# Patient Record
Sex: MALE | Race: WHITE | NOT HISPANIC OR LATINO | Employment: OTHER | URBAN - METROPOLITAN AREA
[De-identification: names, ages, dates, MRNs, and addresses within clinical notes are randomized per-mention and may not be internally consistent; named-entity substitution may affect disease eponyms.]

---

## 2017-01-17 ENCOUNTER — OFFICE VISIT (OUTPATIENT)
Dept: PAIN MEDICINE | Facility: CLINIC | Age: 72
End: 2017-01-17

## 2017-01-17 ENCOUNTER — APPOINTMENT (OUTPATIENT)
Dept: LAB | Facility: HOSPITAL | Age: 72
End: 2017-01-17
Attending: ANESTHESIOLOGY

## 2017-01-17 VITALS
HEART RATE: 68 BPM | DIASTOLIC BLOOD PRESSURE: 73 MMHG | HEIGHT: 69 IN | BODY MASS INDEX: 29.33 KG/M2 | SYSTOLIC BLOOD PRESSURE: 137 MMHG | TEMPERATURE: 98 F | OXYGEN SATURATION: 95 % | RESPIRATION RATE: 18 BRPM | WEIGHT: 198 LBS

## 2017-01-17 DIAGNOSIS — R53.81 PHYSICAL DECONDITIONING: ICD-10-CM

## 2017-01-17 DIAGNOSIS — Z01.818 PRE-PROCEDURAL EXAMINATION: Primary | ICD-10-CM

## 2017-01-17 DIAGNOSIS — M48.062 SPINAL STENOSIS, LUMBAR REGION, WITH NEUROGENIC CLAUDICATION: ICD-10-CM

## 2017-01-17 DIAGNOSIS — Z51.81 ENCOUNTER FOR THERAPEUTIC DRUG LEVEL MONITORING: ICD-10-CM

## 2017-01-17 DIAGNOSIS — M96.1 POSTLAMINECTOMY SYNDROME OF LUMBAR REGION: ICD-10-CM

## 2017-01-17 DIAGNOSIS — M54.16 CHRONIC LUMBAR RADICULOPATHY: ICD-10-CM

## 2017-01-17 DIAGNOSIS — D51.3 OTHER DIETARY VITAMIN B12 DEFICIENCY ANEMIA: ICD-10-CM

## 2017-01-17 DIAGNOSIS — Z98.1 STATUS POST LUMBAR SPINAL FUSION: ICD-10-CM

## 2017-01-17 DIAGNOSIS — G96.198 EPIDURAL FIBROSIS: ICD-10-CM

## 2017-01-17 LAB
APTT PPP: 29.8 SECONDS (ref 24–31)
BASOPHILS # BLD AUTO: 0.1 10*3/MM3 (ref 0–0.2)
BASOPHILS NFR BLD AUTO: 1.3 % (ref 0–1)
DEPRECATED RDW RBC AUTO: 42.6 FL (ref 37–54)
EOSINOPHIL # BLD AUTO: 0.34 10*3/MM3 (ref 0.1–0.3)
EOSINOPHIL NFR BLD AUTO: 4.6 % (ref 0–3)
ERYTHROCYTE [DISTWIDTH] IN BLOOD BY AUTOMATED COUNT: 12.5 % (ref 11.3–14.5)
HCT VFR BLD AUTO: 43.9 % (ref 38.9–50.9)
HGB BLD-MCNC: 14.6 G/DL (ref 13.1–17.5)
IMM GRANULOCYTES # BLD: 0.02 10*3/MM3 (ref 0–0.03)
IMM GRANULOCYTES NFR BLD: 0.3 % (ref 0–0.6)
INR PPP: 0.95
LYMPHOCYTES # BLD AUTO: 1.6 10*3/MM3 (ref 0.6–4.8)
LYMPHOCYTES NFR BLD AUTO: 21.4 % (ref 24–44)
MCH RBC QN AUTO: 31.1 PG (ref 27–31)
MCHC RBC AUTO-ENTMCNC: 33.3 G/DL (ref 32–36)
MCV RBC AUTO: 93.4 FL (ref 80–99)
MONOCYTES # BLD AUTO: 0.76 10*3/MM3 (ref 0–1)
MONOCYTES NFR BLD AUTO: 10.2 % (ref 0–12)
NEUTROPHILS # BLD AUTO: 4.64 10*3/MM3 (ref 1.5–8.3)
NEUTROPHILS NFR BLD AUTO: 62.2 % (ref 41–71)
PLATELET # BLD AUTO: 313 10*3/MM3 (ref 150–450)
PMV BLD AUTO: 10.5 FL (ref 6–12)
PROTHROMBIN TIME: 10.3 SECONDS (ref 9.6–11.5)
RBC # BLD AUTO: 4.7 10*6/MM3 (ref 4.2–5.76)
WBC NRBC COR # BLD: 7.46 10*3/MM3 (ref 3.5–10.8)

## 2017-01-17 PROCEDURE — 85025 COMPLETE CBC W/AUTO DIFF WBC: CPT | Performed by: ANESTHESIOLOGY

## 2017-01-17 PROCEDURE — 85610 PROTHROMBIN TIME: CPT | Performed by: ANESTHESIOLOGY

## 2017-01-17 PROCEDURE — 85730 THROMBOPLASTIN TIME PARTIAL: CPT | Performed by: ANESTHESIOLOGY

## 2017-01-17 PROCEDURE — 99214 OFFICE O/P EST MOD 30 MIN: CPT | Performed by: ANESTHESIOLOGY

## 2017-01-17 PROCEDURE — 36415 COLL VENOUS BLD VENIPUNCTURE: CPT

## 2017-01-17 NOTE — PROGRESS NOTES
"Chief Complaint: \"I'm having the same pain: Pain in my left hip and all the way down my left leg into my foot and toes.  I also have some cramps in my leg. I'm here to discuss a stimulator trial with the high-frequency device.\"    Brief History: Mr. Avila returns to the clinic to discuss prospects of a spinal cord stimulator trial with Maggie.   Mr. Avila presents with a challenging chronic pain history, and has already experienced failure to obtain pain relief with conservative and interventional pain management measures. Patient has also been found not to be a surgical candidate.  Patient presents with a five year history of pain, which started after moving furniture. Patient is status post lumbar decompression and fusion that took place soon after his injury. Unfortunately, patient continued experiencing severe left lower extremity pain.   Patient underwent a spinal cord stimulator trial with a Saint Jude on 11/30/2015, which provided more than 50% relief and functional improvement. However, patient wanted to delay implantation of a permanent spinal cord stimulator device until the arrival of new technology. Patient did not like the feeling of tonic stimulation, and at times, even though the stimulation was overlapping the areas of his pain, it did not feel pleasant. At the beginning of the trial, patient experienced complete pain relief with stimulation, but soon thereafter, he did not experience enough coverage of his left foot pain. At times, he felt the pain and the stimulation at the same time.   Patient previously failed a trial of lumbar epidural steroid injections with steroids and hyaluronidase via caudal catheter on 04/25/2016, for treatment of chronic lumbosacral radiculopathy/lumbar postlaminectomy syndrome with epidural fibrosis. Patient experienced 100% pain relief along with full functional recovery that lasted for only 48 hours. Previously, he underwent diagnostic and therapeutic left S1 " "transforaminal epidural steroid injection with hyaluronidase in 12/2015, receiving complete pain relief after the procedure, but unfortunately recurring thereafter.   Patient returns to the clinic with his chronic complaints of left hip and left lower extremity pain and bilateral lower extremity muscle spasms.   There are no changes in his medical history.   Pain level: 5/10.   Pain level ranges from 0-10/10.   Patient reports that he experiences brief episodes of no pain, but that pain rapidly escalates to 10/10, and sometimes, pain wakes him up interferying with his sleep. Patient experienced improvement of his neurogenic claudication during his spinal cord stimulator trial.   Patient complains of intermittent left buttock pain, radiating into the left lower extremity to the foot and toes, described as \"electrical nerve spasms that shoot through my leg.\" The left buttock/leg pain increases immediately upon sitting or lying down. Nothing helps the pain. He reports numbness in his left leg and tingling, but denies weakness in the lower extremities.   Patient denies new symptoms, or any new bowel and/or bladder problems.   No new diagnostic studies.  He  denies difficulties with his balance or any recent falls  Current analgesics: Gabapentin 800 mg 4 times a day, Keppra 250 mg twice a day, tizanidine 4 mg daily at bedtime when necessary.  Patient does not take any supplemental opiates or other analgesics.    Review of previous therapies and consultations:  Mr. Avila failed extensive courses of conservative and interventional pain management measures by Dr. Raciel Ozuna at [facility], followed by unsuccessful L4-L5 hemilaminotomy and foraminotomy on 03/03/2014, with Dr. Mackay. He reports that Dr. Mackay recommended lumbar fusion, and therefore, sought second opinion from Dr. Renee on 05/13/2014. He underwent lumbar decompression and fusion from L4 to S1 on 07/24/2014, with Dr. Renee. Patient reports that he experienced " improvement after his last surgery, particularly complete resolution of his lower back pain. The left lower extremity pain/chronic radiculopathy preceded his lumbar fusion and did not improve subsequently. He underwent surgical consultation with Dr. Cox of the [facility], who recommended further decompression of the nerve roots at L3 through S1. Patient was last seen in follow-up consultation with Dr. Renee on 09/29/2015, and more recently on November 18, 2016, at which time Dr. Renee concurred with a spinal cord stimulator trial.   In terms of oral analgesics, patient takes gabapentin, nortriptyline and Keppra 125 mg a day, without side effects.   JOCELIN Report #[id number], appropriate.  Patient does not take any controlled substances     Review of new diagnostic studies:  MRI of the lumbar spine with and without contrast October 28, 2016.  Revealed postoperative changes of L4 L5 S1 posterior lumbar and interbody fusion.  Facet hypertrophy.  Mild bilateral foraminal stenosis at these levels.  New L3-L4 spinal stenosis secondary to severe posterior ligament hypertrophy.  AP diameter 6.5 mm in transverse diameter 9 mm with crowding of the nerve roots.  Preserved anterior CSF space.  Moderate bilateral neuroforaminal stenosis at L3-L4  MRI of the thoracic spine October 20, 2016: No evidence of significant thoracic spine disease    Previous Diagnostic Studies:   Laboratory Studies, 11/06/2015: CBC: WBC-8.62, RBC 4.42, HGB 13.8, HCT 40.1, . PT 10.7. INR 1.02. PTT 30.  X-ray of the lumbar spine, 03/24/2015: PLIF has been performed from L4 through S1. Interbody fusion devices have been placed in good position. There is no subluxation, fracture, fatigue fracture of the hardware or hardware displacement. Overall alignment in the AP and lateral projection is excellent.  X-ray of the lumbar spine flexion and extension on 09/30/2014, revealed lateral flexion and extension views of the lumbar spine demonstrate  the lumbar vertebrae to be stable in the reflex and extended positions.  Myelogram of the lumbar spine 09/30/2014, revealed changes related to posterior lumbar fusion at L4, L5, and S1. Dural sac is well distended. Normal lumbar myelogram.  CT scan of the lumbar spine post myelogram on 09/30/2014, revealed minimally bulging annulus at L3-L4. There is a bulging annulus at L4-L5. Small left paracentral disc protrusion at L5-S1.  CT of the lumbar spine without contrast on 09/22/2014, revealed pedicle screws are seen posteriorly fusing L4, L5, and S1. Minimal degenerative changes are identified within the sacroiliac joints bilaterally. Anterior spurring identified of the L4-L5 levels. Minimal degenerative changes are seen within the posterior facets. Small broad-based disc bulge identified at L3-L4 creating moderate to severe central spinal canal narrowing. Nerve root compromise cannot be excluded.  X-ray of the lumbar spine on 08/19/2014, revealed posterior fusion rods and pedicle screws are present at L3, L4, and L5. Bone graft material is also present at these levels.  X-ray of the lumbar spine 08/08/2014, revealed AP and lateral views of the lumbar spine demonstrate posterior pedicle screws and posterior lumbar fusion of L4, L5, and S1. Alignment appears satisfactory in both projections and the alignment unchanged since the immediate postoperative images of 07/14/2014.  MRI of the lumbar spine with and without contrast on 04/29/2014 revealed lumbar facet arthropathy from L2-L3 through L5-S1. L4-L5 extensive facet hypertrophy. Operative changes from left laminectomy. Enhancing tissue which is greatest on the laminectomy defect on the left side of the spinal canal and within the left subarticular recess and medial aspect of the foramen, compatible with scar. There is nonenhancing tissue extending into the inferior aspect of the left subarticular recess , most likely related to bony hypertrophy. This contacts the left  L5 nerve root. The dural sac is mildly narrowed. The right subarticular recess is moderately narrowed. Bilateral L4-L5 neuroforaminal stenosis, greater on the right. There is enhancing scar extending into the medial aspect of the left foramen and a mildly bulging annulus. L5-S1, moderate facet hypertrophy greater on the right. A diffusely bulging annulus greatest centrally. Moderate narrowing of the subarticular recesses and mild narrowing of the spinal canal. the neuroforaminal are moderately to severely narrowed, greatest medially.  MRI of the lumbar spine on 01/24/2014 without contrast revealed: L4-L5, high-grade disc protrusion, compounded by marked facet arthropathy and hypertrophy of the ligamentum flavum, producing near-complete central spinal stenosis with critical bilateral recess impingement stenosis. Not significantly changed when compared to previous 08/22/2013 exam. L5-S1, small soft disc protrusion with marked facet arthropathic hypertrophy, producing moderately severe lateral recess symmetrical bilateral stenosis in the lateral recess.  X-ray of the lumbar spine on 08/27/2013 revealed: Grade 1 anterolisthesis of L4 on L5. Moderate to severe facet arthropathy from L3 through S1. Mild L2-L3 discogenic disease. Mild to moderate L4-L5 discogenic disease. Mild L5-S1 discogenic disease.  MRI of the lumbar spine without contrast on 08/22/2013, revealed: L3-L4, fairly extensive facet hypertrophy. Minimal AP canal diameter is approximately 9 mm, no high grade stenosis is seen. L4-L5, marked posterior ligament and facet hypertrophy. Large broad-based disc protrusion with paracentral disc herniation causing marked narrowing of the canal, to approximately 2.5 or 3 mm AP diameter. Foramina appear mildly to moderately narrowed bilateral due to the bony facet changes. L5-S1 mild broad-based disc bulge. Mild lateral recess encroachment and mild bony foraminal narrowing.      Review of Systems   Musculoskeletal:  "Positive for arthralgias (left lower extremity pain).   All other systems reviewed and are negative.     The following portions of the patient's history were reviewed and updated as appropriate: problem list, past medical history, past surgery history, social history, family history, medications, and allergies     Visit Vitals   • /73 (BP Location: Left arm, Patient Position: Sitting)   • Pulse 68   • Temp 98 °F (36.7 °C) (Temporal Artery )   • Resp 18   • Ht 69\" (175.3 cm)   • Wt 198 lb (89.8 kg)   • SpO2 95%   • BMI 29.24 kg/m2      Physical Exam   Neurologic Exam  Constitutional General appearance: No acute distress, well appearing and well nourished. Appears healthy, within normal limits of ideal weight, well hydrated and appearance reflects stated age.   Head and face: Normal.   Palpation of the face and sinuses: No sinus tenderness.   Eyes Conjunctiva and lids: No erythema, swelling or discharge. Pupils: Equal, round, reactive to light.   Neck: Supple, symmetric, trachea midline, no masses.   Pulmonary Respiratory effort: No increased work of breathing or signs of respiratory distress. Respiratory rate: normal. Assessment of respiratory effort revealed normal rhythm and effort. Auscultation of lungs: Clear to auscultation. Auscultation of the lungs revealed no expiratory wheezing, normal expiratory time and no inspiratory wheezing. No rales or crackles were heard bilaterally. No rhonchi. No friction rub. No wheezing. No diminished breath sounds. No bronchial breath sounds.   Cardiovascular Auscultation of heart: Normal rate and rhythm, normal S1 and S2, no murmurs. Peripheral vascular exam: Normal. Examination of extremities for edema and/or varicosities: Normal.   Abdomen: Non-tender, no masses. Bowel sounds were normal. The abdomen was soft and nontender. No masses palpated.   Musculoskeletal Gait and station: Normal. Gait evaluation demonstrated a normal gait. The range of motion of the lumbar spine " is essentially full and without significant pain. Lumbar facet joint loading maneuvers are negative. Kodak and Gaenslen's tests are negative. The range of motion of the hip joints is full and without pain. Palpation of the gluteal bursa, ischial tuberosities, and greater trochanters, unrevealing. Piriformis maneuvers are negative. There is some discomfort upon palpation of the left piriformis muscle. Active resisted external rotation and passive internal rotation did not increase pain.   Muscle strength/tone: Normal. Motor Strength Findings: normal strength. Motor Tone: normal tone. Involuntary movements: none.  Significant shortening of the hamstrings, worse on the left side.  Skin and subcutaneous tissue: Normal without rashes or lesions.   Neurologic   Cranial nerves: Cranial nerves 2-12 intact.   Cortical function: Normal mental status.   Reflexes: 2+ and symmetric. Deep tendon reflexes: 2+ right biceps, 2+ left biceps, 1+ right patella, 1+ left patella, 1+ right ankle jerk and 1+ left ankle jerk. Superficial/Primitive Reflexes: primitive reflexes were absent. Straight leg raising test is negative. Femoral stretch sign is negative.   Sensation: No sensory loss. Sensory exam: intact to light touch, intact pain and temperature sensation, intact vibration sensation and normal proprioception.   Coordination: Normal finger to nose and heel to shin. Coordination: normal balance and negative Romberg's sign.   Psychiatric   Judgment and insight: Normal.   Orientation to person, place and time: Normal.   Recent and remote memory: Intact.   Mood and affect: Normal.     ASSESSMENT:   1. Chronic lumbar radiculopathy    2. Lumbar postlaminectomy syndrome     3. Spinal stenosis, lumbar region, with neurogenic claudication    4. Status post L4-S1 lumbar spinal fusion    5. Physical deconditioning      PLAN: Patient continues struggling with his chronic pain condition and residual symptoms.  Therefore, I have proposed the  following plan, as referenced in the history of present illness, as follows:   1. Patient will be scheduled for a spinal cord stimulator trial with a high-frequency device (Nevro).  Patient has already reviewed educational materials regarding spinal cord stimulation with Nevro. Patient has undergone follow-up surgical consultation with Dr. Renee, who has also concurred on the possibility of spinal cord stimulation. We have discussed regenerative therapies at length, including platelet rich plasma therapy, amniotic fluid therapy, neural prolotherapy, to name a few. Patient would like to move forward with a spinal cord stimulator trial, and if successful, implantation of a spinal cord stimulator device.   2. Diagnostics: We will obtain CBC, PT, PTT prior to this trial.  3. Long-term rehabilitation efforts:  a. Patient will start a comprehensive physical therapy program once his pain is under control  b. Patient has declined Pilates  4. Pharmacological measures, as follows;  a. Continue gabapentin 800 mg four times daily  b. Continue tizanidie 4 mg at bedtime as needed  c. Continue Keppra 125 mg twice daily  d. Patient has declined additional pharmacological measures at this time  5. The patient has been instructed to contact my office with any questions or difficulties. The patient understands the plan and agrees to proceed accordingly.        Patient Care Team:  Brian Obrien MD as PCP - General (Internal Medicine)  Efrain Batista MD as Consulting Physician (Pain Medicine)  Fausto Renee MD as Surgeon (Neurosurgery)     I spent 35 minutes face-to-face with the patient, of which 25 minutes were spent counseling regarding evaluation, diagnosis, prognosis, diagnostic testing, potential referrals, treatment options for chronic pain condition and overall rehabilitation, risk and benefits of different interventions, alternative therapies, a comprehensive plan of care to address physical deconditioning, risks  and benefits as it relates to spinal cord stimulator devices for trial and implantation and long-term management and functional goals of spinal cord stimulation     No orders of the defined types were placed in this encounter.        Future Appointments  Date Time Provider Department Center   1/30/2017 7:00 AM Efrain Batista MD [facility] None         Efrain Batista MD

## 2017-01-17 NOTE — LETTER
"January 17, 2017     Brian Obrien MD  [address]  [address]  [facility] [address]    Patient: Prashant Avila   YOB: 1945   Date of Visit: 1/17/2017       Dear Dr. Micah MD:    Thank you for referring Prashant Avila to me for evaluation. Below are the relevant portions of my assessment and plan of care.    If you have questions, please do not hesitate to call me. I look forward to following Prashant along with you.         Sincerely,        Efrain Batista MD        CC: MD Efrain Richmond MD  1/17/2017  7:47 AM  Signed  Chief Complaint: \"I'm having the same pain: Pain in my left hip and all the way down my left leg into my foot and toes.  I also have some cramps in my leg. I'm here to discuss a stimulator trial with the high-frequency device.\"    Brief History: Mr. Avila returns to the clinic to discuss prospects of a spinal cord stimulator trial with Maggie.   Mr. Avila presents with a challenging chronic pain history, and has already experienced failure to obtain pain relief with conservative and interventional pain management measures. Patient has also been found not to be a surgical candidate.  Patient presents with a five year history of pain, which started after moving furniture. Patient is status post lumbar decompression and fusion that took place soon after his injury. Unfortunately, patient continued experiencing severe left lower extremity pain.   Patient underwent a spinal cord stimulator trial with a [place] on 11/30/2015, which provided more than 50% relief and functional improvement. However, patient wanted to delay implantation of a permanent spinal cord stimulator device until the arrival of new technology. Patient did not like the feeling of tonic stimulation, and at times, even though the stimulation was overlapping the areas of his pain, it did not feel pleasant. At the beginning of the trial, patient experienced complete pain relief with " "stimulation, but soon thereafter, he did not experience enough coverage of his left foot pain. At times, he felt the pain and the stimulation at the same time.   Patient previously failed a trial of lumbar epidural steroid injections with steroids and hyaluronidase via caudal catheter on 04/25/2016, for treatment of chronic lumbosacral radiculopathy/lumbar postlaminectomy syndrome with epidural fibrosis. Patient experienced 100% pain relief along with full functional recovery that lasted for only 48 hours. Previously, he underwent diagnostic and therapeutic left S1 transforaminal epidural steroid injection with hyaluronidase in 12/2015, receiving complete pain relief after the procedure, but unfortunately recurring thereafter.   Patient returns to the clinic with his chronic complaints of left hip and left lower extremity pain and bilateral lower extremity muscle spasms.   There are no changes in his medical history.   Pain level: 5/10.   Pain level ranges from 0-10/10.   Patient reports that he experiences brief episodes of no pain, but that pain rapidly escalates to 10/10, and sometimes, pain wakes him up interferying with his sleep. Patient experienced improvement of his neurogenic claudication during his spinal cord stimulator trial.   Patient complains of intermittent left buttock pain, radiating into the left lower extremity to the foot and toes, described as \"electrical nerve spasms that shoot through my leg.\" The left buttock/leg pain increases immediately upon sitting or lying down. Nothing helps the pain. He reports numbness in his left leg and tingling, but denies weakness in the lower extremities.   Patient denies new symptoms, or any new bowel and/or bladder problems.   No new diagnostic studies.  He  denies difficulties with his balance or any recent falls  Current analgesics: Gabapentin 800 mg 4 times a day, Keppra 250 mg twice a day, tizanidine 4 mg daily at bedtime when necessary.  Patient does not " take any supplemental opiates or other analgesics.    Review of previous therapies and consultations:  Mr. Avila failed extensive courses of conservative and interventional pain management measures by Dr. Raciel Ozuna at [facility], followed by unsuccessful L4-L5 hemilaminotomy and foraminotomy on 03/03/2014, with Dr. Mackay. He reports that Dr. Mackay recommended lumbar fusion, and therefore, sought second opinion from Dr. Renee on 05/13/2014. He underwent lumbar decompression and fusion from L4 to S1 on 07/24/2014, with Dr. Renee. Patient reports that he experienced improvement after his last surgery, particularly complete resolution of his lower back pain. The left lower extremity pain/chronic radiculopathy preceded his lumbar fusion and did not improve subsequently. He underwent surgical consultation with Dr. Cox of the [facility], who recommended further decompression of the nerve roots at L3 through S1. Patient was last seen in follow-up consultation with Dr. Renee on 09/29/2015, and more recently on November 18, 2016, at which time Dr. Renee concurred with a spinal cord stimulator trial.   In terms of oral analgesics, patient takes gabapentin, nortriptyline and Keppra 125 mg a day, without side effects.   [facility] Report #[id number], appropriate.  Patient does not take any controlled substances     Review of new diagnostic studies:  MRI of the lumbar spine with and without contrast October 28, 2016.  Revealed postoperative changes of L4 L5 S1 posterior lumbar and interbody fusion.  Facet hypertrophy.  Mild bilateral foraminal stenosis at these levels.  New L3-L4 spinal stenosis secondary to severe posterior ligament hypertrophy.  AP diameter 6.5 mm in transverse diameter 9 mm with crowding of the nerve roots.  Preserved anterior CSF space.  Moderate bilateral neuroforaminal stenosis at L3-L4  MRI of the thoracic spine October 20, 2016: No evidence of significant thoracic spine disease    Previous Diagnostic  Studies:   Laboratory Studies, 11/06/2015: CBC: WBC-8.62, RBC 4.42, HGB 13.8, HCT 40.1, . PT 10.7. INR 1.02. PTT 30.  X-ray of the lumbar spine, 03/24/2015: PLIF has been performed from L4 through S1. Interbody fusion devices have been placed in good position. There is no subluxation, fracture, fatigue fracture of the hardware or hardware displacement. Overall alignment in the AP and lateral projection is excellent.  X-ray of the lumbar spine flexion and extension on 09/30/2014, revealed lateral flexion and extension views of the lumbar spine demonstrate the lumbar vertebrae to be stable in the reflex and extended positions.  Myelogram of the lumbar spine 09/30/2014, revealed changes related to posterior lumbar fusion at L4, L5, and S1. Dural sac is well distended. Normal lumbar myelogram.  CT scan of the lumbar spine post myelogram on 09/30/2014, revealed minimally bulging annulus at L3-L4. There is a bulging annulus at L4-L5. Small left paracentral disc protrusion at L5-S1.  CT of the lumbar spine without contrast on 09/22/2014, revealed pedicle screws are seen posteriorly fusing L4, L5, and S1. Minimal degenerative changes are identified within the sacroiliac joints bilaterally. Anterior spurring identified of the L4-L5 levels. Minimal degenerative changes are seen within the posterior facets. Small broad-based disc bulge identified at L3-L4 creating moderate to severe central spinal canal narrowing. Nerve root compromise cannot be excluded.  X-ray of the lumbar spine on 08/19/2014, revealed posterior fusion rods and pedicle screws are present at L3, L4, and L5. Bone graft material is also present at these levels.  X-ray of the lumbar spine 08/08/2014, revealed AP and lateral views of the lumbar spine demonstrate posterior pedicle screws and posterior lumbar fusion of L4, L5, and S1. Alignment appears satisfactory in both projections and the alignment unchanged since the immediate postoperative images of  07/14/2014.  MRI of the lumbar spine with and without contrast on 04/29/2014 revealed lumbar facet arthropathy from L2-L3 through L5-S1. L4-L5 extensive facet hypertrophy. Operative changes from left laminectomy. Enhancing tissue which is greatest on the laminectomy defect on the left side of the spinal canal and within the left subarticular recess and medial aspect of the foramen, compatible with scar. There is nonenhancing tissue extending into the inferior aspect of the left subarticular recess , most likely related to bony hypertrophy. This contacts the left L5 nerve root. The dural sac is mildly narrowed. The right subarticular recess is moderately narrowed. Bilateral L4-L5 neuroforaminal stenosis, greater on the right. There is enhancing scar extending into the medial aspect of the left foramen and a mildly bulging annulus. L5-S1, moderate facet hypertrophy greater on the right. A diffusely bulging annulus greatest centrally. Moderate narrowing of the subarticular recesses and mild narrowing of the spinal canal. the neuroforaminal are moderately to severely narrowed, greatest medially.  MRI of the lumbar spine on 01/24/2014 without contrast revealed: L4-L5, high-grade disc protrusion, compounded by marked facet arthropathy and hypertrophy of the ligamentum flavum, producing near-complete central spinal stenosis with critical bilateral recess impingement stenosis. Not significantly changed when compared to previous 08/22/2013 exam. L5-S1, small soft disc protrusion with marked facet arthropathic hypertrophy, producing moderately severe lateral recess symmetrical bilateral stenosis in the lateral recess.  X-ray of the lumbar spine on 08/27/2013 revealed: Grade 1 anterolisthesis of L4 on L5. Moderate to severe facet arthropathy from L3 through S1. Mild L2-L3 discogenic disease. Mild to moderate L4-L5 discogenic disease. Mild L5-S1 discogenic disease.  MRI of the lumbar spine without contrast on 08/22/2013,  "revealed: L3-L4, fairly extensive facet hypertrophy. Minimal AP canal diameter is approximately 9 mm, no high grade stenosis is seen. L4-L5, marked posterior ligament and facet hypertrophy. Large broad-based disc protrusion with paracentral disc herniation causing marked narrowing of the canal, to approximately 2.5 or 3 mm AP diameter. Foramina appear mildly to moderately narrowed bilateral due to the bony facet changes. L5-S1 mild broad-based disc bulge. Mild lateral recess encroachment and mild bony foraminal narrowing.      Review of Systems   Musculoskeletal: Positive for arthralgias (left lower extremity pain).   All other systems reviewed and are negative.     The following portions of the patient's history were reviewed and updated as appropriate: problem list, past medical history, past surgery history, social history, family history, medications, and allergies     Visit Vitals   • /73 (BP Location: Left arm, Patient Position: Sitting)   • Pulse 68   • Temp 98 °F (36.7 °C) (Temporal Artery )   • Resp 18   • Ht 69\" (175.3 cm)   • Wt 198 lb (89.8 kg)   • SpO2 95%   • BMI 29.24 kg/m2      Physical Exam   Neurologic Exam  Constitutional General appearance: No acute distress, well appearing and well nourished. Appears healthy, within normal limits of ideal weight, well hydrated and appearance reflects stated age.   Head and face: Normal.   Palpation of the face and sinuses: No sinus tenderness.   Eyes Conjunctiva and lids: No erythema, swelling or discharge. Pupils: Equal, round, reactive to light.   Neck: Supple, symmetric, trachea midline, no masses.   Pulmonary Respiratory effort: No increased work of breathing or signs of respiratory distress. Respiratory rate: normal. Assessment of respiratory effort revealed normal rhythm and effort. Auscultation of lungs: Clear to auscultation. Auscultation of the lungs revealed no expiratory wheezing, normal expiratory time and no inspiratory wheezing. No rales or " crackles were heard bilaterally. No rhonchi. No friction rub. No wheezing. No diminished breath sounds. No bronchial breath sounds.   Cardiovascular Auscultation of heart: Normal rate and rhythm, normal S1 and S2, no murmurs. Peripheral vascular exam: Normal. Examination of extremities for edema and/or varicosities: Normal.   Abdomen: Non-tender, no masses. Bowel sounds were normal. The abdomen was soft and nontender. No masses palpated.   Musculoskeletal Gait and station: Normal. Gait evaluation demonstrated a normal gait. The range of motion of the lumbar spine is essentially full and without significant pain. Lumbar facet joint loading maneuvers are negative. Kodak and Gaenslen's tests are negative. The range of motion of the hip joints is full and without pain. Palpation of the gluteal bursa, ischial tuberosities, and greater trochanters, unrevealing. Piriformis maneuvers are negative. There is some discomfort upon palpation of the left piriformis muscle. Active resisted external rotation and passive internal rotation did not increase pain.   Muscle strength/tone: Normal. Motor Strength Findings: normal strength. Motor Tone: normal tone. Involuntary movements: none.  Significant shortening of the hamstrings, worse on the left side.  Skin and subcutaneous tissue: Normal without rashes or lesions.   Neurologic   Cranial nerves: Cranial nerves 2-12 intact.   Cortical function: Normal mental status.   Reflexes: 2+ and symmetric. Deep tendon reflexes: 2+ right biceps, 2+ left biceps, 1+ right patella, 1+ left patella, 1+ right ankle jerk and 1+ left ankle jerk. Superficial/Primitive Reflexes: primitive reflexes were absent. Straight leg raising test is negative. Femoral stretch sign is negative.   Sensation: No sensory loss. Sensory exam: intact to light touch, intact pain and temperature sensation, intact vibration sensation and normal proprioception.   Coordination: Normal finger to nose and heel to shin.  Coordination: normal balance and negative Romberg's sign.   Psychiatric   Judgment and insight: Normal.   Orientation to person, place and time: Normal.   Recent and remote memory: Intact.   Mood and affect: Normal.     ASSESSMENT:   1. Chronic lumbar radiculopathy    2. Lumbar postlaminectomy syndrome     3. Spinal stenosis, lumbar region, with neurogenic claudication    4. Status post L4-S1 lumbar spinal fusion    5. Physical deconditioning      PLAN: Patient continues struggling with his chronic pain condition and residual symptoms.  Therefore, I have proposed the following plan, as referenced in the history of present illness, as follows:   1. Patient will be scheduled for a spinal cord stimulator trial with a high-frequency device ([employer]).  Patient has already reviewed educational materials regarding spinal cord stimulation with Nevro. Patient has undergone follow-up surgical consultation with Dr. Renee, who has also concurred on the possibility of spinal cord stimulation. We have discussed regenerative therapies at length, including platelet rich plasma therapy, amniotic fluid therapy, neural prolotherapy, to name a few. Patient would like to move forward with a spinal cord stimulator trial, and if successful, implantation of a spinal cord stimulator device.   2. Diagnostics: We will obtain CBC, PT, PTT prior to this trial.  3. Long-term rehabilitation efforts:  a. Patient will start a comprehensive physical therapy program once his pain is under control  b. Patient has declined Pilates  4. Pharmacological measures, as follows;  a. Continue gabapentin 800 mg four times daily  b. Continue tizanidie 4 mg at bedtime as needed  c. Continue Keppra 125 mg twice daily  d. Patient has declined additional pharmacological measures at this time  5. The patient has been instructed to contact my office with any questions or difficulties. The patient understands the plan and agrees to proceed accordingly.        Patient  Care Team:  Brian Obrien MD as PCP - General (Internal Medicine)  Efrain Batista MD as Consulting Physician (Pain Medicine)  Fausto Renee MD as Surgeon (Neurosurgery)     I spent 35 minutes face-to-face with the patient, of which 25 minutes were spent counseling regarding evaluation, diagnosis, prognosis, diagnostic testing, potential referrals, treatment options for chronic pain condition and overall rehabilitation, risk and benefits of different interventions, alternative therapies, a comprehensive plan of care to address physical deconditioning, risks and benefits as it relates to spinal cord stimulator devices for trial and implantation and long-term management and functional goals of spinal cord stimulation     No orders of the defined types were placed in this encounter.        Future Appointments  Date Time Provider Department Center   1/30/2017 7:00 AM Efrain Batista MD [facility] None         Efrain Batista MD

## 2017-01-17 NOTE — MR AVS SNAPSHOT
Prashant Avila   1/17/2017 7:00 AM   Office Visit    Dept Phone:  [phone]   Encounter #:  [id number]    Provider:  Efrain Batista MD   Department:  [facility]                Your Full Care Plan              Today's Medication Changes          These changes are accurate as of: 1/17/17  7:52 AM.  If you have any questions, ask your nurse or doctor.               Stop taking medication(s)listed here:     nortriptyline 10 MG capsule   Commonly known as:  PAMELOR   Stopped by:  Efrain Batista MD                      Your Updated Medication List          This list is accurate as of: 1/17/17  7:52 AM.  Always use your most recent med list.                gabapentin 800 MG tablet   Commonly known as:  NEURONTIN   Take 1 tablet by mouth 4 (four) times a day.       levETIRAcetam 250 MG tablet   Commonly known as:  KEPPRA       LIPITOR 10 MG tablet   Generic drug:  atorvastatin       tiZANidine 4 MG tablet   Commonly known as:  ZANAFLEX   Take 1 tablet by mouth At Night As Needed for muscle spasms.       triamterene-hydrochlorothiazide 37.5-25 MG per tablet   Commonly known as:  MAXZIDE-25               You Were Diagnosed With        Codes Comments    Chronic lumbar radiculopathy     ICD-10-CM: M54.16  ICD-9-CM: 724.4     Postlaminectomy syndrome of lumbar region     ICD-10-CM: M96.1  ICD-9-CM: 722.83     Spinal stenosis, lumbar region, with neurogenic claudication     ICD-10-CM: M48.06  ICD-9-CM: 724.03     Status post lumbar spinal fusion     ICD-10-CM: Z98.1  ICD-9-CM: V45.4     Physical deconditioning     ICD-10-CM: R53.81  ICD-9-CM: 799.3       Instructions     None    Patient Instructions History      Upcoming Appointments     Visit Type Date Time Department    OFFICE VISIT 1/17/2017  7:00 AM [facility]    OUTSIDE FACILITY 1/30/2017  7:00 AM [facility]      MyChart Signup     Our records indicate that your [facility] [employer] account  "has been deactivated. If you would like to reactivate your account, please email [email or web address] or call [phone] to talk to our [employer] staff.             Other Info from Your Visit           Your Appointments     Jan 30, 2017  7:00 AM EST   Outside Facility with Efrain Batista MD   [facility] (--)    [address] ,  [address] [address] [phone]              Allergies     Doxycycline        Reason for Visit     Follow-up SCS Trial      Vital Signs     Blood Pressure Pulse Temperature Respirations Height Weight    137/73 (BP Location: Left arm, Patient Position: Sitting) 68 98 °F (36.7 °C) (Temporal Artery ) 18 69\" (175.3 cm) 198 lb (89.8 kg)    Oxygen Saturation Body Mass Index Smoking Status             95% 29.24 kg/m2 Never Smoker         Problems and Diagnoses Noted     Chronic lumbar radiculopathy    Postlaminectomy syndrome of lumbar region    Physical deconditioning    Narrowing of spinal canal    Status post lumbar spinal fusion        "

## 2017-01-30 ENCOUNTER — OUTSIDE FACILITY SERVICE (OUTPATIENT)
Dept: PAIN MEDICINE | Facility: CLINIC | Age: 72
End: 2017-01-30

## 2017-01-30 PROCEDURE — 99153 MOD SED SAME PHYS/QHP EA: CPT | Performed by: ANESTHESIOLOGY

## 2017-01-30 PROCEDURE — 95972 ALYS CPLX SP/PN NPGT W/PRGRM: CPT | Performed by: ANESTHESIOLOGY

## 2017-01-30 PROCEDURE — 63650 IMPLANT NEUROELECTRODES: CPT | Performed by: ANESTHESIOLOGY

## 2017-01-30 PROCEDURE — 99152 MOD SED SAME PHYS/QHP 5/>YRS: CPT | Performed by: ANESTHESIOLOGY

## 2017-02-02 ENCOUNTER — OFFICE VISIT (OUTPATIENT)
Dept: PAIN MEDICINE | Facility: CLINIC | Age: 72
End: 2017-02-02

## 2017-02-02 VITALS
TEMPERATURE: 97.8 F | SYSTOLIC BLOOD PRESSURE: 157 MMHG | WEIGHT: 199 LBS | RESPIRATION RATE: 18 BRPM | HEIGHT: 69 IN | BODY MASS INDEX: 29.47 KG/M2 | OXYGEN SATURATION: 96 % | HEART RATE: 66 BPM | DIASTOLIC BLOOD PRESSURE: 74 MMHG

## 2017-02-02 DIAGNOSIS — M96.1 POSTLAMINECTOMY SYNDROME OF LUMBAR REGION: ICD-10-CM

## 2017-02-02 DIAGNOSIS — R53.81 PHYSICAL DECONDITIONING: ICD-10-CM

## 2017-02-02 DIAGNOSIS — M48.062 SPINAL STENOSIS, LUMBAR REGION, WITH NEUROGENIC CLAUDICATION: ICD-10-CM

## 2017-02-02 DIAGNOSIS — M54.16 CHRONIC LUMBAR RADICULOPATHY: ICD-10-CM

## 2017-02-02 DIAGNOSIS — Z98.1 STATUS POST LUMBAR SPINAL FUSION: ICD-10-CM

## 2017-02-02 DIAGNOSIS — G96.198 EPIDURAL FIBROSIS: ICD-10-CM

## 2017-02-02 PROCEDURE — 99024 POSTOP FOLLOW-UP VISIT: CPT | Performed by: ANESTHESIOLOGY

## 2017-02-02 PROCEDURE — 95972 ALYS CPLX SP/PN NPGT W/PRGRM: CPT | Performed by: ANESTHESIOLOGY

## 2017-02-02 RX ORDER — NAPROXEN SODIUM 220 MG
220 TABLET ORAL 2 TIMES DAILY PRN
COMMUNITY
End: 2017-11-03

## 2017-02-02 NOTE — PROGRESS NOTES
"Chief Complaint: \"I am not getting relief from the stimulator. I'm having the same pain: Pain in my left hip and all the way down my left leg into my foot and toes. I also have some cramps in my leg.\"    Brief History: Mr. Prashant Avila is a 72 y.o. male, who returns to the clinic for possible spinal cord stimulator reprogramming and possible removal of spinal cord stimulator trial leads placed on 01/30/2017. Mr. Prashant Avila reports no significant pain relief of his chronic left lower extremity pain with the use of his Nevro stimulator device. He has remained afebrile throughout the trial. Dressings are dry and intact. Patient denies any complications related to the procedure, any new symptoms or any new bladder or bowel problems. Patient denies   numbness and weakness in the lower extremities, or any new bladder or bowel problems.   Mr. Avila presents with a challenging chronic pain history, and has already experienced failure to obtain pain relief with conservative and interventional pain management measures. Patient has also been found not to be a surgical candidate. Patient presents with a five year history of pain, which started after moving furniture. Patient is status post lumbar decompression and fusion that took place soon after his injury. Unfortunately, patient continued experiencing severe left lower extremity pain.   Patient underwent a spinal cord stimulator trial with a [place] on 11/30/2015, which provided more than 50% relief and functional improvement. However, patient wanted to delay implantation of a permanent spinal cord stimulator device until the arrival of new technology. Patient did not like the feeling of tonic stimulation, and at times, even though the stimulation was overlapping the areas of his pain, it did not feel pleasant. At the beginning of the trial, patient experienced complete pain relief with stimulation, but soon thereafter, he did not experience enough coverage of " "his left foot pain. At times, he felt the pain and the stimulation at the same time. Patient previously failed a trial of lumbar epidural steroid injections with steroids and hyaluronidase via caudal catheter on 04/25/2016, for treatment of chronic lumbosacral radiculopathy/lumbar postlaminectomy syndrome with epidural fibrosis. Patient experienced 100% pain relief along with full functional recovery that lasted for only 48 hours. Previously, he underwent diagnostic and therapeutic left S1 transforaminal epidural steroid injection with hyaluronidase in 12/2015, receiving complete pain relief after the procedure, but unfortunately recurring thereafter.   Pain level: 5/10.   Pain level ranges from 0-10/10.   Patient reports that he experiences brief episodes of no pain, but that pain rapidly escalates to 10/10, and sometimes, pain wakes him up interferying with his sleep. Patient complains of intermittent left buttock pain, radiating into the left lower extremity to the foot and toes, described as \"electrical nerve spasms that shoot through my leg.\" The left buttock/leg pain increases immediately upon sitting or lying down. Nothing helps the pain. He reports numbness in his left leg and tingling, but denies weakness in the lower extremities.   Patient denies new symptoms, or any new bowel and/or bladder problems.   No new diagnostic studies. He denies difficulties with his balance or any recent falls  Current analgesics: Gabapentin 800 mg 4 times a day, Keppra 250 mg twice a day, tizanidine 4 mg daily at bedtime when necessary. Patient does not take any supplemental opiates or other analgesics.     Review of previous therapies and consultations:  Mr. Avila failed extensive courses of conservative and interventional pain management measures by Dr. Raciel Ozuna at [facility], followed by unsuccessful L4-L5 hemilaminotomy and foraminotomy on 03/03/2014, with Dr. Mackay. He reports that Dr. Mackay recommended lumbar fusion, and " therefore, sought second opinion from Dr. Renee on 05/13/2014. He underwent lumbar decompression and fusion from L4 to S1 on 07/24/2014, with Dr. Renee. Patient reports that he experienced improvement after his last surgery, particularly complete resolution of his lower back pain. The left lower extremity pain/chronic radiculopathy preceded his lumbar fusion and did not improve subsequently. He underwent surgical consultation with Dr. Cox of the [facility], who recommended further decompression of the nerve roots at L3 through S1. Patient was last seen in follow-up consultation with Dr. Renee on 09/29/2015, and more recently on November 18, 2016, at which time Dr. Renee concurred with a spinal cord stimulator trial.   In terms of oral analgesics, patient takes gabapentin, nortriptyline and Keppra 125 mg a day, without side effects.   [facility] Report #[id number], appropriate.  Patient does not take any controlled substances      Diagnostic studies:  MRI of the lumbar spine with and without contrast October 28, 2016. Revealed postoperative changes of L4 L5 S1 posterior lumbar and interbody fusion. Facet hypertrophy. Mild bilateral foraminal stenosis at these levels. New L3-L4 spinal stenosis secondary to severe posterior ligament hypertrophy. AP diameter 6.5 mm in transverse diameter 9 mm with crowding of the nerve roots. Preserved anterior CSF space. Moderate bilateral neuroforaminal stenosis at L3-L4  MRI of the thoracic spine October 20, 2016: No evidence of significant thoracic spine disease  Laboratory Studies, 11/06/2015: CBC: WBC-8.62, RBC 4.42, HGB 13.8, HCT 40.1, . PT 10.7. INR 1.02. PTT 30.  X-ray of the lumbar spine, 03/24/2015: PLIF has been performed from L4 through S1. Interbody fusion devices have been placed in good position. There is no subluxation, fracture, fatigue fracture of the hardware or hardware displacement. Overall alignment in the AP and lateral projection is  excellent.  X-ray of the lumbar spine flexion and extension on 09/30/2014, revealed lateral flexion and extension views of the lumbar spine demonstrate the lumbar vertebrae to be stable in the reflex and extended positions.  Myelogram of the lumbar spine 09/30/2014, revealed changes related to posterior lumbar fusion at L4, L5, and S1. Dural sac is well distended. Normal lumbar myelogram.  CT scan of the lumbar spine post myelogram on 09/30/2014, revealed minimally bulging annulus at L3-L4. There is a bulging annulus at L4-L5. Small left paracentral disc protrusion at L5-S1.  CT of the lumbar spine without contrast on 09/22/2014, revealed pedicle screws are seen posteriorly fusing L4, L5, and S1. Minimal degenerative changes are identified within the sacroiliac joints bilaterally. Anterior spurring identified of the L4-L5 levels. Minimal degenerative changes are seen within the posterior facets. Small broad-based disc bulge identified at L3-L4 creating moderate to severe central spinal canal narrowing. Nerve root compromise cannot be excluded.  X-ray of the lumbar spine on 08/19/2014, revealed posterior fusion rods and pedicle screws are present at L3, L4, and L5. Bone graft material is also present at these levels.  X-ray of the lumbar spine 08/08/2014, revealed AP and lateral views of the lumbar spine demonstrate posterior pedicle screws and posterior lumbar fusion of L4, L5, and S1. Alignment appears satisfactory in both projections and the alignment unchanged since the immediate postoperative images of 07/14/2014.  MRI of the lumbar spine with and without contrast on 04/29/2014 revealed lumbar facet arthropathy from L2-L3 through L5-S1. L4-L5 extensive facet hypertrophy. Operative changes from left laminectomy. Enhancing tissue which is greatest on the laminectomy defect on the left side of the spinal canal and within the left subarticular recess and medial aspect of the foramen, compatible with scar. There is  nonenhancing tissue extending into the inferior aspect of the left subarticular recess , most likely related to bony hypertrophy. This contacts the left L5 nerve root. The dural sac is mildly narrowed. The right subarticular recess is moderately narrowed. Bilateral L4-L5 neuroforaminal stenosis, greater on the right. There is enhancing scar extending into the medial aspect of the left foramen and a mildly bulging annulus. L5-S1, moderate facet hypertrophy greater on the right. A diffusely bulging annulus greatest centrally. Moderate narrowing of the subarticular recesses and mild narrowing of the spinal canal. the neuroforaminal are moderately to severely narrowed, greatest medially.  MRI of the lumbar spine on 01/24/2014 without contrast revealed: L4-L5, high-grade disc protrusion, compounded by marked facet arthropathy and hypertrophy of the ligamentum flavum, producing near-complete central spinal stenosis with critical bilateral recess impingement stenosis. Not significantly changed when compared to previous 08/22/2013 exam. L5-S1, small soft disc protrusion with marked facet arthropathic hypertrophy, producing moderately severe lateral recess symmetrical bilateral stenosis in the lateral recess.  X-ray of the lumbar spine on 08/27/2013 revealed: Grade 1 anterolisthesis of L4 on L5. Moderate to severe facet arthropathy from L3 through S1. Mild L2-L3 discogenic disease. Mild to moderate L4-L5 discogenic disease. Mild L5-S1 discogenic disease.  MRI of the lumbar spine without contrast on 08/22/2013, revealed: L3-L4, fairly extensive facet hypertrophy. Minimal AP canal diameter is approximately 9 mm, no high grade stenosis is seen. L4-L5, marked posterior ligament and facet hypertrophy. Large broad-based disc protrusion with paracentral disc herniation causing marked narrowing of the canal, to approximately 2.5 or 3 mm AP diameter. Foramina appear mildly to moderately narrowed bilateral due to the bony facet  "changes. L5-S1 mild broad-based disc bulge. Mild lateral recess encroachment and mild bony foraminal narrowing.       The following portions of the patient's history were reviewed and updated as appropriate: problem list, past medical history, past surgery history, social history, family history, medications, and allergies    Review of Systems   Musculoskeletal: Positive for arthralgias (leg pain).   All other systems reviewed and are negative.    Visit Vitals   • /74 (BP Location: Right arm, Patient Position: Sitting)   • Pulse 66   • Temp 97.8 °F (36.6 °C) (Temporal Artery )   • Resp 18   • Ht 69\" (175.3 cm)   • Wt 199 lb (90.3 kg)   • SpO2 96%   • BMI 29.39 kg/m2      Physical Exam   Neurologic Exam  Constitutional General appearance: No acute distress, well appearing and well nourished. Appears healthy, within normal limits of ideal weight, well hydrated and appearance reflects stated age.   Head and face: Normal.   Palpation of the face and sinuses: No sinus tenderness.   Eyes Conjunctiva and lids: No erythema, swelling or discharge. Pupils: Equal, round, reactive to light.   Neck: Supple, symmetric, trachea midline, no masses.   Pulmonary Respiratory effort: No increased work of breathing or signs of respiratory distress. Respiratory rate: normal. Assessment of respiratory effort revealed normal rhythm and effort. Auscultation of lungs: Clear to auscultation. Auscultation of the lungs revealed no expiratory wheezing, normal expiratory time and no inspiratory wheezing. No rales or crackles were heard bilaterally. No rhonchi. No friction rub. No wheezing. No diminished breath sounds. No bronchial breath sounds.   Cardiovascular Auscultation of heart: Normal rate and rhythm, normal S1 and S2, no murmurs. Peripheral vascular exam: Normal. Examination of extremities for edema and/or varicosities: Normal.   Abdomen: Non-tender, no masses. Bowel sounds were normal. The abdomen was soft and nontender. No masses " palpated.   Musculoskeletal Gait and station: Normal. Gait evaluation demonstrated a normal gait. The range of motion of the lumbar spine is essentially full and without significant pain. Lumbar facet joint loading maneuvers are negative. Kodak and Gaenslen's tests are negative. The range of motion of the hip joints is full and without pain. Palpation of the gluteal bursa, ischial tuberosities, and greater trochanters, unrevealing. Piriformis maneuvers are negative. There is some discomfort upon palpation of the left piriformis muscle. Active resisted external rotation and passive internal rotation did not increase pain.   Muscle strength/tone: Normal. Motor Strength Findings: normal strength. Motor Tone: normal tone. Involuntary movements: none. Significant shortening of the hamstrings, worse on the left side.  Skin and subcutaneous tissue: Normal without rashes or lesions.   Neurologic   Cranial nerves: Cranial nerves 2-12 intact.   Cortical function: Normal mental status.   Reflexes: 2+ and symmetric. Deep tendon reflexes: 2+ right biceps, 2+ left biceps, 1+ right patella, 1+ left patella, 1+ right ankle jerk and 1+ left ankle jerk. Superficial/Primitive Reflexes: primitive reflexes were absent. Straight leg raising test is negative. Femoral stretch sign is negative.   Sensation: No sensory loss. Sensory exam: intact to light touch, intact pain and temperature sensation, intact vibration sensation and normal proprioception.   Coordination: Normal finger to nose and heel to shin. Coordination: normal balance and negative Romberg's sign.   Psychiatric   Judgment and insight: Normal.   Orientation to person, place and time: Normal.   Recent and remote memory: Intact.   Mood and affect: Normal.     PROCEDURE: Analysis of the spinal cord stimulator device with complex spinal cord stimulator reprogramming   Patient used the Nevro spinal cord stimulator device 100% of the time since initiation of the trial phase on  2017. The spinal cord stimulator device was reprogrammed under my direct supervision and two programs were created, and the previous programs (P1, P2, P3) deleted, by adjusting electrode polarities, amplitude, pulse width, and pulse rate, in the following fashion;  Program P4   Electrode polarities: 7-, 8+  Amplitude: 2.5 mA     Pulse width: 30 mcs  Rate: 10,000 Hz    Program P5   Electrode polarities: 9-, 10+, 11-, 12+, 13-, 14+, 15-, 16+  Amplitude: 2.5 mA     Pulse width 30 µs  Pulse rate: 10,000 Hz  Cycling: On    Time spent reprogrammin minutes  A copy of the telemetry report will be scanned in the patient's chart.    ASSESSMENT:   1. Chronic lumbar radiculopathy    2. Epidural fibrosis    3. Postlaminectomy syndrome of lumbar region    4. Spinal stenosis, lumbar region, with neurogenic claudication    5. Status post L4-S1 lumbar spinal fusion    6. Physical deconditioning      PLAN: Patient did not appreciate significant pain relief with his current spinal cord stimulator programs.  Therefore, we will extend his trial;  1.  Follow-up tomorrow at 1 PM for possible spinal cord stimulator reprogramming and removal of the spinal cord stimulator trial leads. Patient has undergone follow-up surgical consultation with Dr. Renee, who has also concurred on the possibility of spinal cord stimulation. We have discussed regenerative therapies at length, including platelet rich plasma therapy, amniotic fluid therapy, neural prolotherapy, to name a few.   2. Long-term rehabilitation efforts:  a. Patient will start a comprehensive physical therapy program once his pain is under control  b. Patient has declined Pilates  3. Pharmacological measures, as follows;  a. Continue gabapentin 800 mg four times daily  b. Continue tizanidine 4 mg at bedtime as needed  c. Continue Keppra 125 mg twice daily  d. Patient has declined additional pharmacological measures at this time  4. The patient and his wife have been  instructed to contact my office with any questions or difficulties. The patient understands the plan and agrees to proceed accordingly.        Patient Care Team:  Brian Obrien MD as PCP - General (Internal Medicine)  Efrain Batista MD as Consulting Physician (Pain Medicine)  Fausto Renee MD as Referring Physician (Neurosurgery)     New Medications Ordered This Visit   Medications   • naproxen sodium (ALEVE) 220 MG tablet     Sig: Take 220 mg by mouth 2 (Two) Times a Day As Needed for mild pain (1-3).         No future appointments.      Efrain Batista MD      EMR Dragon/Transcription disclaimer:  Much of this encounter note is an electronic transcription of spoken language to printed text. Electronic transcription of spoken language may permit erroneous, or at times, nonsensical words or phrases to be inadvertently transcribed. Although I have reviewed the note for such errors, some may still exist.

## 2017-02-03 ENCOUNTER — OFFICE VISIT (OUTPATIENT)
Dept: PAIN MEDICINE | Facility: CLINIC | Age: 72
End: 2017-02-03

## 2017-02-03 VITALS
SYSTOLIC BLOOD PRESSURE: 140 MMHG | WEIGHT: 199 LBS | BODY MASS INDEX: 29.47 KG/M2 | RESPIRATION RATE: 18 BRPM | HEIGHT: 69 IN | OXYGEN SATURATION: 97 % | TEMPERATURE: 97.8 F | HEART RATE: 72 BPM | DIASTOLIC BLOOD PRESSURE: 72 MMHG

## 2017-02-03 DIAGNOSIS — M48.062 SPINAL STENOSIS, LUMBAR REGION, WITH NEUROGENIC CLAUDICATION: ICD-10-CM

## 2017-02-03 DIAGNOSIS — Z98.1 STATUS POST LUMBAR SPINAL FUSION: ICD-10-CM

## 2017-02-03 DIAGNOSIS — M54.16 CHRONIC LUMBAR RADICULOPATHY: ICD-10-CM

## 2017-02-03 DIAGNOSIS — M96.1 POSTLAMINECTOMY SYNDROME OF LUMBAR REGION: ICD-10-CM

## 2017-02-03 DIAGNOSIS — R53.81 PHYSICAL DECONDITIONING: ICD-10-CM

## 2017-02-03 DIAGNOSIS — G96.198 EPIDURAL FIBROSIS: ICD-10-CM

## 2017-02-03 PROCEDURE — 99024 POSTOP FOLLOW-UP VISIT: CPT | Performed by: ANESTHESIOLOGY

## 2017-02-03 PROCEDURE — 95972 ALYS CPLX SP/PN NPGT W/PRGRM: CPT | Performed by: ANESTHESIOLOGY

## 2017-02-03 RX ORDER — TIZANIDINE 4 MG/1
4 TABLET ORAL 3 TIMES DAILY PRN
Qty: 90 TABLET | Refills: 6 | Status: SHIPPED | OUTPATIENT
Start: 2017-02-03 | End: 2017-11-03

## 2017-02-03 RX ORDER — DESIPRAMINE HYDROCHLORIDE 10 MG/1
10 TABLET ORAL NIGHTLY
Qty: 60 TABLET | Refills: 6 | Status: SHIPPED | OUTPATIENT
Start: 2017-02-03 | End: 2017-02-20 | Stop reason: ALTCHOICE

## 2017-02-03 RX ORDER — OXCARBAZEPINE 150 MG/1
150 TABLET, FILM COATED ORAL 2 TIMES DAILY
Qty: 60 TABLET | Refills: 3 | Status: SHIPPED | OUTPATIENT
Start: 2017-02-03 | End: 2017-02-20 | Stop reason: ALTCHOICE

## 2017-02-03 NOTE — PROGRESS NOTES
"Chief Complaint: \"I did not get any relief from this stimulator. I have changed the programs as I was instructed. I have pain in my left hip all the way down to my left foot and toes. I have cramps in my leg.\"    Brief History: Mr. Prashant Avila is a 72 y.o. male, who returns to the clinic for additional spinal cord stimulator reprogramming with a different spinal cord device , possible removal of spinal cord stimulator trial leads placed on 01/30/2017, and to discuss alternative options for management of his intractable pain. Mr. Prashant Avila reports no pain relief of his chronic left lower extremity pain with the use of his Nevro stimulator device despite the use of the seven programs tried as per Maggie's protocol. Patient has remained afebrile throughout the trial. Dressings are dry and intact. Patient denies any complications related to the procedure, any new symptoms or any new bladder or bowel problems. Patient denies numbness or weakness in the lower extremities, or any new bladder or bowel problems.   Mr. Avila presents with a challenging chronic pain history and has already failed to obtain pain relief with surgical measures, conservative measures, as well as interventional pain management measures. In addition, patient has been found not to be a candidate for additional surgery.   Patient presents with a five year history of pain, which started after moving furniture. Patient underwent lumbar decompression and fusion soon after his injury. Unfortunately, patient continued experiencing severe left lower extremity pain.   On 11/30/2015, patient underwent a spinal cord stimulator trial with a Saint Jude Medical Device, which provided him with more than 50% pain relief and functional improvement. However, patient decided to delay implantation of a permanent spinal cord stimulator device until the arrival of new technology. Patient did not like the feeling of tonic stimulation, and at times, " "even though stimulation was overlapping the areas of his chronic pain, he did not find it pleasant.   Patient previously failed a trial of lumbar epidural steroid injections with steroids and hyaluronidase via caudal catheter on 04/25/2016, for treatment of chronic lumbosacral radiculopathy/lumbar postlaminectomy syndrome with epidural fibrosis. Patient experienced 100% pain relief along with full functional recovery that lasted for only 48 hours. Previously, he underwent diagnostic and therapeutic left S1 transforaminal epidural steroid injection with hyaluronidase in 12/2015, receiving complete pain relief after the procedure, but unfortunately recurring thereafter.   Pain level: 5/10.   Pain level ranges from 0/10-to-10/10.   Patient is pain-free for brief episodes, but at times his pain rapidly escalates to 10/10. In addition, his pain wakes him up and interferes with his sleep.   Pain description, pattern, and radiation: Intermittent left buttock pain that radiates into the left lower extremity all the way down to the foot and toes, described as \"electrical nerve spasms that shoot through my leg, pins and needles in my foot.\"   Pain increases with sitting or lying down.   Pain decreases: Nothing relieves his pain.   Patient experiences intermittent numbness and tingling in his left lower extremity but denies weakness.   Patient denies new new bowel and/or bladder problems.   He denies difficulties with his balance or any recent falls  Current analgesics: Gabapentin 800 mg 4 times a day, without side effects.  Patient does not take any supplemental opiates or other analgesics.     Review of previous therapies and consultations:  Mr. Avila has already failed extensive courses of conservative and interventional pain management measures by Dr. Raciel Ozuna at [facility], followed by unsuccessful L4-L5 hemilaminotomy and foraminotomy on 03/03/2014, with Dr. Mackay. He reports that Dr. Mackay recommended lumbar fusion, and " therefore, sought second opinion from Dr. Renee on 05/13/2014. He underwent lumbar decompression and fusion from L4 to S1 on 07/24/2014, with Dr. Renee. Patient reports that he experienced improvement after his last surgery, particularly complete resolution of his lower back pain. The left lower extremity pain/chronic radiculopathy preceded his lumbar fusion and did not improve after his surgery. He underwent surgical consultation with Dr. Cox of the [facility], who recommended further decompression of the nerve roots at L3 through S1. Patient was last seen in follow-up consultation with Dr. Renee on 09/29/2015, and more recently on November 18, 2016, at which time Dr. Renee also concurred with a spinal cord stimulator trial.   JOCELIN Report #[id number], appropriate.  Patient does not take any controlled substances      Diagnostic studies:  MRI of the lumbar spine with and without contrast October 28, 2016, revealed postoperative changes of L4-L5-S1 posterior lumbar and interbody fusion. Lumbar facet hypertrophy. Mild bilateral foraminal stenosis. L3-L4 spinal stenosis secondary to severe posterior ligament hypertrophy. AP diameter 6.5 mm in transverse diameter 9 mm with crowding of the nerve roots. Preserved anterior CSF space. Moderate bilateral neuroforaminal stenosis at L3-L4.  MRI of the thoracic spine October 20, 2016: No evidence of significant thoracic spine disease  Laboratory Studies, 11/06/2015: CBC: WBC-8.62, RBC 4.42, HGB 13.8, HCT 40.1, . PT 10.7. INR 1.02. PTT 30.  X-ray of the lumbar spine, 03/24/2015: PLIF has been performed from L4 through S1. Interbody fusion devices have been placed in good position. There is no subluxation, fracture, fatigue fracture of the hardware or hardware displacement. Overall alignment in the AP and lateral projection is excellent.  X-ray of the lumbar spine flexion and extension on 09/30/2014, revealed lateral flexion and extension views of the lumbar  spine demonstrate the lumbar vertebrae to be stable in the reflex and extended positions.  Myelogram of the lumbar spine 09/30/2014, revealed changes related to posterior lumbar fusion at L4, L5, and S1. Dural sac is well distended. Normal lumbar myelogram.  CT scan of the lumbar spine post myelogram on 09/30/2014, revealed minimally bulging annulus at L3-L4. There is a bulging annulus at L4-L5. Small left paracentral disc protrusion at L5-S1.  CT of the lumbar spine without contrast on 09/22/2014, revealed pedicle screws are seen posteriorly fusing L4, L5, and S1. Minimal degenerative changes are identified within the sacroiliac joints bilaterally. Anterior spurring identified of the L4-L5 levels. Minimal degenerative changes are seen within the posterior facets. Small broad-based disc bulge identified at L3-L4 creating moderate to severe central spinal canal narrowing. Nerve root compromise cannot be excluded.  X-ray of the lumbar spine on 08/19/2014, revealed posterior fusion rods and pedicle screws are present at L3, L4, and L5. Bone graft material is also present at these levels.  X-ray of the lumbar spine 08/08/2014, revealed AP and lateral views of the lumbar spine demonstrate posterior pedicle screws and posterior lumbar fusion of L4, L5, and S1. Alignment appears satisfactory in both projections and the alignment unchanged since the immediate postoperative images of 07/14/2014.  MRI of the lumbar spine with and without contrast on 04/29/2014 revealed lumbar facet arthropathy from L2-L3 through L5-S1. L4-L5 extensive facet hypertrophy. Operative changes from left laminectomy. Enhancing tissue which is greatest on the laminectomy defect on the left side of the spinal canal and within the left subarticular recess and medial aspect of the foramen, compatible with scar. There is nonenhancing tissue extending into the inferior aspect of the left subarticular recess , most likely related to bony hypertrophy. This  contacts the left L5 nerve root. The dural sac is mildly narrowed. The right subarticular recess is moderately narrowed. Bilateral L4-L5 neuroforaminal stenosis, greater on the right. There is enhancing scar extending into the medial aspect of the left foramen and a mildly bulging annulus. L5-S1, moderate facet hypertrophy greater on the right. A diffusely bulging annulus greatest centrally. Moderate narrowing of the subarticular recesses and mild narrowing of the spinal canal. the neuroforaminal are moderately to severely narrowed, greatest medially.  MRI of the lumbar spine on 01/24/2014 without contrast revealed: L4-L5, high-grade disc protrusion, compounded by marked facet arthropathy and hypertrophy of the ligamentum flavum, producing near-complete central spinal stenosis with critical bilateral recess impingement stenosis. Not significantly changed when compared to previous 08/22/2013 exam. L5-S1, small soft disc protrusion with marked facet arthropathic hypertrophy, producing moderately severe lateral recess symmetrical bilateral stenosis in the lateral recess.  X-ray of the lumbar spine on 08/27/2013 revealed: Grade 1 anterolisthesis of L4 on L5. Moderate to severe facet arthropathy from L3 through S1. Mild L2-L3 discogenic disease. Mild to moderate L4-L5 discogenic disease. Mild L5-S1 discogenic disease.  MRI of the lumbar spine without contrast on 08/22/2013, revealed: L3-L4, fairly extensive facet hypertrophy. Minimal AP canal diameter is approximately 9 mm, no high grade stenosis is seen. L4-L5, marked posterior ligament and facet hypertrophy. Large broad-based disc protrusion with paracentral disc herniation causing marked narrowing of the canal, to approximately 2.5 or 3 mm AP diameter. Foramina appear mildly to moderately narrowed bilateral due to the bony facet changes. L5-S1 mild broad-based disc bulge. Mild lateral recess encroachment and mild bony foraminal narrowing.       The following portions  "of the patient's history were reviewed and updated as appropriate: problem list, past medical history, past surgery history, social history, family history, medications, and allergies    Review of Systems   Musculoskeletal: Positive for arthralgias (leg pain).   All other systems reviewed and are negative.    Visit Vitals   • /72 (BP Location: Left arm, Patient Position: Sitting)   • Pulse 72   • Temp 97.8 °F (36.6 °C) (Temporal Artery )   • Resp 18   • Ht 69\" (175.3 cm)   • Wt 199 lb (90.3 kg)   • SpO2 97%   • BMI 29.39 kg/m2      Physical Exam   Neurologic Exam  Constitutional General appearance: No acute distress, well appearing and well nourished. Appears healthy, within normal limits of ideal weight, well hydrated and appearance reflects stated age.   Head and face: Normal.   Palpation of the face and sinuses: No sinus tenderness.   Eyes Conjunctiva and lids: No erythema, swelling or discharge. Pupils: Equal, round, reactive to light.   Neck: Supple, symmetric, trachea midline, no masses.   Pulmonary Respiratory effort: No increased work of breathing or signs of respiratory distress. Respiratory rate: normal. Assessment of respiratory effort revealed normal rhythm and effort. Auscultation of lungs: Clear to auscultation. Auscultation of the lungs revealed no expiratory wheezing, normal expiratory time and no inspiratory wheezing. No rales or crackles were heard bilaterally. No rhonchi. No friction rub. No wheezing. No diminished breath sounds. No bronchial breath sounds.   Cardiovascular Auscultation of heart: Normal rate and rhythm, normal S1 and S2, no murmurs. Peripheral vascular exam: Normal. Examination of extremities for edema and/or varicosities: Normal.   Abdomen: Non-tender, no masses. Bowel sounds were normal. The abdomen was soft and nontender. No masses palpated.   Musculoskeletal Gait and station: Normal. Gait evaluation demonstrated a normal gait. The range of motion of the lumbar spine is " essentially full and without significant pain. Lumbar facet joint loading maneuvers are negative. Kodak and Gaenslen's tests are negative. The range of motion of the hip joints is full and without pain. Palpation of the gluteal bursa, ischial tuberosities, and greater trochanters, unrevealing. Piriformis maneuvers are negative. There is some discomfort upon palpation of the left piriformis muscle. Active resisted external rotation and passive internal rotation did not increase pain.   Muscle strength/tone: Normal. Motor Strength Findings: normal strength. Motor Tone: normal tone. Involuntary movements: none. Significant shortening of the hamstrings, worse on the left side.  Skin and subcutaneous tissue: Normal without rashes or lesions.   Neurologic   Cranial nerves: Cranial nerves 2-12 intact.   Cortical function: Normal mental status.   Reflexes: 2+ and symmetric. Deep tendon reflexes: 2+ right biceps, 2+ left biceps, 1+ right patella, 1+ left patella, 1+ right ankle jerk and 1+ left ankle jerk. Superficial/Primitive Reflexes: primitive reflexes were absent. Straight leg raising test is negative. Femoral stretch sign is negative.   Sensation: No sensory loss. Sensory exam: intact to light touch, intact pain and temperature sensation, intact vibration sensation and normal proprioception.   Coordination: Normal finger to nose and heel to shin. Coordination: normal balance and negative Romberg's sign.   Psychiatric   Judgment and insight: Normal.   Orientation to person, place and time: Normal.   Recent and remote memory: Intact.   Mood and affect: Normal.     PROCEDURE: Analysis of the spinal cord stimulator device with complex spinal cord stimulator reprogramming   Patient used the Nevro spinal cord stimulator device 100% of the time since initiation of the trial phase on 01/30/2017. Patient did not experience any significant pain relief with the Nevro SCS system. Therefore, patient was connected to a Saint Jude  Medical Stimulator device, and the device was reprogrammed by adjusting electrode polarities, pulse width, pulse rate, BurstDR, micro-dosing, as follows;  Program ONE  Electrode polarities: 3+, 4-, 5-, 6+  Amplitude: 1.35 mA     Pulse width: 1000 mcs  Rate: 40 Hz  IntraBurst rate: 500 Hz  Micro-dosin:3   Patient experienced stimulation that overlapped the pain. However, patient did not like the stimulation. He tolerated sitting position for more than one hour.  Time spent reprogrammin minutes  A copy of the telemetry report will be scanned in the patient's chart.    Procedure #2: Removal of spinal cord stimulator trial leads.   Two leads were removed with tips intact. There is no erythema, drainage, or fluid accumulation at the site. The area was cleansed with chlorhexidine.  A small Covaderm was applied.     ASSESSMENT:   1. Chronic lumbar radiculopathy    2. Epidural fibrosis    3. Spinal stenosis, lumbar region, with neurogenic claudication    4. Lumbar postlaminectomy syndrome     5. Status post L4-S1 lumbar spinal fusion    6. Physical deconditioning       PLAN: Patient did not appreciate pain relief during his spinal cord stimulator trial despite adjusting programs and trying another device before removing the leads. Mr. Avila presents with a challenging chronic pain history and has already failed to obtain pain relief with surgical measures, conservative measures, as well as interventional pain management measures. In addition, patient has been found not to be a candidate for additional surgery. Patient presents with intolerance to all analgesics tried. Patient declined additional pharmacological measures in the past, but has agreed to another trial due to the severity of his chronic pain. Therefore, I have proposed the following plan:  1. Patient will be scheduled for diagnostic left S1 selective nerve root injection to identify the origin of his chronic pain. If patient experiences significant  pain relief, then, I will schedule him for a series of left S1 transforaminal epidural steroid injections with the addition of hyaluronidase while he undergoes intensive physical therapy. We have also discussed the possibility of regenerative therapies including platelet rich plasma therapy, amniotic fluid therapy, neural prolotherapy, to name a few, left S1 DRG pulsed radiofrenquency, or eventually DRG stimulation trial.  Patient has declined the possibility of an intrathecal pump trial if failure to obtain relief with all available therapies.  2. Long-term rehabilitation efforts:  a. Patient will start a comprehensive physical therapy program once his pain is under control  b. Patient has declined Pilates  3. Pharmacological measures, as follows;  a. Continue gabapentin 800 mg four times daily  b. Trial with desipramine 10 mg 1-2 tablets at bedtime  c. Continue tizanidine 4 mg at bedtime as needed  d. Trial with Oxcarbazepine 150 mg mg twice daily with further titration  e. Start the following multivitamins and supplements, as follows;  · Start folic acid/cyanocobalamine/Pyridoxine 2.5 mg/1000 mcg/25 mg one tablet by mouth three times daily  · Start vitamin C 500 mg 3 times a day  · Start CoQ10 100 mg 3 times a day    · Start alpha lipoid acid 2681-1444 mg per day divided into 3 doses  · Start magnesium oxide 400 mg one tablet three times daily    · Start omega-3 fatty acids 1 g capsule 1-2 tablets 3 times a day with meals  · Start melatonin 3 mg at bedtime  4. The patient and his wife have been instructed to contact my office with any questions or difficulties. The patient understands the plan and agrees to proceed accordingly.        Patient Care Team:  Brian Obrien MD as PCP - General (Internal Medicine)  Efrain Batista MD as Consulting Physician (Pain Medicine)  Fausto Renee MD as Referring Physician (Neurosurgery)     New Medications Ordered This Visit   Medications   • tiZANidine (ZANAFLEX) 4  MG tablet     Sig: Take 1 tablet by mouth 3 (Three) Times a Day As Needed for muscle spasms.     Dispense:  90 tablet     Refill:  6   • desipramine (NOPRAMIN) 10 MG tablet     Sig: Take 1 tablet by mouth Every Night. 1-2 TABLETS AT BEDTIME     Dispense:  60 tablet     Refill:  6     Patient failed trials with amitriptyline, nortriptyline, Paxil, Keppra, opioids, NSAIDS, etc   • OXcarbazepine (TRILEPTAL) 150 MG tablet     Sig: Take 1 tablet by mouth 2 (Two) Times a Day.     Dispense:  60 tablet     Refill:  3         No future appointments.      Efrain Batista MD      EMR Dragon/Transcription disclaimer:  Much of this encounter note is an electronic transcription of spoken language to printed text. Electronic transcription of spoken language may permit erroneous, or at times, nonsensical words or phrases to be inadvertently transcribed. Although I have reviewed the note for such errors, some may still exist.

## 2017-02-06 ENCOUNTER — OUTSIDE FACILITY SERVICE (OUTPATIENT)
Dept: PAIN MEDICINE | Facility: CLINIC | Age: 72
End: 2017-02-06

## 2017-02-06 PROCEDURE — 64483 NJX AA&/STRD TFRM EPI L/S 1: CPT | Performed by: ANESTHESIOLOGY

## 2017-02-20 ENCOUNTER — OUTSIDE FACILITY SERVICE (OUTPATIENT)
Dept: PAIN MEDICINE | Facility: CLINIC | Age: 72
End: 2017-02-20

## 2017-02-20 PROCEDURE — 99152 MOD SED SAME PHYS/QHP 5/>YRS: CPT | Performed by: ANESTHESIOLOGY

## 2017-02-20 PROCEDURE — 64445 NJX AA&/STRD SCIATIC NRV IMG: CPT | Performed by: ANESTHESIOLOGY

## 2017-02-20 PROCEDURE — 76942 ECHO GUIDE FOR BIOPSY: CPT | Performed by: ANESTHESIOLOGY

## 2017-02-20 RX ORDER — NORTRIPTYLINE HYDROCHLORIDE 10 MG/1
CAPSULE ORAL
Qty: 60 CAPSULE | Refills: 6 | Status: SHIPPED | OUTPATIENT
Start: 2017-02-20 | End: 2017-11-03

## 2017-02-20 RX ORDER — LEVETIRACETAM 250 MG/1
250 TABLET ORAL 2 TIMES DAILY
Qty: 60 TABLET | Refills: 6 | Status: SHIPPED | OUTPATIENT
Start: 2017-02-20 | End: 2017-11-03

## 2017-03-25 ENCOUNTER — HOSPITAL ENCOUNTER (EMERGENCY)
Facility: HOSPITAL | Age: 72
Discharge: HOME | End: 2017-03-25
Attending: EMERGENCY MEDICINE | Admitting: EMERGENCY MEDICINE

## 2017-03-25 ENCOUNTER — APPOINTMENT (OUTPATIENT)
Dept: CARDIOLOGY | Facility: HOSPITAL | Age: 72
End: 2017-03-25

## 2017-03-25 VITALS
TEMPERATURE: 98.1 F | HEART RATE: 53 BPM | RESPIRATION RATE: 16 BRPM | DIASTOLIC BLOOD PRESSURE: 74 MMHG | WEIGHT: 190 LBS | HEIGHT: 69 IN | BODY MASS INDEX: 28.14 KG/M2 | SYSTOLIC BLOOD PRESSURE: 158 MMHG | OXYGEN SATURATION: 95 %

## 2017-03-25 DIAGNOSIS — S76.311A HAMSTRING MUSCLE STRAIN, RIGHT, INITIAL ENCOUNTER: Primary | ICD-10-CM

## 2017-03-25 LAB
BH CV ECHO MEAS - BSA(HAYCOCK): 2.1 M^2
BH CV ECHO MEAS - BSA: 2 M^2
BH CV ECHO MEAS - BZI_BMI: 28.1 KILOGRAMS/M^2
BH CV ECHO MEAS - BZI_METRIC_HEIGHT: 175.3 CM
BH CV ECHO MEAS - BZI_METRIC_WEIGHT: 86.2 KG
BH CV LOWER VASCULAR LEFT COMMON FEMORAL AUGMENT: NORMAL
BH CV LOWER VASCULAR LEFT COMMON FEMORAL COMPRESS: NORMAL
BH CV LOWER VASCULAR LEFT COMMON FEMORAL PHASIC: NORMAL
BH CV LOWER VASCULAR LEFT COMMON FEMORAL SPONT: NORMAL
BH CV LOWER VASCULAR RIGHT COMMON FEMORAL AUGMENT: NORMAL
BH CV LOWER VASCULAR RIGHT COMMON FEMORAL COMPRESS: NORMAL
BH CV LOWER VASCULAR RIGHT COMMON FEMORAL PHASIC: NORMAL
BH CV LOWER VASCULAR RIGHT COMMON FEMORAL SPONT: NORMAL
BH CV LOWER VASCULAR RIGHT DISTAL FEMORAL COMPRESS: NORMAL
BH CV LOWER VASCULAR RIGHT GASTRONEMIUS COMPRESS: NORMAL
BH CV LOWER VASCULAR RIGHT GREATER SAPH AK COMPRESS: NORMAL
BH CV LOWER VASCULAR RIGHT MID FEMORAL AUGMENT: NORMAL
BH CV LOWER VASCULAR RIGHT MID FEMORAL COMPRESS: NORMAL
BH CV LOWER VASCULAR RIGHT MID FEMORAL PHASIC: NORMAL
BH CV LOWER VASCULAR RIGHT MID FEMORAL SPONT: NORMAL
BH CV LOWER VASCULAR RIGHT PERONEAL COMPRESS: NORMAL
BH CV LOWER VASCULAR RIGHT POPLITEAL AUGMENT: NORMAL
BH CV LOWER VASCULAR RIGHT POPLITEAL COMPRESS: NORMAL
BH CV LOWER VASCULAR RIGHT POPLITEAL PHASIC: NORMAL
BH CV LOWER VASCULAR RIGHT POPLITEAL SPONT: NORMAL
BH CV LOWER VASCULAR RIGHT POSTERIOR TIBIAL COMPRESS: NORMAL
BH CV LOWER VASCULAR RIGHT PROXIMAL FEMORAL COMPRESS: NORMAL
BH CV LOWER VASCULAR RIGHT SAPHENOFEMORAL JUNCTION AUGMENT: NORMAL
BH CV LOWER VASCULAR RIGHT SAPHENOFEMORAL JUNCTION COMPRESS: NORMAL
BH CV LOWER VASCULAR RIGHT SAPHENOFEMORAL JUNCTION PHASIC: NORMAL
BH CV LOWER VASCULAR RIGHT SAPHENOFEMORAL JUNCTION SPONT: NORMAL

## 2017-03-25 PROCEDURE — 93971 EXTREMITY STUDY: CPT | Performed by: INTERNAL MEDICINE

## 2017-03-25 PROCEDURE — 99283 EMERGENCY DEPT VISIT LOW MDM: CPT

## 2017-03-25 PROCEDURE — 93971 EXTREMITY STUDY: CPT

## 2017-03-25 NOTE — ED PROVIDER NOTES
Subjective   HPI Comments: Prashant Avila is a 72 y.o. male presenting to the ED with c/o right leg pain. Mr. Avila reports that he has been having pain and swelling in his right upper leg over the past three days. This morning, he noted bruising behind his right knee . He presented to urgent care, who advised he present to the ED for further evaluation of a possible DVT. Denies any injury or trauma to cause the pain. No other complaints at this time.    Patient is a 72 y.o. male presenting with lower extremity pain.   History provided by:  Patient  Lower Extremity Issue   Location:  Leg  Time since incident:  3 days  Injury: no    Leg location:  R upper leg  Pain details:     Quality:  Aching    Radiates to:  Does not radiate    Severity:  Moderate    Onset quality:  Sudden    Timing:  Constant    Progression:  Worsening  Chronicity:  New  Dislocation: no    Foreign body present:  No foreign bodies  Tetanus status:  Unknown  Prior injury to area:  Unable to specify  Relieved by:  Nothing  Worsened by:  Nothing  Ineffective treatments:  None tried  Associated symptoms: swelling    Associated symptoms: no back pain, no fatigue and no fever        Review of Systems   Constitutional: Negative for chills, fatigue and fever.   HENT: Negative for rhinorrhea.    Respiratory: Negative for cough and shortness of breath.    Cardiovascular: Positive for leg swelling (right leg). Negative for chest pain.   Gastrointestinal: Negative for abdominal pain, diarrhea, nausea and vomiting.   Genitourinary: Negative for dysuria.   Musculoskeletal: Negative for back pain and joint swelling.   Skin: Negative for color change.   Neurological: Negative for dizziness, syncope, weakness, light-headedness, numbness and headaches.   All other systems reviewed and are negative.      Past Medical History:   Diagnosis Date   • Arthritis    • Carcinoma of prostate    • Chronic pain disorder    • Extremity pain    • Hearing loss of both ears     • History of chronic kidney disease    • Hyperlipidemia    • Hypertension    • Low back pain    • Neck pain    • Osteoarthritis    • Rheumatoid arthritis        Allergies   Allergen Reactions   • Doxycycline        Past Surgical History:   Procedure Laterality Date   • EPIDURAL BLOCK  2016   • LUMBAR FUSION  07/14/2014    TLIF L4-S1 (Dr. Renee)   • LUMBAR LAMINECTOMY  03/03/2014    LT-L4/5 BONG & VICTOR HUGO (Dr. Mackay)   • ORTHOPEDIC SURGERY     • OTHER SURGICAL HISTORY      Enteroscopic Polypectomy   • OTHER SURGICAL HISTORY      Vocal Cordectomy   • PROSTATE SURGERY     • VOCAL CORD BIOPSY         Family History   Problem Relation Age of Onset   • Congenital heart disease Mother    • Multiple myeloma Father        Social History     Social History   • Marital status:      Spouse name: N/A   • Number of children: N/A   • Years of education: N/A     Social History Main Topics   • Smoking status: Never Smoker   • Smokeless tobacco: Never Used   • Alcohol use No   • Drug use: No   • Sexual activity: Not Asked     Other Topics Concern   • None     Social History Narrative         Objective   Physical Exam   Constitutional: He is oriented to person, place, and time. He appears well-developed and well-nourished. No distress.   HENT:   Head: Normocephalic and atraumatic.   Eyes: Conjunctivae and EOM are normal.   Neck: Normal range of motion. Neck supple.   Pulmonary/Chest: Effort normal. No respiratory distress.   Musculoskeletal: Normal range of motion. He exhibits no edema.        Right upper leg: He exhibits tenderness.        Right lower leg: Normal.        Right foot: Normal.   Neurological: He is alert and oriented to person, place, and time.   Skin: Skin is warm and dry. Bruising (Brownish bruising on the right politeal fossa) noted. No erythema.   Psychiatric: He has a normal mood and affect. His behavior is normal.   Nursing note and vitals reviewed.      Procedures         ED Course  ED Course   Comment By  Time   The preliminary ultrasound report is that all veins compress and there is no evidence of DVT.  Given that information I think it's most likely that he had a muscle tear on his hamstring and we are now seeing the bleeding from that. Mitch Holder MD 03/25 1521                     MDM  Number of Diagnoses or Management Options  Hamstring muscle strain, right, initial encounter: new and requires workup     Amount and/or Complexity of Data Reviewed  Tests in the radiology section of CPT®: ordered and reviewed        Final diagnoses:   Hamstring muscle strain, right, initial encounter       Documentation assistance provided by martir Willingham.  Information recorded by the scribe was done at my direction and has been verified and validated by me.     Scott Willingham  03/25/17 1522       Mitch Holder MD  03/26/17 0639

## 2017-04-12 RX ORDER — GABAPENTIN 800 MG/1
TABLET ORAL
Qty: 120 TABLET | Refills: 0 | Status: SHIPPED | OUTPATIENT
Start: 2017-04-12 | End: 2017-05-10 | Stop reason: SDUPTHER

## 2017-05-10 RX ORDER — GABAPENTIN 800 MG/1
TABLET ORAL
Qty: 120 TABLET | Refills: 0 | Status: SHIPPED | OUTPATIENT
Start: 2017-05-10 | End: 2017-06-12 | Stop reason: SDUPTHER

## 2017-06-12 RX ORDER — GABAPENTIN 800 MG/1
TABLET ORAL
Qty: 120 TABLET | Refills: 0 | Status: SHIPPED | OUTPATIENT
Start: 2017-06-12 | End: 2017-07-14 | Stop reason: SDUPTHER

## 2017-07-14 RX ORDER — GABAPENTIN 800 MG/1
TABLET ORAL
Qty: 120 TABLET | Refills: 2 | OUTPATIENT
Start: 2017-07-14 | End: 2017-10-13 | Stop reason: SDUPTHER

## 2017-08-04 ENCOUNTER — TRANSCRIBE ORDERS (OUTPATIENT)
Dept: ADMINISTRATIVE | Facility: HOSPITAL | Age: 72
End: 2017-08-04

## 2017-08-04 ENCOUNTER — HOSPITAL ENCOUNTER (OUTPATIENT)
Dept: GENERAL RADIOLOGY | Facility: HOSPITAL | Age: 72
Discharge: HOME | End: 2017-08-04
Admitting: ANESTHESIOLOGY

## 2017-08-04 DIAGNOSIS — M54.50 LOW BACK PAIN RADIATING TO LEFT LEG: ICD-10-CM

## 2017-08-04 DIAGNOSIS — M79.605 LOW BACK PAIN RADIATING TO LEFT LEG: Primary | ICD-10-CM

## 2017-08-04 DIAGNOSIS — M79.605 LOW BACK PAIN RADIATING TO LEFT LEG: ICD-10-CM

## 2017-08-04 DIAGNOSIS — M54.50 LOW BACK PAIN RADIATING TO LEFT LEG: Primary | ICD-10-CM

## 2017-08-04 PROCEDURE — 72120 X-RAY BEND ONLY L-S SPINE: CPT

## 2017-09-08 ENCOUNTER — TRANSCRIBE ORDERS (OUTPATIENT)
Dept: ADMINISTRATIVE | Facility: HOSPITAL | Age: 72
End: 2017-09-08

## 2017-09-08 DIAGNOSIS — M54.5 LOW BACK PAIN, UNSPECIFIED BACK PAIN LATERALITY, UNSPECIFIED CHRONICITY, WITH SCIATICA PRESENCE UNSPECIFIED: Primary | ICD-10-CM

## 2017-09-13 ENCOUNTER — HOSPITAL ENCOUNTER (OUTPATIENT)
Dept: CT IMAGING | Facility: HOSPITAL | Age: 72
Discharge: HOME | End: 2017-09-13
Attending: NEUROLOGICAL SURGERY | Admitting: NEUROLOGICAL SURGERY

## 2017-09-13 DIAGNOSIS — M54.5 LOW BACK PAIN, UNSPECIFIED BACK PAIN LATERALITY, UNSPECIFIED CHRONICITY, WITH SCIATICA PRESENCE UNSPECIFIED: ICD-10-CM

## 2017-09-13 PROCEDURE — 72131 CT LUMBAR SPINE W/O DYE: CPT

## 2017-10-13 RX ORDER — GABAPENTIN 800 MG/1
TABLET ORAL
Qty: 120 TABLET | Refills: 2 | Status: CANCELLED | OUTPATIENT
Start: 2017-10-13

## 2017-10-13 RX ORDER — GABAPENTIN 800 MG/1
TABLET ORAL
Qty: 120 TABLET | Refills: 5 | OUTPATIENT
Start: 2017-10-13 | End: 2018-04-19 | Stop reason: SDUPTHER

## 2017-11-03 ENCOUNTER — OFFICE VISIT (OUTPATIENT)
Dept: NEUROSURGERY | Facility: CLINIC | Age: 72
End: 2017-11-03

## 2017-11-03 VITALS — HEIGHT: 69 IN | TEMPERATURE: 97.5 F | WEIGHT: 189 LBS | BODY MASS INDEX: 27.99 KG/M2

## 2017-11-03 DIAGNOSIS — M51.34 DDD (DEGENERATIVE DISC DISEASE), THORACIC: ICD-10-CM

## 2017-11-03 DIAGNOSIS — M54.16 LUMBAR RADICULOPATHY: Primary | ICD-10-CM

## 2017-11-03 PROCEDURE — 99213 OFFICE O/P EST LOW 20 MIN: CPT | Performed by: NEUROLOGICAL SURGERY

## 2017-11-03 NOTE — PROGRESS NOTES
Patient: Prashant Avila  : 1945    Primary Care Provider: Brian Obrien MD    Requesting Provider: Dr. Jean Warren      History    Chief Complaint: Left hip and leg pain.    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 Warren who has provided an evaluation.  The patient is well-known to my service.  In the past the patient had undergone a left L4-5 foraminotomy at the [facility] in this was not helpful.  Subsequently on 14 I performed an L4-S1 decompression and fusion.  He's continued to have pain in his back and left hip that extends down his leg.  The curious issue about his symptoms is that they are better with standing and walking and much worse with sitting or lying down.  He's had extensive pain management interventions including trials of spinal cord stimulators which have not been helpful.  Most recently Dr. Francis did a medial branch block which was not helpful.  At one point a number of years ago he was seen at the [facility] and Dr. Cox who recommended additional decompression at multiple levels including L3-4.  Prior to that I performed a CT myelogram which did not reveal evidence of nerve root compromise at L3-4 and I was not particularly enthusiastic about that.  He indicates that his symptoms are progressing.  A lot of the pain extends into the back of his left leg but at times he has more global pain.  He has no right-sided symptoms.  He denies bowel or bladder difficulties.    Review of Systems   Constitutional: Negative for activity change, appetite change, chills, diaphoresis, fatigue, fever and unexpected weight change.   HENT: Positive for hearing loss and tinnitus. Negative for congestion, dental problem, drooling, ear discharge, ear pain, facial swelling, mouth sores, nosebleeds, postnasal drip, rhinorrhea, sinus pressure, sneezing, sore throat, trouble swallowing and voice change.    Eyes: Negative for photophobia, pain,  "discharge, redness, itching and visual disturbance.   Respiratory: Negative for apnea, cough, choking, chest tightness, shortness of breath, wheezing and stridor.    Cardiovascular: Negative for chest pain, palpitations and leg swelling.   Gastrointestinal: Negative for abdominal distention, abdominal pain, anal bleeding, blood in stool, constipation, diarrhea, nausea, rectal pain and vomiting.   Musculoskeletal: Positive for back pain, myalgias and neck pain. Negative for arthralgias, gait problem, joint swelling and neck stiffness.   Skin: Negative for color change, pallor, rash and wound.   Allergic/Immunologic: Negative for environmental allergies, food allergies and immunocompromised state.   Neurological: Negative for dizziness, tremors, seizures, syncope, facial asymmetry, speech difficulty, weakness, light-headedness, numbness and headaches.   Hematological: Negative for adenopathy. Does not bruise/bleed easily.   Psychiatric/Behavioral: Positive for sleep disturbance. Negative for agitation, behavioral problems, confusion, decreased concentration, dysphoric mood, self-injury and suicidal ideas. The patient is not nervous/anxious and is not hyperactive.        The patient's past medical history, past surgical history, family history, and social history have been reviewed at length in the electronic medical record.    Physical Exam:   Temp 97.5 °F (36.4 °C) (Temporal Artery )   Ht 69\" (175.3 cm)  Wt 189 lb (85.7 kg)  BMI 27.91 kg/m2  CONSTITUTIONAL: Patient is well-nourished, pleasant and appears stated age.  CV: Heart regular rate and rhythm without murmur, rub, or gallop.  PULMONARY: Lungs are clear to ascultation.  MUSCULOSKELETAL:  Straight leg raising is negative.  Kodak's Sign is negative.  ROM in back normal.  Tenderness in the back to palpation is not observed.  NEUROLOGICAL:  Orientation, memory, attention span, language function, and cognition have been examined and are intact.  Strength is " intact in the lower extremities to direct testing.  Muscle tone is normal throughout.  Station and gait are normal.  Sensation is intact to light touch testing throughout.  Deep tendon reflexes are 1+ and symmetrical.  Coordination is intact.      Medical Decision Making    Data Review:   Plain films of his back reveal his fusion construct from L4-S1 to be intact.  I don't see any significant improvement on the flexion extension views.  There is modest fusion mass interbody at both levels.  CT of the lumbar spine demonstrates changes consistent with his known decompression and fusion.  There is narrowing of the recesses bilaterally at L3-4.    Diagnosis:   I remain uncertain as to the source for his symptoms.  Other providers of raised the specter of decompression on the left at L3-4.    Treatment Options:   I've discussed at length whether to proceed with decompression at L3-4 with the Rev.  If he feels that his symptoms are unmanageable then I think it is reasonable to pursue a laminotomy and foraminotomy on the left at L3-4.  In addition to the usual risks I think there is some risk of him developing instability at this level that would necessitate extending his fusion to L3.  The greatest risk however in my mind would be him not improving.  After a very lengthy discussion he has elected to hold off on surgery for now.  He will follow-up with me or contact me if he changes his mind and would like to proceed with surgery.       Diagnosis Plan   1. Lumbar radiculopathy     2. DDD (degenerative disc disease), thoracic         Scribed for Fausto Renee MD by Tawnya Silver CMA on 11/03/2017 at 10:14 AM    I, Dr. Renee, personally performed the services described in the documentation, as scribed in my presence, and it is both accurate and complete.

## 2018-04-16 RX ORDER — GABAPENTIN 800 MG/1
TABLET ORAL
Qty: 120 TABLET | Refills: 5 | OUTPATIENT
Start: 2018-04-16

## 2018-04-17 ENCOUNTER — TELEPHONE (OUTPATIENT)
Dept: PAIN MEDICINE | Facility: CLINIC | Age: 73
End: 2018-04-17

## 2018-04-19 ENCOUNTER — TELEPHONE (OUTPATIENT)
Dept: PAIN MEDICINE | Facility: CLINIC | Age: 73
End: 2018-04-19

## 2018-04-19 ENCOUNTER — HOSPITAL ENCOUNTER (OUTPATIENT)
Dept: CARDIOLOGY | Facility: HOSPITAL | Age: 73
Discharge: HOME | End: 2018-04-19
Attending: ANESTHESIOLOGY | Admitting: ANESTHESIOLOGY

## 2018-04-19 ENCOUNTER — OFFICE VISIT (OUTPATIENT)
Dept: PAIN MEDICINE | Facility: CLINIC | Age: 73
End: 2018-04-19

## 2018-04-19 VITALS
HEART RATE: 58 BPM | SYSTOLIC BLOOD PRESSURE: 141 MMHG | OXYGEN SATURATION: 97 % | HEIGHT: 69 IN | RESPIRATION RATE: 16 BRPM | TEMPERATURE: 97.6 F | BODY MASS INDEX: 27.85 KG/M2 | WEIGHT: 188 LBS | DIASTOLIC BLOOD PRESSURE: 80 MMHG

## 2018-04-19 DIAGNOSIS — M96.1 POSTLAMINECTOMY SYNDROME OF LUMBAR REGION: ICD-10-CM

## 2018-04-19 DIAGNOSIS — M48.061 BILATERAL STENOSIS OF LATERAL RECESS OF LUMBAR SPINE: ICD-10-CM

## 2018-04-19 DIAGNOSIS — Z51.81 THERAPEUTIC DRUG MONITORING: Primary | ICD-10-CM

## 2018-04-19 DIAGNOSIS — M48.061 BILATERAL STENOSIS OF LATERAL RECESS OF LUMBAR SPINE: Primary | ICD-10-CM

## 2018-04-19 DIAGNOSIS — Z51.81 THERAPEUTIC DRUG MONITORING: ICD-10-CM

## 2018-04-19 DIAGNOSIS — G03.9 ADHESIVE ARACHNOIDITIS: ICD-10-CM

## 2018-04-19 DIAGNOSIS — Z98.1 STATUS POST LUMBAR SPINAL FUSION: ICD-10-CM

## 2018-04-19 DIAGNOSIS — M54.16 CHRONIC LUMBAR RADICULOPATHY: ICD-10-CM

## 2018-04-19 PROCEDURE — 93010 ELECTROCARDIOGRAM REPORT: CPT | Performed by: INTERNAL MEDICINE

## 2018-04-19 PROCEDURE — 93005 ELECTROCARDIOGRAM TRACING: CPT | Performed by: ANESTHESIOLOGY

## 2018-04-19 PROCEDURE — 99215 OFFICE O/P EST HI 40 MIN: CPT | Performed by: ANESTHESIOLOGY

## 2018-04-19 RX ORDER — AZELASTINE 1 MG/ML
SPRAY, METERED NASAL
COMMUNITY
Start: 2018-02-19 | End: 2018-10-24

## 2018-04-19 RX ORDER — GABAPENTIN 800 MG/1
TABLET ORAL
Qty: 120 TABLET | Refills: 5 | OUTPATIENT
Start: 2018-04-19 | End: 2018-10-29 | Stop reason: SDUPTHER

## 2018-04-19 NOTE — PROGRESS NOTES
"Chief Complaint: \"I have pain in my left buttock all the way down to my left foot and toes.\"     History of Present Illness:   Patient: Mr. Prashant Avila, 73 y.o. male   Referring physician: Dr. Fausto Renee   Reason for referral: Consultation for intractable chronic left lower extremity pain.   Patient is a established patient not seen in over one year. Patient called the office on 04/18/2018, requesting an appointment for assessment of his unrelenting chronic left lower extremity pain.   Pain history: Patient reports a 3-year history of pain, which began without incident. Pain has progressed in intensity over the past 1 year. Patient is status post unsuccessful L4-L5 hemilaminectomy and foraminotomy at the [facility] with Dr. Mackay  on 03/03/2014, followed by L4-S1 lumbar  decompression and fusion by Dr. Renee on 07/24/2014. Patient reports that he experienced significant improvement after his last surgery, particularly complete resolution of his lower back pain. The left lower extremity pain/chronic radiculopathy preceded his lumbar fusion and did not improve after surgery. He underwent surgical consultation with Dr. Cox of the [facility] [place], who recommended further decompression of the nerve roots at L3-S1. He underwent follow-up neurosurgical consultation with Dr. Fausto Renee on 11/03/2017. Dr. Renee discussed the possibility of lumbar decompression at L3-L4: Left L3-L4 laminotomy and foraminotomy. Due to potential risk of developing instability that would necessitate extending his lumbar fusion to the L3 level. Patient elected to hold off on surgery. Patient is here to discuss strategies at improving his chronic pain.  Pain description: constant pain with intermittent exacerbation, described as burning and tingling sensation.   Radiation of pain: The pain radiates into the posterior aspect of the thigh, left leg and plantar aspect of the foot. The leg pain is more severe than the lower back pain. When " the pain increases, it affects the entire left lower extremity.  Pain pattern: dermatomic   Pain intensity today: 4/10 (standing) 9-10 (sitting or lying down)  Average pain intensity last week: 7/10  Pain intensity ranges from: 4/10 to 10/10  Aggravating factors: Pain increases with sitting longer than 5 minutes and laying down for more than 5 minutes.  Alleviating factors: Pain decreases with standing.   Associated symptoms:   Patient denies numbness or weakness in the lower extremities.   Patient denies any new bladder or bowel problems.   Patient denies difficulties with his balance or recent falls.      Review of previous therapies and additional medical records:  Prashant Avila has already failed the following measures, including:   Conservative measures: oral analgesics, physical therapy, ice and heat   Interventional measures:   02/20/2017- Diagnostic and Therapeutic Left sciatic nerve block with local anesthetics, steroids, hyaluronidase under ultrasound, PNS and fluoroscopic guidance- Patient did not receive sustained relief from this procedure.   02/06/2017: Diagnostic left S1 selective nerve root injection  01/30/2017: Nevro SCS Trial; no relief  04/25/2016: Trial of lumbar epidural steroid injections with steroids and hyaluronidase via caudal catheter with 100% pain relief lasting 48 hours  12/16/2015 diagnostic and therapeutic left S1 transforaminal epidural steroid injection with hyaluronidase with complete resolution of pain that recurred thereafter  11/30/2015: Spinal cord stimulator trial [employer] Jude, which provided him with more than 50% pain relief and functional improvement. However, patient decided to delay implantation of a permanent spinal cord stimulator device until the arrival of new technology. Patient did not like the feeling of tonic stimulation, and at times, even though stimulation was overlapping the areas of his chronic pain, he did not find it pleasant.   02/09/2015: Diagnostic  left piriformis muscle injection  Surgical measures:   03/03/2014: Left L4-L5 hemilaminectomy and foraminotomy at [facility] with Dr. Mackay   07/24/2014: L4-S1 decompression and fusion by Dr. Renee  Prashant Avila underwent follow-up neurosurgical consultation with Dr. Fausto Renee on 11/30/2017, and was found to be a potential surgical candidate.  Prashant Avila presents with significant comorbidities including hypertension, rheumatoid arthritis, osteoporosis, hyperlipidemia, chronic kidney disease, hearing loss, carcinoma of prostate, engaged in treatment.  In terms of current analgesics, Prashant Avila takes: Tylenol and gabapentin  I have reviewed Jno Report #[id number] consistent to medication reconciliation.     04-19-18           Global Pain Scale            Pain 19           Feelings 1           Clinical outcomes 11           Activities 17           GPS Total: 48              Review of Diagnostic Studies:    CT Lumbar Spine, without contrast, 09/13/2017: There are postoperative changes related to intervertebral body fusion and posterior fusion of L4, L5 and S1. In the lateral projection the alignment is excellent.   L1-L2: disc space is normal.   L2-L3: minimally circumferentially bulging annulus.   L3-L4: minimally circumferentially bulging annulus. Narrowing of the lateral recess.  L4-L5 and L5-S1: postoperative changes but there is no evidence of bony central spinal stenosis.  X-ray lumbar spine 08/04/2017: posterior lumbar fusion of L4, L5 and S1. There are degenerative changes at L1-L2 with anterior osteophyte formation. There is normal lumbar alignment in the lateral projection with a stable alignment in the flexed and extended positions.      Duplex Venous Lower, 03/25/2017: Normal right lower extremity venous duplex scan. No evidence of DVT.  MRI Lumbar Spine, 10/28/2016: Since the previous MRI study, there has been L4-5-S1 posterior lumbar and interbody fusion. No new abnormality of alignment is  "seen. There is, however, significant worsening of posterior element hypertrophy at L3-4, above the fusion level, due to persistent L3-4 disc bulge, and what appears to be new spinal stenosis at this level, generally as a result of posterior element hypertrophic change. Canal appears stable elsewhere. No lumbar compression deformity or new abnormality of alignment is seen. No vertebral marrow edema is appreciated. Sagittal images suggest a small (18 mm) seroma or synovial cyst on the right at L5-S1. Tip of conus medullaris is again noted at L1.  Axial images   L1-L2: no significant canal or foraminal stenosis   L2-L3: canal appears relatively narrowed, due to posterior element hypertrophy but still greater than 1 cm in diameter. Foramina appear  lower limits of normal diameter.   L3-L4: moderate canal stenosis, with additional lateral narrowing, AP diameter 6.5 mm, transverse diameter approximately 9 mm, with crowding of the nerve roots, but well-preserved anterior CSF space. Foramina appear mildly to moderately narrowed bilaterally at this level  due to facet DJD.   L4-L5: fused level, canal appears normal. Foramina appear mildly narrowed due to facet DJD.  L5-S1: canal appears normal. Left-sided neural foramen appears normal. Right-sided neural foramen appears mildly narrowed due to facet DJD. Postcontrast images show expected postoperative enhancement of the dorsal soft tissues and no pathologic postcontrast enhancement elsewhere.       The following portions of the patient's history were reviewed and updated as appropriate: problem list, past medical history, past surgery history, social history, family history, medications, and allergies    Review of Systems   Musculoskeletal: Positive for arthralgias (leg pain).   All other systems reviewed and are negative.    /80 (BP Location: Right arm, Patient Position: Sitting)   Pulse 58   Temp 97.6 °F (36.4 °C) (Temporal Artery )   Resp 16   Ht 175.3 cm (69\")  "  Wt 85.3 kg (188 lb)   SpO2 97%   BMI 27.76 kg/m²      Physical Exam   Neurologic Exam  Constitutional General appearance: No acute distress, well appearing and well nourished. Appears healthy, within normal limits of ideal weight, well hydrated and appearance reflects stated age.   Head and face: Normal.   Palpation of the face and sinuses: No sinus tenderness.   Eyes Conjunctiva and lids: No erythema, swelling or discharge. Pupils: Equal, round, reactive to light.   Neck: Supple, symmetric, trachea midline, no masses.   Pulmonary Respiratory effort: No increased work of breathing or signs of respiratory distress. Respiratory rate: normal. Assessment of respiratory effort revealed normal rhythm and effort. Auscultation of lungs: Clear to auscultation. Auscultation of the lungs revealed no expiratory wheezing, normal expiratory time and no inspiratory wheezing. No rales or crackles were heard bilaterally. No rhonchi. No friction rub. No wheezing. No diminished breath sounds. No bronchial breath sounds.   Cardiovascular Auscultation of heart: Normal rate and rhythm, normal S1 and S2, no murmurs. Peripheral vascular exam: Normal. Examination of extremities for edema and/or varicosities: Normal.   Abdomen: Non-tender, no masses. Bowel sounds were normal. The abdomen was soft and nontender. No masses palpated.   Musculoskeletal Gait and station: Normal. Gait evaluation demonstrated a normal gait. The range of motion of the lumbar spine is essentially full and without significant pain. Lumbar facet joint loading maneuvers are negative. Kodak and Gaenslen's tests are negative. The range of motion of the hip joints is full and without pain. Palpation of the gluteal bursa, ischial tuberosities, and greater trochanters, unrevealing. Piriformis maneuvers are negative. There is some discomfort upon palpation of the left piriformis muscle. Active resisted external rotation and passive internal rotation did not increase  pain.   Muscle strength/tone: Normal. Motor Strength Findings: normal strength. Motor Tone: normal tone. Involuntary movements: none. Significant shortening of the hamstrings, worse on the left side.  Skin and subcutaneous tissue: Normal without rashes or lesions.   Neurologic   Cranial nerves: Cranial nerves 2-12 intact.   Cortical function: Normal mental status.   Reflexes: 2+ and symmetric. Deep tendon reflexes: 2+ right biceps, 2+ left biceps, 1+ right patella, 1+ left patella, 1+ right ankle jerk and 1+ left ankle jerk. Superficial/Primitive Reflexes: primitive reflexes were absent. Straight leg raising test is negative. Femoral stretch sign is negative.   Sensation: No sensory loss. Sensory exam: intact to light touch, intact pain and temperature sensation, intact vibration sensation and normal proprioception.   Coordination: Normal finger to nose and heel to shin. Coordination: normal balance and negative Romberg's sign.   Psychiatric: Judgment and insight: Normal. Orientation to person, place and time: Normal. Recent and remote memory: Intact. Mood and affect: Normal.     ASSESSMENT:   1. Chronic lumbar radiculopathy    2. Adhesive arachnoiditis    3. Bilateral stenosis of lateral recess of lumbar spine    4. Lumbar postlaminectomy syndrome     5. Status post L4-S1 lumbar spinal fusion       PLAN: Patient did not appreciate pain relief during his spinal cord stimulator trial despite adjusting programs and trying another device before removing the leads. Mr. Avila presents with a challenging chronic pain history and has already failed to obtain pain relief with surgical measures, conservative measures, as well as interventional pain management measures. Patient has been found to be a potential candidate for additional surgical decompression at the superior adjacent level of his lumbar fusion. Patient underwent follow-up neurosurgical consultation with Dr. Fausto Renee on 11/03/2017. Dr. Renee discussed the  possibility of lumbar decompression at L3-L4: Left L3-L4 laminotomy and foraminotomy. Due to potential risk of developing instability that would necessitate extending his lumbar fusion to the L3 level.  Patient elected to hold off on surgery.  On MRI there is evidence of lateral recess stenosis at the adjacent level at L3-L4 with crowding of the nerve roots and possible lumbar arachnoiditis.  Patient's pain syndrome is compatible with lumbar polyradiculopathy with a predominantly L5-S1 dermatomal distribution, but with increased pain, his pain also spreads into the anterior aspect of his left thigh which could involve a left L3 radicular component.  Patient presents with intolerance to conventional analgesics and adjuvants. Therefore, I have proposed the following plan:  1. Diagnostics: EKG (baseline prior to starting mexiletine)  2. Interventional pain management measures: Patient will be scheduled for diagnostic and therapeutic left L3-L4 transforaminal epidural steroid injection with the addition of hyaluronidase.  We may repeat the procedure depending on patient's outcome. We have also discussed the possibility of regenerative therapies including platelet rich plasma therapy, amniotic fluid therapy, neural prolotherapy, left S1 (L3) DRG pulsed radiofrenquency, or eventually DRG stimulation trial Left L3 and S1.  Patient has declined the possibility of surgical decompression at this time  3. Long-term rehabilitation efforts:  a. Patient will start a comprehensive physical therapy program once his pain is under control  b. Patient has declined Pilates  4. Pharmacological measures, as follows;  A. Continue gabapentin 800 mg four times daily  B. Trial with mexiletine 150 mg BID (WILL START AFTER REVIEWING HIS EKG)  C. Trial with Rheumate once daily  5. The patient and his wife have been instructed to contact my office with any questions or difficulties. The patient understands the plan and agrees to proceed  accordingly.    I spent 70 minutes face-to-face with the patient and family, of which 45 minutes were spent counseling regarding evaluation, diagnosis, prognosis, diagnostic testing, potential referrals, treatment options for chronic pain condition and overall rehabilitation, risk and benefits of different interventions, alternative therapies, risks and benefits as it relates to spinal cord stimulator devices for trial and implantation and long-term management and functional goals of spinal cord stimulation       Patient Care Team:  Brian Obrien MD as PCP - General (Internal Medicine)  Efrain Batista MD as Consulting Physician (Pain Medicine)  Fausto Renee MD as Referring Physician (Neurosurgery)  Jean Warren MD as Referring Physician (Neurosurgery)     New Medications Ordered This Visit   Medications   • azelastine (ASTELIN) 0.1 % nasal spray         No future appointments.      Efrain Batista MD      EMR Dragon/Transcription disclaimer:  Much of this encounter note is an electronic transcription of spoken language to printed text. Electronic transcription of spoken language may permit erroneous, or at times, nonsensical words or phrases to be inadvertently transcribed. Although I have reviewed the note for such errors, some may still exist.

## 2018-04-23 ENCOUNTER — OUTSIDE FACILITY SERVICE (OUTPATIENT)
Dept: PAIN MEDICINE | Facility: CLINIC | Age: 73
End: 2018-04-23

## 2018-04-23 PROCEDURE — 64483 NJX AA&/STRD TFRM EPI L/S 1: CPT | Performed by: ANESTHESIOLOGY

## 2018-04-23 PROCEDURE — 99152 MOD SED SAME PHYS/QHP 5/>YRS: CPT | Performed by: ANESTHESIOLOGY

## 2018-10-22 DIAGNOSIS — M48.061 BILATERAL STENOSIS OF LATERAL RECESS OF LUMBAR SPINE: ICD-10-CM

## 2018-10-22 RX ORDER — GABAPENTIN 800 MG/1
TABLET ORAL
Qty: 120 TABLET | Refills: 5 | OUTPATIENT
Start: 2018-10-22

## 2018-10-24 ENCOUNTER — OFFICE VISIT (OUTPATIENT)
Dept: PAIN MEDICINE | Facility: CLINIC | Age: 73
End: 2018-10-24

## 2018-10-24 VITALS
DIASTOLIC BLOOD PRESSURE: 62 MMHG | WEIGHT: 184 LBS | SYSTOLIC BLOOD PRESSURE: 122 MMHG | HEIGHT: 69 IN | BODY MASS INDEX: 27.25 KG/M2 | TEMPERATURE: 96.7 F | OXYGEN SATURATION: 98 % | RESPIRATION RATE: 18 BRPM | HEART RATE: 75 BPM

## 2018-10-24 DIAGNOSIS — M96.1 POSTLAMINECTOMY SYNDROME OF LUMBAR REGION: ICD-10-CM

## 2018-10-24 DIAGNOSIS — Z98.1 STATUS POST LUMBAR SPINAL FUSION: ICD-10-CM

## 2018-10-24 DIAGNOSIS — G03.9 ADHESIVE ARACHNOIDITIS: ICD-10-CM

## 2018-10-24 DIAGNOSIS — M48.061 BILATERAL STENOSIS OF LATERAL RECESS OF LUMBAR SPINE: ICD-10-CM

## 2018-10-24 DIAGNOSIS — M54.16 CHRONIC LUMBAR RADICULOPATHY: Primary | ICD-10-CM

## 2018-10-24 PROCEDURE — 99213 OFFICE O/P EST LOW 20 MIN: CPT | Performed by: PHYSICIAN ASSISTANT

## 2018-10-24 NOTE — PROGRESS NOTES
"Chief Complaint: \"I need a refill on gabapentin.  I still have pain in my left buttock all the way down to my left foot and toes.\"     History of Present Illness:   Patient: Mr. Prashant Avila, 73 y.o. Male, who presents to the clinic for medication refill.  He takes 800 mg of gabapentin four times daily, without adverse effects.  Patient was last seen on 04/23/2018 for left L3-L4 transforaminal epidural steroid injections.  He continues to complain of left leg pain.  Patient has undergone numerous interventional pain measures for his chronic pain, without significant relief.  Also with history of lumbar surgery, see below.  Pain description: constant pain with intermittent exacerbation, described as burning and tingling sensation.   Radiation of pain: The pain radiates into the posterior aspect of the thigh, left leg and plantar aspect of the foot. The leg pain is more severe than the lower back pain.   Pain pattern: dermatomic   Pain intensity today: 7/10  Average pain intensity last week: 7/10  Pain intensity ranges from: 4/10 to 10/10  Aggravating factors: Pain increases with sitting longer than 5 minutes and laying down for more than 5 minutes.  Alleviating factors: Pain decreases with standing.   Associated symptoms:   Patient denies numbness or weakness in the lower extremities.   Patient denies any new bladder or bowel problems.   Patient denies difficulties with his balance or recent falls.      Review of previous therapies and additional medical records:  Prashant Avila has already failed the following measures, including:   Conservative measures: oral analgesics, physical therapy, ice and heat   Interventional measures:   02/20/2017- Diagnostic and Therapeutic Left sciatic nerve block with local anesthetics, steroids, hyaluronidase under ultrasound, PNS and fluoroscopic guidance- Patient did not receive sustained relief from this procedure.   02/06/2017: Diagnostic left S1 selective nerve root " injection  01/30/2017: Nevro SCS Trial; no relief  04/25/2016: Trial of lumbar epidural steroid injections with steroids and hyaluronidase via caudal catheter with 100% pain relief lasting 48 hours  12/16/2015 diagnostic and therapeutic left S1 transforaminal epidural steroid injection with hyaluronidase with complete resolution of pain that recurred thereafter  11/30/2015: Spinal cord stimulator trial [employer], which provided him with more than 50% pain relief and functional improvement. However, patient decided to delay implantation of a permanent spinal cord stimulator device until the arrival of new technology. Patient did not like the feeling of tonic stimulation, and at times, even though stimulation was overlapping the areas of his chronic pain, he did not find it pleasant.   02/09/2015: Diagnostic left piriformis muscle injection  Surgical measures:   03/03/2014: Left L4-L5 hemilaminectomy and foraminotomy at [facility] with Dr. Mackay   07/24/2014: L4-S1 decompression and fusion by Dr. Renee  Prashant Avila underwent follow-up neurosurgical consultation with Dr. Fausto Renee on 11/30/2017, and was found to be a potential surgical candidate.  Prashant Avila presents with significant comorbidities including hypertension, rheumatoid arthritis, osteoporosis, hyperlipidemia, chronic kidney disease, hearing loss, carcinoma of prostate, engaged in treatment.  In terms of current analgesics, Prashant Avila takes: Tylenol and gabapentin  I have reviewed Jon Report #[id number] consistent to medication reconciliation.     04-19-18 10-          Global Pain Scale            Pain 19 17          Feelings 1 0          Clinical outcomes 11 8          Activities 17 10          GPS Total: 48 35             Review of New Diagnostic Studies: There are no new diagnostic studies available for review.       The following portions of the patient's history were reviewed and updated as appropriate: problem list, past  "medical history, past surgery history, social history, family history, medications, and allergies    Review of Systems   Musculoskeletal: Positive for arthralgias (leg pain).   All other systems reviewed and are negative.    /62   Pulse 75   Temp 96.7 °F (35.9 °C) (Temporal Artery )   Resp 18   Ht 175.3 cm (69\")   Wt 83.5 kg (184 lb)   SpO2 98%   BMI 27.17 kg/m²      Physical Exam   Neurologic Exam  Constitutional General appearance: No acute distress, well appearing and well nourished.   Head and face: Normal.    Eyes Conjunctiva and lids: No erythema, swelling or discharge. Pupils: Equal, round, reactive to light.   Neck: Supple, symmetric, trachea midline, no masses.   Pulmonary: Lungs clear to auscultation bilaterally.  Cardiovascular Auscultation of heart: Normal rate and rhythm, normal S1 and S2, no murmurs.    Musculoskeletal Gait and station: Normal. Gait evaluation demonstrated a normal gait.   Lumbar spine: The range of motion of the lumbar spine is full and without pain. Lumbar facet joint loading maneuvers are negative.   Kodak and Gaenslen's tests are negative.   The range of motion of the hip joints is full and without pain.   Piriformis maneuvers are negative.   Muscle strength/tone: Normal. Motor Strength Findings: normal. Motor Tone: normal tone. Involuntary movements: none.   Skin and subcutaneous tissue: Normal without rashes or lesions.   Neurologic   Cranial nerves: Cranial nerves 2-12 intact.   Cortical function: Normal mental status.   Deep tendon reflexes: 2+ bilateral biceps, 1+ bilateral patellar, 1+ bilateral ankle.   Straight leg raising test is negative. Femoral stretch sign is negative.   Sensation: No sensory loss. Sensory exam: intact to light touch, intact pain and temperature sensation, intact vibration sensation and normal proprioception.   Coordination: Normal finger to nose and heel to shin. Coordination: normal balance and negative Romberg's sign.   Psychiatric: " Judgment and insight: Normal. Orientation to person, place and time: Normal. Recent and remote memory: Intact. Mood and affect: Normal.     ASSESSMENT:   1. Chronic lumbar radiculopathy    2. Adhesive arachnoiditis    3. Bilateral stenosis of lateral recess of lumbar spine    4. Lumbar postlaminectomy syndrome     5. Status post L4-S1 lumbar spinal fusion       PLAN:   I have reviewed the above medical records.  I'm going to refill the gabapentin today.  He states that he is doing okay with the gabapentin, and denies adverse effects.  I've recommended that he follow-up with Dr. Batista to discuss treatment options due to his complex history and failure with numerous interventional measures.  Patient is satisfied with the plan.  1. Continue gabapentin 800 mg qid.  Refilled.  Patient advised that he will need medication follow-up in 6 months.  2.  Follow-up with Dr. Batista to discuss treatment options for chronic back and leg pain.  3. The patient has been instructed to contact my office with any questions or difficulties. The patient understands the plan and agrees to proceed accordingly.      Patient Care Team:  Brian Obrien MD as PCP - General (General Practice)  Brian Obrien MD as PCP - Claims Attributed  Efrain Batista MD as Consulting Physician (Pain Medicine)  Fausto Renee MD as Referring Physician (Neurosurgery)  Jean Warren MD as Referring Physician (Neurosurgery)     No orders of the defined types were placed in this encounter.        Future Appointments  Date Time Provider Department Center   11/8/2018 2:30 PM Efrain Batista MD [facility] None         Abner Taylor PA-C      EMR Dragon/Transcription disclaimer:  Much of this encounter note is an electronic transcription of spoken language to printed text. Electronic transcription of spoken language may permit erroneous, or at times, nonsensical words or phrases to be inadvertently transcribed. Although I have  reviewed the note for such errors, some may still exist.

## 2018-10-29 DIAGNOSIS — M48.061 BILATERAL STENOSIS OF LATERAL RECESS OF LUMBAR SPINE: ICD-10-CM

## 2018-10-29 RX ORDER — GABAPENTIN 800 MG/1
TABLET ORAL
Qty: 120 TABLET | Refills: 5 | OUTPATIENT
Start: 2018-10-29 | End: 2019-05-23 | Stop reason: SDUPTHER

## 2018-11-01 NOTE — PROGRESS NOTES
"Chief Complaint: \"Pain in my left buttocks and all the way down to my left foot and toes.\"      History of Present Illness: Mr. Prashant Avila, 73 y.o. Male, originally referred by Dr. Fausto Renee in consultation for intractable chronic left lower extremity pain. Patient was last seen by my physician assistant, Abner Taylor PA-C, for medication refill. He is here today to discuss potential therapies for treatment of his unrelenting chronic and severe left gluteal and lower extremity pain.   Pain interferes with his sleep and daily activities as he is unable to sit down.    Pain history: Patient reports a 5-year history of pain, which began without incident. Patient is status post L4-L5 hemilaminectomy and foraminotomy at the [facility] with Dr. Mackay on 03/03/2014, followed by L4-S1 lumbar decompression and fusion by Dr. Renee on 07/24/2014. Patient reports that he experienced significant improvement after his last surgery with Dr. Renee, in particular, complete resolution of his previously severe lower back pain. Unfortunately, his left lower extremity pain/chronic radicular pain preceded his lumbar fusion, and did not improve after surgery. He underwent surgical consultation with Dr. Cox at the [facility], who recommended decompression of the nerve roots at L3-S1. He underwent follow-up neurosurgical consultation with Dr. Fausto Renee on 11/03/2017. Dr. Renee discussed the possibility of lumbar decompression at L3-L4: Left L3-L4 laminotomy and foraminotomy. Due to potential risk of developing instability that would necessitate extending his lumbar fusion to the L3 level. Patient underwent on April 3, 2018, left L3-L4 transforaminal epidural steroid injection without significant pain relief. Patient elected to hold off on surgery. Patient is here to discuss strategies at improving his unrelenting chronic pain.  Pain description: constant pain with intermittent exacerbation, described as burning, " electricity, pins and needles, and tingling sensation.   Radiation of pain: The pain radiates into the posterior aspect of the thigh, left leg and plantar aspect of the foot. The leg pain is more severe than the lower back pain.   Pain pattern: dermatomic, left S1   Pain intensity today: 7/10 (standing) 9-10 (sitting or lying down)  Average pain intensity last week: 7/10  Pain intensity ranges from: 4/10 to 10/10  Aggravating factors: Pain increases with sitting longer than 5 minutes and laying down for more than 5 minutes.  Alleviating factors: Pain decreases with standing.     Associated symptoms:   Patient denies numbness or weakness in the lower extremities.   Patient denies any new bladder or bowel problems.   Patient denies difficulties with his balance or recent falls.      Review of previous therapies and additional medical records:  Prashant Avila has already failed the following measures, including:   Conservative measures: oral analgesics, physical therapy, ice and heat   Interventional measures:   02/20/2017- Diagnostic and Therapeutic Left sciatic nerve block with local anesthetics, steroids, hyaluronidase under ultrasound, PNS and fluoroscopic guidance- Patient did not receive sustained relief from this procedure.   02/06/2017: Diagnostic left S1 selective nerve root injection  01/30/2017: Nevro SCS Trial; no relief  04/25/2016: Trial of lumbar epidural steroid injections with steroids and hyaluronidase via caudal catheter with 100% pain relief lasting 48 hours  12/16/2015 diagnostic and therapeutic left S1 transforaminal epidural steroid injection with hyaluronidase with complete resolution of pain that recurred thereafter  11/30/2015: Spinal cord stimulator trial [employer] Jude, which provided him with more than 50% pain relief and functional improvement. However, patient decided to delay implantation of a permanent spinal cord stimulator device until the arrival of new technology. Patient did not like  the perception of tonic stimulation, even though stimulation overlapped successfully the areas of his chronic pain, he did not find it pleasant.   02/09/2015: Diagnostic left piriformis muscle injection  Surgical measures:   03/03/2014: Left L4-L5 hemilaminectomy and foraminotomy at [facility] with Dr. Mackay   07/24/2014: L4-S1 decompression and fusion by Dr. Renee  Prashant Avila underwent follow-up neurosurgical consultation with Dr. Fausto Renee on 11/30/2017, and was found to be a potential surgical candidate.  Prashant Avila presents with significant comorbidities including hypertension, rheumatoid arthritis, osteoporosis, hyperlipidemia, chronic kidney disease, hearing loss, carcinoma of prostate, engaged in treatment.  In terms of current analgesics, Prashant Avila takes: Tylenol and gabapentin 800 mg 4 times a day without any side effects.  I have reviewed Jon Report #[id number] consistent to medication reconciliation.    Global Pain Scale 4-19-18 10-24-18 11-8-18        Pain 19 17 17        Feelings 1 0 0        Clinical outcomes 11 8 13        Activities 17 10 16        GPS Total: 48 35 46           Review of New Diagnostic Studies:    EKG April 19, 2018 revealed sinus bradycardia with first-degree AV block.  Left bundle branch block.      Review of Previous Diagnostic Studies:    CT Lumbar Spine, without contrast, 09/13/2017: There are postoperative changes related to intervertebral body fusion and posterior fusion of L4, L5 and S1. In the lateral projection the alignment is excellent.   L1-L2: disc space is normal.   L2-L3: minimally circumferentially bulging annulus.   L3-L4: minimally circumferentially bulging annulus. Narrowing of the lateral recess.  L4-L5 and L5-S1: postoperative changes but there is no evidence of bony central spinal stenosis.  X-ray lumbar spine 08/04/2017: posterior lumbar fusion of L4, L5 and S1. There are degenerative changes at L1-L2 with anterior osteophyte formation.  There is normal lumbar alignment in the lateral projection with a stable alignment in the flexed and extended positions.      Duplex Venous Lower, 03/25/2017: Normal right lower extremity venous duplex scan. No evidence of DVT.  MRI Lumbar Spine, 10/28/2016: Since the previous MRI study, there has been L4-5-S1 posterior lumbar and interbody fusion. No new abnormality of alignment is seen. There is, however, significant worsening of posterior element hypertrophy at L3-4, above the fusion level, due to persistent L3-4 disc bulge, and what appears to be new spinal stenosis at this level, generally as a result of posterior element hypertrophic change. Canal appears stable elsewhere. No lumbar compression deformity or new abnormality of alignment is seen. No vertebral marrow edema is appreciated. Sagittal images suggest a small (18 mm) seroma or synovial cyst on the right at L5-S1. Tip of conus medullaris is again noted at L1.  Axial images   L1-L2: no significant canal or foraminal stenosis   L2-L3: canal appears relatively narrowed, due to posterior element hypertrophy but still greater than 1 cm in diameter. Foramina appear  lower limits of normal diameter.   L3-L4: moderate canal stenosis, with additional lateral narrowing, AP diameter 6.5 mm, transverse diameter approximately 9 mm, with crowding of the nerve roots, but well-preserved anterior CSF space. Foramina appear mildly to moderately narrowed bilaterally at this level  due to facet DJD.   L4-L5: fused level, canal appears normal. Foramina appear mildly narrowed due to facet DJD.  L5-S1: canal appears normal. Left-sided neural foramen appears normal. Right-sided neural foramen appears mildly narrowed due to facet DJD. Postcontrast images show expected postoperative enhancement of the dorsal soft tissues and no pathologic postcontrast enhancement elsewhere.       The following portions of the patient's history were reviewed and updated as appropriate: problem  "list, past medical history, past surgery history, social history, family history, medications, and allergies    Review of Systems   Musculoskeletal: Positive for arthralgias (leg pain), back pain, joint swelling and neck stiffness.   All other systems reviewed and are negative.    /62   Pulse 82   Temp 97.6 °F (36.4 °C) (Temporal Artery )   Resp 18   Ht 175.3 cm (69\")   Wt 83.5 kg (184 lb)   SpO2 95%   BMI 27.17 kg/m²      Physical Exam   Neurologic Exam  Constitutional General appearance: No acute distress, well appearing and well nourished. Appears healthy, within normal limits of ideal weight, well hydrated and appearance reflects stated age.   Head and face: Normal.   Palpation of the face and sinuses: No sinus tenderness.   Eyes Conjunctiva and lids: No erythema, swelling or discharge. Pupils: Equal, round, reactive to light.   Neck: Supple, symmetric, trachea midline, no masses.   Pulmonary Respiratory effort: No increased work of breathing or signs of respiratory distress. Respiratory rate: normal. Assessment of respiratory effort revealed normal rhythm and effort. Auscultation of lungs: Clear to auscultation. Auscultation of the lungs revealed no expiratory wheezing, normal expiratory time and no inspiratory wheezing. No rales or crackles were heard bilaterally. No rhonchi. No friction rub. No wheezing. No diminished breath sounds. No bronchial breath sounds.   Cardiovascular Auscultation of heart: Normal rate and rhythm, normal S1 and S2, no murmurs. Peripheral vascular exam: Normal. Examination of extremities for edema and/or varicosities: Normal.   Abdomen: Non-tender, no masses. Bowel sounds were normal. The abdomen was soft and nontender. No masses palpated.   Musculoskeletal Gait and station: Normal. Gait evaluation demonstrated a normal gait. The range of motion of the lumbar spine is essentially full and without significant pain. Lumbar facet joint loading maneuvers are negative. Kodak " and Gaenslen's tests are negative. The range of motion of the hip joints is full and without pain. Palpation of the gluteal bursa, ischial tuberosities, and greater trochanters, unrevealing. Piriformis maneuvers are negative. There is some discomfort upon palpation of the left piriformis muscle. Active resisted external rotation and passive internal rotation did not increase pain.   Muscle strength/tone: Normal. Motor Strength Findings: normal strength. Motor Tone: normal tone. Involuntary movements: none. Significant shortening of the hamstrings, worse on the left side.  Skin and subcutaneous tissue: Normal without rashes or lesions.   Neurologic   Cranial nerves: Cranial nerves 2-12 intact.   Cortical function: Normal mental status.   Reflexes: 2+ and symmetric. Deep tendon reflexes: 2+ right biceps, 2+ left biceps, 1+ right patella, 1+ left patella, 1+ right ankle jerk and 1+ left ankle jerk. Superficial/Primitive Reflexes: primitive reflexes were absent. Straight leg raising test is negative. Femoral stretch sign is negative.   Sensation: No sensory loss. Sensory exam: intact to light touch, intact pain and temperature sensation, intact vibration sensation and normal proprioception.   Coordination: Normal finger to nose and heel to shin. Coordination: normal balance and negative Romberg's sign.   Psychiatric: Judgment and insight: Normal. Orientation to person, place and time: Normal. Recent and remote memory: Intact. Mood and affect: Normal.     ASSESSMENT:   1. Chronic lumbar radiculopathy    2. Adhesive arachnoiditis    3. Bilateral stenosis of lateral recess of lumbar spine    4. Lumbar postlaminectomy syndrome     5. Status post L4-S1 lumbar spinal fusion    6. Physical deconditioning         PLAN: Mr. Avila presents with a challenging chronic pain history and has already failed to obtain pain relief with surgical measures, conservative measures, and interventional pain management measures, as referenced  above. Patient underwent follow-up neurosurgical consultation with Dr. Fausto Renee on 11/03/2017. Dr. Renee discussed the possibility of lumbar decompression at L3-L4: Left L3-L4 laminotomy and foraminotomy. Due to potential risk of developing instability that would necessitate extending his lumbar fusion to the L3 level. Patient elected to hold off on surgery. On MRI of the lumbar spine, there is evidence of lateral recess stenosis at the adjacent level at L3-L4 with crowding of the nerve roots and possible lumbar arachnoiditis. 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 Patient denies pain in the L3 distribution. Patient presents with intolerance to conventional analgesics and adjuvants. Therefore, I have proposed the following plan:  1. Diagnostics: EMG/NCV bilateral lower extremities  2. Interventional pain management measures: I have discussed with the patient the possibility of regenerative therapies (as per patient's request) including platelet rich plasma therapy, amniotic fluid therapy, neural prolotherapy. We have also discussed left S1 DRG pulsed radiofrenquency, DRG stimulation trial left L3 and S1, conventional SCS trial with [employer] Jude for BurstDr, intrathecal pump trials (Prialt, clonidine/bupivacaine). We have also discussed the possibility of follow-up surgical consultation. Patient would like to consider all options and get back with me  3. Long-term rehabilitation efforts:  a. Patient will start a comprehensive physical therapy program once his pain is under control  b. Patient has declined Pilates  4. Pharmacological measures, as follows; Patient refused the idea of new pharmacological trials. Unfortunately, he is not a candidate for mexiletine based on his EKG  A. Continue gabapentin 800 mg four times daily  B. Trial with Rheumate twice daily (samples)  C. Start pyridoxine 25 mg three times a day  5. The patient has been  instructed to contact my office with any questions or difficulties. The patient understands the plan and agrees to proceed accordingly.    I spent more than 60 minutes face-to-face with the patient, of which more than 50% of the time was spent counseling regarding evaluation, diagnosis, prognosis, diagnostic testing, potential referrals, treatment options for chronic pain condition and overall rehabilitation, risk and benefits of different interventions, alternative therapies, risks and benefits as it relates to spinal cord stimulator devices for trial and implantation and long-term management and functional goals of spinal cord stimulation, intrathecal pump trials and implants, regenerative therapies, to name a few topics       Patient Care Team:  Brian Obrien MD as PCP - General (General Practice)  Brian Obrien MD as PCP - Claims Attributed  Efrain Batista MD as Consulting Physician (Pain Medicine)  Fausto Renee MD as Consulting Physician (Neurosurgery)  Jean Warren MD as Consulting Physician (Neurosurgery)  Hank Frankel MD as Consulting Physician (Cardiology)  Marcelino Condon MD as Consulting Physician (Urology)  Brian Tatum III, MD as Consulting Physician (Cardiology)  SHERON Chun MD as Consulting Physician (Family Medicine)     No orders of the defined types were placed in this encounter.        No future appointments.      Efrain Batista MD      EMR Dragon/Transcription disclaimer:  Much of this encounter note is an electronic transcription of spoken language to printed text. Electronic transcription of spoken language may permit erroneous, or at times, nonsensical words or phrases to be inadvertently transcribed. Although I have reviewed the note for such errors, some may still exist.

## 2018-11-08 ENCOUNTER — OFFICE VISIT (OUTPATIENT)
Dept: PAIN MEDICINE | Facility: CLINIC | Age: 73
End: 2018-11-08

## 2018-11-08 VITALS
OXYGEN SATURATION: 95 % | HEIGHT: 69 IN | SYSTOLIC BLOOD PRESSURE: 140 MMHG | DIASTOLIC BLOOD PRESSURE: 62 MMHG | WEIGHT: 184 LBS | RESPIRATION RATE: 18 BRPM | HEART RATE: 82 BPM | BODY MASS INDEX: 27.25 KG/M2 | TEMPERATURE: 97.6 F

## 2018-11-08 DIAGNOSIS — M48.061 BILATERAL STENOSIS OF LATERAL RECESS OF LUMBAR SPINE: ICD-10-CM

## 2018-11-08 DIAGNOSIS — R53.81 PHYSICAL DECONDITIONING: ICD-10-CM

## 2018-11-08 DIAGNOSIS — M96.1 POSTLAMINECTOMY SYNDROME OF LUMBAR REGION: ICD-10-CM

## 2018-11-08 DIAGNOSIS — M54.16 CHRONIC LUMBAR RADICULOPATHY: ICD-10-CM

## 2018-11-08 DIAGNOSIS — Z98.1 STATUS POST LUMBAR SPINAL FUSION: ICD-10-CM

## 2018-11-08 DIAGNOSIS — G03.9 ADHESIVE ARACHNOIDITIS: ICD-10-CM

## 2018-11-08 DIAGNOSIS — R53.81 PHYSICAL DECONDITIONING: Primary | ICD-10-CM

## 2018-11-08 PROCEDURE — 99215 OFFICE O/P EST HI 40 MIN: CPT | Performed by: ANESTHESIOLOGY

## 2018-11-08 RX ORDER — PYRIDOXINE HCL (VITAMIN B6) 25 MG
25 TABLET ORAL 3 TIMES DAILY
Qty: 90 TABLET | Refills: 5 | Status: SHIPPED | OUTPATIENT
Start: 2018-11-08 | End: 2019-10-03

## 2018-11-08 RX ORDER — ME-TETRAHYDROFOLATE/B12/HRB236 1-1-500 MG
CAPSULE ORAL
Qty: 180 CAPSULE | Refills: 1 | Status: ON HOLD | OUTPATIENT
Start: 2018-11-08 | End: 2019-01-21

## 2018-12-18 ENCOUNTER — HOSPITAL ENCOUNTER (OUTPATIENT)
Dept: NEUROLOGY | Facility: HOSPITAL | Age: 73
Discharge: HOME | End: 2018-12-18
Attending: ANESTHESIOLOGY | Admitting: ANESTHESIOLOGY

## 2018-12-18 PROCEDURE — 95910 NRV CNDJ TEST 7-8 STUDIES: CPT

## 2018-12-18 PROCEDURE — 95886 MUSC TEST DONE W/N TEST COMP: CPT

## 2019-01-10 ENCOUNTER — TELEPHONE (OUTPATIENT)
Dept: PAIN MEDICINE | Facility: CLINIC | Age: 74
End: 2019-01-10

## 2019-01-10 DIAGNOSIS — M48.061 BILATERAL STENOSIS OF LATERAL RECESS OF LUMBAR SPINE: Primary | ICD-10-CM

## 2019-01-10 DIAGNOSIS — Z98.1 STATUS POST LUMBAR SPINAL FUSION: ICD-10-CM

## 2019-01-10 DIAGNOSIS — M96.1 POSTLAMINECTOMY SYNDROME OF LUMBAR REGION: ICD-10-CM

## 2019-01-10 DIAGNOSIS — M54.16 CHRONIC LUMBAR RADICULOPATHY: ICD-10-CM

## 2019-01-10 NOTE — TELEPHONE ENCOUNTER
Results given to patient- he requests that a myelogram be done if possible to further address/assess the location of his pain and see if there was a surgical intervention. Dr. Batista notified.

## 2019-01-21 ENCOUNTER — HOSPITAL ENCOUNTER (OUTPATIENT)
Facility: HOSPITAL | Age: 74
Discharge: HOME | End: 2019-01-21
Attending: RADIOLOGY | Admitting: ANESTHESIOLOGY

## 2019-01-21 ENCOUNTER — APPOINTMENT (OUTPATIENT)
Dept: CT IMAGING | Facility: HOSPITAL | Age: 74
End: 2019-01-21

## 2019-01-21 VITALS
BODY MASS INDEX: 28.73 KG/M2 | SYSTOLIC BLOOD PRESSURE: 151 MMHG | DIASTOLIC BLOOD PRESSURE: 82 MMHG | TEMPERATURE: 97.5 F | OXYGEN SATURATION: 98 % | RESPIRATION RATE: 16 BRPM | HEART RATE: 61 BPM | HEIGHT: 69 IN | WEIGHT: 194 LBS

## 2019-01-21 DIAGNOSIS — M48.061 BILATERAL STENOSIS OF LATERAL RECESS OF LUMBAR SPINE: ICD-10-CM

## 2019-01-21 DIAGNOSIS — M54.16 CHRONIC LUMBAR RADICULOPATHY: ICD-10-CM

## 2019-01-21 DIAGNOSIS — Z98.1 STATUS POST LUMBAR SPINAL FUSION: ICD-10-CM

## 2019-01-21 DIAGNOSIS — M96.1 POSTLAMINECTOMY SYNDROME OF LUMBAR REGION: ICD-10-CM

## 2019-01-21 PROCEDURE — 72132 CT LUMBAR SPINE W/DYE: CPT

## 2019-01-21 PROCEDURE — 0 IOPAMIDOL 41 % SOLUTION: Performed by: RADIOLOGY

## 2019-01-21 PROCEDURE — 62304 MYELOGRAPHY LUMBAR INJECTION: CPT | Performed by: RADIOLOGY

## 2019-01-21 RX ORDER — LIDOCAINE HYDROCHLORIDE 10 MG/ML
INJECTION, SOLUTION EPIDURAL; INFILTRATION; INTRACAUDAL; PERINEURAL AS NEEDED
Status: DISCONTINUED | OUTPATIENT
Start: 2019-01-21 | End: 2019-01-21 | Stop reason: HOSPADM

## 2019-01-21 RX ORDER — CELECOXIB 200 MG/1
200 CAPSULE ORAL EVERY MORNING
COMMUNITY
End: 2020-01-14

## 2019-01-21 NOTE — H&P
NAME: REBECA STONE  : 1945  PCP: Brian Obrien MD  Attending MD: Francisco Javier Clay MD    Date of Admission:  2019  Date of Service: 2019    History of Present Illness:  74 y.o.  male with history of multiple prior lumbar spine surgeries, his most recent being L4-S1 fusion in .  He's had recurrent/persistent left lower extremity pain and underwent an epidural steroid injection in 2018 without significant relief.  He is being evaluated for possible further intervention.    Past Medical History:  Past Medical History:   Diagnosis Date   • Arthritis    • Carcinoma of prostate (CMS/HCC)    • Chronic pain disorder    • Extremity pain    • Hearing loss of both ears    • History of chronic kidney disease    • Hyperlipidemia    • Hypertension    • Low back pain    • Neck pain    • Osteoarthritis    • Rheumatoid arthritis (CMS/HCC)        Past Surgical History:  Past Surgical History:   Procedure Laterality Date   • BACK SURGERY     • EPIDURAL BLOCK     • LUMBAR FUSION  2014    TLIF L4-S1 (Dr. Renee)   • LUMBAR LAMINECTOMY  2014    LT-L4/5 LAMI & VICTOR HUGO (Dr. Mackay)   • ORTHOPEDIC SURGERY     • OTHER SURGICAL HISTORY      Enteroscopic Polypectomy   • OTHER SURGICAL HISTORY      Vocal Cordectomy   • PROSTATE SURGERY     • VOCAL CORD BIOPSY           Medications  Medications Prior to Admission   Medication Sig Dispense Refill Last Dose   • atorvastatin (LIPITOR) 10 MG tablet Take 1 tablet by mouth Every Morning.   2019 at 0700   • celecoxib (CeleBREX) 200 MG capsule Take 200 mg by mouth Every Morning.   2019 at 0700   • gabapentin (NEURONTIN) 800 MG tablet TAKE ONE TABLET BY MOUTH 4 TIMES DAILY (Patient taking differently: Take 800 mg by mouth 4 (Four) Times a Day. TAKE ONE TABLET BY MOUTH 4 TIMES DAILY) 120 tablet 5 2019 at 0700   • pyridoxine (VITAMIN B-6) 25 MG tablet Take 1 tablet by mouth 3 (Three) Times a Day. 90 tablet 5 2019 at 0700   •  triamterene-hydrochlorothiazide (MAXZIDE-25) 37.5-25 MG per tablet Take 1 tablet by mouth daily.   1/21/2019 at 0700       Allergies:  Allergies   Allergen Reactions   • Doxycycline Rash       Social Hx:  Social History     Socioeconomic History   • Marital status:      Spouse name: Not on file   • Number of children: Not on file   • Years of education: Not on file   • Highest education level: Not on file   Social Needs   • Financial resource strain: Not on file   • Food insecurity - worry: Not on file   • Food insecurity - inability: Not on file   • Transportation needs - medical: Not on file   • Transportation needs - non-medical: Not on file   Occupational History   • Not on file   Tobacco Use   • Smoking status: Never Smoker   • Smokeless tobacco: Never Used   Substance and Sexual Activity   • Alcohol use: No   • Drug use: No   • Sexual activity: Not on file   Other Topics Concern   • Not on file   Social History Narrative   • Not on file       Family Hx:  Family History   Problem Relation Age of Onset   • Congenital heart disease Mother    • Multiple myeloma Father        Review of Imaging:  No new imaging    Laboratory Result:  Lab Results   Component Value Date    WBC 7.46 01/17/2017    HGB 14.6 01/17/2017    HCT 43.9 01/17/2017    MCV 93.4 01/17/2017     01/17/2017     Lab Results   Component Value Date    GLUCOSE 140 (H) 07/09/2014    CALCIUM 9.2 07/09/2014     07/09/2014    K 4.13 07/14/2014    CO2 28 07/09/2014     07/09/2014    BUN 21 07/09/2014    CREATININE 1.1 07/09/2014    ANIONGAP 8 07/09/2014     Lab Results   Component Value Date    HGBA1C 5.3 07/09/2014     No results found for: CHOL, CHLPL, TRIG, HDL, LDL, LDLDIRECT    Physical Examination:  There were no vitals filed for this visit.     General Appearance:   Well developed, well nourished, well groomed, alert, and cooperative.    Neurological examination:  Cranial nerves: Cranial nerves 2-12 intact.   Cortical  function: Normal mental status.   Reflexes: 2+ and symmetric. Deep tendon reflexes: 2+ right biceps, 2+ left biceps, 1+ right patella, 1+ left patella, 1+ right ankle jerk and 1+ left ankle jerk. Superficial/Primitive Reflexes: primitive reflexes were absent. Straight leg raising test is negative. Femoral stretch sign is negative.   Sensation: No sensory loss. Sensory exam: intact to light touch, intact pain and temperature sensation, intact vibration sensation and normal proprioception.   Coordination: Normal finger to nose and heel to shin. Coordination: normal balance and negative Romberg's sign.         Diagnoses/Plan:    Mr. Avila is a 74 y.o. male with chronic back and lower extremity pain, status post multiple prior lumbar surgeries and epidural steroid injections.  He presents for lumbar myelography to evaluate his back pain and radicular complaints for possible intervention

## 2019-01-24 NOTE — PROGRESS NOTES
"Chief Complaint: \"Pain in my left buttocks and all the way down the bottom of my left foot and toes.\"      History of Present Illness: Mr. Prashant Avila, 74 y.o. male, originally referred by Dr. Fausto Renee in consultation for intractable chronic left lower extremity pain. Patient was last seen by my physician assistant, Abner Taylor PA-C, for medication refill.  In addition, we have order a lumbar myelogram followed by CT postmyelogram.  He is here today to discuss potential options for the treatment of his unrelenting left lower extremity pain. Pain interferes with his sleep and daily activities as he is unable to sit down for any length of time due to the severity of his pain.    Pain history: Patient reports a 6-year history of pain, which began without incident. Patient is status post L4-L5 hemilaminectomy and foraminotomy at the [facility] with Dr. Mackay on 03/03/2014, followed by L4-S1 lumbar decompression and fusion due to unresolved pain by Dr. Renee on 07/24/2014. Patient reports that he experienced significant improvement after his last surgery with Dr. Renee, in particular, complete resolution of his previously severe lower back pain. Unfortunately, his left lower extremity pain, chronic radicular pain preceded his last lumbar fusion, and did not improve after surgery. He underwent surgical consultation with Dr. Cox at the [facility], who recommended decompression of the nerve roots at L3-S1. He underwent follow-up neurosurgical consultation with Dr. Fausto Renee on 11/03/2017. Dr. Renee discussed the possibility of lumbar decompression at L3-L4: Left L3-L4 laminotomy and foraminotomy. Due to potential risk of developing instability that would necessitate extending his lumbar fusion to the L3 level. Patient underwent on April 3, 2018, left L3-L4 transforaminal epidural steroid injection without any  pain relief. Patient elected to hold off on surgery. He previously underwent EMG/NCV, which " confirmed chronic left S1 radiculopathy.  A CT myelogram was significant for stenosis above the level of his lumbar fusion, at L3-L4 without clinical correlation.  Patient presents with pain with the left S1 dermatomal distribution.  Pain description: constant pain with intermittent exacerbation, described as burning, electricity, pins and needles, and tingling sensation.   Radiation of pain: The pain radiates into the posterior aspect of the thigh, left leg and plantar aspect of the foot. The leg pain is more severe than the lower back pain.   Pain pattern: dermatomic, left S1   Pain intensity today: 7/10 (standing) 9-10 (sitting or lying down)  Average pain intensity last week: 7/10  Pain intensity ranges from: 4/10 to 10/10  Aggravating factors: Pain increases with sitting longer than 5 minutes and laying down for more than 5 minutes.  Alleviating factors: Pain decreases with standing.     Associated symptoms:   Patient denies numbness or weakness in the lower extremities.   Patient denies any new bladder or bowel problems.   Patient denies difficulties with his balance or recent falls.      Review of previous therapies and additional medical records:  Prashant Avila has already failed the following measures, including:   Conservative measures: oral analgesics, physical therapy, ice and heat   Interventional measures:   02/20/2017- Diagnostic and therapeutic left sciatic nerve block with local anesthetics, steroids, hyaluronidase under ultrasound, PNS and fluoroscopic guidance- Patient did not receive sustained relief from this procedure.   02/06/2017: Diagnostic left S1 selective nerve root injection  01/30/2017: Nevro SCS Trial; no relief  04/25/2016: Trial of lumbar epidural steroid injections with steroids and hyaluronidase via caudal catheter with 100% pain relief lasting 48 hours  12/16/2015: diagnostic and therapeutic left S1 transforaminal epidural steroid injection with hyaluronidase with complete  resolution of pain for several weeks  11/30/2015: Spinal cord stimulator trial [employer], which provided him with more than 50% pain relief and functional improvement. However, patient decided to delay implantation of a permanent spinal cord stimulator device until the arrival of new technology. Patient did not like the perception of tonic stimulation, even though stimulation overlapped successfully the areas of his chronic pain, he did not find it pleasant.   02/09/2015: Diagnostic left piriformis muscle injection  Surgical measures:   03/03/2014: Left L4-L5 hemilaminectomy and foraminotomy at [facility] with Dr. Mackay   07/24/2014: L4-S1 decompression and fusion by Dr. Renee  Prashant Avila underwent follow-up neurosurgical consultation with Dr. Fausto Renee on 11/30/2017, and was found to be a potential surgical candidate.  Prashant Avila presents with significant comorbidities including hypertension, rheumatoid arthritis, osteoporosis, hyperlipidemia, chronic kidney disease, hearing loss, carcinoma of prostate, engaged in treatment.  In terms of current analgesics, Prashant Avila takes: Tylenol and gabapentin 800 mg 4 times a day without any side effects.  I have reviewed Jon Report #[id number] consistent to medication reconciliation.    Global Pain Scale 04-19  2018 10-24  2018 11-08  2018 01-29  2019       Pain 19 17 17 18       Feelings 1 0 0 0       Clinical outcomes 11 8 13 11       Activities 17 10 16 18       GPS Total: 48 35 46 47          Review of New Diagnostic Studies:    EMG/NCV of the bilateral lower extremities December 18, 2018: Chronic left S1 radiculopathy.   CT LUMBAR SPINE WITH CONTRAST-01/21/2019: There is excellent opacification of the thecal sac. Imaging covers from T12 through the S1 level caudally. The conus terminates at the L1/L2 level.  The distal cord and conus are normal in caliber.  The nerve roots of the cauda equina are normal in caliber and distribution.  L4-S1 fusion  hardware is in place; the left S1 pedicle screw is  fractured and there is lucency around the right S1 pedicle screw. Left L4 laminotomy. Vertebral body heights and alignment are normal. Vertebral body heights are normal. Degenerative changes are as follows:  T12-L1: Facet arthropathy without significant foraminal or spinal canal stenosis.  L1-L2: Facet arthropathy without significant foraminal or spinal canal stenosis.  L2-L3: Right greater than left facet arthropathy. Mild narrowing of the right subarticular zone. No significant central spinal canal or  foraminal stenosis.  L3-L4: Disc bulge and facet arthropathy with ligamentum flavum thickening. There is severe spinal canal stenosis and at least moderate bilateral foraminal stenosis. This has progressed since the lumbar myelogram CT from 09/30/2014.  L4-L5: Improved right subarticular zone narrowing. No significant osseous spinal canal or foraminal stenosis.  L5-S1: Bilateral facet arthropathy. Shallow disc osteophyte. Unchanged mild osseous foraminal stenosis. No significant osseous spinal canal stenosis.     Review of Previous Diagnostic Studies:    EKG April 19, 2018 revealed sinus bradycardia with first-degree AV block.  Left bundle branch block.    CT Lumbar Spine, without contrast, 09/13/2017: There are postoperative changes related to intervertebral body fusion and posterior fusion of L4, L5 and S1. In the lateral projection the alignment is excellent.   L1-L2: disc space is normal.   L2-L3: minimally circumferentially bulging annulus.   L3-L4: minimally circumferentially bulging annulus. Narrowing of the lateral recess.  L4-L5 and L5-S1: postoperative changes but there is no evidence of bony central spinal stenosis.  X-ray lumbar spine 08/04/2017: posterior lumbar fusion of L4, L5 and S1. There are degenerative changes at L1-L2 with anterior osteophyte formation. There is normal lumbar alignment in the lateral projection with a stable alignment in the  flexed and extended positions.      Duplex Venous Lower, 03/25/2017: Normal right lower extremity venous duplex scan. No evidence of DVT.  MRI Lumbar Spine, 10/28/2016: Since the previous MRI study, there has been L4-5-S1 posterior lumbar and interbody fusion. No new abnormality of alignment is seen. There is, however, significant worsening of posterior element hypertrophy at L3-4, above the fusion level, due to persistent L3-4 disc bulge, and what appears to be new spinal stenosis at this level, generally as a result of posterior element hypertrophic change. Canal appears stable elsewhere. No lumbar compression deformity or new abnormality of alignment is seen. No vertebral marrow edema is appreciated. Sagittal images suggest a small (18 mm) seroma or synovial cyst on the right at L5-S1. Tip of conus medullaris is again noted at L1.  Axial images   L1-L2: no significant canal or foraminal stenosis   L2-L3: canal appears relatively narrowed, due to posterior element hypertrophy but still greater than 1 cm in diameter. Foramina appear  lower limits of normal diameter.   L3-L4: moderate canal stenosis, with additional lateral narrowing, AP diameter 6.5 mm, transverse diameter approximately 9 mm, with crowding of the nerve roots, but well-preserved anterior CSF space. Foramina appear mildly to moderately narrowed bilaterally at this level  due to facet DJD.   L4-L5: fused level, canal appears normal. Foramina appear mildly narrowed due to facet DJD.  L5-S1: canal appears normal. Left-sided neural foramen appears normal. Right-sided neural foramen appears mildly narrowed due to facet DJD. Postcontrast images show expected postoperative enhancement of the dorsal soft tissues and no pathologic postcontrast enhancement elsewhere.       The following portions of the patient's history were reviewed and updated as appropriate: problem list, past medical history, past surgery history, social history, family history,  "medications, and allergies    Review of Systems   Musculoskeletal: Positive for arthralgias (leg pain), back pain and joint swelling.   All other systems reviewed and are negative.    /72   Pulse 81   Temp 97.2 °F (36.2 °C) (Temporal)   Resp 18   Ht 175.3 cm (69\")   Wt 88.8 kg (195 lb 12.8 oz)   SpO2 98%   BMI 28.91 kg/m²      Physical Exam   Neurologic Exam  Constitutional General appearance: No acute distress, well appearing and well nourished. Appears healthy, within normal limits of ideal weight, well hydrated and appearance reflects stated age.   Head and face: Normal.   Palpation of the face and sinuses: No sinus tenderness.   Eyes Conjunctiva and lids: No erythema, swelling or discharge. Pupils: Equal, round, reactive to light.   Neck: Supple, symmetric, trachea midline, no masses.   Pulmonary Respiratory effort: No increased work of breathing or signs of respiratory distress. Respiratory rate: normal. Assessment of respiratory effort revealed normal rhythm and effort. Auscultation of lungs: Clear to auscultation. Auscultation of the lungs revealed no expiratory wheezing, normal expiratory time and no inspiratory wheezing. No rales or crackles were heard bilaterally. No rhonchi. No friction rub. No wheezing. No diminished breath sounds. No bronchial breath sounds.   Cardiovascular Auscultation of heart: Normal rate and rhythm, normal S1 and S2, no murmurs. Peripheral vascular exam: Normal. Examination of extremities for edema and/or varicosities: Normal.   Abdomen: Non-tender, no masses. Bowel sounds were normal. The abdomen was soft and nontender. No masses palpated.   Musculoskeletal Gait and station: Normal. Gait evaluation demonstrated a normal gait. The range of motion of the lumbar spine is essentially full and without significant pain. Lumbar facet joint loading maneuvers are negative. Kodak and Gaenslen's tests are negative. The range of motion of the hip joints is full and without " pain. Palpation of the gluteal bursa, ischial tuberosities, and greater trochanters, unrevealing. Piriformis maneuvers are negative. There is some discomfort upon palpation of the left piriformis muscle. Active resisted external rotation and passive internal rotation did not increase pain.   Muscle strength/tone: Normal. Motor Strength Findings: normal strength. Motor Tone: normal tone. Involuntary movements: none. Significant shortening of the hamstrings, worse on the left side.  Skin and subcutaneous tissue: Normal without rashes or lesions.   Neurologic   Cranial nerves: Cranial nerves 2-12 intact.   Cortical function: Normal mental status.   Reflexes: 2+ and symmetric. Deep tendon reflexes: 2+ right biceps, 2+ left biceps, 1+ right patella, 1+ left patella, 1+ right ankle jerk and 1+ left ankle jerk. Superficial/Primitive Reflexes: primitive reflexes were absent. Straight leg raising test is negative. Femoral stretch sign is negative.   Sensation: No sensory loss. Sensory exam: intact to light touch, intact pain and temperature sensation, intact vibration sensation and normal proprioception.   Coordination: Normal finger to nose and heel to shin. Coordination: normal balance and negative Romberg's sign.   Psychiatric: Judgment and insight: Normal. Orientation to person, place and time: Normal. Recent and remote memory: Intact. Mood and affect: Normal.     ASSESSMENT:   1. Chronic lumbar radiculopathy    2. Adhesive arachnoiditis    3. Lumbar postlaminectomy syndrome     4. Status post L4-S1 lumbar spinal fusion    5. Bilateral stenosis of lateral recess of lumbar spine    6. Physical deconditioning         PLAN: Mr. Avila presents with a challenging chronic pain history and has already failed to obtain pain relief with surgical measures, conservative measures, and interventional pain management measures, as referenced above. Patient underwent follow-up neurosurgical consultation with Dr. Fausto Renee on  11/03/2017. Dr. Renee discussed the possibility of lumbar decompression and extension of fusion at L3-L4. Patient elected to hold off on surgery. On MRI of the lumbar spine, there is evidence of lateral recess stenosis at the adjacent level at L3-L4 with crowding of the nerve roots and possible lumbar arachnoiditis. CT scan post-myelogram confirmed stenosis at L3-L4 without correlation. Recent EMG/NCV confirmed chronic left S1 radiculopathy. Patient's pain syndrome is compatible with left S1 lumbar radiculopathy. Patient denies pain in the L3 distribution. Patient presents with intolerance to conventional analgesics and adjuvants. Therefore, I have proposed the following plan:  1. Interventional pain management measures: Patient has elected to proceed with diagnostic and therapeutic left S1 transforaminal epidural steroid injection.  In the meantime, patient will return to Dr. Praveen Navarro to obtain psychological clearance for a potential spinal cord stimulator trial, DRG trial or an intrathecal trial. We have also discussed at length the possibility of regenerative therapies (as per patient's request) including platelet rich plasma therapy, amniotic fluid therapy, neural prolotherapy. However, due to cost and lack of insurance coverage, he has decided against these therapies. In addition, we have discussed left S1 DRG pulsed radiofrenquency (declined due to lack of insurance coverage), DRG stimulation trial left L3 and S1, conventional SCS trial with [facility] for BurstDr therapy, intrathecal pump trial (Prialt vs clonidine/bupivacaine). Last, I have encouraged him to pursue follow-up NS consultation with Dr. Renee, which he has declined at this time  2. Long-term rehabilitation efforts:  A. Start a comprehensive physical therapy program for water therapy, neurodynamics, gait and balance and core strengthening  B. Patient has declined Pilates  3. Pharmacological measures, as follows; Patient refused the idea of  new pharmacological trials. Unfortunately, he is not a candidate for mexiletine based on his EKG  A. Continue gabapentin 800 mg four times daily  B. Trial with Rheumate twice daily (samples)  C. Continue pyridoxine 25 mg three times a day  4. The patient has been instructed to contact my office with any questions or difficulties. The patient understands the plan and agrees to proceed accordingly.        Patient Care Team:  SHERON Chun MD as PCP - General (Family Medicine)  Efrain Batista MD as Consulting Physician (Pain Medicine)  Fausto Renee MD as Consulting Physician (Neurosurgery)  Jean Warren MD as Consulting Physician (Neurosurgery)  Hank Frankel MD as Consulting Physician (Cardiology)  Marcelino Condon MD as Consulting Physician (Urology)  Brian Tatum III, MD as Consulting Physician (Cardiology)  SHERON Chun MD as Consulting Physician (Family Medicine)  Francisco Javier Clay MD as Consulting Physician (Neurosurgery)  Royer Roldan MD as Consulting Physician (General Practice)     No orders of the defined types were placed in this encounter.        No future appointments.      Efrain Batista MD      EMR Dragon/Transcription disclaimer:  Much of this encounter note is an electronic transcription of spoken language to printed text. Electronic transcription of spoken language may permit erroneous, or at times, nonsensical words or phrases to be inadvertently transcribed. Although I have reviewed the note for such errors, some may still exist.

## 2019-01-29 ENCOUNTER — OFFICE VISIT (OUTPATIENT)
Dept: PAIN MEDICINE | Facility: CLINIC | Age: 74
End: 2019-01-29

## 2019-01-29 VITALS
RESPIRATION RATE: 18 BRPM | WEIGHT: 195.8 LBS | HEIGHT: 69 IN | BODY MASS INDEX: 29 KG/M2 | TEMPERATURE: 97.2 F | DIASTOLIC BLOOD PRESSURE: 72 MMHG | SYSTOLIC BLOOD PRESSURE: 140 MMHG | OXYGEN SATURATION: 98 % | HEART RATE: 81 BPM

## 2019-01-29 DIAGNOSIS — M54.16 CHRONIC LUMBAR RADICULOPATHY: ICD-10-CM

## 2019-01-29 DIAGNOSIS — M54.16 CHRONIC LUMBAR RADICULOPATHY: Primary | ICD-10-CM

## 2019-01-29 DIAGNOSIS — G03.9 ADHESIVE ARACHNOIDITIS: ICD-10-CM

## 2019-01-29 DIAGNOSIS — M48.061 BILATERAL STENOSIS OF LATERAL RECESS OF LUMBAR SPINE: ICD-10-CM

## 2019-01-29 DIAGNOSIS — Z98.1 STATUS POST LUMBAR SPINAL FUSION: ICD-10-CM

## 2019-01-29 DIAGNOSIS — R53.81 PHYSICAL DECONDITIONING: ICD-10-CM

## 2019-01-29 DIAGNOSIS — M96.1 POSTLAMINECTOMY SYNDROME OF LUMBAR REGION: ICD-10-CM

## 2019-01-29 PROCEDURE — 99214 OFFICE O/P EST MOD 30 MIN: CPT | Performed by: ANESTHESIOLOGY

## 2019-01-29 RX ORDER — ME-TETRAHYDROFOLATE/B12/HRB236 1-1-500 MG
CAPSULE ORAL
Qty: 180 CAPSULE | Refills: 1 | Status: SHIPPED | OUTPATIENT
Start: 2019-01-29 | End: 2019-03-06

## 2019-02-06 ENCOUNTER — OUTSIDE FACILITY SERVICE (OUTPATIENT)
Dept: PAIN MEDICINE | Facility: CLINIC | Age: 74
End: 2019-02-06

## 2019-02-06 PROCEDURE — 99152 MOD SED SAME PHYS/QHP 5/>YRS: CPT | Performed by: ANESTHESIOLOGY

## 2019-02-06 PROCEDURE — 64483 NJX AA&/STRD TFRM EPI L/S 1: CPT | Performed by: ANESTHESIOLOGY

## 2019-02-07 ENCOUNTER — TELEPHONE (OUTPATIENT)
Dept: PAIN MEDICINE | Facility: CLINIC | Age: 74
End: 2019-02-07

## 2019-02-07 NOTE — TELEPHONE ENCOUNTER
Patient had dx/tx left S1 TFESI on 02/06/19. Called patient to f/u post procedure. Reached voicemail. Left message for return call

## 2019-03-05 ENCOUNTER — DOCUMENTATION (OUTPATIENT)
Dept: PAIN MEDICINE | Facility: CLINIC | Age: 74
End: 2019-03-05

## 2019-03-05 NOTE — PROGRESS NOTES
1.  Patient does not meet the criteria for psychiatric diagnosis at this time.  2.  From a psychological perspective, patient is considered to be an appropriate candidate for a spinal cord stimulator or an intrathecal pump implant at this time.  3.  Patient does not require any psychological treatment at this time, but I will be available to see him again in the future if needed.

## 2019-03-06 ENCOUNTER — OFFICE VISIT (OUTPATIENT)
Dept: PAIN MEDICINE | Facility: CLINIC | Age: 74
End: 2019-03-06

## 2019-03-06 VITALS
OXYGEN SATURATION: 98 % | BODY MASS INDEX: 28.76 KG/M2 | RESPIRATION RATE: 18 BRPM | TEMPERATURE: 97.3 F | HEART RATE: 64 BPM | DIASTOLIC BLOOD PRESSURE: 62 MMHG | HEIGHT: 69 IN | WEIGHT: 194.2 LBS | SYSTOLIC BLOOD PRESSURE: 122 MMHG

## 2019-03-06 DIAGNOSIS — Z98.1 STATUS POST LUMBAR SPINAL FUSION: ICD-10-CM

## 2019-03-06 DIAGNOSIS — G03.9 ADHESIVE ARACHNOIDITIS: ICD-10-CM

## 2019-03-06 DIAGNOSIS — M54.16 CHRONIC LUMBAR RADICULOPATHY: Primary | ICD-10-CM

## 2019-03-06 DIAGNOSIS — R53.81 PHYSICAL DECONDITIONING: ICD-10-CM

## 2019-03-06 DIAGNOSIS — M96.1 POSTLAMINECTOMY SYNDROME OF LUMBAR REGION: ICD-10-CM

## 2019-03-06 DIAGNOSIS — M48.061 BILATERAL STENOSIS OF LATERAL RECESS OF LUMBAR SPINE: ICD-10-CM

## 2019-03-06 PROCEDURE — 99213 OFFICE O/P EST LOW 20 MIN: CPT | Performed by: PHYSICIAN ASSISTANT

## 2019-03-06 RX ORDER — PREDNISONE 1 MG/1
5 TABLET ORAL DAILY
COMMUNITY
End: 2019-10-03

## 2019-03-06 NOTE — PROGRESS NOTES
"Chief Complaint: \"Left buttock pain all the way down the bottom of my left foot and toes.\"      History of Present Illness: Mr. Prashant Avila, 74 y.o. male, with a 6-year history of pain, which began without incident.  He presents today for post-procedure follow-up.  Patient was last seen on 02/06/2019 for left S1 transforaminal epidural steroid injection and experienced pain relief in his shoulders, knees, and hips that was fairly short-lived.  Unfortunately, patient reports no relief of his lower back, left thigh, and leg pain.  He was recently prescribed prednisone by his rheumatologist, but hasn't taken it yet.  Pain description: constant pain with intermittent exacerbation, described as burning, electricity, pins and needles, and tingling sensation.   Radiation of pain: The pain radiates into the posterior aspect of the thigh, left leg, and plantar aspect of the foot.  The leg pain is more severe than the lower back pain.   Pain pattern: dermatomic, left S1   Pain intensity today: 7/10   Average pain intensity last week: 7/10  Pain intensity ranges from: 4/10 to 9/10  Aggravating factors: Pain increases with sitting longer than 5 minutes and laying down for more than 5 minutes.  Alleviating factors: Pain decreases with standing.     Associated symptoms:   Patient denies numbness or weakness in the lower extremities.   Patient denies any new bladder or bowel problems.   Patient denies difficulties with his balance or recent falls.      Review of previous therapies and additional medical records:  Prashant Avila has already failed the following measures, including:   Conservative measures: oral analgesics, physical therapy, ice and heat   Interventional measures: As referenced above.  02/20/2017- Diagnostic and therapeutic left sciatic nerve block with local anesthetics, steroids, hyaluronidase; Patient did not receive sustained relief from this procedure.   02/06/2017: Diagnostic left S1 selective nerve " "root injection  01/30/2017: Nevro SCS Trial- no relief  04/25/2016: Trial of lumbar CHINTAN's with steroids and hyaluronidase via caudal catheter with 100% pain relief lasting 48 hours  12/16/2015: Left S1 TESI with hyaluronidase with complete resolution of pain for several weeks  11/30/2015: Spinal cord stimulator trial [facility], which provided him with more than 50% pain relief and functional improvement. However, patient decided to delay implantation of a permanent spinal cord stimulator device until the arrival of new technology.    02/09/2015: Diagnostic left piriformis muscle injection  Surgical measures:   03/03/2014: Left L4-L5 hemilaminectomy and [facility]aminotomy at [facility] with Dr. Mackay   07/24/2014: L4-S1 decompression and fusion by Dr. Renee  Prashant Avila underwent follow-up neurosurgical consultation with Dr. Fausto Renee on 11/30/2017, and was found to be a potential surgical candidate.  Prashant Avila presents with significant comorbidities including hypertension, rheumatoid arthritis, osteoporosis, hyperlipidemia, chronic kidney disease, hearing loss, carcinoma of prostate, engaged in treatment.  In terms of current analgesics, rPashant Avila takes: gabapentin, Celebrex  I have reviewed Jon Report #[id number] consistent to medication reconciliation.    Pain Psychology Evaluation (02/25/2019): \"From a psychological perspective, patient is considered to be an appropriate candidate for a spinal cord stimulator or an intrathecal pump implant at this time.\"      Global Pain Scale 04-19  2018 10-24  2018 11-08  2018 01-29  2019 03-06  2019      Pain 19 17 17 18 17      Feelings 1 0 0 0 0      Clinical outcomes 11 8 13 11 6      Activities 17 10 16 18 6      GPS Total: 48 35 46 47 29         Review of Diagnostic Studies:    EMG/NCV of the bilateral lower extremities- 12/18/2018: Chronic left S1 radiculopathy.   CT LUMBAR SPINE WITH CONTRAST-01/21/2019:   T12-L1: Facet arthropathy without significant " "foraminal or spinal canal stenosis.  L1-L2: Facet arthropathy without significant foraminal or spinal canal stenosis.  L2-L3: Right greater than left facet arthropathy. Mild narrowing of the right subarticular zone. No significant central spinal canal or  foraminal stenosis.  L3-L4: Disc bulge and facet arthropathy with ligamentum flavum thickening. There is severe spinal canal stenosis and at least moderate bilateral foraminal stenosis. This has progressed since the lumbar myelogram CT from 09/30/2014.  L4-L5: Improved right subarticular zone narrowing. No significant osseous spinal canal or foraminal stenosis.  L5-S1: Bilateral facet arthropathy. Shallow disc osteophyte. Unchanged mild osseous foraminal stenosis. No significant osseous spinal canal stenosis.         The following portions of the patient's history were reviewed and updated as appropriate: problem list, past medical history, past surgery history, social history, family history, medications, and allergies    Review of Systems   Musculoskeletal: Positive for arthralgias (leg pain), back pain and joint swelling.   All other systems reviewed and are negative.    /62   Pulse 64   Temp 97.3 °F (36.3 °C) (Temporal)   Resp 18   Ht 175.3 cm (69\")   Wt 88.1 kg (194 lb 3.2 oz)   SpO2 98%   BMI 28.68 kg/m²      Physical Exam   Neurologic Exam  General appearance: No acute distress.  Well appearing and well nourished.    Head and face: Normal.   Conjunctiva and lids: No erythema, swelling or discharge. Pupils: Equal, round, and reactive to light.   Neck: Supple, symmetric, trachea midline, no masses.   Pulmonary: No increased work of breathing or signs of respiratory distress.  Clear to auscultation bilaterally.   Cardiovascular: Normal rate and rhythm, normal S1 and S2, no murmurs.  Peripheral vascular exam: Normal.  No edema.   Gait and station: Normal.   Lumbar spine: The range of motion of the lumbar spine is essentially full and without " significant pain. Lumbar facet joint loading maneuvers are negative.   Kodak and Gaenslen's tests are negative.   The range of motion of the hip joints is full and without pain.   Bilateral greater trochanters and iliotibial bands were non-TTP.   Muscle strength/tone: Normal.    Involuntary movements: none.   Skin and subcutaneous tissue: Normal without rashes or lesions.   Neurologic   Cranial nerves: Cranial nerves 2-12 intact.   Cortical function: Normal mental status.   Deep tendon reflexes: 2+ biceps bilaterally.  1+ patellar bilaterally.  1+ Achilles bilaterally.   Straight leg raising test is negative.    Sensation: No sensory loss. Sensory exam: intact to light touch, intact pain and temperature sensation, intact vibration sensation and normal proprioception.   Coordination: Normal finger to nose and heel to shin. Coordination: normal balance and negative Romberg's sign.   Psychiatric: Judgment and insight: Normal. Orientation to person, place and time: Normal. Recent and remote memory: Intact. Mood and affect: Normal.     ASSESSMENT:   1. Chronic lumbar radiculopathy    2. Adhesive arachnoiditis    3. Lumbar postlaminectomy syndrome     4. Status post L4-S1 lumbar spinal fusion    5. Bilateral stenosis of lateral recess of lumbar spine    6. Physical deconditioning         PLAN: Patient has failed to obtain pain relief with surgical measures, conservative measures, and interventional pain management measures.  He has received psychological clearance for an implantable device.  I have reviewed all available medical records, and discussed the patient with Dr. Batista.  1. Interventional pain management measures: None scheduled.  We discussed the possibility of an implantable device, which Dr. Batista has also previously discussed at length.  We also discussed neurosurgical consult, which patient declined.  At this time he wants to consider his options, and he will let us know if he wants to pursue an  implantable device for pain manangement.  Advised that he would need CBC, aPTT, and PT/INR prior to stimulator trial or an intrathecal pump trial.  Patient was given additional educational materials regarding spinal cord stimulation.  2. Long-term rehabilitation efforts:  A. Start a comprehensive physical therapy program for water therapy, neurodynamics, gait and balance and core strengthening  3. Pharmacological measures: Reviewed and discussed.  A. Continue gabapentin 800 mg four times daily  B. ContinueRheumate twice daily (samples)  C. Continue pyridoxine 25 mg three times a day  4. The patient has been instructed to contact my office with any questions or difficulties. The patient understands the plan and agrees to proceed accordingly.        Patient Care Team:  SHERON Chun MD as PCP - General (Family Medicine)  Brian Obrien MD as PCP - Claims Attributed  Efrain Batista MD as Consulting Physician (Pain Medicine)  Fausto Renee MD as Consulting Physician (Neurosurgery)  Jean Warren MD as Consulting Physician (Neurosurgery)  Hank Frankel MD as Consulting Physician (Cardiology)  Marcelino Condon MD as Consulting Physician (Urology)  Brian Tatum III, MD as Consulting Physician (Cardiology)  SHERON Chun MD as Consulting Physician (Family Medicine)  Francisco Javier Clay MD as Consulting Physician (Neurosurgery)  Royer Roldan MD as Consulting Physician (General Practice)  Abner Taylor PA-C as Physician Assistant (Physician Assistant)     No orders of the defined types were placed in this encounter.        No future appointments.      Abner Taylor PA-C      EMR Dragon/Transcription disclaimer:  Much of this encounter note is an electronic transcription of spoken language to printed text. Electronic transcription of spoken language may permit erroneous, or at times, nonsensical words or phrases to be inadvertently transcribed. Although I have reviewed the note for  such errors, some may still exist.

## 2019-05-23 DIAGNOSIS — M48.061 BILATERAL STENOSIS OF LATERAL RECESS OF LUMBAR SPINE: ICD-10-CM

## 2019-05-23 RX ORDER — GABAPENTIN 800 MG/1
TABLET ORAL
Qty: 120 TABLET | Refills: 5 | OUTPATIENT
Start: 2019-05-23 | End: 2019-12-11 | Stop reason: SDUPTHER

## 2019-05-23 NOTE — TELEPHONE ENCOUNTER
Received refill request for Gabapentin 800 mg qid. Patient last seen 03/06/19. Jon report # [id number] scanned to [employer]. Rx called in to [facility]

## 2019-10-02 NOTE — PROGRESS NOTES
"Chief Complaint: \"Pain in my left buttocks and all the way down the bottom of my left foot and toes.\"        History of Present Illness: Mr. Prashant Avila, 74 y.o. male, originally referred by Dr. Fausto Renee in consultation for intractable chronic left lower extremity pain. Patient was last seen by my physician assistant, Abner Taylor PA-C, for medication refill.  He is here today to discuss potential options for the treatment of his unrelenting left lower extremity pain. Pain interferes with his sleep and daily activities as he is unable to sit down for any length of time due to the severity of his pain.    Pain history: Patient reports a 7-year history of pain, which began without incident. Patient is status post L4-L5 hemilaminectomy and foraminotomy at the [facility] with Dr. Mackay on 03/03/2014, followed by L4-S1 lumbar decompression and fusion due to unresolved pain by Dr. Renee on 07/24/2014. Patient reports that he experienced significant improvement after his last surgery with Dr. Renee, in particular, complete resolution of his previously severe lower back pain. Unfortunately, his left lower extremity pain, chronic radicular pain preceded his last lumbar fusion, and did not improve after surgery. He underwent surgical consultation with Dr. Cox at the [facility], who recommended decompression of the nerve roots at L3-S1. He underwent follow-up neurosurgical consultation with Dr. Fausto Renee on 11/03/2017. Dr. Renee discussed the possibility of lumbar decompression at L3-L4: Left L3-L4 laminotomy and foraminotomy. Due to potential risk of developing instability that would necessitate extending his lumbar fusion to the L3 level. Patient underwent on April 3, 2018, left L3-L4 transforaminal epidural steroid injection without any pain relief. Patient elected to hold off on surgery. Patient underwent EMG/NCV, which confirmed chronic left S1 radiculopathy.  A CT myelogram was significant for " stenosis above the level of his lumbar fusion, at L3-L4 without clinical correlation.  Patient presents with pain with a left S1 dermatomal distribution pattern.  Pain description: Constant pain with intermittent exacerbation, described as burning, electricity, pins and needles, and tingling sensation.   Radiation of pain: The pain radiates into the posterior aspect of the thigh, left leg and plantar aspect of the foot. The leg pain is more severe than the lower back pain.   Pain pattern: dermatomic, left L5 and S1   Pain intensity today: 3/10 (standing) 9-10/10 (sitting or lying down)  Average pain intensity last week: 7/10  Pain intensity ranges from: 4/10 to 10/10  Aggravating factors: Pain increases with sitting longer than 5 minutes and laying down for more than 5 minutes.  Alleviating factors: Pain decreases with standing.     Associated symptoms:   Patient denies numbness or weakness in the lower extremities.   Patient denies any new bladder or bowel problems.   Patient denies difficulties with his balance or recent falls.      Review of previous therapies and additional medical records:  Prashant Avila has already failed the following measures, including:   Conservative measures: oral analgesics, ice and heat, physical therapy  Interventional measures:  02/06/2019: Left S1 transforaminal epidural steroid injection and experienced pain relief that was short-lived   02/20/2017- Diagnostic and therapeutic left sciatic nerve block with local anesthetics, steroids, hyaluronidase under ultrasound, PNS and fluoroscopic guidance- Patient did not receive sustained relief from this procedure.   02/06/2017: Diagnostic left S1 selective nerve root injection  01/30/2017: Nevro SCS Trial; no relief  04/25/2016: Trial of lumbar epidural steroid injections with steroids and hyaluronidase via caudal catheter with 100% pain relief lasting 48 hours  12/16/2015: diagnostic and therapeutic left S1 transforaminal epidural  "steroid injection with hyaluronidase with complete resolution of pain for several weeks  11/30/2015: Spinal cord stimulator trial [employer] [facility], which provided him with more than 50% pain relief and functional improvement. However, patient decided to delay implantation of a permanent spinal cord stimulator device until the arrival of new technology. Patient did not like the perception of tonic stimulation, even though stimulation overlapped successfully the areas of his chronic pain, he did not find it pleasant.   02/09/2015: Diagnostic left piriformis muscle injection  Surgical measures:   03/03/2014: Left L4-L5 hemilaminectomy and [facility]aminotomy at [facility] with Dr. Mackay   07/24/2014: L4-S1 decompression and fusion by Dr. Renee  Prashant Avila underwent follow-up neurosurgical consultation with Dr. Fausto Renee on 11/30/2017, and was found to be a potential surgical candidate.  Pain Psychology Evaluation (02/25/2019): \"From a psychological perspective, patient is considered to be an appropriate candidate for a spinal cord stimulator or an intrathecal pump implant at this time.\"  Prashant Avila presents with significant comorbidities including hypertension, rheumatoid arthritis, osteoporosis, hyperlipidemia, chronic kidney disease, hearing loss, carcinoma of prostate, engaged in treatment.  In terms of current analgesics, Prashant Avila takes: Tylenol and gabapentin 800 mg 4 times a day without any side effects.  I have reviewed Jon Report #[id number] consistent to medication reconciliation.    Global Pain Scale 04-19  2018 10-24  2018 11-08  2018 01-29  2019 03-06  2019 10-03  2019     Pain 19 17 17 18 17 20     Feelings 1 0 0 0 0   8     Clinical outcomes 11 8 13 11 6 11     Activities 17 10 16 18 6 15     GPS Total: 48 35 46 47 29 54        Review of New Diagnostic Studies:    EMG/NCV of the bilateral lower extremities December 18, 2018: Chronic left S1 radiculopathy.   CT LUMBAR SPINE WITH CONTRAST-01/21/2019: " There is excellent opacification of the thecal sac. Imaging covers from T12 through the S1 level caudally. The conus terminates at the L1/L2 level.  The distal cord and conus are normal in caliber.  The nerve roots of the cauda equina are normal in caliber and distribution.  L4-S1 fusion hardware is in place; the left S1 pedicle screw is  fractured and there is lucency around the right S1 pedicle screw. Left L4 laminotomy. Vertebral body heights and alignment are normal. Vertebral body heights are normal. Degenerative changes are as follows:  T12-L1: Facet arthropathy without significant foraminal or spinal canal stenosis.  L1-L2: Facet arthropathy without significant foraminal or spinal canal stenosis.  L2-L3: Right greater than left facet arthropathy. Mild narrowing of the right subarticular zone. No significant central spinal canal or  foraminal stenosis.  L3-L4: Disc bulge and facet arthropathy with ligamentum flavum thickening. There is severe spinal canal stenosis and at least moderate bilateral foraminal stenosis. This has progressed since the lumbar myelogram CT from 09/30/2014.  L4-L5: Improved right subarticular zone narrowing. No significant osseous spinal canal or foraminal stenosis.  L5-S1: Bilateral facet arthropathy. Shallow disc osteophyte. Unchanged mild osseous foraminal stenosis. No significant osseous spinal canal stenosis.  EKG April 19, 2018 revealed sinus bradycardia with first-degree AV block.  Left bundle branch block.    CT Lumbar Spine, without contrast, 09/13/2017: There are postoperative changes related to intervertebral body fusion and posterior fusion of L4, L5 and S1. In the lateral projection the alignment is excellent.   L1-L2: disc space is normal.   L2-L3: minimally circumferentially bulging annulus.   L3-L4: minimally circumferentially bulging annulus. Narrowing of the lateral recess.  L4-L5 and L5-S1: postoperative changes but there is no evidence of bony central spinal  stenosis.  X-ray lumbar spine 08/04/2017: posterior lumbar fusion of L4, L5 and S1. There are degenerative changes at L1-L2 with anterior osteophyte formation. There is normal lumbar alignment in the lateral projection with a stable alignment in the flexed and extended positions.      Duplex Venous Lower, 03/25/2017: Normal right lower extremity venous duplex scan. No evidence of DVT.  MRI Lumbar Spine, 10/28/2016: Since the previous MRI study, there has been L4-5-S1 posterior lumbar and interbody fusion. No new abnormality of alignment is seen. There is, however, significant worsening of posterior element hypertrophy at L3-4, above the fusion level, due to persistent L3-4 disc bulge, and what appears to be new spinal stenosis at this level, generally as a result of posterior element hypertrophic change. Canal appears stable elsewhere. No lumbar compression deformity or new abnormality of alignment is seen. No vertebral marrow edema is appreciated. Sagittal images suggest a small (18 mm) seroma or synovial cyst on the right at L5-S1. Tip of conus medullaris is again noted at L1.  Axial images   L1-L2: no significant canal or foraminal stenosis   L2-L3: canal appears relatively narrowed, due to posterior element hypertrophy but still greater than 1 cm in diameter. Foramina appear  lower limits of normal diameter.   L3-L4: moderate canal stenosis, with additional lateral narrowing, AP diameter 6.5 mm, transverse diameter approximately 9 mm, with crowding of the nerve roots, but well-preserved anterior CSF space. Foramina appear mildly to moderately narrowed bilaterally at this level  due to facet DJD.   L4-L5: fused level, canal appears normal. Foramina appear mildly narrowed due to facet DJD.  L5-S1: canal appears normal. Left-sided neural foramen appears normal. Right-sided neural foramen appears mildly narrowed due to facet DJD. Postcontrast images show expected postoperative enhancement of the dorsal soft tissues  "and no pathologic postcontrast enhancement elsewhere.       The following portions of the patient's history were reviewed and updated as appropriate: problem list, past medical history, past surgery history, social history, family history, medications, and allergies    Review of Systems   Musculoskeletal: Positive for arthralgias (leg pain), back pain and joint swelling.   All other systems reviewed and are negative.    /62 (BP Location: Left arm, Patient Position: Sitting)   Temp 98.3 °F (36.8 °C) (Temporal)   Ht 175.3 cm (69\")   Wt 83.9 kg (185 lb)   BMI 27.32 kg/m²      Physical Exam   Neurologic Exam  Constitutional General appearance: No acute distress, well appearing and well nourished. Appears healthy, within normal limits of ideal weight, well hydrated and appearance reflects stated age.   Head and face: Normal.   Palpation of the face and sinuses: No sinus tenderness.   Eyes Conjunctiva and lids: No erythema, swelling or discharge. Pupils: Equal, round, reactive to light.   Neck: Supple, symmetric, trachea midline, no masses.   Pulmonary Respiratory effort: No increased work of breathing or signs of respiratory distress. Respiratory rate: normal. Assessment of respiratory effort revealed normal rhythm and effort. Auscultation of lungs: Clear to auscultation. Auscultation of the lungs revealed no expiratory wheezing, normal expiratory time and no inspiratory wheezing. No rales or crackles were heard bilaterally. No rhonchi. No friction rub. No wheezing. No diminished breath sounds. No bronchial breath sounds.   Cardiovascular Auscultation of heart: Normal rate and rhythm, normal S1 and S2, no murmurs. Peripheral vascular exam: Normal. Examination of extremities for edema and/or varicosities: Normal.   Abdomen: Non-tender, no masses. Bowel sounds were normal. The abdomen was soft and nontender. No masses palpated.   Musculoskeletal Gait and station: Normal. Gait evaluation demonstrated a normal " gait. The range of motion of the lumbar spine is essentially full and without significant pain. Lumbar facet joint loading maneuvers are negative. Kodak and Gaenslen's tests are negative. The range of motion of the hip joints is full and without pain. Palpation of the gluteal bursa, ischial tuberosities, and greater trochanters, unrevealing. Piriformis maneuvers are negative. There is some discomfort upon palpation of the left piriformis muscle. Active resisted external rotation and passive internal rotation did not increase pain.   Muscle strength/tone: Normal. Motor Strength Findings: normal strength. Motor Tone: normal tone. Involuntary movements: none. Significant shortening of the hamstrings, worse on the left side.  Skin and subcutaneous tissue: Normal without rashes or lesions.   Neurologic   Cranial nerves: Cranial nerves 2-12 intact.   Cortical function: Normal mental status.   Reflexes: 2+ and symmetric. Deep tendon reflexes: 2+ right biceps, 2+ left biceps, 1+ right patella, 1+ left patella, 1+ right ankle jerk and 1+ left ankle jerk. Superficial/Primitive Reflexes: primitive reflexes were absent. Straight leg raising test is negative. Femoral stretch sign is negative.   Sensation: No sensory loss. Sensory exam: intact to light touch, intact pain and temperature sensation, intact vibration sensation and normal proprioception.   Coordination: Normal finger to nose and heel to shin. Coordination: normal balance and negative Romberg's sign.   Psychiatric: Judgment and insight: Normal. Orientation to person, place and time: Normal. Recent and remote memory: Intact. Mood and affect: Normal.     ASSESSMENT:   1. Chronic lumbar radiculopathy    2. Adhesive arachnoiditis    3. Lumbar postlaminectomy syndrome     4. Status post L4-S1 lumbar spinal fusion    5. Bilateral stenosis of lateral recess of lumbar spine    6. Physical deconditioning         PLAN: Mr. Avila presents with a challenging chronic pain  history and has already failed to obtain pain relief with surgical measures, conservative measures, and interventional pain management measures, as referenced above. Patient underwent follow-up neurosurgical consultation with Dr. Fausto Renee on 11/03/2017. Dr. Renee discussed the possibility of lumbar decompression and extension of fusion at L3-L4. Patient elected to hold off on surgery. On MRI of the lumbar spine, there is evidence of lateral recess stenosis at the adjacent level at L3-L4 with crowding of the nerve roots and possible lumbar arachnoiditis. CT scan post-myelogram confirmed stenosis at L3-L4 without correlation. Recent EMG/NCV confirmed chronic left S1 radiculopathy. Patient's pain syndrome is compatible with chronic left S1 lumbar radiculopathy and lumbar arachnoiditis. Patient denies pain in the L3 distribution. Patient presents with intolerance to conventional analgesics and adjuvants. Therefore, I have proposed the following plan:  1. Interventional pain management measures: Patient has elected to proceed with a spinal cord stimulator trial with [employer] with [facility]. We have also discussed at length the possibility of an intrathecal trial (Prialt vs clonidine/bupivacaine), regenerative therapies (as per patient's request) including platelet rich plasma therapy, amniotic fluid therapy, neural prolotherapy. However, due to cost and lack of insurance coverage, he has decided against these therapies. In addition, we have discussed left S1 DRG pulsed radiofrenquency (declined due to lack of insurance coverage), . Last, I have encouraged him to pursue follow-up NS consultation with Dr. Renee, which he has declined at this time  2. Long-term rehabilitation efforts:  A. Start a comprehensive physical therapy program for water therapy, neurodynamics, gait and balance and core strengthening  B. Patient has declined a referral to [facility]  3. Pharmacological measures, as follows; Patient refused the  idea of new pharmacological trials. Unfortunately, he is not a candidate for mexiletine based on his EKG. Continue gabapentin 800 mg four times daily  4. The patient has been instructed to contact my office with any questions or difficulties. The patient understands the plan and agrees to proceed accordingly.        Patient Care Team:  SHERON Chun MD as PCP - General (Family Medicine)  SHERON Chun MD as PCP - Claims Attributed  Efrain Batista MD as Consulting Physician (Pain Medicine)  Fausto Renee MD as Consulting Physician (Neurosurgery)  Jean Warren MD as Consulting Physician (Neurosurgery)  Hank Frankel MD as Consulting Physician (Cardiology)  Marcelino Condon MD as Consulting Physician (Urology)  Brian Tatum III, MD as Consulting Physician (Cardiology)  SHERON Chun MD as Consulting Physician (Family Medicine)  Francisco Javier Clya MD as Consulting Physician (Neurosurgery)  Royer Roldan MD as Consulting Physician (General Practice)  Emigdio Watt MD as Consulting Physician (Gastroenterology)  Shola Vila MD as Consulting Physician (Otolaryngology)     No orders of the defined types were placed in this encounter.        No future appointments.      Efrain Batista MD      EMR Dragon/Transcription disclaimer:  Much of this encounter note is an electronic transcription of spoken language to printed text. Electronic transcription of spoken language may permit erroneous, or at times, nonsensical words or phrases to be inadvertently transcribed. Although I have reviewed the note for such errors, some may still exist.

## 2019-10-03 ENCOUNTER — APPOINTMENT (OUTPATIENT)
Dept: LAB | Facility: HOSPITAL | Age: 74
End: 2019-10-03

## 2019-10-03 ENCOUNTER — OFFICE VISIT (OUTPATIENT)
Dept: PAIN MEDICINE | Facility: CLINIC | Age: 74
End: 2019-10-03

## 2019-10-03 VITALS
HEIGHT: 69 IN | DIASTOLIC BLOOD PRESSURE: 62 MMHG | WEIGHT: 185 LBS | TEMPERATURE: 98.3 F | BODY MASS INDEX: 27.4 KG/M2 | SYSTOLIC BLOOD PRESSURE: 104 MMHG

## 2019-10-03 DIAGNOSIS — G03.9 ADHESIVE ARACHNOIDITIS: ICD-10-CM

## 2019-10-03 DIAGNOSIS — R53.81 PHYSICAL DECONDITIONING: ICD-10-CM

## 2019-10-03 DIAGNOSIS — Z98.1 STATUS POST LUMBAR SPINAL FUSION: ICD-10-CM

## 2019-10-03 DIAGNOSIS — M54.16 CHRONIC LUMBAR RADICULOPATHY: Primary | ICD-10-CM

## 2019-10-03 DIAGNOSIS — M48.061 BILATERAL STENOSIS OF LATERAL RECESS OF LUMBAR SPINE: ICD-10-CM

## 2019-10-03 DIAGNOSIS — Z51.81 ENCOUNTER FOR THERAPEUTIC DRUG MONITORING: ICD-10-CM

## 2019-10-03 DIAGNOSIS — M54.16 CHRONIC LUMBAR RADICULOPATHY: ICD-10-CM

## 2019-10-03 DIAGNOSIS — M96.1 POSTLAMINECTOMY SYNDROME OF LUMBAR REGION: ICD-10-CM

## 2019-10-03 LAB
APTT PPP: 38.8 SECONDS (ref 24–37)
BASOPHILS # BLD AUTO: 0.09 10*3/MM3 (ref 0–0.2)
BASOPHILS NFR BLD AUTO: 1 % (ref 0–1.5)
DEPRECATED RDW RBC AUTO: 43.4 FL (ref 37–54)
EOSINOPHIL # BLD AUTO: 0.28 10*3/MM3 (ref 0–0.4)
EOSINOPHIL NFR BLD AUTO: 3.3 % (ref 0.3–6.2)
ERYTHROCYTE [DISTWIDTH] IN BLOOD BY AUTOMATED COUNT: 12.7 % (ref 12.3–15.4)
HCT VFR BLD AUTO: 40.5 % (ref 37.5–51)
HGB BLD-MCNC: 13.4 G/DL (ref 13–17.7)
IMM GRANULOCYTES # BLD AUTO: 0.03 10*3/MM3 (ref 0–0.05)
IMM GRANULOCYTES NFR BLD AUTO: 0.3 % (ref 0–0.5)
INR PPP: 1.12 (ref 0.85–1.16)
LYMPHOCYTES # BLD AUTO: 1.84 10*3/MM3 (ref 0.7–3.1)
LYMPHOCYTES NFR BLD AUTO: 21.4 % (ref 19.6–45.3)
MCH RBC QN AUTO: 30.7 PG (ref 26.6–33)
MCHC RBC AUTO-ENTMCNC: 33.1 G/DL (ref 31.5–35.7)
MCV RBC AUTO: 92.9 FL (ref 79–97)
MONOCYTES # BLD AUTO: 1.01 10*3/MM3 (ref 0.1–0.9)
MONOCYTES NFR BLD AUTO: 11.7 % (ref 5–12)
NEUTROPHILS # BLD AUTO: 5.36 10*3/MM3 (ref 1.7–7)
NEUTROPHILS NFR BLD AUTO: 62.3 % (ref 42.7–76)
NRBC BLD AUTO-RTO: 0 /100 WBC (ref 0–0.2)
PLATELET # BLD AUTO: 308 10*3/MM3 (ref 140–450)
PMV BLD AUTO: 10.9 FL (ref 6–12)
PROTHROMBIN TIME: 13.8 SECONDS (ref 11.2–14.3)
RBC # BLD AUTO: 4.36 10*6/MM3 (ref 4.14–5.8)
WBC NRBC COR # BLD: 8.61 10*3/MM3 (ref 3.4–10.8)

## 2019-10-03 PROCEDURE — 99214 OFFICE O/P EST MOD 30 MIN: CPT | Performed by: ANESTHESIOLOGY

## 2019-10-03 PROCEDURE — 36415 COLL VENOUS BLD VENIPUNCTURE: CPT | Performed by: ANESTHESIOLOGY

## 2019-10-03 PROCEDURE — 85730 THROMBOPLASTIN TIME PARTIAL: CPT | Performed by: ANESTHESIOLOGY

## 2019-10-03 PROCEDURE — 85025 COMPLETE CBC W/AUTO DIFF WBC: CPT | Performed by: ANESTHESIOLOGY

## 2019-10-03 PROCEDURE — 85610 PROTHROMBIN TIME: CPT | Performed by: ANESTHESIOLOGY

## 2019-10-03 RX ORDER — TRIAMTERENE AND HYDROCHLOROTHIAZIDE 37.5; 25 MG/1; MG/1
1 TABLET ORAL EVERY MORNING
COMMUNITY

## 2019-10-14 ENCOUNTER — OUTSIDE FACILITY SERVICE (OUTPATIENT)
Dept: PAIN MEDICINE | Facility: CLINIC | Age: 74
End: 2019-10-14

## 2019-10-14 PROBLEM — Z46.2 ENCOUNTER FOR FITTING AND ADJUSTMENT OF NEUROPACEMAKER OF SPINAL CORD: Status: ACTIVE | Noted: 2019-10-14

## 2019-10-14 PROCEDURE — 99152 MOD SED SAME PHYS/QHP 5/>YRS: CPT | Performed by: ANESTHESIOLOGY

## 2019-10-14 PROCEDURE — 63650 IMPLANT NEUROELECTRODES: CPT | Performed by: ANESTHESIOLOGY

## 2019-10-14 NOTE — PROGRESS NOTES
"Chief Complaint: \"I did very well during the stimulator trial. I slept better and was able to sit longer with less pain.\"    Brief History: Mr. Prashant Avila is a 74 y.o. male, who returns to the clinic for possible spinal cord stimulator reprogramming and removal of spinal cord stimulator trial leads placed on 10/14/2019. Mr. Prashant Avila reports 75-80% relief of his chronic left hip, left gluteal and left lower extremity pain along with remarkable functional improvement with the use of his stimulator device. In addition, patient reports improvement of his nocturnal pain with the use of the SCS device.  Pain level is rated as 3/10 with the stimulator \"turned on.”   Patient level ranges from 1/10 to 3/10 with the use of the spinal cord stimulator.    Patient did not take additional analgesics throughout his spinal cord stimulator trial. He has remained afebrile throughout the trial. Dressings are dry and intact. Patient denies any complications related to the procedure.   Patient denies pain, numbness and weakness in the lower extremities.  Patient denies any new bladder or bowel problems.   Patient is satisfied with the trial and would like to move forward with implantation of a spinal cord stimulator device.     Pain History:  Referring physician: Dr. Fausto Renee   Consultation for intractable chronic left lower extremity pain.   Patient reports a 7-year history of pain, which began without incident. Patient is status post L4-L5 hemilaminectomy and foraminotomy at the [facility] with Dr. Mackay on 03/03/2014, followed by L4-S1 lumbar decompression and fusion due to unresolved pain by Dr. Renee on 07/24/2014. Patient reports that he experienced significant improvement after his last surgery with Dr. Renee, in particular, complete resolution of his previously severe lower back pain. Unfortunately, his left lower extremity pain, chronic radicular pain preceded his last lumbar fusion, and did not improve after " surgery. He underwent surgical consultation with Dr. Cox at the [facility], who recommended decompression of the nerve roots at L3-S1. He underwent follow-up neurosurgical consultation with Dr. Fausto Renee on 11/03/2017. Dr. Renee discussed the possibility of lumbar decompression at L3-L4: Left L3-L4 laminotomy and foraminotomy. Due to potential risk of developing instability that would necessitate extending his lumbar fusion to the L3 level. Patient underwent on April 3, 2018, left L3-L4 transforaminal epidural steroid injection without any pain relief. Patient elected to hold off on surgery. Patient underwent EMG/NCV, which confirmed chronic left S1 radiculopathy.  A CT myelogram was significant for stenosis above the level of his lumbar fusion, at L3-L4 without clinical correlation.  Patient presents with pain with a left S1 dermatomal distribution pattern.  Pain description:   Constant pain with intermittent exacerbation, described as burning, electricity, pins and needles, and tingling sensation.   Radiation of pain: The pain radiates into the posterior aspect of the thigh, left leg and plantar aspect of the foot. The leg pain is more severe than the lower back pain.   Pain pattern: dermatomic, left L5 and S1   Pain intensity today: 3/10 (standing) 9-10/10 (sitting or lying down)  Average pain intensity last week: 7/10  Pain intensity ranges from: 4/10 to 10/10  Aggravating factors: Pain increases with sitting longer than 5 minutes and laying down for more than 5 minutes.  Alleviating factors: Pain decreases with standing.   Associated symptoms:   Patient denies numbness or weakness in the lower extremities.   Patient denies any new bladder or bowel problems.   Patient denies difficulties with his balance or recent falls.       Review of previous therapies and additional medical records:  Prashant Avila has already failed the following measures, including:   Conservative measures: oral analgesics,  "ice and heat, physical therapy  Interventional measures:  02/06/2019: Left S1 transforaminal epidural steroid injection and experienced pain relief that was short-lived   02/20/2017- Diagnostic and therapeutic left sciatic nerve block with local anesthetics, steroids, hyaluronidase under ultrasound, PNS and fluoroscopic guidance- Patient did not receive sustained relief from this procedure.   02/06/2017: Diagnostic left S1 selective nerve root injection  01/30/2017: Nevro SCS Trial; no relief  04/25/2016: Trial of lumbar epidural steroid injections with steroids and hyaluronidase via caudal catheter with 100% pain relief lasting 48 hours  12/16/2015: diagnostic and therapeutic left S1 transforaminal epidural steroid injection with hyaluronidase with complete resolution of pain for several weeks  11/30/2015: Spinal cord stimulator trial Saint Jude, which provided him with more than 50% pain relief and functional improvement. However, patient decided to delay implantation of a permanent spinal cord stimulator device until the arrival of new technology. Patient did not like the perception of tonic stimulation, even though stimulation overlapped successfully the areas of his chronic pain, he did not find it pleasant.   02/09/2015: Diagnostic left piriformis muscle injection  Surgical measures:   03/03/2014: Left L4-L5 hemilaminectomy and foraminotomy at [facility] with Dr. Mackay   07/24/2014: L4-S1 decompression and fusion by Dr. Renee  Prashant Avila underwent follow-up neurosurgical consultation with Dr. Fausto Renee on 11/30/2017, and was found to be a potential surgical candidate.  Pain Psychology Evaluation (02/25/2019): \"From a psychological perspective, patient is considered to be an appropriate candidate for a spinal cord stimulator or an intrathecal pump implant at this time.\"  Prashant Avila presents with significant comorbidities including hypertension, rheumatoid arthritis, osteoporosis, hyperlipidemia, " chronic kidney disease, hearing loss, carcinoma of prostate, engaged in treatment.  In terms of current analgesics, Prashant Avila takes: Tylenol and gabapentin 800 mg 4 times a day without any side effects.  I have reviewed Jon Report #[id number] consistent to medication reconciliation.     Global Pain Scale 04-19  2018 10-24  2018 11-08  2018 01-29  2019 03-06  2019 10-03  2019 10-17  2019         Pain 19 17 17 18 17 20  10         Feelings 1 0 0 0 0   8    4         Clinical outcomes 11 8 13 11 6 11    7         Activities 17 10 16 18 6 15    7         GPS Total: 48 35 46 47 29 54  28           Review of Diagnostic Studies:    EMG/NCV of the bilateral lower extremities December 18, 2018: Chronic left S1 radiculopathy.   CT LUMBAR SPINE WITH CONTRAST-01/21/2019: There is excellent opacification of the thecal sac. Imaging covers from T12 through the S1 level caudally. The conus terminates at the L1/L2 level.  The distal cord and conus are normal in caliber.  The nerve roots of the cauda equina are normal in caliber and distribution.  L4-S1 fusion hardware is in place; the left S1 pedicle screw is  fractured and there is lucency around the right S1 pedicle screw. Left L4 laminotomy. Vertebral body heights and alignment are normal. Vertebral body heights are normal. Degenerative changes are as follows:  T12-L1: Facet arthropathy without significant foraminal or spinal canal stenosis.  L1-L2: Facet arthropathy without significant foraminal or spinal canal stenosis.  L2-L3: Right greater than left facet arthropathy. Mild narrowing of the right subarticular zone. No significant central spinal canal or  foraminal stenosis.  L3-L4: Disc bulge and facet arthropathy with ligamentum flavum thickening. There is severe spinal canal stenosis and at least moderate bilateral foraminal stenosis. This has progressed since the lumbar myelogram CT from 09/30/2014.  L4-L5: Improved right subarticular zone narrowing. No significant  osseous spinal canal or foraminal stenosis.  L5-S1: Bilateral facet arthropathy. Shallow disc osteophyte. Unchanged mild osseous foraminal stenosis. No significant osseous spinal canal stenosis.  EKG April 19, 2018 revealed sinus bradycardia with first-degree AV block.  Left bundle branch block.    CT Lumbar Spine, without contrast, 09/13/2017: There are postoperative changes related to intervertebral body fusion and posterior fusion of L4, L5 and S1. In the lateral projection the alignment is excellent.   L1-L2: disc space is normal.   L2-L3: minimally circumferentially bulging annulus.   L3-L4: minimally circumferentially bulging annulus. Narrowing of the lateral recess.  L4-L5 and L5-S1: postoperative changes but there is no evidence of bony central spinal stenosis.  X-ray lumbar spine 08/04/2017: posterior lumbar fusion of L4, L5 and S1. There are degenerative changes at L1-L2 with anterior osteophyte formation. There is normal lumbar alignment in the lateral projection with a stable alignment in the flexed and extended positions.      Duplex Venous Lower, 03/25/2017: Normal right lower extremity venous duplex scan. No evidence of DVT.  MRI Lumbar Spine, 10/28/2016: Since the previous MRI study, there has been L4-5-S1 posterior lumbar and interbody fusion. No new abnormality of alignment is seen. There is, however, significant worsening of posterior element hypertrophy at L3-4, above the fusion level, due to persistent L3-4 disc bulge, and what appears to be new spinal stenosis at this level, generally as a result of posterior element hypertrophic change. Canal appears stable elsewhere. No lumbar compression deformity or new abnormality of alignment is seen. No vertebral marrow edema is appreciated. Sagittal images suggest a small (18 mm) seroma or synovial cyst on the right at L5-S1. Tip of conus medullaris is again noted at L1.  Axial images   L1-L2: no significant canal or foraminal stenosis   L2-L3: canal  "appears relatively narrowed, due to posterior element hypertrophy but still greater than 1 cm in diameter. Foramina appear  lower limits of normal diameter.   L3-L4: moderate canal stenosis, with additional lateral narrowing, AP diameter 6.5 mm, transverse diameter approximately 9 mm, with crowding of the nerve roots, but well-preserved anterior CSF space. Foramina appear mildly to moderately narrowed bilaterally at this level  due to facet DJD.   L4-L5: fused level, canal appears normal. Foramina appear mildly narrowed due to facet DJD.  L5-S1: canal appears normal. Left-sided neural foramen appears normal. Right-sided neural foramen appears mildly narrowed due to facet DJD. Postcontrast images show expected postoperative enhancement of the dorsal soft tissues and no pathologic postcontrast enhancement elsewhere.     The following portions of the patient's history were reviewed and updated as appropriate: problem list, past medical history, past surgery history, social history, family history, medications, and allergies    Review of Systems   All other systems reviewed and are negative.      /62   Pulse 53   Temp 97.7 °F (36.5 °C) (Temporal)   Resp 18   Ht 175.3 cm (69\")   Wt 82.4 kg (181 lb 9.6 oz)   SpO2 93%   BMI 26.82 kg/m²       Physical Exam   Neurologic Exam  Constitutional General Appearance: No acute distress, well appearing and well nourished. Appears healthy, within normal limits of ideal weight, well hydrated and appearance reflects stated age.   Head and face: Normal.   Palpation of the face and sinuses: No sinus tenderness.   Eyes Conjunctiva and lids: No erythema, swelling or discharge. Pupils: Equal, round, reactive to light.   Neck: Supple, symmetric, trachea midline, no masses.   Pulmonary Respiratory effort: No increased work of breathing or signs of respiratory distress. Respiratory rate: normal. Assessment of respiratory effort revealed normal rhythm and effort. Auscultation of " lungs: Clear to auscultation. Auscultation of the lungs revealed no expiratory wheezing, normal expiratory time and no inspiratory wheezing. No rales or crackles were heard bilaterally. No rhonchi. No friction rub. No wheezing. No diminished breath sounds. No bronchial breath sounds.   Cardiovascular Auscultation of heart: Normal rate and rhythm, normal S1 and S2, no murmurs. Peripheral vascular exam: Normal. Examination of extremities for edema and/or varicosities: Normal.   Abdomen: Non-tender, no masses. Bowel sounds were normal. The abdomen was soft and nontender. No masses palpated.   Musculoskeletal Gait and station: Normal. Gait evaluation demonstrated a normal gait. The range of motion of the lumbar spine is essentially full and without significant pain. Lumbar facet joint loading maneuvers are negative. Kodak and Gaenslen's tests are negative. The range of motion of the hip joints is full and without pain. Palpation of the gluteal bursa, ischial tuberosities, and greater trochanters, unrevealing. Piriformis maneuvers are negative. There is some discomfort upon palpation of the left piriformis muscle. Active resisted external rotation and passive internal rotation did not increase pain.   Muscle strength/tone: Normal. Motor Strength Findings: normal strength. Motor Tone: normal tone. Involuntary movements: none. Significant shortening of the hamstrings, worse on the left side.  Skin and subcutaneous tissue: Normal without rashes or lesions.   Neurologic   Cranial nerves: Cranial nerves 2-12 intact.   Cortical function: Normal mental status.   Reflexes: 2+ and symmetric. Deep tendon reflexes: 2+ right biceps, 2+ left biceps, 1+ right patella, 1+ left patella, 1+ right ankle jerk and 1+ left ankle jerk. Superficial/Primitive Reflexes: primitive reflexes were absent. Straight leg raising test is negative. Femoral stretch sign is negative.   Sensation: No sensory loss. Sensory exam: intact to light touch,  intact pain and temperature sensation, intact vibration sensation and normal proprioception.   Coordination: Normal finger to nose and heel to shin. Coordination: normal balance and negative Romberg's sign.   Psychiatric: Judgment and insight: Normal. Orientation to person, place and time: Normal. Recent and remote memory: Intact. Mood and affect: Normal.     Analysis of the spinal cord stimulator device with complex spinal cord stimulator reprogramming   Patient used the Saint Jude spinal cord stimulator device 100% of the time since initiation of the trial phase. The spinal cord stimulator device was reprogrammed under my direct supervision and four programs were created by adjusting electrode polarities, amplitude, pulse width, pulse rate, BurstDR, micro-dosing, in the following fashion;    Program FOUR (Best Program):    Electrode polarities: 4+, 7-  Amplitude: 0.6-1.2 mA     Pulse width: 1000 mcs  Rate: 40 Hz  IntraBurst rate: 500 Hz  Micro-dosing ratio: 30:90  Patient experienced significant pain relief with coverage with pleasant paresthesia in all areas of chronic pain.    Time spent reprogramming: 15 minutes  A copy of the telemetry report will be scanned in the patient's chart.    Procedure #2: Removal of spinal cord stimulator trial leads.   Two leads were removed with tips intact. There is no erythema, drainage, or fluid accumulation at the site. The area was cleansed with chlorhexidine.  A small Covaderm was applied.     ASSESSMENT:   1. Chronic lumbar radiculopathy    2. Adhesive arachnoiditis    3. Lumbar postlaminectomy syndrome     4. Status post L4-S1 lumbar spinal fusion    5. Bilateral stenosis of lateral recess of lumbar spine    6. Physical deconditioning    7. Encounter for fitting and adjustment of neuropacemaker of spinal cord      PLAN: Patient's chronic pain condition has been significantly improved during his SCS trial. Mr. Avila presents with a very challenging chronic pain history and  had previously failed to obtain pain relief with surgical measures, conservative measures, and interventional pain management measures, as referenced on previous encounters. Patient presents with intolerance to conventional analgesics and adjuvants. Patient underwent follow-up neurosurgical consultation with Dr. Fausto Renee on 11/03/2017. Dr. Renee discussed the possibility of lumbar decompression and extension of fusion at L3-L4. Patient elected to hold off on surgery. On MRI of the lumbar spine, there is evidence of lateral recess stenosis at the adjacent level at L3-L4 with crowding of the nerve roots and possible lumbar arachnoiditis. CT scan post-myelogram confirmed stenosis at L3-L4 without correlation. Recent EMG/NCV confirmed chronic left S1 radiculopathy. Patient's chronic pain syndrome is of chronic left S1 lumbar radiculopathy and lumbar arachnoiditis. Patient denies pain in the L3 distribution at this time. Patient is very satisfied with the trial and would like to move forward with implantation of a spinal cord stimulator device. Therefore, I have proposed the following plan:  1. Referral to Dr. Fausto Renee in consultation for implantation of a spinal cord stimulator device. I have recommended [employer] Harsha Penta paddle lead with the top electrodes projecting at the level of the superior endplate of the T8 vertebral level. I have recommended [employer] Harsha Proclaim 5 XR IPG Non-Rechargeable (Full Body MRI compatible). Patient will follow-up with gabe mcmahon.   2. Diagnostics: MRI of the thoracic spine without contrast to assess patency of thoracic epidural space for placement of surgical leads.  3. Long-term rehabilitation efforts:  A. Start a comprehensive physical therapy program for water therapy, neurodynamics, gait and balance and core strengthening 4-6 weeks after implantation of his SCS device  B. Patient has declined a referral to [facility]  4. Pharmacological measures, as follows;   A. Patient  refused the idea of new pharmacological trials. Unfortunately, he is not a candidate for mexiletine based on his EKG. Continue gabapentin 800 mg four times daily  B. Tramadol 50 mg 1/2 to 1 tablet combined with Tylenol 500 mg 1-2 tablets every six hours PRN pain #12, no refills  5. The patient has been instructed to contact my office with any questions or difficulties. The patient understands the plan and agrees to proceed accordingly.      Patient Care Team:  SHERON Chun MD as PCP - General (Family Medicine)  SHERON Chun MD as PCP - Claims Attributed  Efarin Batista MD as Consulting Physician (Pain Medicine)  Fausto Renee MD as Consulting Physician (Neurosurgery)  Jean Warren MD as Consulting Physician (Neurosurgery)  Hank Frankel MD as Consulting Physician (Cardiology)  Marcelino Condon MD as Consulting Physician (Urology)  Brian Tatum III, MD as Consulting Physician (Cardiology)  SHERON Chun MD as Consulting Physician (Family Medicine)  Francisco Javier Clay MD as Consulting Physician (Neurosurgery)  Royer Roldan MD as Consulting Physician (General Practice)  Emigdio Watt MD as Consulting Physician (Gastroenterology)  Shola Vila MD as Consulting Physician (Otolaryngology)     No orders of the defined types were placed in this encounter.        No future appointments.      Efrain Batista MD      EMR Dragon/Transcription disclaimer:  Much of this encounter note is an electronic transcription of spoken language to printed text. Electronic transcription of spoken language may permit erroneous, or at times, nonsensical words or phrases to be inadvertently transcribed. Although I have reviewed the note for such errors, some may still exist.

## 2019-10-15 ENCOUNTER — TELEPHONE (OUTPATIENT)
Dept: PAIN MEDICINE | Facility: CLINIC | Age: 74
End: 2019-10-15

## 2019-10-15 NOTE — TELEPHONE ENCOUNTER
Patient had SCS trial placed with St. Harsha stimulator on 10/14/19. Called patient to f/u post procedure. Reached voicemail. Left message for return call

## 2019-10-17 ENCOUNTER — OFFICE VISIT (OUTPATIENT)
Dept: PAIN MEDICINE | Facility: CLINIC | Age: 74
End: 2019-10-17

## 2019-10-17 VITALS
SYSTOLIC BLOOD PRESSURE: 140 MMHG | HEART RATE: 53 BPM | BODY MASS INDEX: 26.9 KG/M2 | HEIGHT: 69 IN | DIASTOLIC BLOOD PRESSURE: 62 MMHG | TEMPERATURE: 97.7 F | WEIGHT: 181.6 LBS | OXYGEN SATURATION: 93 % | RESPIRATION RATE: 18 BRPM

## 2019-10-17 DIAGNOSIS — Z98.1 STATUS POST LUMBAR SPINAL FUSION: ICD-10-CM

## 2019-10-17 DIAGNOSIS — M96.1 POSTLAMINECTOMY SYNDROME OF LUMBAR REGION: ICD-10-CM

## 2019-10-17 DIAGNOSIS — G03.9 ADHESIVE ARACHNOIDITIS: ICD-10-CM

## 2019-10-17 DIAGNOSIS — M48.061 BILATERAL STENOSIS OF LATERAL RECESS OF LUMBAR SPINE: ICD-10-CM

## 2019-10-17 DIAGNOSIS — R53.81 PHYSICAL DECONDITIONING: ICD-10-CM

## 2019-10-17 DIAGNOSIS — Z01.818 PRE-OP TESTING: Primary | ICD-10-CM

## 2019-10-17 DIAGNOSIS — M54.16 CHRONIC LUMBAR RADICULOPATHY: ICD-10-CM

## 2019-10-17 DIAGNOSIS — Z46.2 ENCOUNTER FOR FITTING AND ADJUSTMENT OF NEUROPACEMAKER OF SPINAL CORD: ICD-10-CM

## 2019-10-17 PROCEDURE — 95972 ALYS CPLX SP/PN NPGT W/PRGRM: CPT | Performed by: ANESTHESIOLOGY

## 2019-10-17 PROCEDURE — 99024 POSTOP FOLLOW-UP VISIT: CPT | Performed by: ANESTHESIOLOGY

## 2019-10-17 RX ORDER — TRAMADOL HYDROCHLORIDE 50 MG/1
TABLET ORAL
Qty: 12 TABLET | Refills: 0 | OUTPATIENT
Start: 2019-10-17 | End: 2019-11-06

## 2019-10-17 NOTE — TELEPHONE ENCOUNTER
Tramadol 50 mg 1/2 to 1 tablet combined with Tylenol 500 mg 1-2 tablets every six hours PRN pain #12, no refills

## 2019-10-31 ENCOUNTER — HOSPITAL ENCOUNTER (OUTPATIENT)
Dept: MRI IMAGING | Facility: HOSPITAL | Age: 74
Discharge: HOME | End: 2019-10-31
Admitting: ANESTHESIOLOGY

## 2019-10-31 PROCEDURE — 72146 MRI CHEST SPINE W/O DYE: CPT

## 2019-11-06 ENCOUNTER — PREP FOR SURGERY (OUTPATIENT)
Dept: OTHER | Facility: HOSPITAL | Age: 74
End: 2019-11-06

## 2019-11-06 ENCOUNTER — OFFICE VISIT (OUTPATIENT)
Dept: NEUROSURGERY | Facility: CLINIC | Age: 74
End: 2019-11-06

## 2019-11-06 VITALS — WEIGHT: 183.4 LBS | RESPIRATION RATE: 16 BRPM | HEIGHT: 69 IN | BODY MASS INDEX: 27.16 KG/M2

## 2019-11-06 DIAGNOSIS — M54.16 LUMBAR RADICULOPATHY: Primary | ICD-10-CM

## 2019-11-06 DIAGNOSIS — M54.16 CHRONIC LUMBAR RADICULOPATHY: Primary | ICD-10-CM

## 2019-11-06 DIAGNOSIS — Z98.1 HISTORY OF LUMBAR FUSION: ICD-10-CM

## 2019-11-06 DIAGNOSIS — M53.9 MULTILEVEL DEGENERATIVE DISC DISEASE: ICD-10-CM

## 2019-11-06 PROCEDURE — 99213 OFFICE O/P EST LOW 20 MIN: CPT | Performed by: NEUROLOGICAL SURGERY

## 2019-11-06 RX ORDER — PRAVASTATIN SODIUM 10 MG
10 TABLET ORAL NIGHTLY
COMMUNITY

## 2019-11-06 RX ORDER — FAMOTIDINE 20 MG/1
20 TABLET, FILM COATED ORAL
Status: CANCELLED | OUTPATIENT
Start: 2019-11-06

## 2019-11-06 RX ORDER — CEFAZOLIN SODIUM 2 G/100ML
2 INJECTION, SOLUTION INTRAVENOUS ONCE
Status: CANCELLED | OUTPATIENT
Start: 2019-11-06 | End: 2019-11-06

## 2019-11-06 NOTE — PROGRESS NOTES
Patient: Prashant Avila  : 1945    Primary Care Provider: SHERON Chun MD    Requesting Provider: As above        History    Chief Complaint: Left buttock and leg pain.    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  He is now a 74-year-old gentleman who is well-known to my service. In the past the patient had undergone a left L4-5 foraminotomy at the [facility] but this was not helpful.  Subsequently on 14 I performed an L4-S1 decompression and fusion.  He's continued to have pain in his back and left hip that extends down his leg.  The curious issue about his symptoms is that they are better with standing and walking and much worse with sitting or lying down.  He has no right lower extremity symptoms.  In the past he has had extensive pain management interventions.  At one point a number of years ago he was seen at the [facility] and Dr. Cox who recommended additional decompression at multiple levels including L3-4.  Prior to that I performed a CT myelogram which did not reveal evidence of nerve root compromise at L3-4 and I was not particularly enthusiastic about that.  Most recently the patient has undergone a trial of a Saint Jude epidural spinal cord stimulator and this provided rather substantial improvement in his symptoms.     Review of Systems   Constitutional: Negative for activity change, appetite change, chills, diaphoresis, fatigue, fever and unexpected weight change.   HENT: Negative for congestion, dental problem, drooling, ear discharge, ear pain, facial swelling, hearing loss, mouth sores, nosebleeds, postnasal drip, rhinorrhea, sinus pressure, sneezing, sore throat, tinnitus, trouble swallowing and voice change.    Eyes: Negative for photophobia, pain, discharge, redness, itching and visual disturbance.   Respiratory: Negative for apnea, cough, choking, chest tightness, shortness of breath, wheezing and stridor.    Cardiovascular: Negative for chest  "pain, palpitations and leg swelling.   Gastrointestinal: Negative for abdominal distention, abdominal pain, anal bleeding, blood in stool, constipation, diarrhea, nausea, rectal pain and vomiting.   Endocrine: Negative for cold intolerance, heat intolerance, polydipsia, polyphagia and polyuria.   Genitourinary: Negative for decreased urine volume, difficulty urinating, dysuria, enuresis, flank pain, frequency, genital sores, hematuria and urgency.   Musculoskeletal: Positive for back pain. Negative for arthralgias, gait problem, joint swelling, myalgias, neck pain and neck stiffness.   Skin: Negative for color change, pallor, rash and wound.   Allergic/Immunologic: Negative for environmental allergies, food allergies and immunocompromised state.   Neurological: Negative for dizziness, tremors, seizures, syncope, facial asymmetry, speech difficulty, weakness, light-headedness, numbness and headaches.   Hematological: Negative for adenopathy. Does not bruise/bleed easily.   Psychiatric/Behavioral: Negative for agitation, behavioral problems, confusion, decreased concentration, dysphoric mood, hallucinations, self-injury, sleep disturbance and suicidal ideas. The patient is not nervous/anxious and is not hyperactive.    All other systems reviewed and are negative.      The patient's past medical history, past surgical history, family history, and social history have been reviewed at length in the electronic medical record.    Physical Exam:   Resp 16   Ht 175.3 cm (69\")   Wt 83.2 kg (183 lb 6.4 oz)   BMI 27.08 kg/m²   CONSTITUTIONAL: Patient is well-nourished, pleasant and appears stated age.  CV: Heart regular rate and rhythm without murmur, rub, or gallop.  PULMONARY: Lungs are clear to ascultation.  MUSCULOSKELETAL:  Straight leg raising is negative.  Kodak's Sign is negative.  ROM in back normal.  Tenderness in the back to palpation is not observed.  NEUROLOGICAL:  Orientation, memory, attention span, language " function, and cognition have been examined and are intact.  Strength is intact in the lower extremities to direct testing.  Muscle tone is normal throughout.  Station and gait are normal.  Sensation is intact to light touch testing throughout.  Deep tendon reflexes are 1+ and symmetrical.  Coordination is intact.      Medical Decision Making    Data Review:   MRI of the thoracic spine demonstrates a capacious canal.    Diagnosis:   Lumbar radiculopathy.    Treatment Options:   As requested by Dr. Batista I have recommended Saint Harsha epidural spinal cord stimulator placement.  It is desired that the paddle lead project to the superior T8 vertebral level.  This will be a fully MRI compatible system.  The nature of the procedure as well as the potential risks, complications, limitations, and alternatives to the procedure were discussed at length with the patient and the patient has agreed to proceed with surgery.       Diagnosis Plan   1. Chronic lumbar radiculopathy     2. Multilevel degenerative disc disease     3. History of lumbar fusion         Scribed for Fausto Renee MD by Jess Camp CMA on 11/6/2019 3:43 PM       I, Dr. Renee, personally performed the services described in the documentation, as scribed in my presence, and it is both accurate and complete.

## 2019-11-06 NOTE — H&P (VIEW-ONLY)
Patient: Prashant Avila  : 1945     Primary Care Provider: SHERON Chun MD     Requesting Provider: As above           History     Chief Complaint: Left buttock and leg pain.     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  He is now a 74-year-old gentleman who is well-known to my service. In the past the patient had undergone a left L4-5 foraminotomy at the [facility] but this was not helpful.  Subsequently on 14 I performed an L4-S1 decompression and fusion.  He's continued to have pain in his back and left hip that extends down his leg.  The curious issue about his symptoms is that they are better with standing and walking and much worse with sitting or lying down.  He has no right lower extremity symptoms.  In the past he has had extensive pain management interventions.  At one point a number of years ago he was seen at the [facility] and Dr. Cox who recommended additional decompression at multiple levels including L3-4.  Prior to that I performed a CT myelogram which did not reveal evidence of nerve root compromise at L3-4 and I was not particularly enthusiastic about that.  Most recently the patient has undergone a trial of a Saint Jude epidural spinal cord stimulator and this provided rather substantial improvement in his symptoms.      Review of Systems   Constitutional: Negative for activity change, appetite change, chills, diaphoresis, fatigue, fever and unexpected weight change.   HENT: Negative for congestion, dental problem, drooling, ear discharge, ear pain, facial swelling, hearing loss, mouth sores, nosebleeds, postnasal drip, rhinorrhea, sinus pressure, sneezing, sore throat, tinnitus, trouble swallowing and voice change.    Eyes: Negative for photophobia, pain, discharge, redness, itching and visual disturbance.   Respiratory: Negative for apnea, cough, choking, chest tightness, shortness of breath, wheezing and stridor.    Cardiovascular: Negative for  chest pain, palpitations and leg swelling.   Gastrointestinal: Negative for abdominal distention, abdominal pain, anal bleeding, blood in stool, constipation, diarrhea, nausea, rectal pain and vomiting.   Endocrine: Negative for cold intolerance, heat intolerance, polydipsia, polyphagia and polyuria.   Genitourinary: Negative for decreased urine volume, difficulty urinating, dysuria, enuresis, flank pain, frequency, genital sores, hematuria and urgency.   Musculoskeletal: Positive for back pain. Negative for arthralgias, gait problem, joint swelling, myalgias, neck pain and neck stiffness.   Skin: Negative for color change, pallor, rash and wound.   Allergic/Immunologic: Negative for environmental allergies, food allergies and immunocompromised state.   Neurological: Negative for dizziness, tremors, seizures, syncope, facial asymmetry, speech difficulty, weakness, light-headedness, numbness and headaches.   Hematological: Negative for adenopathy. Does not bruise/bleed easily.   Psychiatric/Behavioral: Negative for agitation, behavioral problems, confusion, decreased concentration, dysphoric mood, hallucinations, self-injury, sleep disturbance and suicidal ideas. The patient is not nervous/anxious and is not hyperactive.    All other systems reviewed and are negative.        The patient's past medical history, past surgical history, family history, and social history have been reviewed at length in the electronic medical record.     Past Medical History:   Diagnosis Date   • Arthritis    • Carcinoma of prostate (CMS/HCC)    • Chronic pain disorder    • Extremity pain    • Hearing loss of both ears    • History of chronic kidney disease    • Hyperlipidemia    • Hypertension    • Low back pain    • Neck pain    • Osteoarthritis    • Rheumatoid arthritis (CMS/HCC)      Past Surgical History:   Procedure Laterality Date   • BACK SURGERY     • EPIDURAL BLOCK  2016   • LUMBAR FUSION  07/14/2014    TLIF L4-S1 (Dr. Renee)  "  • LUMBAR LAMINECTOMY  03/03/2014    LT-L4/5 BONG & VICTOR HUGO (Dr. Mackay)   • ORTHOPEDIC SURGERY     • OTHER SURGICAL HISTORY      Enteroscopic Polypectomy   • OTHER SURGICAL HISTORY      Vocal Cordectomy   • IA MYELOGRAPHY VIA LUMBAR INJECT RS&I LUMBOSACRAL N/A 1/21/2019    Procedure: IR myelogram, lumbar;  Surgeon: Francisco Javier Clay MD;  Location: [facility] INVASIVE LOCATION;  Service: Interventional Radiology   • PROSTATE SURGERY     • VOCAL CORD BIOPSY       Family History   Problem Relation Age of Onset   • Congenital heart disease Mother    • Multiple myeloma Father      Social History     Socioeconomic History   • Marital status:      Spouse name: Not on file   • Number of children: Not on file   • Years of education: Not on file   • Highest education level: Not on file   Tobacco Use   • Smoking status: Never Smoker   • Smokeless tobacco: Never Used   Substance and Sexual Activity   • Alcohol use: No   • Drug use: No   • Sexual activity: Defer     Allergies   Allergen Reactions   • Doxycycline Rash       Physical Exam:   Resp 16   Ht 175.3 cm (69\")   Wt 83.2 kg (183 lb 6.4 oz)   BMI 27.08 kg/m²   CONSTITUTIONAL: Patient is well-nourished, pleasant and appears stated age.  CV: Heart regular rate and rhythm without murmur, rub, or gallop.  PULMONARY: Lungs are clear to ascultation.  MUSCULOSKELETAL:  Straight leg raising is negative.  Kodak's Sign is negative.  ROM in back normal.  Tenderness in the back to palpation is not observed.  NEUROLOGICAL:  Orientation, memory, attention span, language function, and cognition have been examined and are intact.  Strength is intact in the lower extremities to direct testing.  Muscle tone is normal throughout.  Station and gait are normal.  Sensation is intact to light touch testing throughout.  Deep tendon reflexes are 1+ and symmetrical.  Coordination is intact.        Medical Decision Making     Data Review:   MRI of the thoracic spine demonstrates a " capacious canal.     Diagnosis:   Lumbar radiculopathy.     Treatment Options:   As requested by Dr. Batista I have recommended Saint Jude epidural spinal cord stimulator placement.  It is desired that the paddle lead project to the superior T8 vertebral level.  This will be a fully MRI compatible system.  The nature of the procedure as well as the potential risks, complications, limitations, and alternatives to the procedure were discussed at length with the patient and the patient has agreed to proceed with surgery.          Diagnosis Plan   1. Chronic lumbar radiculopathy      2. Multilevel degenerative disc disease      3. History of lumbar fusion

## 2019-11-06 NOTE — H&P
Patient: Prashant Avila  : 1945     Primary Care Provider: SHERON Chun MD     Requesting Provider: As above           History     Chief Complaint: Left buttock and leg pain.     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  He is now a 74-year-old gentleman who is well-known to my service. In the past the patient had undergone a left L4-5 foraminotomy at the [place] but this was not helpful.  Subsequently on 14 I performed an L4-S1 decompression and fusion.  He's continued to have pain in his back and left hip that extends down his leg.  The curious issue about his symptoms is that they are better with standing and walking and much worse with sitting or lying down.  He has no right lower extremity symptoms.  In the past he has had extensive pain management interventions.  At one point a number of years ago he was seen at the [facility] and Dr. Cox who recommended additional decompression at multiple levels including L3-4.  Prior to that I performed a CT myelogram which did not reveal evidence of nerve root compromise at L3-4 and I was not particularly enthusiastic about that.  Most recently the patient has undergone a trial of a Saint Jude epidural spinal cord stimulator and this provided rather substantial improvement in his symptoms.      Review of Systems   Constitutional: Negative for activity change, appetite change, chills, diaphoresis, fatigue, fever and unexpected weight change.   HENT: Negative for congestion, dental problem, drooling, ear discharge, ear pain, facial swelling, hearing loss, mouth sores, nosebleeds, postnasal drip, rhinorrhea, sinus pressure, sneezing, sore throat, tinnitus, trouble swallowing and voice change.    Eyes: Negative for photophobia, pain, discharge, redness, itching and visual disturbance.   Respiratory: Negative for apnea, cough, choking, chest tightness, shortness of breath, wheezing and stridor.    Cardiovascular: Negative for  chest pain, palpitations and leg swelling.   Gastrointestinal: Negative for abdominal distention, abdominal pain, anal bleeding, blood in stool, constipation, diarrhea, nausea, rectal pain and vomiting.   Endocrine: Negative for cold intolerance, heat intolerance, polydipsia, polyphagia and polyuria.   Genitourinary: Negative for decreased urine volume, difficulty urinating, dysuria, enuresis, flank pain, frequency, genital sores, hematuria and urgency.   Musculoskeletal: Positive for back pain. Negative for arthralgias, gait problem, joint swelling, myalgias, neck pain and neck stiffness.   Skin: Negative for color change, pallor, rash and wound.   Allergic/Immunologic: Negative for environmental allergies, food allergies and immunocompromised state.   Neurological: Negative for dizziness, tremors, seizures, syncope, facial asymmetry, speech difficulty, weakness, light-headedness, numbness and headaches.   Hematological: Negative for adenopathy. Does not bruise/bleed easily.   Psychiatric/Behavioral: Negative for agitation, behavioral problems, confusion, decreased concentration, dysphoric mood, hallucinations, self-injury, sleep disturbance and suicidal ideas. The patient is not nervous/anxious and is not hyperactive.    All other systems reviewed and are negative.        The patient's past medical history, past surgical history, family history, and social history have been reviewed at length in the electronic medical record.     Past Medical History:   Diagnosis Date   • Arthritis    • Carcinoma of prostate (CMS/HCC)    • Chronic pain disorder    • Extremity pain    • Hearing loss of both ears    • History of chronic kidney disease    • Hyperlipidemia    • Hypertension    • Low back pain    • Neck pain    • Osteoarthritis    • Rheumatoid arthritis (CMS/HCC)      Past Surgical History:   Procedure Laterality Date   • BACK SURGERY     • EPIDURAL BLOCK  2016   • LUMBAR FUSION  07/14/2014    TLIF L4-S1 (Dr. Renee)  "  • LUMBAR LAMINECTOMY  03/03/2014    LT-L4/5 BONG & VICTOR HUGO (Dr. Mackay)   • ORTHOPEDIC SURGERY     • OTHER SURGICAL HISTORY      Enteroscopic Polypectomy   • OTHER SURGICAL HISTORY      Vocal Cordectomy   • [place] MYELOGRAPHY VIA LUMBAR INJECT RS&I LUMBOSACRAL N/A 1/21/2019    Procedure: IR myelogram, lumbar;  Surgeon: Francisco Javier Clay MD;  Location: [facility] INVASIVE LOCATION;  Service: Interventional Radiology   • PROSTATE SURGERY     • VOCAL CORD BIOPSY       Family History   Problem Relation Age of Onset   • Congenital heart disease Mother    • Multiple myeloma Father      Social History     Socioeconomic History   • Marital status:      Spouse name: Not on file   • Number of children: Not on file   • Years of education: Not on file   • Highest education level: Not on file   Tobacco Use   • Smoking status: Never Smoker   • Smokeless tobacco: Never Used   Substance and Sexual Activity   • Alcohol use: No   • Drug use: No   • Sexual activity: Defer     Allergies   Allergen Reactions   • Doxycycline Rash       Physical Exam:   Resp 16   Ht 175.3 cm (69\")   Wt 83.2 kg (183 lb 6.4 oz)   BMI 27.08 kg/m²   CONSTITUTIONAL: Patient is well-nourished, pleasant and appears stated age.  CV: Heart regular rate and rhythm without murmur, rub, or gallop.  PULMONARY: Lungs are clear to ascultation.  MUSCULOSKELETAL:  Straight leg raising is negative.  Kodak's Sign is negative.  ROM in back normal.  Tenderness in the back to palpation is not observed.  NEUROLOGICAL:  Orientation, memory, attention span, language function, and cognition have been examined and are intact.  Strength is intact in the lower extremities to direct testing.  Muscle tone is normal throughout.  Station and gait are normal.  Sensation is intact to light touch testing throughout.  Deep tendon reflexes are 1+ and symmetrical.  Coordination is intact.        Medical Decision Making     Data Review:   MRI of the thoracic spine demonstrates a " capacious canal.     Diagnosis:   Lumbar radiculopathy.     Treatment Options:   As requested by Dr. Batista I have recommended Saint Jude epidural spinal cord stimulator placement.  It is desired that the paddle lead project to the superior T8 vertebral level.  This will be a fully MRI compatible system.  The nature of the procedure as well as the potential risks, complications, limitations, and alternatives to the procedure were discussed at length with the patient and the patient has agreed to proceed with surgery.          Diagnosis Plan   1. Chronic lumbar radiculopathy      2. Multilevel degenerative disc disease      3. History of lumbar fusion

## 2019-11-11 ENCOUNTER — APPOINTMENT (OUTPATIENT)
Dept: PREADMISSION TESTING | Facility: HOSPITAL | Age: 74
End: 2019-11-11

## 2019-11-11 VITALS — HEIGHT: 69 IN | BODY MASS INDEX: 26.96 KG/M2 | WEIGHT: 182 LBS

## 2019-11-11 DIAGNOSIS — M54.16 LUMBAR RADICULOPATHY: ICD-10-CM

## 2019-11-11 LAB
DEPRECATED RDW RBC AUTO: 41.5 FL (ref 37–54)
ERYTHROCYTE [DISTWIDTH] IN BLOOD BY AUTOMATED COUNT: 12.4 % (ref 12.3–15.4)
HCT VFR BLD AUTO: 39.2 % (ref 37.5–51)
HGB BLD-MCNC: 12.8 G/DL (ref 13–17.7)
MCH RBC QN AUTO: 30.3 PG (ref 26.6–33)
MCHC RBC AUTO-ENTMCNC: 32.7 G/DL (ref 31.5–35.7)
MCV RBC AUTO: 92.7 FL (ref 79–97)
PLATELET # BLD AUTO: 360 10*3/MM3 (ref 140–450)
PMV BLD AUTO: 10.1 FL (ref 6–12)
POTASSIUM BLD-SCNC: 4.6 MMOL/L (ref 3.5–5.2)
RBC # BLD AUTO: 4.23 10*6/MM3 (ref 4.14–5.8)
WBC NRBC COR # BLD: 7.45 10*3/MM3 (ref 3.4–10.8)

## 2019-11-11 PROCEDURE — 93010 ELECTROCARDIOGRAM REPORT: CPT | Performed by: INTERNAL MEDICINE

## 2019-11-11 PROCEDURE — 93005 ELECTROCARDIOGRAM TRACING: CPT

## 2019-11-11 PROCEDURE — 84132 ASSAY OF SERUM POTASSIUM: CPT | Performed by: ANESTHESIOLOGY

## 2019-11-11 PROCEDURE — 87081 CULTURE SCREEN ONLY: CPT | Performed by: NEUROLOGICAL SURGERY

## 2019-11-11 PROCEDURE — 85027 COMPLETE CBC AUTOMATED: CPT | Performed by: ANESTHESIOLOGY

## 2019-11-11 PROCEDURE — 36415 COLL VENOUS BLD VENIPUNCTURE: CPT

## 2019-11-12 LAB — MRSA SPEC QL CULT: NORMAL

## 2019-11-21 ENCOUNTER — ANESTHESIA (OUTPATIENT)
Dept: PERIOP | Facility: HOSPITAL | Age: 74
End: 2019-11-21

## 2019-11-21 ENCOUNTER — HOSPITAL ENCOUNTER (EMERGENCY)
Facility: HOSPITAL | Age: 74
Discharge: HOME | End: 2019-11-22
Attending: EMERGENCY MEDICINE | Admitting: EMERGENCY MEDICINE

## 2019-11-21 ENCOUNTER — ANESTHESIA EVENT (OUTPATIENT)
Dept: PERIOP | Facility: HOSPITAL | Age: 74
End: 2019-11-21

## 2019-11-21 ENCOUNTER — APPOINTMENT (OUTPATIENT)
Dept: CT IMAGING | Facility: HOSPITAL | Age: 74
End: 2019-11-21

## 2019-11-21 ENCOUNTER — HOSPITAL ENCOUNTER (OUTPATIENT)
Facility: HOSPITAL | Age: 74
Discharge: HOME | End: 2019-11-21
Attending: NEUROLOGICAL SURGERY | Admitting: NEUROLOGICAL SURGERY

## 2019-11-21 ENCOUNTER — APPOINTMENT (OUTPATIENT)
Dept: GENERAL RADIOLOGY | Facility: HOSPITAL | Age: 74
End: 2019-11-21

## 2019-11-21 VITALS
DIASTOLIC BLOOD PRESSURE: 76 MMHG | BODY MASS INDEX: 26.96 KG/M2 | HEIGHT: 69 IN | OXYGEN SATURATION: 99 % | SYSTOLIC BLOOD PRESSURE: 161 MMHG | TEMPERATURE: 97.2 F | WEIGHT: 182 LBS | RESPIRATION RATE: 18 BRPM | HEART RATE: 51 BPM

## 2019-11-21 DIAGNOSIS — Z96.89 SPINAL CORD STIMULATOR STATUS: ICD-10-CM

## 2019-11-21 DIAGNOSIS — M54.16 LUMBAR RADICULOPATHY: ICD-10-CM

## 2019-11-21 DIAGNOSIS — Z86.79 HISTORY OF HYPERTENSION: ICD-10-CM

## 2019-11-21 DIAGNOSIS — G89.4 CHRONIC PAIN SYNDROME: ICD-10-CM

## 2019-11-21 DIAGNOSIS — M54.16 CHRONIC LUMBAR RADICULOPATHY: ICD-10-CM

## 2019-11-21 DIAGNOSIS — R10.84 GENERALIZED ABDOMINAL PAIN: Primary | ICD-10-CM

## 2019-11-21 DIAGNOSIS — N28.1 BILATERAL RENAL CYSTS: ICD-10-CM

## 2019-11-21 DIAGNOSIS — E87.1 HYPONATREMIA: ICD-10-CM

## 2019-11-21 DIAGNOSIS — Z87.39 HISTORY OF RHEUMATOID ARTHRITIS: ICD-10-CM

## 2019-11-21 LAB
ALBUMIN SERPL-MCNC: 4.2 G/DL (ref 3.5–5.2)
ALBUMIN/GLOB SERPL: 1.1 G/DL
ALP SERPL-CCNC: 69 U/L (ref 39–117)
ALT SERPL W P-5'-P-CCNC: 13 U/L (ref 1–41)
ANION GAP SERPL CALCULATED.3IONS-SCNC: 17 MMOL/L (ref 5–15)
AST SERPL-CCNC: 26 U/L (ref 1–40)
BASOPHILS # BLD AUTO: 0.01 10*3/MM3 (ref 0–0.2)
BASOPHILS NFR BLD AUTO: 0.1 % (ref 0–1.5)
BILIRUB SERPL-MCNC: 0.5 MG/DL (ref 0.2–1.2)
BUN BLD-MCNC: 18 MG/DL (ref 8–23)
BUN/CREAT SERPL: 16.2 (ref 7–25)
CALCIUM SPEC-SCNC: 9 MG/DL (ref 8.6–10.5)
CHLORIDE SERPL-SCNC: 91 MMOL/L (ref 98–107)
CO2 SERPL-SCNC: 21 MMOL/L (ref 22–29)
CREAT BLD-MCNC: 1.11 MG/DL (ref 0.76–1.27)
DEPRECATED RDW RBC AUTO: 40.6 FL (ref 37–54)
EOSINOPHIL # BLD AUTO: 0 10*3/MM3 (ref 0–0.4)
EOSINOPHIL NFR BLD AUTO: 0 % (ref 0.3–6.2)
ERYTHROCYTE [DISTWIDTH] IN BLOOD BY AUTOMATED COUNT: 12 % (ref 12.3–15.4)
GFR SERPL CREATININE-BSD FRML MDRD: 64 ML/MIN/1.73
GLOBULIN UR ELPH-MCNC: 3.7 GM/DL
GLUCOSE BLD-MCNC: 174 MG/DL (ref 65–99)
HCT VFR BLD AUTO: 39.7 % (ref 37.5–51)
HGB BLD-MCNC: 13.2 G/DL (ref 13–17.7)
HOLD SPECIMEN: NORMAL
HOLD SPECIMEN: NORMAL
IMM GRANULOCYTES # BLD AUTO: 0.06 10*3/MM3 (ref 0–0.05)
IMM GRANULOCYTES NFR BLD AUTO: 0.5 % (ref 0–0.5)
LIPASE SERPL-CCNC: 16 U/L (ref 13–60)
LYMPHOCYTES # BLD AUTO: 0.37 10*3/MM3 (ref 0.7–3.1)
LYMPHOCYTES NFR BLD AUTO: 3.4 % (ref 19.6–45.3)
MCH RBC QN AUTO: 30.6 PG (ref 26.6–33)
MCHC RBC AUTO-ENTMCNC: 33.2 G/DL (ref 31.5–35.7)
MCV RBC AUTO: 91.9 FL (ref 79–97)
MONOCYTES # BLD AUTO: 0.29 10*3/MM3 (ref 0.1–0.9)
MONOCYTES NFR BLD AUTO: 2.6 % (ref 5–12)
NEUTROPHILS # BLD AUTO: 10.24 10*3/MM3 (ref 1.7–7)
NEUTROPHILS NFR BLD AUTO: 93.4 % (ref 42.7–76)
NRBC BLD AUTO-RTO: 0 /100 WBC (ref 0–0.2)
PLATELET # BLD AUTO: 252 10*3/MM3 (ref 140–450)
PMV BLD AUTO: 10.1 FL (ref 6–12)
POTASSIUM BLD-SCNC: 4 MMOL/L (ref 3.5–5.2)
POTASSIUM BLD-SCNC: 4.3 MMOL/L (ref 3.5–5.2)
PROT SERPL-MCNC: 7.9 G/DL (ref 6–8.5)
RBC # BLD AUTO: 4.32 10*6/MM3 (ref 4.14–5.8)
SODIUM BLD-SCNC: 129 MMOL/L (ref 136–145)
WBC NRBC COR # BLD: 10.97 10*3/MM3 (ref 3.4–10.8)
WHOLE BLOOD HOLD SPECIMEN: NORMAL
WHOLE BLOOD HOLD SPECIMEN: NORMAL

## 2019-11-21 PROCEDURE — 74177 CT ABD & PELVIS W/CONTRAST: CPT

## 2019-11-21 PROCEDURE — 25010000002 ONDANSETRON PER 1 MG: Performed by: EMERGENCY MEDICINE

## 2019-11-21 PROCEDURE — 96375 TX/PRO/DX INJ NEW DRUG ADDON: CPT

## 2019-11-21 PROCEDURE — 25010000002 DEXAMETHASONE PER 1 MG: Performed by: NURSE ANESTHETIST, CERTIFIED REGISTERED

## 2019-11-21 PROCEDURE — 85025 COMPLETE CBC W/AUTO DIFF WBC: CPT

## 2019-11-21 PROCEDURE — 25010000002 VANCOMYCIN 1 G RECONSTITUTED SOLUTION: Performed by: NEUROLOGICAL SURGERY

## 2019-11-21 PROCEDURE — 25010000002 NEOSTIGMINE 10 MG/10ML SOLUTION: Performed by: NURSE ANESTHETIST, CERTIFIED REGISTERED

## 2019-11-21 PROCEDURE — 25010000002 ONDANSETRON PER 1 MG: Performed by: NURSE ANESTHETIST, CERTIFIED REGISTERED

## 2019-11-21 PROCEDURE — C1767 GENERATOR, NEURO NON-RECHARG: HCPCS | Performed by: NEUROLOGICAL SURGERY

## 2019-11-21 PROCEDURE — 63710000001 FAMOTIDINE 20 MG TABLET: Performed by: ANESTHESIOLOGY

## 2019-11-21 PROCEDURE — 25010000002 HYDROMORPHONE PER 4 MG: Performed by: NURSE ANESTHETIST, CERTIFIED REGISTERED

## 2019-11-21 PROCEDURE — 76000 FLUOROSCOPY <1 HR PHYS/QHP: CPT

## 2019-11-21 PROCEDURE — 63685 INS/RPLC SPI NPG/RCVR POCKET: CPT | Performed by: NEUROLOGICAL SURGERY

## 2019-11-21 PROCEDURE — 25010000002 FENTANYL CITRATE (PF) 100 MCG/2ML SOLUTION: Performed by: NURSE ANESTHETIST, CERTIFIED REGISTERED

## 2019-11-21 PROCEDURE — 84132 ASSAY OF SERUM POTASSIUM: CPT | Performed by: NEUROLOGICAL SURGERY

## 2019-11-21 PROCEDURE — 63655 IMPLANT NEUROELECTRODES: CPT | Performed by: NEUROLOGICAL SURGERY

## 2019-11-21 PROCEDURE — 80053 COMPREHEN METABOLIC PANEL: CPT

## 2019-11-21 PROCEDURE — 25010000002 MORPHINE PER 10 MG: Performed by: EMERGENCY MEDICINE

## 2019-11-21 PROCEDURE — 99284 EMERGENCY DEPT VISIT MOD MDM: CPT

## 2019-11-21 PROCEDURE — 83690 ASSAY OF LIPASE: CPT

## 2019-11-21 PROCEDURE — 25010000002 PROPOFOL 10 MG/ML EMULSION: Performed by: NURSE ANESTHETIST, CERTIFIED REGISTERED

## 2019-11-21 PROCEDURE — 25010000002 IOPAMIDOL 61 % SOLUTION: Performed by: EMERGENCY MEDICINE

## 2019-11-21 PROCEDURE — 51798 US URINE CAPACITY MEASURE: CPT

## 2019-11-21 PROCEDURE — A9270 NON-COVERED ITEM OR SERVICE: HCPCS | Performed by: ANESTHESIOLOGY

## 2019-11-21 PROCEDURE — 96374 THER/PROPH/DIAG INJ IV PUSH: CPT

## 2019-11-21 PROCEDURE — 81003 URINALYSIS AUTO W/O SCOPE: CPT

## 2019-11-21 PROCEDURE — C1778 LEAD, NEUROSTIMULATOR: HCPCS | Performed by: NEUROLOGICAL SURGERY

## 2019-11-21 DEVICE — LD STIM PENTA PADL KT 16CH 3MM 60CM: Type: IMPLANTABLE DEVICE | Site: BACK | Status: FUNCTIONAL

## 2019-11-21 DEVICE — GEN IPG SCS PROCLAIM/ELITE/5 NONRECHG 10N: Type: IMPLANTABLE DEVICE | Site: BACK | Status: FUNCTIONAL

## 2019-11-21 RX ORDER — SODIUM CHLORIDE 0.9 % (FLUSH) 0.9 %
3-10 SYRINGE (ML) INJECTION AS NEEDED
Status: CANCELLED | OUTPATIENT
Start: 2019-11-21

## 2019-11-21 RX ORDER — SODIUM CHLORIDE 0.9 % (FLUSH) 0.9 %
10 SYRINGE (ML) INJECTION AS NEEDED
Status: DISCONTINUED | OUTPATIENT
Start: 2019-11-21 | End: 2019-11-22 | Stop reason: HOSPADM

## 2019-11-21 RX ORDER — ONDANSETRON 2 MG/ML
INJECTION INTRAMUSCULAR; INTRAVENOUS AS NEEDED
Status: DISCONTINUED | OUTPATIENT
Start: 2019-11-21 | End: 2019-11-21 | Stop reason: SURG

## 2019-11-21 RX ORDER — MORPHINE SULFATE 2 MG/ML
2 INJECTION, SOLUTION INTRAMUSCULAR; INTRAVENOUS ONCE
Status: COMPLETED | OUTPATIENT
Start: 2019-11-21 | End: 2019-11-21

## 2019-11-21 RX ORDER — GLYCOPYRROLATE 0.2 MG/ML
INJECTION INTRAMUSCULAR; INTRAVENOUS AS NEEDED
Status: DISCONTINUED | OUTPATIENT
Start: 2019-11-21 | End: 2019-11-21 | Stop reason: SURG

## 2019-11-21 RX ORDER — LIDOCAINE HYDROCHLORIDE 10 MG/ML
INJECTION, SOLUTION EPIDURAL; INFILTRATION; INTRACAUDAL; PERINEURAL AS NEEDED
Status: DISCONTINUED | OUTPATIENT
Start: 2019-11-21 | End: 2019-11-21 | Stop reason: SURG

## 2019-11-21 RX ORDER — CEFAZOLIN SODIUM 2 G/100ML
2 INJECTION, SOLUTION INTRAVENOUS ONCE
Status: DISCONTINUED | OUTPATIENT
Start: 2019-11-21 | End: 2019-11-21 | Stop reason: HOSPADM

## 2019-11-21 RX ORDER — FENTANYL CITRATE 50 UG/ML
50 INJECTION, SOLUTION INTRAMUSCULAR; INTRAVENOUS
Status: DISCONTINUED | OUTPATIENT
Start: 2019-11-21 | End: 2019-11-21 | Stop reason: HOSPADM

## 2019-11-21 RX ORDER — ONDANSETRON 2 MG/ML
4 INJECTION INTRAMUSCULAR; INTRAVENOUS ONCE
Status: COMPLETED | OUTPATIENT
Start: 2019-11-21 | End: 2019-11-21

## 2019-11-21 RX ORDER — SODIUM CHLORIDE, SODIUM LACTATE, POTASSIUM CHLORIDE, CALCIUM CHLORIDE 600; 310; 30; 20 MG/100ML; MG/100ML; MG/100ML; MG/100ML
9 INJECTION, SOLUTION INTRAVENOUS CONTINUOUS
Status: DISCONTINUED | OUTPATIENT
Start: 2019-11-21 | End: 2019-11-21 | Stop reason: HOSPADM

## 2019-11-21 RX ORDER — SODIUM CHLORIDE 0.9 % (FLUSH) 0.9 %
3 SYRINGE (ML) INJECTION EVERY 12 HOURS SCHEDULED
Status: CANCELLED | OUTPATIENT
Start: 2019-11-21

## 2019-11-21 RX ORDER — NEOSTIGMINE METHYLSULFATE 1 MG/ML
INJECTION, SOLUTION INTRAVENOUS AS NEEDED
Status: DISCONTINUED | OUTPATIENT
Start: 2019-11-21 | End: 2019-11-21 | Stop reason: SURG

## 2019-11-21 RX ORDER — LIDOCAINE HYDROCHLORIDE 10 MG/ML
0.5 INJECTION, SOLUTION EPIDURAL; INFILTRATION; INTRACAUDAL; PERINEURAL ONCE AS NEEDED
Status: COMPLETED | OUTPATIENT
Start: 2019-11-21 | End: 2019-11-21

## 2019-11-21 RX ORDER — DEXAMETHASONE SODIUM PHOSPHATE 4 MG/ML
INJECTION, SOLUTION INTRA-ARTICULAR; INTRALESIONAL; INTRAMUSCULAR; INTRAVENOUS; SOFT TISSUE AS NEEDED
Status: DISCONTINUED | OUTPATIENT
Start: 2019-11-21 | End: 2019-11-21 | Stop reason: SURG

## 2019-11-21 RX ORDER — HYDROCODONE BITARTRATE AND ACETAMINOPHEN 10; 325 MG/1; MG/1
1 TABLET ORAL 3 TIMES DAILY PRN
Qty: 15 TABLET | Refills: 0 | Status: SHIPPED | OUTPATIENT
Start: 2019-11-21 | End: 2019-12-11

## 2019-11-21 RX ORDER — SODIUM CHLORIDE 9 MG/ML
INJECTION, SOLUTION INTRAVENOUS AS NEEDED
Status: DISCONTINUED | OUTPATIENT
Start: 2019-11-21 | End: 2019-11-21 | Stop reason: HOSPADM

## 2019-11-21 RX ORDER — HYDROMORPHONE HYDROCHLORIDE 1 MG/ML
0.5 INJECTION, SOLUTION INTRAMUSCULAR; INTRAVENOUS; SUBCUTANEOUS
Status: DISCONTINUED | OUTPATIENT
Start: 2019-11-21 | End: 2019-11-21 | Stop reason: HOSPADM

## 2019-11-21 RX ORDER — MAGNESIUM HYDROXIDE 1200 MG/15ML
LIQUID ORAL AS NEEDED
Status: DISCONTINUED | OUTPATIENT
Start: 2019-11-21 | End: 2019-11-21 | Stop reason: HOSPADM

## 2019-11-21 RX ORDER — VANCOMYCIN HYDROCHLORIDE 1 G/20ML
INJECTION, POWDER, LYOPHILIZED, FOR SOLUTION INTRAVENOUS AS NEEDED
Status: DISCONTINUED | OUTPATIENT
Start: 2019-11-21 | End: 2019-11-21 | Stop reason: HOSPADM

## 2019-11-21 RX ORDER — PROPOFOL 10 MG/ML
VIAL (ML) INTRAVENOUS AS NEEDED
Status: DISCONTINUED | OUTPATIENT
Start: 2019-11-21 | End: 2019-11-21 | Stop reason: SURG

## 2019-11-21 RX ORDER — FENTANYL CITRATE 50 UG/ML
INJECTION, SOLUTION INTRAMUSCULAR; INTRAVENOUS AS NEEDED
Status: DISCONTINUED | OUTPATIENT
Start: 2019-11-21 | End: 2019-11-21 | Stop reason: SURG

## 2019-11-21 RX ORDER — ROCURONIUM BROMIDE 10 MG/ML
INJECTION, SOLUTION INTRAVENOUS AS NEEDED
Status: DISCONTINUED | OUTPATIENT
Start: 2019-11-21 | End: 2019-11-21 | Stop reason: SURG

## 2019-11-21 RX ORDER — FAMOTIDINE 20 MG/1
20 TABLET, FILM COATED ORAL ONCE
Status: COMPLETED | OUTPATIENT
Start: 2019-11-21 | End: 2019-11-21

## 2019-11-21 RX ORDER — FAMOTIDINE 10 MG/ML
20 INJECTION, SOLUTION INTRAVENOUS ONCE
Status: CANCELLED | OUTPATIENT
Start: 2019-11-21 | End: 2019-11-21

## 2019-11-21 RX ADMIN — FENTANYL CITRATE 100 MCG: 50 INJECTION, SOLUTION INTRAMUSCULAR; INTRAVENOUS at 09:00

## 2019-11-21 RX ADMIN — MORPHINE SULFATE 2 MG: 2 INJECTION, SOLUTION INTRAMUSCULAR; INTRAVENOUS at 20:59

## 2019-11-21 RX ADMIN — HYDROMORPHONE HYDROCHLORIDE 0.5 MG: 1 INJECTION, SOLUTION INTRAMUSCULAR; INTRAVENOUS; SUBCUTANEOUS at 10:34

## 2019-11-21 RX ADMIN — SODIUM CHLORIDE, POTASSIUM CHLORIDE, SODIUM LACTATE AND CALCIUM CHLORIDE 9 ML/HR: 600; 310; 30; 20 INJECTION, SOLUTION INTRAVENOUS at 07:40

## 2019-11-21 RX ADMIN — ROCURONIUM BROMIDE 35 MG: 10 INJECTION INTRAVENOUS at 09:00

## 2019-11-21 RX ADMIN — LIDOCAINE HYDROCHLORIDE 50 MG: 10 INJECTION, SOLUTION EPIDURAL; INFILTRATION; INTRACAUDAL; PERINEURAL at 09:00

## 2019-11-21 RX ADMIN — IOPAMIDOL 95 ML: 612 INJECTION, SOLUTION INTRAVENOUS at 21:59

## 2019-11-21 RX ADMIN — SODIUM CHLORIDE 500 ML: 9 INJECTION, SOLUTION INTRAVENOUS at 20:58

## 2019-11-21 RX ADMIN — PROPOFOL 200 MG: 10 INJECTION, EMULSION INTRAVENOUS at 09:00

## 2019-11-21 RX ADMIN — NEOSTIGMINE METHYLSULFATE 3 MG: 1 INJECTION, SOLUTION INTRAVENOUS at 10:06

## 2019-11-21 RX ADMIN — FAMOTIDINE 20 MG: 20 TABLET ORAL at 07:43

## 2019-11-21 RX ADMIN — GLYCOPYRROLATE 0.4 MG: 0.2 INJECTION, SOLUTION INTRAMUSCULAR; INTRAVENOUS at 10:06

## 2019-11-21 RX ADMIN — FENTANYL CITRATE 50 MCG: 0.05 INJECTION, SOLUTION INTRAMUSCULAR; INTRAVENOUS at 11:09

## 2019-11-21 RX ADMIN — DEXAMETHASONE SODIUM PHOSPHATE 8 MG: 4 INJECTION, SOLUTION INTRAMUSCULAR; INTRAVENOUS at 09:06

## 2019-11-21 RX ADMIN — ONDANSETRON 4 MG: 2 INJECTION INTRAMUSCULAR; INTRAVENOUS at 12:10

## 2019-11-21 RX ADMIN — FENTANYL CITRATE 50 MCG: 0.05 INJECTION, SOLUTION INTRAMUSCULAR; INTRAVENOUS at 10:43

## 2019-11-21 RX ADMIN — EPHEDRINE SULFATE 10 MG: 50 INJECTION INTRAMUSCULAR; INTRAVENOUS; SUBCUTANEOUS at 09:20

## 2019-11-21 RX ADMIN — HYDROMORPHONE HYDROCHLORIDE 0.5 MG: 1 INJECTION, SOLUTION INTRAMUSCULAR; INTRAVENOUS; SUBCUTANEOUS at 10:25

## 2019-11-21 RX ADMIN — ONDANSETRON 4 MG: 2 INJECTION INTRAMUSCULAR; INTRAVENOUS at 20:59

## 2019-11-21 RX ADMIN — LIDOCAINE HYDROCHLORIDE 0.5 ML: 10 INJECTION, SOLUTION EPIDURAL; INFILTRATION; INTRACAUDAL; PERINEURAL at 07:40

## 2019-11-21 RX ADMIN — ONDANSETRON 4 MG: 2 INJECTION INTRAMUSCULAR; INTRAVENOUS at 09:58

## 2019-11-21 NOTE — ANESTHESIA PROCEDURE NOTES
Airway  Urgency: elective    Date/Time: 11/21/2019 9:15 AM  Airway not difficult    General Information and Staff    Patient location during procedure: OR  CRNA: Ramiro June CRNA    Indications and Patient Condition  Indications for airway management: airway protection    Preoxygenated: yes  MILS not maintained throughout  Mask difficulty assessment: 1 - vent by mask    Final Airway Details  Final airway type: endotracheal airway      Successful airway: ETT  Cuffed: yes   Successful intubation technique: direct laryngoscopy  Facilitating devices/methods: intubating stylet  Endotracheal tube insertion site: oral  Blade: Lucian  Blade size: 3  ETT size (mm): 7.5  Cormack-Lehane Classification: grade I - full view of glottis  Placement verified by: chest auscultation and capnometry   Measured from: lips  ETT/EBT  to lips (cm): 20  Number of attempts at approach: 1    Additional Comments  Negative epigastric sounds, Breath sound equal bilaterally with symmetric chest rise and fall

## 2019-11-21 NOTE — ANESTHESIA PREPROCEDURE EVALUATION
Anesthesia Evaluation     Patient summary reviewed and Nursing notes reviewed   no history of anesthetic complications:  NPO Solid Status: > 8 hours  NPO Liquid Status: > 8 hours           Airway   Mallampati: II  TM distance: >3 FB  Neck ROM: full  No difficulty expected  Dental      Pulmonary - negative pulmonary ROS and normal exam   Cardiovascular - normal exam    (+) hypertension, dysrhythmias, hyperlipidemia,       Neuro/Psych  (+) numbness,     GI/Hepatic/Renal/Endo    (+)   renal disease,     Musculoskeletal     Abdominal    Substance History      OB/GYN          Other   arthritis,                      Anesthesia Plan    ASA 3     general     intravenous induction     Anesthetic plan, all risks, benefits, and alternatives have been provided, discussed and informed consent has been obtained with: patient.    Plan discussed with CRNA.

## 2019-11-21 NOTE — ANESTHESIA POSTPROCEDURE EVALUATION
Patient: Prashant Avila    Procedure Summary     Date:  11/21/19 Room / Location:   [facility]  /  [facility]    Anesthesia Start:  0857 Anesthesia Stop:  1017    Procedure:  SPINAL CORD STIMULATOR INSERTION (N/A Back) Diagnosis:       Lumbar radiculopathy      (Lumbar radiculopathy [M54.16])    Surgeon:  Fausto Renee MD Provider:  Arturo Metcalf MD    Anesthesia Type:  general ASA Status:  3          Anesthesia Type: general  Last vitals  BP   158/72 (11/21/19 1045)   Temp   97.2 °F (36.2 °C) (11/21/19 1045)   Pulse   55 (11/21/19 1055)   Resp   18 (11/21/19 1045)     SpO2   98 % (11/21/19 1055)     Post Anesthesia Care and Evaluation    Patient location during evaluation: PACU  Patient participation: complete - patient participated  Level of consciousness: awake and alert  Pain score: 0  Pain management: adequate  Airway patency: patent  Anesthetic complications: No anesthetic complications  PONV Status: none  Cardiovascular status: hemodynamically stable and acceptable  Respiratory status: nonlabored ventilation, acceptable and nasal cannula  Hydration status: acceptable

## 2019-11-21 NOTE — INTERVAL H&P NOTE
"Pre-Op H&P (See Recent Office Note Attached for Full H&P)    Chief complaint: Left buttock and leg pain    Review of Systems:  General ROS:  no fever, chills, rashes, No change since last office visit  Cardiovascular ROS: no chest pain or dyspnea on exertion.  History of LBBB, cardiac workup with negative findings with Dr. Frankel 2018  Respiratory ROS: no cough, shortness of breath, or wheezing    Meds:    No current facility-administered medications on file prior to encounter.      Current Outpatient Medications on File Prior to Encounter   Medication Sig Dispense Refill   • celecoxib (CeleBREX) 200 MG capsule Take 200 mg by mouth Every Morning.     • gabapentin (NEURONTIN) 800 MG tablet TAKE 1 TABLET BY MOUTH 4 TIMES DAILY (Patient taking differently: 800 mg 4 (Four) Times a Day. TAKE 1 TABLET BY MOUTH 4 TIMES DAILY) 120 tablet 5   • pravastatin (PRAVACHOL) 10 MG tablet Take 10 mg by mouth Daily.     • triamterene-hydrochlorothiazide (MAXZIDE-25) 37.5-25 MG per tablet Take 1 tablet by mouth Daily.         Vital Signs:  /75 (BP Location: Right arm, Patient Position: Lying)   Pulse 58   Temp 97.4 °F (36.3 °C) (Temporal)   Resp 16   Ht 175.3 cm (69\")   Wt 82.6 kg (182 lb)   SpO2 98%   BMI 26.88 kg/m²     Physical Exam:    CV:  S1S2 regular rate and rhythm, no murmur               Resp:  Clear to auscultation; respirations regular, even and unlabored    Results Review:     Lab Results   Component Value Date    WBC 7.45 11/11/2019    HGB 12.8 (L) 11/11/2019    HCT 39.2 11/11/2019    MCV 92.7 11/11/2019     11/11/2019    NEUTROABS 5.36 10/03/2019    GLUCOSE 140 (H) 07/09/2014    BUN 21 07/09/2014    CREATININE 1.1 07/09/2014     07/09/2014    K 4.6 11/11/2019     07/09/2014    CO2 28 07/09/2014    CALCIUM 9.2 07/09/2014        I reviewed the patient's new clinical results.    Cancer Staging (if applicable)  Cancer Patient: __ yes _x_no __unknown; If yes, clinical stage T:__ N:__M:__, " stage group or __N/A    Assessment/Plan:    Diagnosis:   Lumbar radiculopathy.     Treatment Options:   As requested by Dr. Batista I have recommended Saint Harsha epidural spinal cord stimulator placement.  It is desired that the paddle lead project to the superior T8 vertebral level.  This will be a fully MRI compatible system.  The nature of the procedure as well as the potential risks, complications, limitations, and alternatives to the procedure were discussed at length with the patient and the patient has agreed to proceed with surgery.    Henny Leggett, NABOR  11/21/2019   7:40 AM

## 2019-11-21 NOTE — OP NOTE
NEUROSURGICAL OPERATIVE NOTE        PREOPERATIVE DIAGNOSIS:    Lumbar postlaminectomy syndrome      POSTOPERATIVE DIAGNOSIS:  Same      PROCEDURE:  T10 laminotomy for placement of [facility] epidural spinal cord stimulator      SURGEON:  Fausto Renee M.D.      ASSISTANT: Chente White PA-C      ANESTHESIA:  General      ESTIMATED BLOOD LOSS: Minimal      SPECIMEN: None      DRAINS: None      COMPLICATIONS:  None      CLINICAL NOTE:  The patient is a 74-year-old gentleman who has undergone prior lumbar interventions.  Despite that he has ongoing severe left leg pain.  A recent trial of a spinal cord stimulator provided significant improvement in his symptoms and as such she presents at this time for [facility] epidural spinal cord stimulator placement.  The nature of the procedure as well as the potential risks, complications, limitations, and alternatives to the procedure were discussed at length with the patient and the patient has agreed to proceed with surgery.      TECHNICAL NOTE:  The patient was brought to the operating room and while on his cart general endotracheal anesthesia was achieved. He was then turned prone onto blanket rolls maintained longitudinally under her chest and abdomen. Special care was ensured to protect pressure points. His mid and low back as well as the upper buttocks were prepared and draped in the usual fashion. A localizing radiograph was obtained to identify the T10-T11 level. This was based on counting up from the 12th rib. A  midline incision was fashioned. Underlying tissues were divided with cautery to provide exposure to the posterior spinal elements. Another radiograph confirmed the operative level. A central laminotomy was fashioned at the inferior aspect of T10-T11 using Leksell rongeur, Midas Justin drill, and Kerrison punches. Bleeding points were controlled with bone wax and Floseal. The dorsal epidural space was defined in a cephalad fashion using a Penfield #3  dissector. The [facility] Penta lead was then advanced a couple of passes near the upper endplate of the T8 vertebra. This was just slightly left of midline as desired given his left leg pain. Lead tethers were utilized to secure the lead wires to surrounding tissues. A transverse incision was then made on the left upper buttock and a subcutaneous pocket was fashioned. A lead passer was utilized to pass the lead wires through the thoracic incision to the upper buttock incision. Lead wires were affixed to the [facility] IPG. Impedances were appropriate. Vancomycin powder was sprinkled in the buttock pocket and the IPG was placed and secured in place with 3-0 silk sutures. The subcutaneous tissues were closed in a single layer in interrupted fashion with 2-0 Vicryl suture. The thoracic incision was washed out with an antibiotic-containing solution. A small portion of Gelfoam was left at the laminotomy site. Vancomycin powder was sprinkled in the depths and then more superficially as the wound was closed. The paraspinous muscle and fascia were reapproximated in interrupted fashion with 0 Vicryl suture. Subcutaneous tissues were closed in layers with 3-0 Vicryl suture. Completion fluoroscopy confirmed good positioning of the lead as noted above. The skin at each site was closed in a running subcuticular fashion with 3-0 Vicryl suture. A dermal sealant and sterile dressings were applied. He did receive preoperative antibiotics.             Fausto Renee M.D.

## 2019-11-22 ENCOUNTER — TELEPHONE (OUTPATIENT)
Dept: NEUROSURGERY | Facility: CLINIC | Age: 74
End: 2019-11-22

## 2019-11-22 VITALS
BODY MASS INDEX: 26.96 KG/M2 | RESPIRATION RATE: 18 BRPM | HEIGHT: 69 IN | HEART RATE: 70 BPM | SYSTOLIC BLOOD PRESSURE: 129 MMHG | TEMPERATURE: 97.6 F | DIASTOLIC BLOOD PRESSURE: 55 MMHG | OXYGEN SATURATION: 94 % | WEIGHT: 182 LBS

## 2019-11-22 LAB
BILIRUB UR QL STRIP: NEGATIVE
CLARITY UR: CLEAR
COLOR UR: YELLOW
GLUCOSE UR STRIP-MCNC: NEGATIVE MG/DL
HGB UR QL STRIP.AUTO: NEGATIVE
KETONES UR QL STRIP: NEGATIVE
LEUKOCYTE ESTERASE UR QL STRIP.AUTO: NEGATIVE
NITRITE UR QL STRIP: NEGATIVE
PH UR STRIP.AUTO: <=5 [PH] (ref 5–8)
PROT UR QL STRIP: NEGATIVE
SP GR UR STRIP: 1.04 (ref 1–1.03)
UROBILINOGEN UR QL STRIP: ABNORMAL

## 2019-11-22 NOTE — TELEPHONE ENCOUNTER
Provider:  Víctor  Caller: patient  Time of call:  10:00   Phone #:  [phone]  Surgery:  Yesterday-SCS insertion  Surgery Date:    Last visit:   same  Next visit: 12/04/19    [facility]    Reason for call:     Patient called to let Dr. Renee know that he had to go to the ED last night.  He had increased back and abdominal pain.      CT's were preformed and patient states the studies are negative.  His pain eased up while he was in the ED so they discharged him.

## 2019-11-22 NOTE — TELEPHONE ENCOUNTER
Any chance patient can get in to see Dr. Batista on 12/04/19, per Chente CARRASCO.  He is already established with Dr. Batista.    Please see Chente's PA note.

## 2019-11-22 NOTE — TELEPHONE ENCOUNTER
"Called patient and discussed his symptoms last night.  Patient states that he was having pain in his abdomen/ lower pubic area.  Patient stated the ER while they worked him up with CT as well as an EKG and a bladder scan.  Patient avoided while he was there bladder scan came back negative for urinary retention.     Patient states that the majority of his pain is better but still has some tenderness in his abdomen which I reassured him that this is normal finding should subside the next 48 hours.     Patient is interested in getting an appointment with Dr. Batista for adjustment of his spinal stem.  He states that is working most noticeably at night but hopes to 100 and for maximum relief.  I will contact Dr. Batista staff to make sure that they have an appointment coincides with neurosurgery visit on 4 December    Patient only taking one Norco 10 at night and \"does not want to take any more than that.\"  "

## 2019-11-25 ENCOUNTER — TELEPHONE (OUTPATIENT)
Dept: NEUROSURGERY | Facility: CLINIC | Age: 74
End: 2019-11-25

## 2019-11-25 NOTE — TELEPHONE ENCOUNTER
Dr. Batista does not have clinic on 12/04/2019. I spoke with patient's wife the day of his surgery and let her know that we will call and schedule him when Dr. Batista gives us a day that he can see him. We typically see patient's 3-4 weeks after surgery. Dr. Batista is currently out of the country and we will discuss with him upon his return.

## 2019-11-25 NOTE — TELEPHONE ENCOUNTER
Patient's wife called and said her  feels so bad, he feels as if he needs to be admitted.  He said he is getting worse everyday instead of better.    His wife requests that they see Dr. Víctor gatica.

## 2019-11-27 NOTE — TELEPHONE ENCOUNTER
Patient continues with severe abdominal pain. He was having constipation and without bowel movement in 5 days but reports that he had an enema last pm.   I have given him #5 methadone, 5mg to try for his abdominal pain.    Lindsey Frankel PA-C

## 2019-12-04 ENCOUNTER — OFFICE VISIT (OUTPATIENT)
Dept: NEUROSURGERY | Facility: CLINIC | Age: 74
End: 2019-12-04

## 2019-12-04 VITALS
TEMPERATURE: 97.5 F | WEIGHT: 182 LBS | DIASTOLIC BLOOD PRESSURE: 70 MMHG | SYSTOLIC BLOOD PRESSURE: 158 MMHG | HEIGHT: 69 IN | BODY MASS INDEX: 26.96 KG/M2

## 2019-12-04 DIAGNOSIS — M96.1 POSTLAMINECTOMY SYNDROME OF LUMBAR REGION: ICD-10-CM

## 2019-12-04 DIAGNOSIS — Z46.2 ENCOUNTER FOR FITTING AND ADJUSTMENT OF NEUROPACEMAKER OF SPINAL CORD: Primary | ICD-10-CM

## 2019-12-04 PROCEDURE — 99024 POSTOP FOLLOW-UP VISIT: CPT | Performed by: PHYSICIAN ASSISTANT

## 2019-12-04 RX ORDER — METHADONE HYDROCHLORIDE 5 MG/1
TABLET ORAL
Refills: 0 | COMMUNITY
Start: 2019-11-27 | End: 2019-12-11

## 2019-12-04 RX ORDER — AMOXICILLIN 500 MG/1
CAPSULE ORAL
COMMUNITY
Start: 2019-12-03 | End: 2020-01-14

## 2019-12-04 NOTE — PROGRESS NOTES
Subjective     Chief Complaint: follow up for SCS implantation    Patient ID: Prashant Avila is a 74 y.o. male is here today for follow-up.    History of Present Illness    The patient is a 74-year-old gentleman who has undergone prior lumbar interventions.  Despite that he has ongoing severe left leg pain.  A recent trial of a spinal cord stimulator provided significant improvement in his symptoms and on 11/21/19, he underwent T10 laminotomy for placement of [facility] epidural spinal cord stimulator. Surgery was without complication and he was discharged to home.   Postoperatively, the patient had severe thoracic wall sensitivity and abdominal pain. He presented in the ER for this and was treated for pain. CT of the thoracic spine and abdomen were negative. The abdominal pain persisted and was worsened by constipation from the pain medication, which was finally relieved late last week. He states today that he is finally starting to improve, though he had quite a rough time last week.  He is no longer taking pain medication.   He denies fever or chills. He still has some tenderness over the IPG implantation site in the left buttock. He had some minimal drainage from his thoracic incision, which has stopped. He has had difficulty getting relief of his leg pain and currently has the stimulator turned off. He was hoping to meet with the [facility] representatives today to help with programming.   On a side note, he was started on a 10 day course of amoxicillin yesterday for a dental issue    The following portions of the patient's history were reviewed and updated as appropriate: allergies, current medications, past family history, past medical history, past social history, past surgical history and problem list.    Family history:   Family History   Problem Relation Age of Onset   • Congenital heart disease Mother    • Multiple myeloma Father        Social history:   Social History     Socioeconomic History   •  Marital status:      Spouse name: Not on file   • Number of children: Not on file   • Years of education: Not on file   • Highest education level: Not on file   Tobacco Use   • Smoking status: Never Smoker   • Smokeless tobacco: Never Used   Substance and Sexual Activity   • Alcohol use: No   • Drug use: No   • Sexual activity: Defer       Review of Systems   Constitutional: Negative for activity change, appetite change, chills, diaphoresis, fatigue, fever and unexpected weight change.   HENT: Negative for congestion, dental problem, drooling, ear discharge, ear pain, facial swelling, hearing loss, mouth sores, nosebleeds, postnasal drip, rhinorrhea, sinus pressure, sneezing, sore throat, tinnitus, trouble swallowing and voice change.    Eyes: Negative for photophobia, pain, discharge, redness, itching and visual disturbance.   Respiratory: Negative for apnea, cough, choking, chest tightness, shortness of breath, wheezing and stridor.    Cardiovascular: Negative for chest pain, palpitations and leg swelling.   Gastrointestinal: Negative for abdominal distention, abdominal pain, anal bleeding, blood in stool, constipation, diarrhea, nausea, rectal pain and vomiting.   Endocrine: Negative for cold intolerance, heat intolerance, polydipsia, polyphagia and polyuria.   Genitourinary: Negative for decreased urine volume, difficulty urinating, dysuria, enuresis, flank pain, frequency, genital sores, hematuria and urgency.   Musculoskeletal: Positive for back pain. Negative for arthralgias, gait problem, joint swelling, myalgias, neck pain and neck stiffness.   Skin: Negative for color change, pallor, rash and wound.   Allergic/Immunologic: Negative for environmental allergies, food allergies and immunocompromised state.   Neurological: Negative for dizziness, tremors, seizures, syncope, facial asymmetry, speech difficulty, weakness, light-headedness, numbness and headaches.   Hematological: Negative for adenopathy.  "Does not bruise/bleed easily.   Psychiatric/Behavioral: Negative for agitation, behavioral problems, confusion, decreased concentration, dysphoric mood, hallucinations, self-injury, sleep disturbance and suicidal ideas. The patient is not nervous/anxious and is not hyperactive.    All other systems reviewed and are negative.      Objective   Blood pressure 158/70, temperature 97.5 °F (36.4 °C), temperature source Temporal, height 175.3 cm (69\"), weight 82.6 kg (182 lb).  Body mass index is 26.88 kg/m².    Physical Exam   Constitutional: He is oriented to person, place, and time. He appears well-developed and well-nourished.   Pulmonary/Chest: Effort normal. No respiratory distress.   Musculoskeletal: Normal range of motion.   Neurological: He is alert and oriented to person, place, and time.   Skin:   Thoracic incision has mild redness along the length of the incision site, but it is clean and dry. There is no swelling or tenderness.  He has evidence of reaction to the dressing that had been applied to his back    The left buttock incision is clean, dry, and intact with no erythema. There is some mild fluctuation of the tissue that may represent a small hematoma in the pocket. It is tender to palpation, but not red or hot to touch         Assessment/Plan   Mr Avila is improving, though his thoracic incision especially bears close watching. It does not appear to be infected, but is not well healed yet.  We will not add an additional antibiotic at this time, but his wife will watch it closely and call should the redness increase at all.    We will have him follow up next week to be sure that it is healing properly and that the tenderness over the IPG incision is decreasing.  If he needs to meet in person with the [facility] representatives, he may be able to do so at that time as well; he was provided with their numbness.    They will call with any questions or concerns.     Prashant was seen today for " post-op.    Diagnoses and all orders for this visit:    Encounter for fitting and adjustment of neuropacemaker of spinal cord    Lumbar postlaminectomy syndrome         Return in about 1 week (around 12/11/2019).

## 2019-12-10 DIAGNOSIS — M48.061 BILATERAL STENOSIS OF LATERAL RECESS OF LUMBAR SPINE: ICD-10-CM

## 2019-12-11 ENCOUNTER — OFFICE VISIT (OUTPATIENT)
Dept: NEUROSURGERY | Facility: CLINIC | Age: 74
End: 2019-12-11

## 2019-12-11 VITALS
BODY MASS INDEX: 26.96 KG/M2 | TEMPERATURE: 97.6 F | SYSTOLIC BLOOD PRESSURE: 116 MMHG | DIASTOLIC BLOOD PRESSURE: 64 MMHG | HEIGHT: 69 IN | WEIGHT: 182 LBS

## 2019-12-11 DIAGNOSIS — M48.061 BILATERAL STENOSIS OF LATERAL RECESS OF LUMBAR SPINE: ICD-10-CM

## 2019-12-11 DIAGNOSIS — Z46.2 ENCOUNTER FOR FITTING AND ADJUSTMENT OF NEUROPACEMAKER OF SPINAL CORD: ICD-10-CM

## 2019-12-11 DIAGNOSIS — M96.1 POSTLAMINECTOMY SYNDROME OF LUMBAR REGION: Primary | ICD-10-CM

## 2019-12-11 PROCEDURE — 99024 POSTOP FOLLOW-UP VISIT: CPT | Performed by: PHYSICIAN ASSISTANT

## 2019-12-11 RX ORDER — GABAPENTIN 800 MG/1
TABLET ORAL
Qty: 120 TABLET | Refills: 5 | Status: ON HOLD | OUTPATIENT
Start: 2019-12-11 | End: 2020-10-12

## 2019-12-11 RX ORDER — GABAPENTIN 800 MG/1
TABLET ORAL
Qty: 120 TABLET | Refills: 5 | OUTPATIENT
Start: 2019-12-11

## 2019-12-11 NOTE — PROGRESS NOTES
Subjective         Chief Complaint: follow up for SCS implantation     Patient ID: Prashant Avila is a 74 y.o. male is here today for follow-up.     History of Present Illness     The patient is a 74-year-old gentleman who has undergone prior lumbar interventions, but had continuing left leg sciatica. He had significant relief of his symptoms with a spinal cord stimulator trial and  on 11/21/19, he underwent T10 laminotomy for placement of [facility] epidural spinal cord stimulator. Surgery was without complication and he was discharged to home.     Postoperatively, the patient had severe thoracic wall sensitivity and abdominal pain, which subsided just before his first follow up visit last week. He has a hematoma over his left buttock at the IBG insertion site, which he finds uncomfortable. His thoracic incision was somewhat erythematous and still had a bit of drainage at his follow up and he was asked to come in for re-evaluation today.     He notes resolution of the abdominal pain. The IPG site is still uncomfortable, and he notes itching of the thoracic incision, but denies further drainage, fever or chills.   The following portions of the patient's history were reviewed and updated as appropriate: allergies, current medications, past family history, past medical history, past social history, past surgical history and problem list.     Family history:         Family History   Problem Relation Age of Onset   • Congenital heart disease Mother     • Multiple myeloma Father           Social history:   Social History   Social History            Socioeconomic History   • Marital status:        Spouse name: Not on file   • Number of children: Not on file   • Years of education: Not on file   • Highest education level: Not on file   Tobacco Use   • Smoking status: Never Smoker   • Smokeless tobacco: Never Used   Substance and Sexual Activity   • Alcohol use: No   • Drug use: No   • Sexual activity: Defer             Review of Systems   Constitutional: Negative for activity change, appetite change, chills, diaphoresis, fatigue, fever and unexpected weight change.   HENT: Negative for congestion, dental problem, drooling, ear discharge, ear pain, facial swelling, hearing loss, mouth sores, nosebleeds, postnasal drip, rhinorrhea, sinus pressure, sneezing, sore throat, tinnitus, trouble swallowing and voice change.    Eyes: Negative for photophobia, pain, discharge, redness, itching and visual disturbance.   Respiratory: Negative for apnea, cough, choking, chest tightness, shortness of breath, wheezing and stridor.    Cardiovascular: Negative for chest pain, palpitations and leg swelling.   Gastrointestinal: Negative for abdominal distention, abdominal pain, anal bleeding, blood in stool, constipation, diarrhea, nausea, rectal pain and vomiting.   Endocrine: Negative for cold intolerance, heat intolerance, polydipsia, polyphagia and polyuria.   Genitourinary: Negative for decreased urine volume, difficulty urinating, dysuria, enuresis, flank pain, frequency, genital sores, hematuria and urgency.   Musculoskeletal: Positive for back pain. Negative for arthralgias, gait problem, joint swelling, myalgias, neck pain and neck stiffness.   Skin: Negative for color change, pallor, rash and wound.   Allergic/Immunologic: Negative for environmental allergies, food allergies and immunocompromised state.   Neurological: Negative for dizziness, tremors, seizures, syncope, facial asymmetry, speech difficulty, weakness, light-headedness, numbness and headaches.   Hematological: Negative for adenopathy. Does not bruise/bleed easily.   Psychiatric/Behavioral: Negative for agitation, behavioral problems, confusion, decreased concentration, dysphoric mood, hallucinations, self-injury, sleep disturbance and suicidal ideas. The patient is not nervous/anxious and is not hyperactive.    All other systems reviewed and are  "negative.           Objective      Blood pressure 158/70, temperature 97.5 °F (36.4 °C), temperature source Temporal, height 175.3 cm (69\"), weight 82.6 kg (182 lb).  Body mass index is 26.88 kg/m².     Physical Exam   Constitutional: He is oriented to person, place, and time. He appears well-developed and well-nourished.   Pulmonary/Chest: Effort normal. No respiratory distress.   Musculoskeletal: Normal range of motion.   Neurological: He is alert and oriented to person, place, and time.   Skin:   Thoracic incision has mild redness along the length of the incision site, but it is clean and dry. There is no swelling or tenderness.   The left buttock incision is clean, dry, and intact with no erythema. There is some mild fluctuation of the tissue that may represent a small hematoma in the pocket. It is tender to palpation, but not red or hot to touch. The incision line itself is very well healed              Assessment/Plan      Mr Avila is improving, and his thoracic incision is more well-closed today. There is still slight swelling over the IPG insertion site.   He is planning to meet with the [employer] representative today for programming and adjustment of the device.  We will follow up with him on an as-needed basis going forward.   They will call with any questions or concerns.      Prashant was seen today for post-op.     Diagnoses and all orders for this visit:     Encounter for fitting and adjustment of neuropacemaker of spinal cord     Lumbar postlaminectomy syndrome     "

## 2020-01-14 ENCOUNTER — OFFICE VISIT (OUTPATIENT)
Dept: PAIN MEDICINE | Facility: CLINIC | Age: 75
End: 2020-01-14

## 2020-01-14 VITALS
SYSTOLIC BLOOD PRESSURE: 132 MMHG | BODY MASS INDEX: 26.88 KG/M2 | TEMPERATURE: 98 F | DIASTOLIC BLOOD PRESSURE: 76 MMHG | HEIGHT: 69 IN

## 2020-01-14 DIAGNOSIS — Z96.82 PRESENCE OF NEUROSTIMULATOR: ICD-10-CM

## 2020-01-14 DIAGNOSIS — M96.1 POSTLAMINECTOMY SYNDROME OF LUMBAR REGION: ICD-10-CM

## 2020-01-14 DIAGNOSIS — Z98.1 STATUS POST LUMBAR SPINAL FUSION: ICD-10-CM

## 2020-01-14 DIAGNOSIS — M48.061 BILATERAL STENOSIS OF LATERAL RECESS OF LUMBAR SPINE: ICD-10-CM

## 2020-01-14 DIAGNOSIS — G03.9 ADHESIVE ARACHNOIDITIS: ICD-10-CM

## 2020-01-14 DIAGNOSIS — Z46.2 ENCOUNTER FOR FITTING AND ADJUSTMENT OF NEUROPACEMAKER OF SPINAL CORD: ICD-10-CM

## 2020-01-14 DIAGNOSIS — R53.81 PHYSICAL DECONDITIONING: ICD-10-CM

## 2020-01-14 DIAGNOSIS — M54.16 CHRONIC LUMBAR RADICULOPATHY: ICD-10-CM

## 2020-01-14 DIAGNOSIS — M54.16 CHRONIC LUMBAR RADICULOPATHY: Primary | ICD-10-CM

## 2020-01-14 PROCEDURE — 99215 OFFICE O/P EST HI 40 MIN: CPT | Performed by: ANESTHESIOLOGY

## 2020-01-14 PROCEDURE — 95972 ALYS CPLX SP/PN NPGT W/PRGRM: CPT | Performed by: ANESTHESIOLOGY

## 2020-01-14 NOTE — PROGRESS NOTES
"Chief Complaint: \"I am doing well with the stimulator device. I need more coverage in my left hip and left leg.\"      Brief History: Mr. Prashant Avila is a 75 y.o. male, who underwent implantation of a spinal cord stimulator device with Dr. Fausto Renee on 11/21/2019 with [employer] Penta paddle lead with the top electrodes projecting at the level of the superior endplate of the T8 vertebral level. IPG: [employer] Proclaim 5 XR IPG Non-Rechargeable (Full Body MRI compatible).  The device was implanted primarily for treatment of chronic left hip and left lower extremity pain. Patient returns to the clinic for evaluation of his chronic pain and possible spinal cord stimulator reprogramming. Patient has remained afebrile and surgical wounds are well healed and without erythema, drainage, or fluid accumulation. Patient reports that he experienced severe abdominal pain that lasted for 2 weeks with gradual resolution. For the first month, he was unable to use his SCS device. Patient still reports mild discomfort at the IPG site at this time.   Mr. Prashant Avila reports 0% pain relief or remarkable functional improvement with the use of his stimulator device.   Pain level is rated as 7/10 with the stimulator \"turned on.”  Patient level ranges from 3/10 to 10/10 with the use of the spinal cord stimulator device.    Patient denies pain, numbness and weakness in the lower extremities.  Patient denies any new bladder or bowel problems.      Pain History:  Referring physician: Dr. Fausto Renee   Consultation for intractable chronic left lower extremity pain.   Patient reports a 7-year history of pain, which began without incident. Patient is status post L4-L5 hemilaminectomy and foraminotomy at the [facility] with Dr. Mackay on 03/03/2014, followed by L4-S1 lumbar decompression and fusion due to unresolved pain by Dr. Renee on 07/24/2014. Patient reports that he experienced significant improvement after his last surgery " with Dr. Renee, in particular, complete resolution of his previously severe lower back pain. Unfortunately, his left lower extremity pain, chronic radicular pain preceded his last lumbar fusion, and did not improve after surgery. He underwent surgical consultation with Dr. Cox at the [facility], who recommended decompression of the nerve roots at L3-S1. He underwent follow-up neurosurgical consultation with Dr. Fausto Renee on 11/03/2017. Dr. Renee discussed the possibility of lumbar decompression at L3-L4: Left L3-L4 laminotomy and foraminotomy. Due to potential risk of developing instability that would necessitate extending his lumbar fusion to the L3 level. Patient underwent on April 3, 2018, left L3-L4 transforaminal epidural steroid injection without any pain relief. Patient elected to hold off on surgery. Patient underwent EMG/NCV, which confirmed chronic left S1 radiculopathy.  A CT myelogram was significant for stenosis above the level of his lumbar fusion, at L3-L4 without clinical correlation.  Patient presents with pain with a left S1 dermatomal distribution pattern.  Pain description:   Constant pain with intermittent exacerbation, described as burning, electricity, pins and needles, and tingling sensation.   Radiation of pain: The pain radiates into the posterior aspect of the thigh, left leg and plantar aspect of the foot. The leg pain is more severe than the lower back pain.   Pain pattern: dermatomic, left L5 and S1   Pain intensity today: 3/10 (standing) 9-10/10 (sitting or lying down)  Average pain intensity last week: 7/10  Pain intensity ranges from: 4/10 to 10/10  Aggravating factors: Pain increases with sitting longer than 5 minutes and laying down for more than 5 minutes.  Alleviating factors: Pain decreases with standing.   Associated symptoms:   Patient denies numbness or weakness in the lower extremities.   Patient denies any new bladder or bowel problems.   Patient denies  "difficulties with his balance or recent falls.       Review of previous therapies and additional medical records:  Prashant Avila has already failed the following measures, including:   Conservative measures: oral analgesics, ice and heat, physical therapy  Interventional measures:  02/06/2019: Left S1 transforaminal epidural steroid injection and experienced pain relief that was short-lived   02/20/2017- Diagnostic and therapeutic left sciatic nerve block with local anesthetics, steroids, hyaluronidase under ultrasound, PNS and fluoroscopic guidance- Patient did not receive sustained relief from this procedure.   02/06/2017: Diagnostic left S1 selective nerve root injection  01/30/2017: Nevro SCS Trial; no relief  04/25/2016: Trial of lumbar epidural steroid injections with steroids and hyaluronidase via caudal catheter with 100% pain relief lasting 48 hours  12/16/2015: diagnostic and therapeutic left S1 transforaminal epidural steroid injection with hyaluronidase with complete resolution of pain for several weeks  11/30/2015: Spinal cord stimulator trial Saint Jude, which provided him with more than 50% pain relief and functional improvement. However, patient decided to delay implantation of a permanent spinal cord stimulator device until the arrival of new technology. Patient did not like the perception of tonic stimulation, even though stimulation overlapped successfully the areas of his chronic pain, he did not find it pleasant.   02/09/2015: Diagnostic left piriformis muscle injection  Surgical measures:   03/03/2014: Left L4-L5 hemilaminectomy and foraminotomy at [facility] with Dr. Mackay   07/24/2014: L4-S1 decompression and fusion by Dr. Renee  Prashant Avila underwent follow-up neurosurgical consultation with Dr. Fausto Renee on 11/30/2017, and was found to be a potential surgical candidate.  Pain Psychology Evaluation (02/25/2019): \"From a psychological perspective, patient is considered to be an " "appropriate candidate for a spinal cord stimulator or an intrathecal pump implant at this time.\"  Prashant Avila presents with significant comorbidities including hypertension, rheumatoid arthritis, osteoporosis, hyperlipidemia, chronic kidney disease, hearing loss, carcinoma of prostate, engaged in treatment.  In terms of current analgesics, Prashant Avila takes: Tylenol and gabapentin 800 mg 4 times a day without any side effects.  I have reviewed Jon Report #[id number] consistent to medication reconciliation.    Global Pain Scale 04-19  2018 10-24  2018 11-08  2018 01-29  2019 03-06  2019 10-03  2019 10-17  2019 01-14  2020           Pain 19 17 17 18 17 20  10 10           Feelings 1 0 0 0 0   8    4   2           Clinical outcomes 11 8 13 11 6 11    7   6           Activities 17 10 16 18 6 15    7   6           GPS Total: 48 35 46 47 29 54  28  20             Review of New Diagnostic Studies:    MRI THORACIC SPINE WO CONTRAST-10/31/2019: Minimal degenerative changes seen at multiple levels throughout the thoracic spine with no central spinal canal stenosis identified. Epidural space is unremarkable.    Review of Previous Diagnostic Studies:    EMG/NCV of the bilateral lower extremities December 18, 2018: Chronic left S1 radiculopathy.   CT LUMBAR SPINE WITH CONTRAST-01/21/2019: There is excellent opacification of the thecal sac. Imaging covers from T12 through the S1 level caudally. The conus terminates at the L1/L2 level.  The distal cord and conus are normal in caliber.  The nerve roots of the cauda equina are normal in caliber and distribution.  L4-S1 fusion hardware is in place; the left S1 pedicle screw is  fractured and there is lucency around the right S1 pedicle screw. Left L4 laminotomy. Vertebral body heights and alignment are normal. Vertebral body heights are normal. Degenerative changes are as follows:  T12-L1: Facet arthropathy without significant foraminal or spinal canal stenosis.  L1-L2: " Facet arthropathy without significant foraminal or spinal canal stenosis.  L2-L3: Right greater than left facet arthropathy. Mild narrowing of the right subarticular zone. No significant central spinal canal or  foraminal stenosis.  L3-L4: Disc bulge and facet arthropathy with ligamentum flavum thickening. There is severe spinal canal stenosis and at least moderate bilateral foraminal stenosis. This has progressed since the lumbar myelogram CT from 09/30/2014.  L4-L5: Improved right subarticular zone narrowing. No significant osseous spinal canal or foraminal stenosis.  L5-S1: Bilateral facet arthropathy. Shallow disc osteophyte. Unchanged mild osseous foraminal stenosis. No significant osseous spinal canal stenosis.  EKG April 19, 2018 revealed sinus bradycardia with first-degree AV block.  Left bundle branch block.    CT Lumbar Spine, without contrast, 09/13/2017: There are postoperative changes related to intervertebral body fusion and posterior fusion of L4, L5 and S1. In the lateral projection the alignment is excellent.   L1-L2: disc space is normal.   L2-L3: minimally circumferentially bulging annulus.   L3-L4: minimally circumferentially bulging annulus. Narrowing of the lateral recess.  L4-L5 and L5-S1: postoperative changes but there is no evidence of bony central spinal stenosis.  X-ray lumbar spine 08/04/2017: posterior lumbar fusion of L4, L5 and S1. There are degenerative changes at L1-L2 with anterior osteophyte formation. There is normal lumbar alignment in the lateral projection with a stable alignment in the flexed and extended positions.      Duplex Venous Lower, 03/25/2017: Normal right lower extremity venous duplex scan. No evidence of DVT.  MRI Lumbar Spine, 10/28/2016: Since the previous MRI study, there has been L4-5-S1 posterior lumbar and interbody fusion. No new abnormality of alignment is seen. There is, however, significant worsening of posterior element hypertrophy at L3-4, above the  "fusion level, due to persistent L3-4 disc bulge, and what appears to be new spinal stenosis at this level, generally as a result of posterior element hypertrophic change. Canal appears stable elsewhere. No lumbar compression deformity or new abnormality of alignment is seen. No vertebral marrow edema is appreciated. Sagittal images suggest a small (18 mm) seroma or synovial cyst on the right at L5-S1. Tip of conus medullaris is again noted at L1.  Axial images   L1-L2: no significant canal or foraminal stenosis   L2-L3: canal appears relatively narrowed, due to posterior element hypertrophy but still greater than 1 cm in diameter. Foramina appear  lower limits of normal diameter.   L3-L4: moderate canal stenosis, with additional lateral narrowing, AP diameter 6.5 mm, transverse diameter approximately 9 mm, with crowding of the nerve roots, but well-preserved anterior CSF space. Foramina appear mildly to moderately narrowed bilaterally at this level  due to facet DJD.   L4-L5: fused level, canal appears normal. Foramina appear mildly narrowed due to facet DJD.  L5-S1: canal appears normal. Left-sided neural foramen appears normal. Right-sided neural foramen appears mildly narrowed due to facet DJD. Postcontrast images show expected postoperative enhancement of the dorsal soft tissues and no pathologic postcontrast enhancement elsewhere.     The following portions of the patient's history were reviewed and updated as appropriate: problem list, past medical history, past surgery history, social history, family history, medications, and allergies    Review of Systems   All other systems reviewed and are negative.    /76 (BP Location: Left arm, Patient Position: Sitting)   Temp 98 °F (36.7 °C) (Temporal)   Ht 175.3 cm (69\")   BMI 26.88 kg/m²       Physical Exam   Constitutional General Appearance: No acute distress, well appearing and well nourished.   Appears healthy, within normal limits of ideal weight, well " hydrated and appearance reflects stated age.   Head and face: Normal.   Palpation of the face and sinuses: No sinus tenderness.   Eyes Conjunctiva and lids: No erythema, swelling or discharge.   Pupils: Equal, round, reactive to light.   Neck: Supple, symmetric, trachea midline, no masses.   Pulmonary Respiratory effort: No increased work of breathing or signs of respiratory distress. Respiratory rate: normal. Assessment of respiratory effort revealed normal rhythm and effort. Auscultation of lungs: Clear to auscultation. Auscultation of the lungs revealed no expiratory wheezing, normal expiratory time and no inspiratory wheezing. No rales or crackles were heard bilaterally. No rhonchi. No friction rub. No wheezing. No diminished breath sounds. No bronchial breath sounds.   Cardiovascular Auscultation of heart: Normal rate and rhythm, normal S1 and S2, no murmurs. Peripheral vascular exam: Normal. Examination of extremities for edema and/or varicosities: Normal.   Abdomen: Non-tender, no masses. Bowel sounds were normal. The abdomen was soft and nontender. No masses palpated.   Musculoskeletal Gait and station: Normal. Gait evaluation demonstrated a normal gait. The range of motion of the lumbar spine is essentially full and without significant pain. Lumbar facet joint loading maneuvers are negative. Kodak and Gaenslen's tests are negative. The range of motion of the hip joints is full and without pain. Palpation of the gluteal bursa, ischial tuberosities, and greater trochanters, unrevealing. Piriformis maneuvers are negative. There is some discomfort upon palpation of the left piriformis muscle. Active resisted external rotation and passive internal rotation did not increase pain.   Muscle strength/tone: Normal.   Motor Strength Findings: normal strength.   Motor Tone: normal tone. Involuntary movements: none. Significant shortening of the hamstrings, worse on the left side.  Skin and subcutaneous tissue: Normal  without rashes or lesions.   Neurologic   Cranial nerves: Cranial nerves 2-12 intact.   Cortical function: Normal mental status.   Reflexes: 2+ and symmetric. Deep tendon reflexes: 2+ right biceps, 2+ left biceps, 1+ right patella, 1+ left patella, 1+ right ankle jerk and 1+ left ankle jerk. Superficial/Primitive Reflexes: primitive reflexes were absent. Straight leg raising test is negative. Femoral stretch sign is negative.   Sensation: No sensory loss. Sensory exam: intact to light touch, intact pain and temperature sensation, intact vibration sensation and normal proprioception.   Coordination: Normal finger to nose and heel to shin. Coordination: normal balance and negative Romberg's sign.   Psychiatric: Judgment and insight: Normal. Orientation to person, place and time: Normal. Recent and remote memory: Intact. Mood and affect: Normal.     Analysis of the spinal cord stimulator device with complex spinal cord stimulator reprogramming   Patient used the [employer] Jude spinal cord stimulator device for 575 hours since last reprogramming, and 696 lifetime hours (average 19 hours per day). Analysis of impedence reveals normal impedence for all contacts, except 13 (high). The spinal cord stimulator device was reprogrammed under my direct supervision and three programs were created by adjusting electrode polarities, amplitude, pulse width,  pulse rate, BurstDR, micro-dosing, in the following fashion;    Program TWO (Preferred Program):    Electrode polarities: 4+, 5+, 8-, 9+  Amplitude: 0.3-1.2 mA     Pulse width: 500 mcs  Rate: 500 Hz  IntraBurst rate: 500 Hz  Micro-dosing: Continuous    Patient experienced significant pain relief with coverage with pleasant paresthesia in all areas of chronic pain.  Time spent reprogrammin minutes  A copy of the telemetry report will be scanned in the patient's chart.    ASSESSMENT:   1. Chronic lumbar radiculopathy    2. Adhesive arachnoiditis    3. Lumbar postlaminectomy  syndrome     4. Status post L4-S1 lumbar spinal fusion    5. Bilateral stenosis of lateral recess of lumbar spine    6. Physical deconditioning    7. Presence of neurostimulator    8. Encounter for fitting and adjustment of neuropacemaker of spinal cord      PLAN: Patient continues dealing with his chronic pain condition. He has been unable to appreciate relief from his SCS device, yet. He started using his SCS device more consistently over the past month. He was dealing with significant postoperative pain for 4-6 weeks after his surgery. Mr. Avila presents with a very challenging chronic pain history and had previously failed to obtain pain relief with surgical measures, conservative measures, and interventional pain management measures, as referenced on previous encounters. Patient presents intolerance to most analgesics and adjuvants. Patient underwent follow-up neurosurgical consultation with Dr. Fausto Renee on 11/03/2017. Dr. Renee discussed the possibility of lumbar decompression and extension of fusion at L3-L4. Patient elected to hold off on surgery. On MRI of the lumbar spine, there is evidence of lateral recess stenosis at the adjacent level at L3-L4 with crowding of the nerve roots and possible lumbar arachnoiditis. CT scan post-myelogram confirmed stenosis at L3-L4 without correlation. Recent EMG/NCV confirmed chronic left S1 radiculopathy. Patient's chronic pain syndrome is due to chronic left S1 lumbar radiculopathy and lumbar arachnoiditis. Patient continues denying pain in the L3 distribution. Therefore, I have proposed the following plan:  1. Follow up with me on 01/21/2020 at 11:45 for evaluation of his chronic pain and possible SCS reprogramming.   2. Long-term rehabilitation efforts:  A. Start a comprehensive physical therapy program at [facility] for water therapy, neurodynamics, gait and balance and core strengthening   B. Patient has declined a referral to \[facility]\""[place]  3.  Pharmacological measures, as follows;   A. Patient has consistently refused the idea of new pharmacological trials. Unfortunately, he is not a candidate for mexiletine based on his EKG. Continue gabapentin 800 mg four times daily  B. Rx for EpiCeram  4. The patient has been instructed to contact my office with any questions or difficulties. The patient understands the plan and agrees to proceed accordingly.       I spent more than 50 minutes face-to-face with the patient and family, of which more than 50% of the time were spent counseling regarding evaluation, diagnosis, prognosis, potential referrals, treatment options for chronic pain condition and overall rehabilitation, risk and benefits of different interventions, alternative therapies and long-term management and functional goals of spinal cord stimulation       Patient Care Team:  SHERON Chun MD as PCP - General (Family Medicine)  Efrain Batista MD as Consulting Physician (Pain Medicine)  Fausto Renee MD as Consulting Physician (Neurosurgery)  Jean Warren MD as Consulting Physician (Neurosurgery)  Hank Frankel MD as Consulting Physician (Cardiology)  Marcelino Condon MD as Consulting Physician (Urology)  Brian Tatum III, MD as Consulting Physician (Cardiology)  SHERON Chun MD as Consulting Physician (Family Medicine)  Francisco Javier Clay MD as Consulting Physician (Neurosurgery)  Royer Roldan MD as Consulting Physician (General Practice)  Emigdio Watt MD as Consulting Physician (Gastroenterology)  Shola Vila MD as Consulting Physician (Otolaryngology)     No orders of the defined types were placed in this encounter.        No future appointments.      Efrain Batista MD    EMR Dragon/Transcription disclaimer:  Much of this encounter note is an electronic transcription of spoken language to printed text. Electronic transcription of spoken language may permit erroneous, or at times, nonsensical words or  phrases to be inadvertently transcribed. Although I have reviewed the note for such errors, some may still exist.

## 2020-01-16 NOTE — PROGRESS NOTES
"Chief Complaint: \"I am doing the same. I still have the same pain in my left hip and left leg.\"      Brief History: Mr. Prashant Avila is a 75 y.o. male, who underwent implantation of a spinal cord stimulator device with Dr. Fausto Renee on 11/21/2019 with [employer] Penta paddle lead with the top electrodes projecting at the level of the superior endplate of the T8 vertebral level. IPG: [employer] Proclaim 5 XR IPG Non-Rechargeable (Full Body MRI compatible). The device was implanted primarily for treatment of chronic left hip and left lower extremity pain.   Mr. Prashant Avila reports no significant pain relief or functional improvement with the use of his stimulator device since his last reprogramming.  Today, will spend more time mapping the areas of his chronic pain and providing new programs to enhance pain control. His chronic pain condition is quite challenging, and he has failed previous conservative, interventional, and surgical measures.  Pain level is rated as 7/10 with the stimulator \"turned on.”  Patient level ranges from 3/10 to 8/10 with the use of the spinal cord stimulator device.    Patient denies pain, numbness and weakness in the lower extremities.  Patient denies any new bladder or bowel problems.      Pain History:  Referring physician: Dr. Fausto Renee   Consultation for intractable chronic left lower extremity pain.   Patient reports a 7-year history of pain, which began without incident. Patient is status post L4-L5 hemilaminectomy and foraminotomy at the [facility] with Dr. Mackay on 03/03/2014, followed by L4-S1 lumbar decompression and fusion due to unresolved pain by Dr. Renee on 07/24/2014. Patient reports that he experienced significant improvement after his last surgery with Dr. Renee, in particular, complete resolution of his previously severe lower back pain. Unfortunately, his left lower extremity pain, chronic radicular pain preceded his last lumbar fusion, and did not " improve after surgery. He underwent surgical consultation with Dr. Cox at the [facility], who recommended decompression of the nerve roots at L3-S1. He underwent follow-up neurosurgical consultation with Dr. Fausto Renee on 11/03/2017. Dr. Renee discussed the possibility of lumbar decompression at L3-L4: Left L3-L4 laminotomy and foraminotomy. Due to potential risk of developing instability that would necessitate extending his lumbar fusion to the L3 level. Patient underwent on April 3, 2018, left L3-L4 transforaminal epidural steroid injection without any pain relief. Patient elected to hold off on surgery. Patient underwent EMG/NCV, which confirmed chronic left S1 radiculopathy.  A CT myelogram was significant for stenosis above the level of his lumbar fusion, at L3-L4 without clinical correlation.  Patient presents with pain with a left S1 dermatomal distribution pattern.  Pain description:   Constant pain with intermittent exacerbation, described as burning, electricity, pins and needles, and tingling sensation.   Radiation of pain: The pain radiates into the posterior aspect of the thigh, left leg and plantar aspect of the foot. The leg pain is more severe than the lower back pain.   Pain pattern: dermatomic, left L5 and S1   Pain intensity today: 3/10 (standing) 9-10/10 (sitting or lying down)  Average pain intensity last week: 7/10  Pain intensity ranges from: 4/10 to 10/10  Aggravating factors: Pain increases with sitting longer than 5 minutes and laying down for more than 5 minutes.  Alleviating factors: Pain decreases with standing.   Associated symptoms:   Patient denies numbness or weakness in the lower extremities.   Patient denies any new bladder or bowel problems.   Patient denies difficulties with his balance or recent falls.       Review of previous therapies and additional medical records:  Prashant Avila has already failed the following measures, including:   Conservative measures:  "oral analgesics, ice and heat, physical therapy  Interventional measures:  02/06/2019: Left S1 transforaminal epidural steroid injection and experienced pain relief that was short-lived   02/20/2017- Diagnostic and therapeutic left sciatic nerve block with local anesthetics, steroids, hyaluronidase under ultrasound, PNS and fluoroscopic guidance- Patient did not receive sustained relief from this procedure.   02/06/2017: Diagnostic left S1 selective nerve root injection  01/30/2017: Nevro SCS Trial; no relief  04/25/2016: Trial of lumbar epidural steroid injections with steroids and hyaluronidase via caudal catheter with 100% pain relief lasting 48 hours  12/16/2015: diagnostic and therapeutic left S1 transforaminal epidural steroid injection with hyaluronidase with complete resolution of pain for several weeks  11/30/2015: Spinal cord stimulator trial Saint Jude, which provided him with more than 50% pain relief and functional improvement. However, patient decided to delay implantation of a permanent spinal cord stimulator device until the arrival of new technology. Patient did not like the perception of tonic stimulation, even though stimulation overlapped successfully the areas of his chronic pain, he did not find it pleasant.   02/09/2015: Diagnostic left piriformis muscle injection  Surgical measures:   03/03/2014: Left L4-L5 hemilaminectomy and foraminotomy at [facility] with Dr. Mackay   07/24/2014: L4-S1 decompression and fusion by Dr. Víctor Bustosboone Howeтатьяна underwent follow-up neurosurgical consultation with Dr. Fausto Renee on 11/30/2017, and was found to be a potential surgical candidate.  Pain Psychology Evaluation (02/25/2019): \"From a psychological perspective, patient is considered to be an appropriate candidate for a spinal cord stimulator or an intrathecal pump implant at this time.\"  Prashant Avila presents with significant comorbidities including hypertension, rheumatoid arthritis, osteoporosis, " hyperlipidemia, chronic kidney disease, hearing loss, carcinoma of prostate, engaged in treatment.  In terms of current analgesics, Prashant Avila takes: Tylenol and gabapentin 800 mg 4 times a day without any side effects.  I have reviewed Jon Report #[id number] consistent to medication reconciliation.    Global Pain Scale 04-19  2018 10-24  2018 11-08  2018 01-29  2019 03-06  2019 10-03  2019 10-17  2019 01-14  2020 01-21  2020         Pain 19 17 17 18 17 20  10 10 10          Feelings 1 0 0 0 0   8    4   2  0         Clinical outcomes 11 8 13 11 6 11    7   6  7         Activities 17 10 16 18 6 15    7   6  4         GPS Total: 48 35 46 47 29 54  28  24  21           Diagnostic Studies:    MRI THORACIC SPINE WO CONTRAST-10/31/2019: Minimal degenerative changes seen at multiple levels throughout the thoracic spine with no central spinal canal stenosis identified. Epidural space is unremarkable.  EMG/NCV of the bilateral lower extremities December 18, 2018: Chronic left S1 radiculopathy.   CT LUMBAR SPINE WITH CONTRAST-01/21/2019: There is excellent opacification of the thecal sac. Imaging covers from T12 through the S1 level caudally. The conus terminates at the L1/L2 level.  The distal cord and conus are normal in caliber.  The nerve roots of the cauda equina are normal in caliber and distribution.  L4-S1 fusion hardware is in place; the left S1 pedicle screw is  fractured and there is lucency around the right S1 pedicle screw. Left L4 laminotomy. Vertebral body heights and alignment are normal. Vertebral body heights are normal. Degenerative changes are as follows:  T12-L1: Facet arthropathy without significant foraminal or spinal canal stenosis.  L1-L2: Facet arthropathy without significant foraminal or spinal canal stenosis.  L2-L3: Right greater than left facet arthropathy. Mild narrowing of the right subarticular zone. No significant central spinal canal or  foraminal stenosis.  L3-L4: Disc bulge and  facet arthropathy with ligamentum flavum thickening. There is severe spinal canal stenosis and at least moderate bilateral foraminal stenosis. This has progressed since the lumbar myelogram CT from 09/30/2014.  L4-L5: Improved right subarticular zone narrowing. No significant osseous spinal canal or foraminal stenosis.  L5-S1: Bilateral facet arthropathy. Shallow disc osteophyte. Unchanged mild osseous foraminal stenosis. No significant osseous spinal canal stenosis.  EKG April 19, 2018 revealed sinus bradycardia with first-degree AV block.  Left bundle branch block.    CT Lumbar Spine, without contrast, 09/13/2017: There are postoperative changes related to intervertebral body fusion and posterior fusion of L4, L5 and S1. In the lateral projection the alignment is excellent.   L1-L2: disc space is normal.   L2-L3: minimally circumferentially bulging annulus.   L3-L4: minimally circumferentially bulging annulus. Narrowing of the lateral recess.  L4-L5 and L5-S1: postoperative changes but there is no evidence of bony central spinal stenosis.  X-ray lumbar spine 08/04/2017: posterior lumbar fusion of L4, L5 and S1. There are degenerative changes at L1-L2 with anterior osteophyte formation. There is normal lumbar alignment in the lateral projection with a stable alignment in the flexed and extended positions.      Duplex Venous Lower, 03/25/2017: Normal right lower extremity venous duplex scan. No evidence of DVT.  MRI Lumbar Spine, 10/28/2016: Since the previous MRI study, there has been L4-5-S1 posterior lumbar and interbody fusion. No new abnormality of alignment is seen. There is, however, significant worsening of posterior element hypertrophy at L3-4, above the fusion level, due to persistent L3-4 disc bulge, and what appears to be new spinal stenosis at this level, generally as a result of posterior element hypertrophic change. Canal appears stable elsewhere. No lumbar compression deformity or new abnormality of  "alignment is seen. No vertebral marrow edema is appreciated. Sagittal images suggest a small (18 mm) seroma or synovial cyst on the right at L5-S1. Tip of conus medullaris is again noted at L1.  Axial images   L1-L2: no significant canal or foraminal stenosis   L2-L3: canal appears relatively narrowed, due to posterior element hypertrophy but still greater than 1 cm in diameter. Foramina appear  lower limits of normal diameter.   L3-L4: moderate canal stenosis, with additional lateral narrowing, AP diameter 6.5 mm, transverse diameter approximately 9 mm, with crowding of the nerve roots, but well-preserved anterior CSF space. Foramina appear mildly to moderately narrowed bilaterally at this level  due to facet DJD.   L4-L5: fused level, canal appears normal. Foramina appear mildly narrowed due to facet DJD.  L5-S1: canal appears normal. Left-sided neural foramen appears normal. Right-sided neural foramen appears mildly narrowed due to facet DJD. Postcontrast images show expected postoperative enhancement of the dorsal soft tissues and no pathologic postcontrast enhancement elsewhere.     The following portions of the patient's history were reviewed and updated as appropriate: problem list, past medical history, past surgery history, social history, family history, medications, and allergies    Review of Systems   All other systems reviewed and are negative.    /84 (BP Location: Right arm, Patient Position: Sitting)   Ht 175.3 cm (69\")   Wt 81.6 kg (180 lb)   BMI 26.58 kg/m²       Physical Exam   Constitutional General Appearance: No acute distress, well appearing and well nourished.   Appears healthy, within normal limits of ideal weight, well hydrated and appearance reflects stated age.   Head and face: Normal.   Palpation of the face and sinuses: No sinus tenderness.   Eyes Conjunctiva and lids: No erythema, swelling or discharge.   Pupils: Equal, round, reactive to light.   Neck: Supple, symmetric, " trachea midline, no masses.   Pulmonary Respiratory effort: No increased work of breathing or signs of respiratory distress. Respiratory rate: normal. Assessment of respiratory effort revealed normal rhythm and effort. Auscultation of lungs: Clear to auscultation. Auscultation of the lungs revealed no expiratory wheezing, normal expiratory time and no inspiratory wheezing. No rales or crackles were heard bilaterally. No rhonchi. No friction rub. No wheezing. No diminished breath sounds. No bronchial breath sounds.   Cardiovascular Auscultation of heart: Normal rate and rhythm, normal S1 and S2, no murmurs. Peripheral vascular exam: Normal. Examination of extremities for edema and/or varicosities: Normal.   Abdomen: Non-tender, no masses. Bowel sounds were normal. The abdomen was soft and nontender. No masses palpated.   Musculoskeletal Gait and station: Normal. Gait evaluation demonstrated a normal gait. The range of motion of the lumbar spine is essentially full and without significant pain. Lumbar facet joint loading maneuvers are negative. Kodak and Gaenslen's tests are negative. The range of motion of the hip joints is full and without pain. Palpation of the gluteal bursa, ischial tuberosities, and greater trochanters, unrevealing. Piriformis maneuvers are negative. There is some discomfort upon palpation of the left piriformis muscle. Active resisted external rotation and passive internal rotation did not increase pain.   Muscle strength/tone: Normal.   Motor Strength Findings: normal strength.   Motor Tone: normal tone. Involuntary movements: none. Significant shortening of the hamstrings, worse on the left side.  Skin and subcutaneous tissue: Normal without rashes or lesions. Both surgical wounds are well healed without erythema, drainage or fluid accumulation   Neurologic   Cranial nerves: Cranial nerves 2-12 intact.   Cortical function: Normal mental status.   Reflexes: 2+ and symmetric. Deep tendon  reflexes: 2+ right biceps, 2+ left biceps, 1+ right patella, 1+ left patella, 1+ right ankle jerk and 1+ left ankle jerk. Superficial/Primitive Reflexes: primitive reflexes were absent. Straight leg raising test is negative. Femoral stretch sign is negative.   Sensation: No sensory loss. Sensory exam: intact to light touch, intact pain and temperature sensation, intact vibration sensation and normal proprioception.   Coordination: Normal finger to nose and heel to shin. Coordination: normal balance and negative Romberg's sign.   Psychiatric: Judgment and insight: Normal. Orientation to person, place and time: Normal. Recent and remote memory: Intact. Mood and affect: Normal.     Analysis of the spinal cord stimulator device with complex spinal cord stimulator reprogramming   Patient used the [employer] Jude spinal cord stimulator device for 744 hours since last reprogramming, and 864 lifetime hours (average 24 hours per day). Analysis of impedence reveals normal impedence for all contacts, except 13 (high). The spinal cord stimulator device was reprogrammed under my direct supervision and two new programs were created, for a total of 5 programs, by adjusting electrode polarities, amplitude, pulse width,  pulse rate, BurstDR, micro-dosing, in the following fashion;    Program FIVE (Preferred Program):    Electrode polarities: 4+, 5-, 6+, 8+, 9-, 10+  Amplitude: 0.3-0.75 mA     Pulse width: 1000 mcs  Rate: 40 Hz  IntraBurst rate: 500 Hz  Micro-dosin:90    Patient experienced significant pain relief with coverage with pleasant paresthesia in all areas of chronic pain.  Time spent reprogrammin minutes  A copy of the telemetry report will be scanned in the patient's chart.      ASSESSMENT:   1. Chronic lumbar radiculopathy    2. Adhesive arachnoiditis    3. Lumbar postlaminectomy syndrome     4. Status post L4-S1 lumbar spinal fusion    5. Bilateral stenosis of lateral recess of lumbar spine    6. Physical  deconditioning    7. Presence of neurostimulator    8. Encounter for fitting and adjustment of neuropacemaker of spinal cord         PLAN: Patient reports no significant improvement of his chronic pain with the use of his SCS device since his last reprogramming. He continues dealing with his chronic pain condition. Mr. Avila presents with a very challenging chronic pain history and had previously failed to obtain pain relief with surgical measures, conservative measures, and interventional pain management measures, as referenced on previous encounters. Patient presents intolerance to most analgesics and adjuvants. Patient underwent follow-up neurosurgical consultation with Dr. Fausto Renee on 11/03/2017. Dr. Renee discussed the possibility of lumbar decompression and extension of fusion at L3-L4. Patient elected to hold off on surgery. On MRI of the lumbar spine, there is evidence of lateral recess stenosis at the adjacent level at L3-L4 with crowding of the nerve roots and possible lumbar arachnoiditis. CT scan post-myelogram confirmed stenosis at L3-L4 without correlation. Recent EMG/NCV confirmed chronic left S1 radiculopathy. Patient's chronic pain syndrome is due to chronic left S1 lumbar radiculopathy and lumbar arachnoiditis. Patient continues denying pain in the L3 distribution. Therefore, I have proposed the following plan:  1. Follow up with me on 02/18/2020 at 1 PM for evaluation of his chronic pain and possible SCS reprogramming.   2. Long-term rehabilitation efforts:  A. Start a comprehensive physical therapy program at [facility] for water therapy, neurodynamics, gait and balance and core strengthening   B. Patient has declined a referral to [facility]  3. Pharmacological measures, as follows;   A. Patient has consistently refused the idea of new pharmacological trials. Unfortunately, he is not a candidate for mexiletine based on his EKG. Continue gabapentin 800 mg four times daily  B.  Continue EpiCeram  4. The patient has been instructed to contact my office with any questions or difficulties. The patient understands the plan and agrees to proceed accordingly.           Patient Care Team:  SHERON Chun MD as PCP - General (Family Medicine)  Efrain Batista MD as Consulting Physician (Pain Medicine)  Fausto Renee MD as Consulting Physician (Neurosurgery)  Jean Warren MD as Consulting Physician (Neurosurgery)  Hank Frankel MD as Consulting Physician (Cardiology)  Marcelino Condon MD as Consulting Physician (Urology)  Brian Tatum III, MD as Consulting Physician (Cardiology)  SHERON Chun MD as Consulting Physician (Family Medicine)  Francisco Javier Clay MD as Consulting Physician (Neurosurgery)  Royer Roldan MD as Consulting Physician (General Practice)  Emigdio Watt MD as Consulting Physician (Gastroenterology)  Shola Vila MD as Consulting Physician (Otolaryngology)     No orders of the defined types were placed in this encounter.        No future appointments.      Efrain Batista MD    EMR Dragon/Transcription disclaimer:  Much of this encounter note is an electronic transcription of spoken language to printed text. Electronic transcription of spoken language may permit erroneous, or at times, nonsensical words or phrases to be inadvertently transcribed. Although I have reviewed the note for such errors, some may still exist.

## 2020-01-21 ENCOUNTER — OFFICE VISIT (OUTPATIENT)
Dept: PAIN MEDICINE | Facility: CLINIC | Age: 75
End: 2020-01-21

## 2020-01-21 VITALS
DIASTOLIC BLOOD PRESSURE: 84 MMHG | HEIGHT: 69 IN | WEIGHT: 180 LBS | SYSTOLIC BLOOD PRESSURE: 132 MMHG | BODY MASS INDEX: 26.66 KG/M2

## 2020-01-21 DIAGNOSIS — Z46.2 ENCOUNTER FOR FITTING AND ADJUSTMENT OF NEUROPACEMAKER OF SPINAL CORD: ICD-10-CM

## 2020-01-21 DIAGNOSIS — R53.81 PHYSICAL DECONDITIONING: ICD-10-CM

## 2020-01-21 DIAGNOSIS — M96.1 POSTLAMINECTOMY SYNDROME OF LUMBAR REGION: ICD-10-CM

## 2020-01-21 DIAGNOSIS — G03.9 ADHESIVE ARACHNOIDITIS: ICD-10-CM

## 2020-01-21 DIAGNOSIS — M54.16 CHRONIC LUMBAR RADICULOPATHY: Primary | ICD-10-CM

## 2020-01-21 DIAGNOSIS — M48.061 BILATERAL STENOSIS OF LATERAL RECESS OF LUMBAR SPINE: ICD-10-CM

## 2020-01-21 DIAGNOSIS — Z98.1 STATUS POST LUMBAR SPINAL FUSION: ICD-10-CM

## 2020-01-21 DIAGNOSIS — M54.16 CHRONIC LUMBAR RADICULOPATHY: ICD-10-CM

## 2020-01-21 DIAGNOSIS — Z96.82 PRESENCE OF NEUROSTIMULATOR: ICD-10-CM

## 2020-01-21 PROCEDURE — 95972 ALYS CPLX SP/PN NPGT W/PRGRM: CPT | Performed by: ANESTHESIOLOGY

## 2020-01-21 PROCEDURE — 99214 OFFICE O/P EST MOD 30 MIN: CPT | Performed by: ANESTHESIOLOGY

## 2020-01-30 ENCOUNTER — TELEPHONE (OUTPATIENT)
Dept: NEUROSURGERY | Facility: CLINIC | Age: 75
End: 2020-01-30

## 2020-01-30 NOTE — TELEPHONE ENCOUNTER
Spoke to pt.  He states that one of the incisions still have not healed.  It is the exact same as it was a month ago.  He wants to be seen.  He has tried the various topicals and nothing seems to make a difference. I told pt it may be tomorrow before I can call him back. He verbalized understanding.

## 2020-01-31 ENCOUNTER — OFFICE VISIT (OUTPATIENT)
Dept: NEUROSURGERY | Facility: CLINIC | Age: 75
End: 2020-01-31

## 2020-01-31 ENCOUNTER — LAB (OUTPATIENT)
Dept: LAB | Facility: HOSPITAL | Age: 75
End: 2020-01-31

## 2020-01-31 VITALS
DIASTOLIC BLOOD PRESSURE: 60 MMHG | TEMPERATURE: 97.6 F | WEIGHT: 182 LBS | SYSTOLIC BLOOD PRESSURE: 142 MMHG | BODY MASS INDEX: 26.96 KG/M2 | HEIGHT: 69 IN

## 2020-01-31 DIAGNOSIS — Z96.89 S/P INSERTION OF SPINAL CORD STIMULATOR: Primary | ICD-10-CM

## 2020-01-31 DIAGNOSIS — T14.8XXD WOUND HEALING, DELAYED: ICD-10-CM

## 2020-01-31 LAB
BASOPHILS # BLD AUTO: 0.08 10*3/MM3 (ref 0–0.2)
BASOPHILS NFR BLD AUTO: 1 % (ref 0–1.5)
CRP SERPL-MCNC: 0.6 MG/DL (ref 0–0.5)
DEPRECATED RDW RBC AUTO: 41.7 FL (ref 37–54)
EOSINOPHIL # BLD AUTO: 0.33 10*3/MM3 (ref 0–0.4)
EOSINOPHIL NFR BLD AUTO: 4 % (ref 0.3–6.2)
ERYTHROCYTE [DISTWIDTH] IN BLOOD BY AUTOMATED COUNT: 12.8 % (ref 12.3–15.4)
ERYTHROCYTE [SEDIMENTATION RATE] IN BLOOD: 17 MM/HR (ref 0–20)
HCT VFR BLD AUTO: 38.9 % (ref 37.5–51)
HGB BLD-MCNC: 13.3 G/DL (ref 13–17.7)
IMM GRANULOCYTES # BLD AUTO: 0.02 10*3/MM3 (ref 0–0.05)
IMM GRANULOCYTES NFR BLD AUTO: 0.2 % (ref 0–0.5)
LYMPHOCYTES # BLD AUTO: 1.39 10*3/MM3 (ref 0.7–3.1)
LYMPHOCYTES NFR BLD AUTO: 16.7 % (ref 19.6–45.3)
MCH RBC QN AUTO: 31.1 PG (ref 26.6–33)
MCHC RBC AUTO-ENTMCNC: 34.2 G/DL (ref 31.5–35.7)
MCV RBC AUTO: 91.1 FL (ref 79–97)
MONOCYTES # BLD AUTO: 0.85 10*3/MM3 (ref 0.1–0.9)
MONOCYTES NFR BLD AUTO: 10.2 % (ref 5–12)
NEUTROPHILS # BLD AUTO: 5.64 10*3/MM3 (ref 1.7–7)
NEUTROPHILS NFR BLD AUTO: 67.9 % (ref 42.7–76)
NRBC BLD AUTO-RTO: 0 /100 WBC (ref 0–0.2)
PLATELET # BLD AUTO: 288 10*3/MM3 (ref 140–450)
PMV BLD AUTO: 10.5 FL (ref 6–12)
RBC # BLD AUTO: 4.27 10*6/MM3 (ref 4.14–5.8)
WBC NRBC COR # BLD: 8.31 10*3/MM3 (ref 3.4–10.8)

## 2020-01-31 PROCEDURE — 85025 COMPLETE CBC W/AUTO DIFF WBC: CPT

## 2020-01-31 PROCEDURE — 36415 COLL VENOUS BLD VENIPUNCTURE: CPT | Performed by: PHYSICIAN ASSISTANT

## 2020-01-31 PROCEDURE — 86140 C-REACTIVE PROTEIN: CPT | Performed by: PHYSICIAN ASSISTANT

## 2020-01-31 PROCEDURE — 99024 POSTOP FOLLOW-UP VISIT: CPT | Performed by: PHYSICIAN ASSISTANT

## 2020-01-31 PROCEDURE — 85652 RBC SED RATE AUTOMATED: CPT | Performed by: PHYSICIAN ASSISTANT

## 2020-01-31 NOTE — PROGRESS NOTES
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 Renee's service.  His history is significant for undergoing multiple lumbar interventions.  However he continued to complain of low back and severe left leg pain.  A recent trial of a spinal cord stimulator proved highly effective in mitigating symptom severity.  As such, patient presented for a T10 laminotomy for placement of a [place] epidural spinal cord stimulator on 2019.    Presently, Mr. Coley is 9 weeks postop.  Patient is very frustrated that his midline thoracic incision has not fully healed.  He continues to note a modest amount of serosanguineous drainage from the inferior pole of his incision site-and thus was asked to come in for re-evaluation today.  Patient states that his midline thoracic incision has a pruritic quality, however he denies scratching the area, recent fever/chills, foul odors or purulent drainage.  He has no other complaints at this time.    Review of Systems   Constitutional: Negative for activity change, appetite change, chills, diaphoresis, fatigue, fever and unexpected weight change.   HENT: Negative for congestion, dental problem, drooling, ear discharge, ear pain, facial swelling, hearing loss, mouth sores, nosebleeds, postnasal drip, rhinorrhea, sinus pressure, sneezing, sore throat, tinnitus, trouble swallowing and voice change.    Eyes: Negative for photophobia, pain, discharge, redness, itching and visual disturbance.   Respiratory: Negative for apnea, cough, choking, chest tightness, shortness of breath, wheezing and stridor.    Cardiovascular: Negative for chest pain, palpitations and leg swelling.   Gastrointestinal: Negative for abdominal distention, abdominal pain, anal bleeding,  "blood in stool, constipation, diarrhea, nausea, rectal pain and vomiting.   Endocrine: Negative for cold intolerance, heat intolerance, polydipsia, polyphagia and polyuria.   Genitourinary: Negative for decreased urine volume, difficulty urinating, dysuria, enuresis, flank pain, frequency, genital sores, hematuria and urgency.   Musculoskeletal: Positive for back pain. Negative for arthralgias, gait problem, joint swelling, myalgias, neck pain and neck stiffness.   Skin: Negative for color change, pallor, rash and wound.   Allergic/Immunologic: Negative for environmental allergies, food allergies and immunocompromised state.   Neurological: Negative for dizziness, tremors, seizures, syncope, facial asymmetry, speech difficulty, weakness, light-headedness, numbness and headaches.   Hematological: Negative for adenopathy. Does not bruise/bleed easily.   Psychiatric/Behavioral: Negative for agitation, behavioral problems, confusion, decreased concentration, dysphoric mood, hallucinations, self-injury, sleep disturbance and suicidal ideas. The patient is not nervous/anxious and is not hyperactive.    All other systems reviewed and are negative.      The patient's past medical history, past surgical history, family history, and social history have been reviewed at length in the electronic medical record.    Physical Exam:   /60 (BP Location: Right arm, Patient Position: Sitting)   Temp 97.6 °F (36.4 °C) (Temporal)   Ht 175.3 cm (69\")   Wt 82.6 kg (182 lb)   BMI 26.88 kg/m²   Consitutional: A&Ox3, pleasant, no acute distress  Skin:   1. Midline thoracic: incision that is well-healed with a small scab to the inferior pole.  Skin is clean and dry and intact without evidence of erythema or infection.  There is no fluctuance or temperature change to suggest fluid collection.  Surgical site is nontender to touch.      2.  Left flank incision:  - Well heal surgical incision   - No evidence of wound dehiscence  - NSOI "   - Non-TTP    Medical Decision Making      Diagnosis/Treatment Options:  1. S/P insertion of spinal cord stimulator  2. Wound healing, delayed    - CBC & Differential; Future  - C-reactive Protein  - Sedimentation Rate       Follow up:  Mr. Avila is seen today in follow-up for a wound check given his ongoing complaints of persistent drainage from the inferior pole of his midline thoracic incision s/p spinal cord stimulator placement on 11/21/2019.  After evaluating his skin today- my concern for underlying infection is low.  However I have ordered baseline labs to rule out infection.  Patient will follow-up in the office next week for reevaluation.  He is adamant that Dr. Renee lay eyes on him at his next visit.    Sherlyn Gonzalez PA-C   1/31/2020   3:25 PM

## 2020-02-03 ENCOUNTER — TELEPHONE (OUTPATIENT)
Dept: NEUROSURGERY | Facility: CLINIC | Age: 75
End: 2020-02-03

## 2020-02-03 RX ORDER — SULFAMETHOXAZOLE AND TRIMETHOPRIM 800; 160 MG/1; MG/1
1 TABLET ORAL 2 TIMES DAILY
Qty: 20 TABLET | Refills: 0 | Status: SHIPPED | OUTPATIENT
Start: 2020-02-03 | End: 2020-02-18 | Stop reason: SDUPTHER

## 2020-02-03 NOTE — TELEPHONE ENCOUNTER
Spoke to the patient.  - went over the results of his most recent lab work   - we are going to start him on a short course of Bactrim DS to be taken BID x 10 days.   - he will continue to observe and keep his FU plan for Wednesday    He is to call our office with any questions, changes or concerns

## 2020-02-05 ENCOUNTER — OFFICE VISIT (OUTPATIENT)
Dept: NEUROSURGERY | Facility: CLINIC | Age: 75
End: 2020-02-05

## 2020-02-05 VITALS — WEIGHT: 185 LBS | TEMPERATURE: 98 F | HEIGHT: 69 IN | BODY MASS INDEX: 27.4 KG/M2

## 2020-02-05 DIAGNOSIS — M96.1 POSTLAMINECTOMY SYNDROME OF LUMBAR REGION: ICD-10-CM

## 2020-02-05 DIAGNOSIS — T14.8XXD WOUND HEALING, DELAYED: ICD-10-CM

## 2020-02-05 DIAGNOSIS — Z96.89 S/P INSERTION OF SPINAL CORD STIMULATOR: Primary | ICD-10-CM

## 2020-02-05 PROCEDURE — 99024 POSTOP FOLLOW-UP VISIT: CPT | Performed by: PHYSICIAN ASSISTANT

## 2020-02-05 NOTE — PROGRESS NOTES
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 Renee's service.  His history is significant for undergoing multiple lumbar interventions.  However he continued to complain of low back and severe left leg pain.  A recent trial of a spinal cord stimulator proved highly effective in mitigating symptom severity.  As such, patient presented for a T10 laminotomy for placement of a [place] epidural spinal cord stimulator on 2019.     Presently, Mr. Coley is 9 weeks postop.  Patient remains frustrated that his midline thoracic incision has not fully healed.  Since last seen in the office he started a short course of Bactrim DS - and has noticed an improvement in the amount of drainage from the inferior pole of his incision site.  Pt. continues to deny recent fever/chills, foul odors, purulent drainage, or other signs of infection.  He has no other complaints at this time.    Review of Systems   Constitutional: Negative for activity change, appetite change, chills, diaphoresis, fatigue, fever and unexpected weight change.   HENT: Negative for congestion, dental problem, drooling, ear discharge, ear pain, facial swelling, hearing loss, mouth sores, nosebleeds, postnasal drip, rhinorrhea, sinus pressure, sneezing, sore throat, tinnitus, trouble swallowing and voice change.    Eyes: Negative for photophobia, pain, discharge, redness, itching and visual disturbance.   Respiratory: Negative for apnea, cough, choking, chest tightness, shortness of breath, wheezing and stridor.    Cardiovascular: Negative for chest pain, palpitations and leg swelling.   Gastrointestinal: Negative for abdominal distention, abdominal pain, anal bleeding, blood in stool, constipation, diarrhea, nausea, rectal  "pain and vomiting.   Endocrine: Negative for cold intolerance, heat intolerance, polydipsia, polyphagia and polyuria.   Genitourinary: Negative for decreased urine volume, difficulty urinating, dysuria, enuresis, flank pain, frequency, genital sores, hematuria and urgency.   Musculoskeletal: Positive for back pain. Negative for arthralgias, gait problem, joint swelling, myalgias, neck pain and neck stiffness.   Skin: Negative for color change, pallor, rash and wound.   Allergic/Immunologic: Negative for environmental allergies, food allergies and immunocompromised state.   Neurological: Negative for dizziness, tremors, seizures, syncope, facial asymmetry, speech difficulty, weakness, light-headedness, numbness and headaches.   Hematological: Negative for adenopathy. Does not bruise/bleed easily.   Psychiatric/Behavioral: Negative for agitation, behavioral problems, confusion, decreased concentration, dysphoric mood, hallucinations, self-injury, sleep disturbance and suicidal ideas. The patient is not nervous/anxious and is not hyperactive.    All other systems reviewed and are negative.      The patient's past medical history, past surgical history, family history, and social history have been reviewed at length in the electronic medical record.    Physical Exam:   Temp 98 °F (36.7 °C)   Ht 175.3 cm (69\")   Wt 83.9 kg (185 lb)   BMI 27.32 kg/m²   1. Midline thoracic:  - incision that is well-healed with a small scab to the inferior pole.    - Skin is clean and dry and intact without evidence of erythema or infection.    - There is no fluctuance or temperature change to suggest fluid collection.    - Surgical site is nontender to touch.       2.  Left flank incision:  - Well heal surgical incision   - No evidence of wound dehiscence  - NSOI   - Non-TTP       Medical Decision Making    Data Review:   Results from last 7 days   Lab Units 01/31/20  1416   WBC 10*3/mm3 8.31   HEMOGLOBIN g/dL 13.3   HEMATOCRIT % 38.9 "   PLATELETS 10*3/mm3 288     1. CRP (1/31/2020): .60 *slightly elevated   2. Sed-Rate (1/31/2020): normal at 17    Diagnosis/Treatment Options:  1. S/P insertion of spinal cord stimulator  2. Wound healing, delayed  3. Lumbar postlaminectomy syndrome        Follow up:  Mr. Aivla is seen today in follow-up for a wound check after starting his short course of Bactrim DS.  Fortunately, his wound drainage has improved.  Pt. Will continue to observe and will be seen back in the office in 10 days for reassessment with new labs to confirm improvement from his abx therapy: crp, sed rate and CBC.  Again, there is no current role for surgical intervention.    Sherlyn Gonzalez PA-C   2/5/2020   3:17 PM

## 2020-02-14 NOTE — PROGRESS NOTES
"Chief Complaint: \"I am doing the same. I still have the same pain in my left hip and left leg.\"      Brief History: Mr. Prashant Avila is a 75 y.o. male, who underwent implantation of a spinal cord stimulator device with Dr. Fausto Renee on 11/21/2019 with [employer] Penta paddle lead with the top electrodes projecting at the level of the superior endplate of the T8 vertebral level. IPG: [employer] Harsha Proclaim 5 XR IPG Non-Rechargeable (Full Body MRI compatible). The device was implanted primarily for treatment of chronic left hip and left lower extremity pain. Patient was last seen on 01/21/2020 for evaluation of his chronic pain and SCS reprogramming.  Mr. Prashant Avila reports 10-20% pain relief and some functional improvement with the use of his stimulator device since his last reprogramming. Today, we will spend more time mapping the areas of his chronic pain and providing new programs to enhance pain control. His chronic pain condition is quite challenging, and he has failed previous conservative, interventional, and surgical measures.  Pain level is rated as 7/10 with the stimulator \"turned on.”  Patient level ranges from 3/10 to 8/10 with the use of the spinal cord stimulator device.    Patient denies pain, numbness and weakness in the lower extremities.  Patient denies any new bladder or bowel problems.      Pain History:  Referring physician: Dr. Fausto Renee   Consultation for intractable chronic left lower extremity pain.   Patient reports a 7-year history of pain, which began without incident. Patient is status post L4-L5 hemilaminectomy and foraminotomy at the [facility] with Dr. Mackay on 03/03/2014, followed by L4-S1 lumbar decompression and fusion due to unresolved pain by Dr. Renee on 07/24/2014. Patient reports that he experienced significant improvement after his last surgery with Dr. Renee, in particular, complete resolution of his previously severe lower back pain. Unfortunately, his left " lower extremity pain, chronic radicular pain preceded his last lumbar fusion, and did not improve after surgery. He underwent surgical consultation with Dr. Cox at the [facility], who recommended decompression of the nerve roots at L3-S1. He underwent follow-up neurosurgical consultation with Dr. Fausto Renee on 11/03/2017. Dr. Renee discussed the possibility of lumbar decompression at L3-L4: Left L3-L4 laminotomy and foraminotomy. Due to potential risk of developing instability that would necessitate extending his lumbar fusion to the L3 level. Patient underwent on April 3, 2018, left L3-L4 transforaminal epidural steroid injection without any pain relief. Patient elected to hold off on surgery. Patient underwent EMG/NCV, which confirmed chronic left S1 radiculopathy.  A CT myelogram was significant for stenosis above the level of his lumbar fusion, at L3-L4 without clinical correlation.  Patient presents with pain with a left S1 dermatomal distribution pattern.  Pain description:   Constant pain with intermittent exacerbation, described as burning, electricity, pins and needles, and tingling sensation.   Radiation of pain: The pain radiates into the posterior aspect of the thigh, left leg and plantar aspect of the foot. The leg pain is more severe than the lower back pain.   Pain pattern: dermatomic, left L5 and S1   Pain intensity today: 3/10 (standing) 9-10/10 (sitting or lying down)  Average pain intensity last week: 7/10  Pain intensity ranges from: 4/10 to 10/10  Aggravating factors: Pain increases with sitting longer than 5 minutes and laying down for more than 5 minutes.  Alleviating factors: Pain decreases with standing.   Associated symptoms:   Patient denies numbness or weakness in the lower extremities.   Patient denies any new bladder or bowel problems.   Patient denies difficulties with his balance or recent falls.       Review of previous therapies and additional medical records:  Prashant  "TOMAS Avila has already failed the following measures, including:   Conservative measures: oral analgesics, ice and heat, physical therapy  Interventional measures:  02/06/2019: Left S1 transforaminal epidural steroid injection and experienced pain relief that was short-lived   02/20/2017- Diagnostic and therapeutic left sciatic nerve block with local anesthetics, steroids, hyaluronidase under ultrasound, PNS and fluoroscopic guidance- Patient did not receive sustained relief from this procedure.   02/06/2017: Diagnostic left S1 selective nerve root injection  01/30/2017: Nevro SCS Trial; no relief  04/25/2016: Trial of lumbar epidural steroid injections with steroids and hyaluronidase via caudal catheter with 100% pain relief lasting 48 hours  12/16/2015: diagnostic and therapeutic left S1 transforaminal epidural steroid injection with hyaluronidase with complete resolution of pain for several weeks  11/30/2015: Spinal cord stimulator trial Saint Jude, which provided him with more than 50% pain relief and functional improvement. However, patient decided to delay implantation of a permanent spinal cord stimulator device until the arrival of new technology. Patient did not like the perception of tonic stimulation, even though stimulation overlapped successfully the areas of his chronic pain, he did not find it pleasant.   02/09/2015: Diagnostic left piriformis muscle injection  Surgical measures:   03/03/2014: Left L4-L5 hemilaminectomy and foraminotomy at [facility] with Dr. Mackay   07/24/2014: L4-S1 decompression and fusion by Dr. Renee  Prashant Avila underwent follow-up neurosurgical consultation with Dr. Fausto Renee on 11/30/2017, and was found to be a potential surgical candidate.  Pain Psychology Evaluation (02/25/2019): \"From a psychological perspective, patient is considered to be an appropriate candidate for a spinal cord stimulator or an intrathecal pump implant at this time.\"  Prashant Avila presents with " significant comorbidities including hypertension, rheumatoid arthritis, osteoporosis, hyperlipidemia, chronic kidney disease, hearing loss, carcinoma of prostate, engaged in treatment.  In terms of current analgesics, Prashant Avila takes: Tylenol and gabapentin 800 mg 4 times a day without any side effects.  I have reviewed Jon Report #[id number] consistent to medication reconciliation.    Global Pain Scale 04-19  2018 10-24  2018 11-08  2018 01-29  2019 03-06  2019 10-03  2019 10-17  2019 01-14  2020 01-21  2020 02-20  2020       Pain 19 17 17 18 17 20  10 10 10   10       Feelings 1 0 0 0 0   8    4   2  0  0       Clinical outcomes 11 8 13 11 6 11    7   6  7  9       Activities 17 10 16 18 6 15    7   6  4  15       GPS Total: 48 35 46 47 29 54  28  24  21  34         Diagnostic Studies:    MRI THORACIC SPINE WO CONTRAST-10/31/2019: Minimal degenerative changes seen at multiple levels throughout the thoracic spine with no central spinal canal stenosis identified. Epidural space is unremarkable.  EMG/NCV of the bilateral lower extremities December 18, 2018: Chronic left S1 radiculopathy.   CT LUMBAR SPINE WITH CONTRAST-01/21/2019: There is excellent opacification of the thecal sac. Imaging covers from T12 through the S1 level caudally. The conus terminates at the L1/L2 level.  The distal cord and conus are normal in caliber.  The nerve roots of the cauda equina are normal in caliber and distribution.  L4-S1 fusion hardware is in place; the left S1 pedicle screw is fractured and there is lucency around the right S1 pedicle screw. Left L4 laminotomy. Vertebral body heights and alignment are normal. Vertebral body heights are normal. Degenerative changes are as follows:  T12-L1: Facet arthropathy without significant foraminal or spinal canal stenosis.  L1-L2: Facet arthropathy without significant foraminal or spinal canal stenosis.  L2-L3: Right greater than left facet arthropathy. Mild narrowing of the right  subarticular zone. No significant central spinal canal or foraminal stenosis.  L3-L4: Disc bulge and facet arthropathy with ligamentum flavum thickening. There is severe spinal canal stenosis and at least moderate bilateral foraminal stenosis.   L4-L5: Improved right subarticular zone narrowing. No significant osseous spinal canal or foraminal stenosis.  L5-S1: Bilateral facet arthropathy. Shallow disc osteophyte. Unchanged mild osseous foraminal stenosis. No significant osseous spinal canal stenosis.  EKG April 19, 2018 revealed sinus bradycardia with first-degree AV block.  Left bundle branch block.    CT Lumbar Spine, without contrast, 09/13/2017: There are postoperative changes related to intervertebral body fusion and posterior fusion of L4, L5 and S1. In the lateral projection the alignment is excellent.   L1-L2: disc space is normal.   L2-L3: minimally circumferentially bulging annulus.   L3-L4: minimally circumferentially bulging annulus. Narrowing of the lateral recess.  L4-L5 and L5-S1: postoperative changes but there is no evidence of bony central spinal stenosis.  X-ray lumbar spine 08/04/2017: posterior lumbar fusion of L4, L5 and S1. Degenerative changes at L1-L2 with anterior osteophyte formation. There is normal lumbar alignment in the lateral projection with a stable alignment in the flexed and extended positions.      Duplex Venous Lower, 03/25/2017: Normal right lower extremity venous duplex scan. No evidence of DVT.  MRI Lumbar Spine, 10/28/2016: Since the previous MRI study, there has been L4-5-S1 posterior lumbar and interbody fusion. No new abnormality of alignment is seen. There is, however, significant worsening of posterior element hypertrophy at L3-4, above the fusion level, due to persistent L3-4 disc bulge, and what appears to be new spinal stenosis at this level, generally as a result of posterior element hypertrophic change. Canal appears stable elsewhere. No lumbar compression  "deformity or new abnormality of alignment is seen. No vertebral marrow edema is appreciated. Sagittal images suggest a small (18 mm) seroma or synovial cyst on the right at L5-S1. Tip of conus medullaris is again noted at L1.  Axial images   L1-L2: no significant canal or foraminal stenosis   L2-L3: canal appears relatively narrowed, due to posterior element hypertrophy but still greater than 1 cm in diameter. Foramina appear  lower limits of normal diameter.   L3-L4: moderate canal stenosis, with additional lateral narrowing, AP diameter 6.5 mm, transverse diameter approximately 9 mm, with crowding of the nerve roots, but well-preserved anterior CSF space. Foramina appear mildly to moderately narrowed bilaterally at this level  due to facet DJD.   L4-L5: fused level, canal appears normal. Foramina appear mildly narrowed due to facet DJD.  L5-S1: canal appears normal. Left-sided neural foramen appears normal. Right-sided neural foramen appears mildly narrowed due to facet DJD. Postcontrast images show expected postoperative enhancement of the dorsal soft tissues and no pathologic postcontrast enhancement elsewhere.     The following portions of the patient's history were reviewed and updated as appropriate: problem list, past medical history, past surgery history, social history, family history, medications, and allergies    Review of Systems   Musculoskeletal: Positive for back pain.   All other systems reviewed and are negative.    /75 (Patient Position: Sitting, Cuff Size: Large Adult)   Ht 175.3 cm (69\")   Wt 82.6 kg (182 lb)   BMI 26.88 kg/m²       Physical Exam   Constitutional General Appearance: No acute distress, well appearing and well nourished.   Appears healthy, within normal limits of ideal weight, well hydrated and appearance reflects stated age.   Head and face: Normal.   Palpation of the face and sinuses: No sinus tenderness.   Eyes Conjunctiva and lids: No erythema, swelling or discharge. "   Pupils: Equal, round, reactive to light.   Neck: Supple, symmetric, trachea midline, no masses.   Pulmonary Respiratory effort: No increased work of breathing or signs of respiratory distress. Respiratory rate: normal. Assessment of respiratory effort revealed normal rhythm and effort. Auscultation of lungs: Clear to auscultation. Auscultation of the lungs revealed no expiratory wheezing, normal expiratory time and no inspiratory wheezing. No rales or crackles were heard bilaterally. No rhonchi. No friction rub. No wheezing. No diminished breath sounds. No bronchial breath sounds.   Cardiovascular Auscultation of heart: Normal rate and rhythm, normal S1 and S2, no murmurs. Peripheral vascular exam: Normal. Examination of extremities for edema and/or varicosities: Normal.   Abdomen: Non-tender, no masses. Bowel sounds were normal. The abdomen was soft and nontender. No masses palpated.   Musculoskeletal Gait and station: Normal. Gait evaluation demonstrated a normal gait. The range of motion of the lumbar spine is essentially full and without significant pain. Lumbar facet joint loading maneuvers are negative. Kodak and Gaenslen's tests are negative. The range of motion of the hip joints is full and without pain. Palpation of the gluteal bursa, ischial tuberosities, and greater trochanters, unrevealing. Piriformis maneuvers are negative. There is some discomfort upon palpation of the left piriformis muscle. Active resisted external rotation and passive internal rotation did not increase pain.   Muscle strength/tone: Normal.   Motor Strength Findings: normal strength.   Motor Tone: normal tone. Involuntary movements: none. Significant shortening of the hamstrings, worse on the left side.  Skin and subcutaneous tissue: Normal without rashes or lesions. Both surgical wounds are well healed without erythema, drainage or fluid accumulation   Neurologic   Cranial nerves: Cranial nerves 2-12 intact.   Cortical function:  Normal mental status.   Reflexes: 2+ and symmetric. Deep tendon reflexes: 2+ right biceps, 2+ left biceps, 1+ right patella, 1+ left patella, 1+ right ankle jerk and 1+ left ankle jerk. Superficial/Primitive Reflexes: primitive reflexes were absent. Straight leg raising test is negative. Femoral stretch sign is negative.   Sensation: No sensory loss. Sensory exam: intact to light touch, intact pain and temperature sensation, intact vibration sensation and normal proprioception.   Coordination: Normal finger to nose and heel to shin. Coordination: normal balance and negative Romberg's sign.   Psychiatric: Judgment and insight: Normal. Orientation to person, place and time: Normal. Recent and remote memory: Intact. Mood and affect: Normal.     Analysis of the spinal cord stimulator device with complex spinal cord stimulator reprogramming   Patient used the Saint Jude spinal cord stimulator device for 288 hours since last reprogramming, and 1152 lifetime hours (average 10 hours per day). Analysis of impedence reveals normal impedence for all contacts. The spinal cord stimulator device was reprogrammed under my direct supervision and two new programs were created, for a total of 6 programs, by adjusting electrode polarities, amplitude, pulse width,  pulse rate, BurstDR, micro-dosing, in the following fashion;    Program FOUR (Preferred Program):    Electrode polarities: 5+, 7+, 8-, 9-, 13+  Amplitude: 0.4-1.5 mA     Pulse width: 1000 mcs  Rate: 40 Hz  IntraBurst rate: 500 Hz  Micro-dosin:90    Patient experienced significant pain relief with coverage with pleasant paresthesia in all areas of chronic pain.  Time spent reprogrammin minutes  A copy of the telemetry report will be scanned in the patient's chart.      ASSESSMENT:   1. Chronic lumbar radiculopathy    2. Lumbar postlaminectomy syndrome     3. Adhesive arachnoiditis    4. Bilateral stenosis of lateral recess of lumbar spine    5. Status post L4-S1  lumbar spinal fusion    6. Physical deconditioning    7. Presence of neurostimulator    8. Encounter for fitting and adjustment of neuropacemaker of spinal cord         PLAN: Patient reports some improvement of his chronic pain with the use of his SCS device since his last reprogramming. Mr. Avila presents with a very challenging chronic pain history and had previously failed to obtain pain relief with surgical measures, conservative measures, and interventional pain management measures, as referenced on previous encounters. Patient presents intolerance to most analgesics and adjuvants. Patient underwent follow-up neurosurgical consultation with Dr. Fausto Renee on 11/03/2017. Dr. Renee discussed the possibility of lumbar decompression and extension of fusion at L3-L4. Patient elected to hold off on surgery. On MRI of the lumbar spine, there is evidence of lateral recess stenosis at the adjacent level at L3-L4 with crowding of the nerve roots and possible lumbar arachnoiditis. CT post-myelogram confirmed stenosis at L3-L4 without clinical correlation. Recent EMG/NCV confirmed chronic left S1 radiculopathy. Patient's chronic pain syndrome is due to chronic left S1 lumbar radiculopathy and lumbar arachnoiditis. Patient denies pain in the L3 distribution. Therefore, I have proposed the following plan:  1. Follow up with me on 04/14/2020 at 07:00 AM  for evaluation of his chronic pain and possible SCS reprogramming.   2. Long-term rehabilitation efforts:  A. Patient declined a comprehensive physical therapy program at [facility] for water therapy, neurodynamics, gait and balance and core strengthening   B. Patient has declined a referral to [facility]. Start water therapy as a long-term exercise program  3. Pharmacological measures, as follows;   A. Patient has consistently refused the idea of new pharmacological trials. Unfortunately, he is not a candidate for mexiletine based on his EKG. Continue  gabapentin 800 mg four times daily  B. Continue EpiCeram  4. The patient has been instructed to contact my office with any questions or difficulties. The patient understands the plan and agrees to proceed accordingly.       Patient Care Team:  SHERON Chun MD as PCP - General (Family Medicine)  Efrain Batista MD as Consulting Physician (Pain Medicine)  Fausto Renee MD as Consulting Physician (Neurosurgery)  Jean Warren MD as Consulting Physician (Neurosurgery)  Hank Frankel MD as Consulting Physician (Cardiology)  Marcelino Condon MD as Consulting Physician (Urology)  Brian Tatum III, MD as Consulting Physician (Cardiology)  SHERON Chun MD as Consulting Physician (Family Medicine)  Francisco Javier Clay MD as Consulting Physician (Neurosurgery)  Royer Roldan MD as Consulting Physician (General Practice)  Emigdio Watt MD as Consulting Physician (Gastroenterology)  Shola Vila MD as Consulting Physician (Otolaryngology)     No orders of the defined types were placed in this encounter.        Future Appointments   Date Time Provider Department Center   2/18/2020  2:30 PM Sherlyn Tsai PA-C [facility]         Efrain Batista MD    EMR Dragon/Transcription disclaimer:  Much of this encounter note is an electronic transcription of spoken language to printed text. Electronic transcription of spoken language may permit erroneous, or at times, nonsensical words or phrases to be inadvertently transcribed. Although I have reviewed the note for such errors, some may still exist.

## 2020-02-17 ENCOUNTER — LAB (OUTPATIENT)
Dept: LAB | Facility: HOSPITAL | Age: 75
End: 2020-02-17

## 2020-02-17 DIAGNOSIS — T14.8XXD WOUND HEALING, DELAYED: ICD-10-CM

## 2020-02-17 LAB
BASOPHILS # BLD AUTO: 0.13 10*3/MM3 (ref 0–0.2)
BASOPHILS NFR BLD AUTO: 1.6 % (ref 0–1.5)
CRP SERPL-MCNC: 0.33 MG/DL (ref 0–0.5)
DEPRECATED RDW RBC AUTO: 41.4 FL (ref 37–54)
EOSINOPHIL # BLD AUTO: 0.48 10*3/MM3 (ref 0–0.4)
EOSINOPHIL NFR BLD AUTO: 5.7 % (ref 0.3–6.2)
ERYTHROCYTE [DISTWIDTH] IN BLOOD BY AUTOMATED COUNT: 12.7 % (ref 12.3–15.4)
HCT VFR BLD AUTO: 39.6 % (ref 37.5–51)
HGB BLD-MCNC: 13.4 G/DL (ref 13–17.7)
IMM GRANULOCYTES # BLD AUTO: 0.03 10*3/MM3 (ref 0–0.05)
IMM GRANULOCYTES NFR BLD AUTO: 0.4 % (ref 0–0.5)
LYMPHOCYTES # BLD AUTO: 1.55 10*3/MM3 (ref 0.7–3.1)
LYMPHOCYTES NFR BLD AUTO: 18.6 % (ref 19.6–45.3)
MCH RBC QN AUTO: 30.5 PG (ref 26.6–33)
MCHC RBC AUTO-ENTMCNC: 33.8 G/DL (ref 31.5–35.7)
MCV RBC AUTO: 90 FL (ref 79–97)
MONOCYTES # BLD AUTO: 0.75 10*3/MM3 (ref 0.1–0.9)
MONOCYTES NFR BLD AUTO: 9 % (ref 5–12)
NEUTROPHILS # BLD AUTO: 5.41 10*3/MM3 (ref 1.7–7)
NEUTROPHILS NFR BLD AUTO: 64.7 % (ref 42.7–76)
NRBC BLD AUTO-RTO: 0 /100 WBC (ref 0–0.2)
PLATELET # BLD AUTO: 328 10*3/MM3 (ref 140–450)
PMV BLD AUTO: 10.2 FL (ref 6–12)
RBC # BLD AUTO: 4.4 10*6/MM3 (ref 4.14–5.8)
WBC NRBC COR # BLD: 8.35 10*3/MM3 (ref 3.4–10.8)

## 2020-02-17 PROCEDURE — 85025 COMPLETE CBC W/AUTO DIFF WBC: CPT

## 2020-02-17 PROCEDURE — 85652 RBC SED RATE AUTOMATED: CPT | Performed by: PHYSICIAN ASSISTANT

## 2020-02-17 PROCEDURE — 36415 COLL VENOUS BLD VENIPUNCTURE: CPT

## 2020-02-17 PROCEDURE — 86140 C-REACTIVE PROTEIN: CPT | Performed by: PHYSICIAN ASSISTANT

## 2020-02-18 ENCOUNTER — OFFICE VISIT (OUTPATIENT)
Dept: PAIN MEDICINE | Facility: CLINIC | Age: 75
End: 2020-02-18

## 2020-02-18 ENCOUNTER — OFFICE VISIT (OUTPATIENT)
Dept: NEUROSURGERY | Facility: CLINIC | Age: 75
End: 2020-02-18

## 2020-02-18 VITALS
SYSTOLIC BLOOD PRESSURE: 140 MMHG | BODY MASS INDEX: 26.96 KG/M2 | DIASTOLIC BLOOD PRESSURE: 75 MMHG | HEIGHT: 69 IN | WEIGHT: 182 LBS

## 2020-02-18 VITALS — WEIGHT: 181.4 LBS | RESPIRATION RATE: 16 BRPM | TEMPERATURE: 96.8 F | BODY MASS INDEX: 26.87 KG/M2 | HEIGHT: 69 IN

## 2020-02-18 DIAGNOSIS — M48.061 BILATERAL STENOSIS OF LATERAL RECESS OF LUMBAR SPINE: ICD-10-CM

## 2020-02-18 DIAGNOSIS — R53.81 PHYSICAL DECONDITIONING: ICD-10-CM

## 2020-02-18 DIAGNOSIS — Z46.2 ENCOUNTER FOR FITTING AND ADJUSTMENT OF NEUROPACEMAKER OF SPINAL CORD: ICD-10-CM

## 2020-02-18 DIAGNOSIS — M96.1 POSTLAMINECTOMY SYNDROME OF LUMBAR REGION: ICD-10-CM

## 2020-02-18 DIAGNOSIS — Z96.89 S/P INSERTION OF SPINAL CORD STIMULATOR: Primary | ICD-10-CM

## 2020-02-18 DIAGNOSIS — M54.16 CHRONIC LUMBAR RADICULOPATHY: ICD-10-CM

## 2020-02-18 DIAGNOSIS — T14.8XXD WOUND HEALING, DELAYED: ICD-10-CM

## 2020-02-18 DIAGNOSIS — Z96.82 PRESENCE OF NEUROSTIMULATOR: ICD-10-CM

## 2020-02-18 DIAGNOSIS — G03.9 ADHESIVE ARACHNOIDITIS: ICD-10-CM

## 2020-02-18 DIAGNOSIS — Z98.1 STATUS POST LUMBAR SPINAL FUSION: ICD-10-CM

## 2020-02-18 PROBLEM — M19.90 OSTEOARTHRITIS: Status: ACTIVE | Noted: 2020-02-18

## 2020-02-18 LAB — ERYTHROCYTE [SEDIMENTATION RATE] IN BLOOD: 8 MM/HR (ref 0–20)

## 2020-02-18 PROCEDURE — 99024 POSTOP FOLLOW-UP VISIT: CPT | Performed by: PHYSICIAN ASSISTANT

## 2020-02-18 PROCEDURE — 99214 OFFICE O/P EST MOD 30 MIN: CPT | Performed by: ANESTHESIOLOGY

## 2020-02-18 PROCEDURE — 95972 ALYS CPLX SP/PN NPGT W/PRGRM: CPT | Performed by: ANESTHESIOLOGY

## 2020-02-18 RX ORDER — SULFAMETHOXAZOLE AND TRIMETHOPRIM 800; 160 MG/1; MG/1
1 TABLET ORAL 2 TIMES DAILY
Qty: 28 TABLET | Refills: 0 | Status: SHIPPED | OUTPATIENT
Start: 2020-02-18 | End: 2020-03-13

## 2020-02-18 NOTE — PROGRESS NOTES
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 Renee's service.  His history is significant for undergoing multiple lumbar interventions.  However he continued to complain of low back and severe left leg pain.  A recent trial of a spinal cord stimulator proved highly effective in mitigating symptom severity.  As such, patient presented for a T10 laminotomy for placement of a [employer] Harsha epidural spinal cord stimulator on 2019.     Presently, Mr. Coley is 3 months postop.  Patient remains frustrated that his midline thoracic incision has not fully healed.  Since last seen in the office he has completed 2 rounds of Bactrim DS - and continues to notice an improvement in the amount of drainage from the inferior pole of his incision site.  It is not draining daily, and he denies drainage last night.  Pt. continues to deny recent fever/chills, foul odors, purulent drainage, or other signs of infection.  He has no other complaints at this time.    Review of Systems   Constitutional: Negative for activity change, appetite change, chills, diaphoresis, fatigue, fever and unexpected weight change.   HENT: Negative for congestion, dental problem, drooling, ear discharge, ear pain, facial swelling, hearing loss, mouth sores, nosebleeds, postnasal drip, rhinorrhea, sinus pressure, sneezing, sore throat, tinnitus, trouble swallowing and voice change.    Eyes: Negative for photophobia, pain, discharge, redness, itching and visual disturbance.   Respiratory: Negative for apnea, cough, choking, chest tightness, shortness of breath, wheezing and stridor.    Cardiovascular: Negative for chest pain, palpitations and leg swelling.   Gastrointestinal: Negative for abdominal distention, abdominal pain,  "anal bleeding, blood in stool, constipation, diarrhea, nausea, rectal pain and vomiting.   Endocrine: Negative for cold intolerance, heat intolerance, polydipsia, polyphagia and polyuria.   Genitourinary: Negative for decreased urine volume, difficulty urinating, dysuria, enuresis, flank pain, frequency, genital sores, hematuria and urgency.   Musculoskeletal: Negative for arthralgias, back pain, gait problem, joint swelling, myalgias, neck pain and neck stiffness.   Skin: Negative for color change, pallor, rash and wound.   Allergic/Immunologic: Negative for environmental allergies, food allergies and immunocompromised state.   Neurological: Negative for dizziness, tremors, seizures, syncope, facial asymmetry, speech difficulty, weakness, light-headedness, numbness and headaches.   Hematological: Negative for adenopathy. Does not bruise/bleed easily.   Psychiatric/Behavioral: Negative for agitation, behavioral problems, confusion, decreased concentration, dysphoric mood, hallucinations, self-injury, sleep disturbance and suicidal ideas. The patient is not nervous/anxious and is not hyperactive.    All other systems reviewed and are negative.      The patient's past medical history, past surgical history, family history, and social history have been reviewed at length in the electronic medical record.    Physical Exam:   Temp 96.8 °F (36 °C) (Temporal)   Resp 16   Ht 175.3 cm (69\")   Wt 82.3 kg (181 lb 6.4 oz)   BMI 26.79 kg/m²   1. Midline thoracic:  - incision that is well-healed with a small scab to the inferior pole.    - Skin is clean and dry and intact without evidence of erythema or infection.    - There is no fluctuance or temperature change to suggest fluid collection.    - Surgical site is nontender to touch.       2.  Left flank incision:  - Well heal surgical incision   - No evidence of wound dehiscence  - NSOI   - Non-TTP    Medical Decision Making    Data Review:   Results from last 7 days   Lab " Units 02/17/20  1327   WBC 10*3/mm3 8.35   HEMOGLOBIN g/dL 13.4   HEMATOCRIT % 39.6   PLATELETS 10*3/mm3 328     1. CRP (2/17/2020): .33 *normal  2. Sed-Rate (2/17/2020): 8 *normal    Diagnosis/Treatment Options:  1. S/P insertion of spinal cord stimulator  2. Wound healing, delayed  3. Lumbar postlaminectomy syndrome        Follow up:  Mr. Avila is seen today in follow-up for a wound check after completing his second round of Bactrim DS.  His wound drainage continues to improve.  Pt. Will continue to observe and will be seen back in the office in 3 weeks after completing 1 more round of antibiotics.  Again, there is no current role for surgical intervention.    Sherlyn Tsai PA-C   2/18/2020   3:25 PM

## 2020-03-13 ENCOUNTER — OFFICE VISIT (OUTPATIENT)
Dept: NEUROSURGERY | Facility: CLINIC | Age: 75
End: 2020-03-13

## 2020-03-13 VITALS — HEIGHT: 69 IN | BODY MASS INDEX: 26.81 KG/M2 | WEIGHT: 181 LBS

## 2020-03-13 DIAGNOSIS — T14.8XXD WOUND HEALING, DELAYED: ICD-10-CM

## 2020-03-13 DIAGNOSIS — M96.1 POSTLAMINECTOMY SYNDROME OF LUMBAR REGION: ICD-10-CM

## 2020-03-13 DIAGNOSIS — Z96.89 S/P INSERTION OF SPINAL CORD STIMULATOR: Primary | ICD-10-CM

## 2020-03-13 PROCEDURE — 99213 OFFICE O/P EST LOW 20 MIN: CPT | Performed by: PHYSICIAN ASSISTANT

## 2020-03-13 NOTE — PROGRESS NOTES
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 Renee's service.  His history is significant for undergoing multiple lumbar interventions.  However he continued to complain of low back and severe left leg pain.  A recent trial of a spinal cord stimulator proved highly effective in mitigating symptom severity.  As such, patient presented for a T10 laminotomy for placement of a [place] epidural spinal cord stimulator on 2019.     Presently, Mr. Coley is 4 months postop.  Patient remains frustrated that his midline thoracic incision has not fully healed.  Since last seen in the office he has completed 3 rounds of Bactrim DS - and continues to notice an improvement in the amount of drainage from the inferior pole of his incision site.  It is not draining daily, and he continues to deny recent fever/chills, foul odors, purulent drainage, or other signs of infection.  He is now interested in having the system removed.  He will speak with Dr. Batista in the coming weeks.  As of yet, no plans for surgery have been decided.  Pt. has no other complaints at this time.    Review of Systems   Constitutional: Negative for activity change, appetite change, chills, diaphoresis, fatigue, fever and unexpected weight change.   HENT: Negative for congestion, dental problem, drooling, ear discharge, ear pain, facial swelling, hearing loss, mouth sores, nosebleeds, postnasal drip, rhinorrhea, sinus pressure, sinus pain, sneezing, sore throat, tinnitus, trouble swallowing and voice change.    Eyes: Negative for photophobia, pain, discharge, redness, itching and visual disturbance.   Respiratory: Negative for apnea, cough, choking, chest tightness, shortness of breath, wheezing and stridor.   "  Cardiovascular: Negative for chest pain, palpitations and leg swelling.   Gastrointestinal: Negative for abdominal distention, abdominal pain, anal bleeding, blood in stool, constipation, diarrhea, nausea, rectal pain and vomiting.   Endocrine: Negative for cold intolerance, heat intolerance, polydipsia, polyphagia and polyuria.   Genitourinary: Negative for decreased urine volume, difficulty urinating, discharge, dysuria, enuresis, flank pain, frequency, genital sores, hematuria, penile pain, penile swelling, scrotal swelling, testicular pain and urgency.   Musculoskeletal: Negative for arthralgias, back pain, gait problem, joint swelling, myalgias, neck pain and neck stiffness.   Skin: Negative for color change, pallor, rash and wound.   Allergic/Immunologic: Negative for environmental allergies, food allergies and immunocompromised state.   Neurological: Negative for dizziness, tremors, seizures, syncope, facial asymmetry, speech difficulty, weakness, light-headedness, numbness and headaches.   Hematological: Negative for adenopathy. Does not bruise/bleed easily.   Psychiatric/Behavioral: Negative for agitation, behavioral problems, confusion, decreased concentration, dysphoric mood, hallucinations, self-injury, sleep disturbance and suicidal ideas. The patient is not nervous/anxious and is not hyperactive.        The patient's past medical history, past surgical history, family history, and social history have been reviewed at length in the electronic medical record.    Physical Exam:   Ht 175.3 cm (69\")   Wt 82.1 kg (181 lb)   BMI 26.73 kg/m²   1. Midline thoracic:  - incision that is well-healed with a small scab to the inferior pole.    - Skin is clean and dry and intact without evidence of erythema or infection.    - There is no fluctuance or temperature change to suggest fluid collection.    - Surgical site is nontender to touch.       2.  Left flank incision:  - Well heal surgical incision   - No " evidence of wound dehiscence  - NSOI   - Non-TTP    Medical Decision Making    Data Review:   1. CRP (2/17/2020): .33 *normal  2. Sed-Rate (2/17/2020): 8 *normal    Diagnosis/Treatment Options:  1. S/P insertion of spinal cord stimulator  2. Wound healing, delayed  3. Lumbar postlaminectomy syndrome        Follow up:  Mr. Avila is seen today in follow-up for a wound check after completing his third round of Bactrim DS.  Per pt. He continues to have a variable amount of wound drainage from the inferior pole of his midline thoracic incision.  He is now interested in system removal.  Pt. Has a FU with Dr. Batista later this month.  He will then need to FU in our office with Dr. Renee to discuss at length the potential risks, complications and limitations of having his SCS system removed.  If surgery is in his best interest, it will be scheduled accordingly.      Sherlyn Tsai PA-C   3/13/2020   10:54

## 2020-03-20 ENCOUNTER — LAB (OUTPATIENT)
Dept: LAB | Facility: HOSPITAL | Age: 75
End: 2020-03-20

## 2020-03-20 DIAGNOSIS — L24.A9 DRAINAGE FROM WOUND: ICD-10-CM

## 2020-03-20 DIAGNOSIS — Z96.89 S/P INSERTION OF SPINAL CORD STIMULATOR: Primary | ICD-10-CM

## 2020-03-20 DIAGNOSIS — Z96.89 S/P INSERTION OF SPINAL CORD STIMULATOR: ICD-10-CM

## 2020-03-20 LAB
BASOPHILS # BLD AUTO: 0.15 10*3/MM3 (ref 0–0.2)
BASOPHILS NFR BLD AUTO: 1.7 % (ref 0–1.5)
CRP SERPL-MCNC: 0.24 MG/DL (ref 0–0.5)
DEPRECATED RDW RBC AUTO: 41.6 FL (ref 37–54)
EOSINOPHIL # BLD AUTO: 0.35 10*3/MM3 (ref 0–0.4)
EOSINOPHIL NFR BLD AUTO: 4.1 % (ref 0.3–6.2)
ERYTHROCYTE [DISTWIDTH] IN BLOOD BY AUTOMATED COUNT: 12.6 % (ref 12.3–15.4)
HCT VFR BLD AUTO: 38.9 % (ref 37.5–51)
HGB BLD-MCNC: 13.2 G/DL (ref 13–17.7)
IMM GRANULOCYTES # BLD AUTO: 0.02 10*3/MM3 (ref 0–0.05)
IMM GRANULOCYTES NFR BLD AUTO: 0.2 % (ref 0–0.5)
LYMPHOCYTES # BLD AUTO: 1.71 10*3/MM3 (ref 0.7–3.1)
LYMPHOCYTES NFR BLD AUTO: 19.9 % (ref 19.6–45.3)
MCH RBC QN AUTO: 31 PG (ref 26.6–33)
MCHC RBC AUTO-ENTMCNC: 33.9 G/DL (ref 31.5–35.7)
MCV RBC AUTO: 91.3 FL (ref 79–97)
MONOCYTES # BLD AUTO: 1.01 10*3/MM3 (ref 0.1–0.9)
MONOCYTES NFR BLD AUTO: 11.7 % (ref 5–12)
NEUTROPHILS # BLD AUTO: 5.37 10*3/MM3 (ref 1.7–7)
NEUTROPHILS NFR BLD AUTO: 62.4 % (ref 42.7–76)
NRBC BLD AUTO-RTO: 0 /100 WBC (ref 0–0.2)
PLATELET # BLD AUTO: 317 10*3/MM3 (ref 140–450)
PMV BLD AUTO: 10 FL (ref 6–12)
RBC # BLD AUTO: 4.26 10*6/MM3 (ref 4.14–5.8)
WBC NRBC COR # BLD: 8.61 10*3/MM3 (ref 3.4–10.8)

## 2020-03-20 PROCEDURE — 85652 RBC SED RATE AUTOMATED: CPT | Performed by: PHYSICIAN ASSISTANT

## 2020-03-20 PROCEDURE — 86140 C-REACTIVE PROTEIN: CPT | Performed by: PHYSICIAN ASSISTANT

## 2020-03-20 PROCEDURE — 85025 COMPLETE CBC W/AUTO DIFF WBC: CPT

## 2020-03-20 PROCEDURE — 36415 COLL VENOUS BLD VENIPUNCTURE: CPT

## 2020-03-21 LAB — ERYTHROCYTE [SEDIMENTATION RATE] IN BLOOD: 14 MM/HR (ref 0–20)

## 2020-04-14 ENCOUNTER — DOCUMENTATION (OUTPATIENT)
Dept: PAIN MEDICINE | Facility: CLINIC | Age: 75
End: 2020-04-14

## 2020-04-14 ENCOUNTER — TELEMEDICINE (OUTPATIENT)
Dept: PAIN MEDICINE | Facility: CLINIC | Age: 75
End: 2020-04-14

## 2020-04-14 DIAGNOSIS — M54.16 CHRONIC LUMBAR RADICULOPATHY: ICD-10-CM

## 2020-04-14 DIAGNOSIS — Z98.1 STATUS POST LUMBAR SPINAL FUSION: ICD-10-CM

## 2020-04-14 DIAGNOSIS — G03.9 ADHESIVE ARACHNOIDITIS: ICD-10-CM

## 2020-04-14 DIAGNOSIS — M96.1 POSTLAMINECTOMY SYNDROME OF LUMBAR REGION: ICD-10-CM

## 2020-04-14 DIAGNOSIS — Z96.82 PRESENCE OF NEUROSTIMULATOR: ICD-10-CM

## 2020-04-14 DIAGNOSIS — M48.061 BILATERAL STENOSIS OF LATERAL RECESS OF LUMBAR SPINE: ICD-10-CM

## 2020-04-14 PROCEDURE — 99214 OFFICE O/P EST MOD 30 MIN: CPT | Performed by: ANESTHESIOLOGY

## 2020-04-14 RX ORDER — CHLORHEXIDINE GLUCONATE 4 G/100ML
SOLUTION TOPICAL
Qty: 236 ML | Refills: 0 | Status: SHIPPED | OUTPATIENT
Start: 2020-04-14 | End: 2020-09-24

## 2020-04-14 RX ORDER — GABAPENTIN 800 MG/1
TABLET ORAL
Qty: 120 TABLET | Refills: 4 | Status: SHIPPED | OUTPATIENT
Start: 2020-04-14 | End: 2020-11-11

## 2020-04-14 RX ORDER — TRAMADOL HYDROCHLORIDE 50 MG/1
TABLET ORAL
Qty: 60 TABLET | Refills: 0 | Status: SHIPPED | OUTPATIENT
Start: 2020-04-14 | End: 2020-09-24

## 2020-04-14 NOTE — PROGRESS NOTES
"This consultation was provided with the use of interactive audio and video telecommunications system that permits real time communication with the patient, as patient is unable to attend an in-office appointment due to the COVID-19 crisis. Consent for assessment and treatment was provided by the patient.    You have chosen to receive care through a telehealth visit.  Do you consent to use a video/audio connection for your medical care today? Yes         Chief Complaint: \"I am doing the same.  I saw Dr. Renee's PA a few weeks ago.  I tried all the programs in my spinal cord stimulator and still have the same pain in my left hip and left leg.\"    Brief History: Mr. Prashant Avila is a 75 y.o. male, who underwent implantation of a spinal cord stimulator device with Dr. Fausto Renee on 11/21/2019 with [employer] Penta paddle lead with the top electrodes projecting at the level of the superior endplate of the T8 vertebral level. IPG: [employer] Proclaim 5 XR IPG Non-Rechargeable (Full Body MRI compatible). The device was implanted primarily for treatment of chronic left hip and left lower extremity pain.  Patient was last seen on 02/20/2020 for evaluation of his chronic pain and SCS reprogramming.  Mr. Prashant Avila reported on his last visit only 10-20% pain relief and minimal functional improvement with the use of his stimulator device. Patient underwent recent neurosurgical consultation with Sherlyn Gonzalez PA-C on March 13, 2020. Patient completed 3 rounds of Bactrim DS with improvement in the drainage from the inferior aspect of his surgical site for placement of a spinal cord stimulator surgical paddle lead.  Patient denies any fever. The drainage is minimal and intermittent, and as per patient's description serosanguineous.  I confirmed also the type of drainage when patient show me his white T-shirt with a very small stain that was clear.  His last labs were reviewed and were normal including white " "count, platelet count, C-reactive protein, and sedimentation rate.  He has remained afebrile.  He denies any pain at the surgical sites except for some discomfort in his left gluteal region which appears to be part of his chronic pain and not related to his IPG.  Patient showed me his IPG site, which looks unremarkable.  Movement of the IPG does not cause any discomfort.  During his last visit with Sherlyn, he mentiond that he was interested in removal of his spinal cord stimulator device.  Today, he asked me about timing for a potential removal.  He is willing to wait until after the epidemic.  Also, I have advised him to keep using the stimulator device due to to the fact that his chronic pain condition has been quite challenging.  He has already failed previous conservative measures, interventional pain management measures, and surgical measures.  He voiced his interest in an intrathecal pump.  I explained to him that we will have to do a pump trial prior to determining candidacy for intrathecal pump implantation.  Pain level is rated as 6/10 with the stimulator \"turned on.”  Patient level ranges from 4/10 to 8/10 with the use of the spinal cord stimulator device.    Patient denies pain, numbness and weakness in the lower extremities.  Patient denies any new bladder or bowel problems.      Pain History:  Referring physician: Dr. Fausto Renee   Consultation for intractable chronic left lower extremity pain.   Patient reports a 7-year history of pain, which began without incident. Patient is status post L4-L5 hemilaminectomy and foraminotomy at the [facility] with Dr. Mackay on 03/03/2014, followed by L4-S1 lumbar decompression and fusion due to unresolved pain by Dr. Renee on 07/24/2014. Patient reports that he experienced significant improvement after his last surgery with Dr. Renee, in particular, complete resolution of his previously severe lower back pain. Unfortunately, his left lower extremity pain, chronic " radicular pain preceded his last lumbar fusion, and did not improve after surgery. He underwent surgical consultation with Dr. Cox at the [facility], who recommended decompression of the nerve roots at L3-S1. He underwent follow-up neurosurgical consultation with Dr. Fausto Renee on 11/03/2017. Dr. Reene discussed the possibility of lumbar decompression at L3-L4: Left L3-L4 laminotomy and foraminotomy. Due to potential risk of developing instability that would necessitate extending his lumbar fusion to the L3 level. Patient underwent on April 3, 2018, left L3-L4 transforaminal epidural steroid injection without any pain relief. Patient elected to hold off on surgery. Patient underwent EMG/NCV, which confirmed chronic left S1 radiculopathy.  A CT myelogram was significant for stenosis above the level of his lumbar fusion, at L3-L4 without clinical correlation.  Patient presents with pain with a left S1 dermatomal distribution pattern.  Pain description:   Constant pain with intermittent exacerbation, described as burning, electricity, pins and needles, and tingling sensation.   Radiation of pain: The pain radiates into the posterior aspect of the thigh, left leg and plantar aspect of the foot. The leg pain is more severe than the lower back pain.   Pain pattern: dermatomic, left L5 and S1   Pain intensity today: 3/10 (standing) 9-10/10 (sitting or lying down)  Average pain intensity last week: 7/10  Pain intensity ranges from: 4/10 to 10/10  Aggravating factors: Pain increases with sitting longer than 5 minutes and laying down for more than 5 minutes.  Alleviating factors: Pain decreases with standing.   Associated symptoms:   Patient denies numbness or weakness in the lower extremities.   Patient denies any new bladder or bowel problems.   Patient denies difficulties with his balance or recent falls.       Review of previous therapies and additional medical records:  Prashant Avila has already failed  "the following measures, including:   Conservative measures: oral analgesics, ice and heat, physical therapy  Interventional measures:  02/06/2019: Left S1 transforaminal epidural steroid injection and experienced pain relief that was short-lived   02/20/2017- Diagnostic and therapeutic left sciatic nerve block with local anesthetics, steroids, hyaluronidase under ultrasound, PNS and fluoroscopic guidance- Patient did not receive sustained relief from this procedure.   02/06/2017: Diagnostic left S1 selective nerve root injection  01/30/2017: Nevro SCS Trial; no relief  04/25/2016: Trial of lumbar epidural steroid injections with steroids and hyaluronidase via caudal catheter with 100% pain relief lasting 48 hours  12/16/2015: diagnostic and therapeutic left S1 transforaminal epidural steroid injection with hyaluronidase with complete resolution of pain for several weeks  11/30/2015: Spinal cord stimulator trial Saint Jude, which provided him with more than 50% pain relief and functional improvement. However, patient decided to delay implantation of a permanent spinal cord stimulator device until the arrival of new technology. Patient did not like the perception of tonic stimulation, even though stimulation overlapped successfully the areas of his chronic pain, he did not find it pleasant.   02/09/2015: Diagnostic left piriformis muscle injection  Surgical measures:   03/03/2014: Left L4-L5 hemilaminectomy and foraminotomy at [facility] with Dr. Mackay   07/24/2014: L4-S1 decompression and fusion by Dr. Renee  Prashant Avila underwent follow-up neurosurgical consultation with Dr. Fausto Renee on 11/30/2017, and was found to be a potential surgical candidate.  Pain Psychology Evaluation (02/25/2019): \"From a psychological perspective, patient is considered to be an appropriate candidate for a spinal cord stimulator or an intrathecal pump implant at this time.\"  Prashant Avila presents with significant comorbidities " including hypertension, rheumatoid arthritis, osteoporosis, hyperlipidemia, chronic kidney disease, hearing loss, carcinoma of prostate, engaged in treatment.  In terms of current analgesics, Prashant Avila takes: Tylenol and gabapentin 800 mg 4 times a day without any side effects.  I have reviewed Jon Report #[id number] consistent to medication reconciliation.    Global Pain Scale 04-19  2018 10-24  2018 11-08  2018 01-29  2019 03-06  2019 10-03  2019 10-17  2019 01-14  2020 01-21  2020 02-20  2020 04-14  2020     Pain 19 17 17 18 17 20  10 10 10   10  10     Feelings 1 0 0 0 0   8    4   2  0  0    0     Clinical outcomes 11 8 13 11 6 11    7   6  7  9    9     Activities 17 10 16 18 6 15    7   6  4  15  15     GPS Total: 48 35 46 47 29 54  28  24  21  34  34         Diagnostic Studies:    MRI THORACIC SPINE WO CONTRAST-10/31/2019: Minimal degenerative changes seen at multiple levels throughout the thoracic spine with no central spinal canal stenosis identified. Epidural space is unremarkable.  EMG/NCV of the bilateral lower extremities December 18, 2018: Chronic left S1 radiculopathy.   CT LUMBAR SPINE WITH CONTRAST-01/21/2019: There is excellent opacification of the thecal sac. Imaging covers from T12 through the S1 level caudally. The conus terminates at the L1/L2 level.  The distal cord and conus are normal in caliber.  The nerve roots of the cauda equina are normal in caliber and distribution.  L4-S1 fusion hardware is in place; the left S1 pedicle screw is fractured and there is lucency around the right S1 pedicle screw. Left L4 laminotomy. Vertebral body heights and alignment are normal. Vertebral body heights are normal. Degenerative changes are as follows:  T12-L1: Facet arthropathy without significant foraminal or spinal canal stenosis.  L1-L2: Facet arthropathy without significant foraminal or spinal canal stenosis.  L2-L3: Right greater than left facet arthropathy. Mild narrowing of the right  subarticular zone. No significant central spinal canal or foraminal stenosis.  L3-L4: Disc bulge and facet arthropathy with ligamentum flavum thickening. There is severe spinal canal stenosis and at least moderate bilateral foraminal stenosis.   L4-L5: Improved right subarticular zone narrowing. No significant osseous spinal canal or foraminal stenosis.  L5-S1: Bilateral facet arthropathy. Shallow disc osteophyte. Unchanged mild osseous foraminal stenosis. No significant osseous spinal canal stenosis.  EKG April 19, 2018 revealed sinus bradycardia with first-degree AV block.  Left bundle branch block.    CT Lumbar Spine, without contrast, 09/13/2017: There are postoperative changes related to intervertebral body fusion and posterior fusion of L4, L5 and S1. In the lateral projection the alignment is excellent.   L1-L2: disc space is normal.   L2-L3: minimally circumferentially bulging annulus.   L3-L4: minimally circumferentially bulging annulus. Narrowing of the lateral recess.  L4-L5 and L5-S1: postoperative changes but there is no evidence of bony central spinal stenosis.  X-ray lumbar spine 08/04/2017: posterior lumbar fusion of L4, L5 and S1. Degenerative changes at L1-L2 with anterior osteophyte formation. There is normal lumbar alignment in the lateral projection with a stable alignment in the flexed and extended positions.      Duplex Venous Lower, 03/25/2017: Normal right lower extremity venous duplex scan. No evidence of DVT.  MRI Lumbar Spine, 10/28/2016: Since the previous MRI study, there has been L4-5-S1 posterior lumbar and interbody fusion. No new abnormality of alignment is seen. There is, however, significant worsening of posterior element hypertrophy at L3-4, above the fusion level, due to persistent L3-4 disc bulge, and what appears to be new spinal stenosis at this level, generally as a result of posterior element hypertrophic change. Canal appears stable elsewhere. No lumbar compression  deformity or new abnormality of alignment is seen. No vertebral marrow edema is appreciated. Sagittal images suggest a small (18 mm) seroma or synovial cyst on the right at L5-S1. Tip of conus medullaris is again noted at L1.  Axial images   L1-L2: no significant canal or foraminal stenosis   L2-L3: canal appears relatively narrowed, due to posterior element hypertrophy but still greater than 1 cm in diameter. Foramina appear  lower limits of normal diameter.   L3-L4: moderate canal stenosis, with additional lateral narrowing, AP diameter 6.5 mm, transverse diameter approximately 9 mm, with crowding of the nerve roots, but well-preserved anterior CSF space. Foramina appear mildly to moderately narrowed bilaterally at this level  due to facet DJD.   L4-L5: fused level, canal appears normal. Foramina appear mildly narrowed due to facet DJD.  L5-S1: canal appears normal. Left-sided neural foramen appears normal. Right-sided neural foramen appears mildly narrowed due to facet DJD. Postcontrast images show expected postoperative enhancement of the dorsal soft tissues and no pathologic postcontrast enhancement elsewhere.     The following portions of the patient's history were reviewed and updated as appropriate: problem list, past medical history, past surgery history, social history, family history, medications, and allergies    Review of Systems   Musculoskeletal: Positive for back pain.   All other systems reviewed and are negative.    There were no vitals taken for this visit.      Physical Exam: Due to the constraints of the telemedicine format, a focused physical exam was performed.  Constitutional: Healthy adult who appears younger than his chronological age.  Patient was instructed to reveal the surgical wounds.  The IPG placement site in the left gluteal region looks unremarkable without erythema, or drainage.  Patient's wife was instructed to hold the IPG and move it in the pocket.  Patient did not experience  any discomfort.  The thoracic paravertebral wound was inspected.  There is no drainage.  At the inferior aspect of the wound, there appears to be erythema.  There is a scab over the inferior pole of the wound.     From previous visits:  Constitutional General Appearance: No acute distress, well appearing and well nourished.   Appears healthy, within normal limits of ideal weight, well hydrated and appearance reflects stated age.   Head and face: Normal.   Palpation of the face and sinuses: No sinus tenderness.   Eyes Conjunctiva and lids: No erythema, swelling or discharge.   Pupils: Equal, round, reactive to light.   Neck: Supple, symmetric, trachea midline, no masses.   Pulmonary Respiratory effort: No increased work of breathing or signs of respiratory distress. Respiratory rate: normal. Assessment of respiratory effort revealed normal rhythm and effort. Auscultation of lungs: Clear to auscultation. Auscultation of the lungs revealed no expiratory wheezing, normal expiratory time and no inspiratory wheezing. No rales or crackles were heard bilaterally. No rhonchi. No friction rub. No wheezing. No diminished breath sounds. No bronchial breath sounds.   Cardiovascular Auscultation of heart: Normal rate and rhythm, normal S1 and S2, no murmurs. Peripheral vascular exam: Normal. Examination of extremities for edema and/or varicosities: Normal.   Abdomen: Non-tender, no masses. Bowel sounds were normal. The abdomen was soft and nontender. No masses palpated.   Musculoskeletal Gait and station: Normal. Gait evaluation demonstrated a normal gait. The range of motion of the lumbar spine is essentially full and without significant pain. Lumbar facet joint loading maneuvers are negative. Kodak and Gaenslen's tests are negative. The range of motion of the hip joints is full and without pain. Palpation of the gluteal bursa, ischial tuberosities, and greater trochanters, unrevealing. Piriformis maneuvers are negative. There  is some discomfort upon palpation of the left piriformis muscle. Active resisted external rotation and passive internal rotation did not increase pain.   Muscle strength/tone: Normal.   Motor Strength Findings: normal strength.   Motor Tone: normal tone. Involuntary movements: none. Significant shortening of the hamstrings, worse on the left side.  Skin and subcutaneous tissue: Normal without rashes or lesions. Both surgical wounds are well healed without erythema, drainage or fluid accumulation   Neurologic   Cranial nerves: Cranial nerves 2-12 intact.   Cortical function: Normal mental status.   Reflexes: 2+ and symmetric. Deep tendon reflexes: 2+ right biceps, 2+ left biceps, 1+ right patella, 1+ left patella, 1+ right ankle jerk and 1+ left ankle jerk. Superficial/Primitive Reflexes: primitive reflexes were absent. Straight leg raising test is negative. Femoral stretch sign is negative.   Sensation: No sensory loss. Sensory exam: intact to light touch, intact pain and temperature sensation, intact vibration sensation and normal proprioception.   Coordination: Normal finger to nose and heel to shin. Coordination: normal balance and negative Romberg's sign.   Psychiatric: Judgment and insight: Normal. Orientation to person, place and time: Normal. Recent and remote memory: Intact. Mood and affect: Normal.     From last visit:  Analysis of the spinal cord stimulator device with complex spinal cord stimulator reprogramming   Patient used the Saint Jude spinal cord stimulator device for 288 hours since last reprogramming, and 1152 lifetime hours (average 10 hours per day). Analysis of impedence reveals normal impedence for all contacts. The spinal cord stimulator device was reprogrammed under my direct supervision and two new programs were created, for a total of 6 programs, by adjusting electrode polarities, amplitude, pulse width,  pulse rate, BurstDR, micro-dosing, in the following fashion;    Program FOUR  (Preferred Program):    Electrode polarities: 5+, 7+, 8-, 9-, 13+  Amplitude: 0.4-1.5 mA     Pulse width: 1000 mcs  Rate: 40 Hz  IntraBurst rate: 500 Hz  Micro-dosin:90      ASSESSMENT:   1. Chronic lumbar radiculopathy    2. Lumbar postlaminectomy syndrome     3. Adhesive arachnoiditis    4. Bilateral stenosis of lateral recess of lumbar spine    5. Status post L4-S1 lumbar spinal fusion    6. Presence of neurostimulator         PLAN: Patient continues to struggling with his chronic pain and reports minimal improvement with the use of his a spinal cord stimulator device.  Mr. Yañez presents with a very challenging chronic pain history.  He has previously failed extensive conservative measures, interventional pain management measures, and surgical measures, as referenced on previous encounters.  In addition, patient reported intolerance to most oral analgesics and adjuvants, as referenced on previous encounters.  He has undergone several neurosurgical consultations.  On 2017, he underwent follow-up neurosurgical consultation with Dr. Fausto Renee and discussed the possibility of lumbar decompression and extension of fusion to L3-L4. Patient elected to hold off on surgery. MRI of the lumbar spine, revealed lateral recess stenosis at L3-L4 associated with crowding of the nerve roots and lumbar arachnoiditis.  Lumbar myelogram followed by CT post myelogram confirmed the diagnosis of lumbar stenosis at L3-L4 without significant clinical correlation.  His clinical symptoms appear to follow the left L5-S1 dermatomal distribution.  In fact, EMG/NCV of the bilateral lower extremities confirmed chronic left S1 radiculopathy.  Therefore, most of his symptoms could be attributed to left S1 radiculopathy and to some extent contribution from lumbar arachnoiditis.  He denies neurogenic claudication.  In fact, he feels less pain standing rather than sitting or lying down. Therefore, I have proposed the following  plan:  1. Follow up: At the request of the patient, we will plan on a follow-up visit next Thursday, April 23 at 8 AM to continue assessment of his chronic pain and adjustment of his current medications.  In the meantime, I have advised the patient to continue using his spinal cord stimulator device.  He confirmed that the stimulator programs are appropriate.  He feels a stimulation in his lower back left gluteal region and the entire left lower extremity including his left foot. I have advised him to try some of the burst programs.  Patient reported some interest in explantation of his a spinal cord stimulator device.  He also reported an interest in an intrathecal delivery system.  I have advised him to continue using his spinal cord stimulator device to determine if we can find some stimulation modality that could provide him with significant pain relief.  Patient agreed with the plan.  2. Pharmacological measures, as follows; Patient consistently refused the idea of new pharmacological trials. Unfortunately, he is not a candidate for mexiletine based on his EKG. Today, he agreed to adjusting his medications and introducing some new ones  A.  Increase gabapentin to 4 times a day: Take gabapentin 800 mg 1 tablet every morning, 1 tablet at noon, and 2 tablets at bedtime  B.  Trial with tramadol 1 tablet p.o. twice daily for chronic pain  C. Continue EpiCeram  3.  Surgical wound: I have explained to the patient to clean his surgical wound using sterile gauze embedded with Hibiclens.  Applied gently to the surgical wound.  Do not scrub.  Leg dry.  Cover the area with an occlusive dressing such as a large Band-Aid  4. Long-term rehabilitation efforts:  A. Patient declined a comprehensive physical therapy program at [facility] for water therapy, neurodynamics, gait and balance and core strengthening   B. Patient has declined a referral to [place]  C. Start water therapy as a long-term exercise program  once Covid restrictions are removed  5. The patient has been instructed to contact my office with any questions or difficulties. The patient understands the plan and agrees to proceed accordingly.     I spent 30  minutes face-to-face with the patient, of which more than 50% of the time were spent counseling regarding diagnosis, potential referrals, treatment options for chronic pain condition and overall rehabilitation, coordination of care with other providers involved in patient's care, risk and benefits of different interventions, alternative therapies, risks and benefits as it relates to intrathecal devices for the trial and implantation, long-term management and functional goals of spinal cord stimulation and long-term management and functional goals of intrathecal therapy       Patient Care Team:  SHERON Chun MD as PCP - General (Family Medicine)  Efrain Batista MD as Consulting Physician (Pain Medicine)  Fausto Renee MD as Consulting Physician (Neurosurgery)  Jean Warren MD as Consulting Physician (Neurosurgery)  Hank Frankel MD as Consulting Physician (Cardiology)  Marcelino Condon MD as Consulting Physician (Urology)  Brian Tatum III, MD as Consulting Physician (Cardiology)  SHERON Chun MD as Consulting Physician (Family Medicine)  Francisco Javier Clay MD as Consulting Physician (Neurosurgery)  Royer Roldan MD as Consulting Physician (General Practice)  Emigdio Watt MD as Consulting Physician (Gastroenterology)  Shola Vila MD as Consulting Physician (Otolaryngology)     No orders of the defined types were placed in this encounter.        No future appointments.      Efrain Batista MD    EMR Dragon/Transcription disclaimer:  Much of this encounter note is an electronic transcription of spoken language to printed text. Electronic transcription of spoken language may permit erroneous, or at times, nonsensical words or phrases to be inadvertently  transcribed. Although I have reviewed the note for such errors, some may still exist.

## 2020-04-14 NOTE — PATIENT INSTRUCTIONS
Mr. Prashant Avila,  here are my recommendations based on what we have talked about today. Please let me know if you have any additional questions.     Medications:  A. Increase gabapentin to 800 mg 1 tablet in the morning, 1 tablet in the afternoon, and 2 tablets at bedtime   B.  Will start a trial with tramadol 50 mg 1 tablet in the morning and 1 in the afternoon.  C. Hibiclens : Clean surgical wound twice a day.  Apply Hibiclens to sterile gauze and clean the wound gently.  Do not scrub.  Apply an occlusive dressing such as a large Band-Aid  I have sent electronic prescriptions to your pharmacy    Spinal cord stimulator device  As we talked today, it appears that you have learned a lot on the use of your spinal cord stimulator device.  My advice is to choose one of your programs (Burst) and just let the program run.  With burst you should not feel any type of tingling.  If you do, we may need to turn down the amplitude (intensity) of your spinal cord stimulator    Follow-up:   I will see you again for a follow-up visit via [employer] on April 23, 2020 at 8 AM     Keep me posted on your progress. Again,  if you have any questions, please call us back    Best regards. Stay safe/stay home,      Dr Batista

## 2020-04-14 NOTE — PROGRESS NOTES
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 After that dispense, they will then allow additional quantity to be called in.   Today's dispense of 14 tablets will void the original 60 that was called in. Dr. Batista aware

## 2020-04-22 PROBLEM — G89.29 CHRONIC INTRACTABLE PAIN: Status: ACTIVE | Noted: 2020-04-22

## 2020-04-22 NOTE — PROGRESS NOTES
"This consultation was provided with the use of interactive audio and video telecommunications system that permits real time communication with the patient, as patient is unable to attend an in-office appointment due to the COVID-19 crisis. Consent for assessment and treatment was provided by the patient.    You have chosen to receive care through a telehealth visit.  Do you consent to use a video/audio connection for your medical care today? Yes       Chief Complaint: \"I am doing the same. I tried all the programs in my spinal cord stimulator and did not get any relief.  I increased the gabapentin and did not feel any relief.  I did not want to start the tramadol.\"      Brief History: Mr. Prashant Avila is a 75 y.o. male, who underwent implantation of a spinal cord stimulator device with Dr. Fausto Renee on 11/21/2019 with [employer] Penta paddle lead with the top electrodes projecting at the level of the superior endplate of the T8 vertebral level. IPG: [employer] Proclaim 5 XR IPG Non-Rechargeable (Full Body MRI compatible). The device was implanted primarily for treatment of chronic left hip and left lower extremity pain.   Patient was last seen via telehealth visit on 04/14/2020 for evaluation of his refractory chronic pain syndrome, when he reported no significant pain relief from the use of his SCS device. I instructed Mr Cerda to change SCS programs and adjust the intensity in a stepwise manner to assess if SCS would be of benefit. Patient reports that he has tried all programs and did not experience significant relief.  He increased the dose of his gabapentin to 4 tablets a day without significant relief.  He did not to start tramadol.  Mr. Prashant Avila underwent follow-up neurosurgical consultation with Sherlyn Gonzalez PA-C on March 13, 2020, after completing three rounds of Bactrim DS with decreased in the drainage from the inferior aspect of his thoracic paravertebral surgical wound at the " site of the SCS surgical paddle lead. I reviewed his labs during his last visit that were within normal parameters and included CBC, PLT count, CRP, ESR. He continues to deny fever or any other symptoms. He denies any pain at the surgical sites except for mild discomfort in his left gluteal region, which coincides with the area of his chronic pain and does not seem to be related to the IPG. I asked the patient to show me his IPG site and gently move the IPG. The sites continues to look unremarkable and he denies discomfort at the site. During his last visit, I provided him with instructions on how to clean the wound and apply occlusive dressing to avoid scratching the area. The drainage stopped over the last 24-48 hours. I asked the patient to show me the surgical areas.  His wife assisted so patient revealed his surgical wound in front of his camera. He also show me his white T-shirt which was clean.  There appears to be a very small area of erythema.  There is no drainage.  There is no fluid accumulation.  I instructed the patient's wife to use a sterile gauze and apply gentle pressure over the surgical margins.  There was no drainage. No pain.  No obvious fluid accumulation.  During his last visit with Sherlyn, he mentiond that he was interested in removal of his spinal cord stimulator device. Today, he asked me about timing for a potential removal as well as timing for an intrathecal pump trial.  He is willing to wait until after the epidemic is over. He is aware that an intrathecal analgesic trial would be necessary prior to determining candidacy for intrathecal pump placement. Patient has already obtained psychological clearance for an intrathecal delivery system. I have advised him to continue using his SCS device and try different programs. He presents with chronic pain refractory to conservative, interventional and surgical measures. Based on the results of his SCS trial, his chronic pain condition, and the  "placement of his SCS lead, it would be expected that he should be able to experience some relief of his chronic pain. He has confirmed with me that the SCS programs continue to provide stimulation to his lower back, left gluteal region, and the entire left lower extremity including his left foot.     Pain Description:   Constant pain with intermittent exacerbation, described as burning, electricity, pins and needles, and tingling sensation.   Radiation of pain: The pain radiates from the left gluteal region into the posterior aspect of the thigh, left leg and plantar aspect of the foot. The leg pain is more severe than the lower back pain  Pain pattern: Dermatomic, left L5 and S1   Pain intensity today: 2/10 (standing) 7-8/10 (sitting or lying down) with the stimulator \"turned on”  Average pain intensity last week: 6/10  Pain intensity ranges from: 1/10 (walking) to 10/10 (sitting > lying down)  Aggravating factors: Pain increases with sitting longer than 5 minutes and laying down for more than 5 minutes.  Alleviating factors: Pain decreases with standing and walking   Associated symptoms:   Patient denies numbness or weakness in the lower extremities.   Patient denies any new bladder or bowel problems.   Patient denies difficulties with his balance or recent falls.      Pain History:  Referring physician: Dr. Fausto Renee   Consultation for intractable chronic left lower extremity pain.   Patient reports a 7-year history of pain, which began without incident. Patient is status post L4-L5 hemilaminectomy and foraminotomy at the [facility] with Dr. Mackay on 03/03/2014, followed by L4-S1 lumbar decompression and fusion due to unresolved pain by Dr. Renee on 07/24/2014. Patient reports that he experienced significant improvement after his last surgery with Dr. Renee, in particular, complete resolution of his previously severe lower back pain. Unfortunately, his left lower extremity pain, chronic radicular pain preceded " his last lumbar fusion, and did not improve after surgery. He underwent surgical consultation with Dr. Cox at the [facility], who recommended decompression of the nerve roots at L3-S1. He underwent follow-up neurosurgical consultation with Dr. Fausto Renee on 11/03/2017. Dr. Renee discussed the possibility of lumbar decompression at L3-L4: Left L3-L4 laminotomy and foraminotomy. Due to potential risk of developing instability that would necessitate extending his lumbar fusion to the L3 level. Patient underwent on April 3, 2018, left L3-L4 transforaminal epidural steroid injection without any pain relief. Patient elected to hold off on surgery. Patient underwent EMG/NCV, which confirmed chronic left S1 radiculopathy.  A CT myelogram was significant for stenosis above the level of his lumbar fusion, at L3-L4 without clinical correlation.  Patient presents with pain with a left S1 dermatomal distribution pattern.  Pain Description:   Constant pain with intermittent exacerbation, described as burning, electricity, pins and needles, and tingling sensation.   Radiation of pain: The pain radiates into the posterior aspect of the thigh, left leg and plantar aspect of the foot. The leg pain is more severe than the lower back pain.   Pain pattern: dermatomic, left L5 and S1   Pain intensity today: 3/10 (standing) 9-10/10 (sitting or lying down)  Average pain intensity last week: 7/10  Pain intensity ranges from: 4/10 to 10/10  Aggravating factors: Pain increases with sitting longer than 5 minutes and laying down for more than 5 minutes.  Alleviating factors: Pain decreases with standing.   Associated symptoms:   Patient denies numbness or weakness in the lower extremities.   Patient denies any new bladder or bowel problems.   Patient denies difficulties with his balance or recent falls.       Review of previous therapies and additional medical records:  Prashant Avila has already failed the following measures,  "including:   Conservative Measures: Oral analgesics, ice and heat, physical therapy  Interventional measures:  02/06/2019: Left S1 transforaminal epidural steroid injection and experienced pain relief that was short-lived   02/20/2017: Diagnostic and therapeutic left sciatic nerve block with local anesthetics, steroids, hyaluronidase under ultrasound, PNS and fluoroscopic guidance- Patient did not receive sustained relief from this procedure.   02/06/2017: Diagnostic left S1 selective nerve root injection  01/30/2017: Nevro SCS Trial; no relief  04/25/2016: Trial of lumbar epidural steroid injections with steroids and hyaluronidase via caudal catheter with 100% pain relief lasting 48 hours  12/16/2015: Diagnostic and therapeutic left S1 transforaminal epidural steroid injection with hyaluronidase with complete resolution of pain for several weeks  11/30/2015: Spinal cord stimulator trial Saint Jude, which provided him with more than 50% pain relief and functional improvement. However, patient decided to delay implantation of a permanent spinal cord stimulator device until the arrival of new technology. Patient did not like the perception of tonic stimulation, even though stimulation overlapped successfully the areas of his chronic pain, he did not find it pleasant.   02/09/2015: Diagnostic left piriformis muscle injection  Surgical Measures:   03/03/2014: Left L4-L5 hemilaminectomy and foraminotomy at [facility] with Dr. Mackay   07/24/2014: L4-S1 decompression and fusion by Dr. Renee  Prashant Avila underwent follow-up neurosurgical consultation with Dr. Fausto Renee on 11/30/2017, and was found to be a potential surgical candidate.  Pain Psychology Evaluation (02/25/2019): \"From a psychological perspective, patient is considered to be an appropriate candidate for a spinal cord stimulator or an intrathecal pump implant at this time.\"  Prashant Avila presents with significant comorbidities including hypertension, " rheumatoid arthritis, osteoporosis, hyperlipidemia, chronic kidney disease, hearing loss, carcinoma of prostate, engaged in treatment.  In terms of current analgesics, Prashant Avila takes: Tylenol and gabapentin 800 mg 4 times a day without any side effects.  I have reviewed Jon Report #[id number] consistent to medication reconciliation.  SOAPP: Low risk  Consent for treatment with controlled substances has been obtained      Global Pain Scale 04-19  2018 10-24  2018 11-08  2018 01-29  2019 03-06  2019 10-03  2019 10-17  2019 01-14  2020 01-21  2020 02-20  2020 04-14  2020 04-23  2020   Pain 19 17 17 18 17 20  10 10 10   10  10  N/A   Feelings 1 0 0 0 0   8    4   2  0  0    0     Clinical outcomes 11 8 13 11 6 11    7   6  7  9    9     Activities 17 10 16 18 6 15    7   6  4  15  15     GPS Total: 48 35 46 47 29 54  28  24  21  34  34         Diagnostic Studies:    MRI THORACIC SPINE WO CONTRAST-10/31/2019: Minimal degenerative changes at multiple levels throughout the thoracic spine with no central spinal canal stenosis. Epidural space is unremarkable.  EMG/NCV of the bilateral lower extremities December 18, 2018: Chronic left S1 radiculopathy  CT LUMBAR SPINE WITH CONTRAST-01/21/2019: There is excellent opacification of the thecal sac. Imaging covers from T12 through the S1 level caudally. The conus terminates at the L1/L2 level.  The distal cord and conus are normal in caliber.  The nerve roots of the cauda equina are normal in caliber and distribution. L4-S1 fusion hardware is in place; the left S1 pedicle screw is fractured and there is lucency around the right S1 pedicle screw. Left L4 laminotomy. Vertebral body heights and alignment are normal. Vertebral body heights are normal. Degenerative changes are as follows:  T12-L1: Facet arthropathy without significant foraminal or spinal canal stenosis.  L1-L2: Facet arthropathy without significant foraminal or spinal canal stenosis.  L2-L3: Right greater than  left facet arthropathy. Mild narrowing of the right subarticular zone. No significant central spinal canal or foraminal stenosis.  L3-L4: Disc bulge and facet arthropathy with ligamentum flavum thickening. There is severe spinal canal stenosis and at least moderate bilateral foraminal stenosis.   L4-L5: Improved right subarticular zone narrowing. No significant osseous spinal canal or foraminal stenosis.  L5-S1: Bilateral facet arthropathy. Shallow disc osteophyte. Unchanged mild osseous foraminal stenosis. No significant osseous spinal canal stenosis.  EKG April 19, 2018 revealed sinus bradycardia with first-degree AV block.  Left bundle branch block.    X-Ray lumbar spine 08/04/2017: Posterior lumbar fusion of L4, L5 and S1. Degenerative changes at L1-L2 with anterior osteophyte formation. Normal lumbar alignment in the lateral projection with a stable alignment in the flexed and extended positions.      Duplex Venous Lower, 03/25/2017: Normal right lower extremity venous duplex scan. No evidence of DVT.  MRI Lumbar Spine, 10/28/2016: Since the previous MRI study, there has been L4-5-S1 posterior lumbar and interbody fusion. No new abnormality of alignment is seen. There is, however, significant worsening of posterior element hypertrophy at L3-4, above the fusion level, due to persistent L3-4 disc bulge, and what appears to be new spinal stenosis at this level, generally as a result of posterior element hypertrophic change. Canal appears stable elsewhere. No lumbar compression deformity or new abnormality of alignment is seen. No vertebral marrow edema is appreciated. Sagittal images suggest a small (18 mm) seroma or synovial cyst on the right at L5-S1. Tip of conus medullaris is again noted at L1.  Axial images   L1-L2: No significant canal or foraminal stenosis   L2-L3: Canal appears relatively narrowed, due to posterior element hypertrophy but still greater than 1 cm in diameter. Foramina appear  lower limits  of normal diameter.   L3-L4: Moderate canal stenosis with lateral narrowing, AP diameter 6.5 mm, transverse diameter approximately 9 mm with crowding of the nerve roots. Foramina appear mildly to moderately narrowed bilaterally due to facet DJD.   L4-L5: Fused level, canal appears normal. Mild foraminal stenosis due to facet DJD  L5-S1: Canal appears normal. Left-sided neural foramen appears normal. Right-sided neural foramen appears mildly narrowed due to facet DJD. Postcontrast images show expected postoperative enhancement of the dorsal soft tissues and no pathologic postcontrast enhancement elsewhere.     The following portions of the patient's history were reviewed and updated as appropriate: problem list, past medical history, past surgery history, social history, family history, medications, and allergies    Review of Systems   Musculoskeletal: Positive for back pain.   All other systems reviewed and are negative.    There were no vitals taken for this visit.      Physical Exam: Due to the constraints of the telemedicine format, a focused physical exam was performed. Constitutional: Healthy adult who appears younger than his chronological age.  Patient was instructed to reveal the surgical wounds.  The IPG placement site in the left gluteal region looks unremarkable without erythema, or drainage.  Patient's wife was instructed to hold the IPG and move it in the pocket.  Patient did not experience any discomfort upon mobilization of the IPG. The thoracic paravertebral wound was inspected.  There is no drainage. At the inferior aspect of the wound, there appears to be an area of perhaps 2 mm erythema.  There is no longer a scab over the inferior aspect of the wound.  I have directed the wife to help me with examination of the wound.  Using sterile gauze, his wife was instructed to apply gentle pressure on the margins of the wound and into the wound without appreciating any drainage.  Patient denies any pain.      From previous visits:  Constitutional General Appearance: No acute distress, well appearing and well nourished.   Appears healthy, within normal limits of ideal weight, well hydrated and appearance reflects stated age.   Head and face: Normal.   Palpation of the face and sinuses: No sinus tenderness.   Eyes Conjunctiva and lids: No erythema, swelling or discharge.   Pupils: Equal, round, reactive to light.   Neck: Supple, symmetric, trachea midline, no masses.   Pulmonary Respiratory effort: No increased work of breathing or signs of respiratory distress. Respiratory rate: normal. Assessment of respiratory effort revealed normal rhythm and effort. Auscultation of lungs: Clear to auscultation. Auscultation of the lungs revealed no expiratory wheezing, normal expiratory time and no inspiratory wheezing. No rales or crackles were heard bilaterally. No rhonchi. No friction rub. No wheezing. No diminished breath sounds. No bronchial breath sounds.   Cardiovascular Auscultation of heart: Normal rate and rhythm, normal S1 and S2, no murmurs. Peripheral vascular exam: Normal. Examination of extremities for edema and/or varicosities: Normal.   Abdomen: Non-tender, no masses. Bowel sounds were normal. The abdomen was soft and nontender. No masses palpated.   Musculoskeletal Gait and station: Normal. Gait evaluation demonstrated a normal gait. The range of motion of the lumbar spine is essentially full and without significant pain. Lumbar facet joint loading maneuvers are negative. Kodak and Gaenslen's tests are negative. The range of motion of the hip joints is full and without pain. Palpation of the gluteal bursa, ischial tuberosities, and greater trochanters, unrevealing. Piriformis maneuvers are negative. There is some discomfort upon palpation of the left piriformis muscle. Active resisted external rotation and passive internal rotation did not increase pain.   Muscle strength/tone: Normal.   Motor Strength  Findings: normal strength.   Motor Tone: normal tone. Involuntary movements: none. Significant shortening of the hamstrings, worse on the left side.  Skin and subcutaneous tissue: Normal without rashes or lesions. Both surgical wounds are well healed without erythema, drainage or fluid accumulation   Neurologic   Cranial nerves: Cranial nerves 2-12 intact.   Cortical function: Normal mental status.   Reflexes: 2+ and symmetric. Deep tendon reflexes: 2+ right biceps, 2+ left biceps, 1+ right patella, 1+ left patella, 1+ right ankle jerk and 1+ left ankle jerk. Superficial/Primitive Reflexes: primitive reflexes were absent. Straight leg raising test is negative. Femoral stretch sign is negative.   Sensation: No sensory loss. Sensory exam: intact to light touch, intact pain and temperature sensation, intact vibration sensation and normal proprioception.   Coordination: Normal finger to nose and heel to shin. Coordination: normal balance and negative Romberg's sign.   Psychiatric: Judgment and insight: Normal. Orientation to person, place and time: Normal. Recent and remote memory: Intact. Mood and affect: Normal.     From last visit:  Analysis of the spinal cord stimulator device with complex spinal cord stimulator reprogramming   Patient used the [employer] spinal cord stimulator device for 288 hours since last reprogramming, and 1152 lifetime hours (average 10 hours per day). Analysis of impedence reveals normal impedence for all contacts. There are six programs:    Program FOUR (Preferred Program):    Electrode polarities: 5+, 7+, 8-, 9-, 13+  Amplitude: 0.4-1.5 mA     Pulse width: 1000 mcs  Rate: 40 Hz  IntraBurst rate: 500 Hz  Micro-dosin:90      ASSESSMENT:   1. Chronic intractable pain    2. Adhesive arachnoiditis    3. Chronic lumbar radiculopathy    4. Lumbar postlaminectomy syndrome     5. Bilateral stenosis of lateral recess of lumbar spine    6. Status post L4-S1 lumbar spinal fusion    7. Presence of  neurostimulator         PLAN/MEDICAL DECISION MAKING: Patient continues struggling with his chronic pain and reports minimal improvement with the use of his a spinal cord stimulator device and an increase of his gabapentin. He did not to start a trial with a low-dose of tramadol. Mr. Yañez presents with a very challenging chronic pain history. He underwent several neurosurgical consultations. On 11/03/2017, he underwent follow-up neurosurgical consultation with Dr. Fausto Renee and discussed the possibility of lumbar decompression and extension of fusion to the L3-L4 level. Patient opted against surgery. An MRI of the lumbar spine revealed lateral recess stenosis at L3-L4 associated with crowding of the nerve roots and lumbar arachnoiditis. A Lumbar myelogram followed by CT post myelogram confirmed the diagnosis of lumbar stenosis at L3-L4 without significant clinical correlation. His clinical symptoms to follow the left L5-S1 dermatomal distribution.An EMG/NCV of the bilateral lower extremities confirmed chronic left S1 radiculopathy. Consequently, most of his symptoms appears to originate from chronic left S1 radiculopathy and to some contribution from lumbar arachnoiditis. He has consistently denied neurogenic claudication. In fact, he feels less pain standing in contrast to sitting or lying down. He has failed conservative measures, interventional pain management measures, and surgical measures, as referenced on previous encounters. In addition, patient has reported intolerance to most oral analgesics and adjuvants, as referenced on previous encounters. Therefore, I have proposed the following plan:  1. Follow up via telehealth: April 30 at 8 AM for assessment of his chronic pain and possible adjustment of his analgesic regimen.  I have advised the patient to continue using his a spinal cord stimulator device. He confirmed that the stimulator programs are providing stimulation to his lower back, the entire  left lower extremity including his left foot. Patient reported interest in explantation of his a spinal cord stimulator device. During previous visits, he voiced an interest in an intrathecal delivery system. He is aware that an intrathecal analgesic trial would be necessary prior to determining candidacy for intrathecal pump placement. Patient has already obtained psychological clearance for an intrathecal delivery system. I have advised him to continue using his spinal cord stimulator device to determine if we can find some stimulation modality that could provide him with significant pain relief. In some cases of refractory chronic pain, a spinal cord stimulator along with an intrathecal analgesic device are necessary to alleviate the different types and sources of pain. Patient agreed with the plan.  I have also sent him some information over my chart regarding a potential intrathecal analgesic trial.  We have discussed the use of different drugs such as morphine or a morphine like opioid and Prialt.  In both cases, I have explained to him that I always use the lowest dose in order to avoid potential side effects.  In some cases, we may have to repeat the trial with a higher dose if the first trial is unsuccessful.  If we could demonstrate that there is significant improvement in his pain level and improved functional levels particularly tolerance to sitting and lying down, then, we could contemplate an intrathecal pump trial  2. Pharmacological measures: In the past, patient consistently refused the idea of new pharmacological trials. Unfortunately, he was found not to be a candidate for a mexiletine trial for Tx of neuropathic pain due to his abnormal EKG. He has agreed to continue adjusting his medications as well as introducing some new trials.  A.  Continue gabapentin to 4 times a day: Take gabapentin 800 mg 1 tablet every morning, 1 tablet at noon, and 2 tablets at bedtime.  He has previously failed a trial  with Lyrica  B. Start trial with tramadol 1 tablet p.o. twice daily for chronic pain   C.  I have briefly discussed a potential trial with a low dose Effexor   3.  Surgical wound: I have explained to the patient and his wife on how to clean his surgical wound using soap and water.  I told him not to scrub the area.  Also reminded him on not scratching the area.  I have recommended using a occlusive dressing, sterile gauze, or a large Band-Aid to protect the area from friction or scratching   4. Long-term rehabilitation efforts:  A. Patient declined in the past a comprehensive physical therapy program at [facility] for water therapy, neurodynamics, gait and balance and core strengthening   B. Patient declined in the past a referral to [facility].  I will discuss with the patient the possibility of restarting water therapy as a long-term exercise program once Covid restrictions are removed  5. The patient has been instructed to contact my office with any questions or difficulties. I have sent instructions via [employer]. The patient understands the plan and agrees to proceed accordingly.     I spent 38 minutes with the patient (08:01 AM to 08:39 AM)      Patient Care Team:  SHERON Chun MD as PCP - General (Family Medicine)  Efrain Batista MD as Consulting Physician (Pain Medicine)  Fausto Renee MD as Consulting Physician (Neurosurgery)  Jean Warren MD as Consulting Physician (Neurosurgery)  Hank Frankel MD as Consulting Physician (Cardiology)  Marcelino Condon MD as Consulting Physician (Urology)  Brian Tatum III, MD as Consulting Physician (Cardiology)  SHERON Chun MD as Consulting Physician (Family Medicine)  Francisco Javier Clay MD as Consulting Physician (Neurosurgery)  Royer Roldan MD as Consulting Physician (General Practice)  Emigdio Watt MD as Consulting Physician (Gastroenterology)  Shola Vila MD as Consulting Physician (Otolaryngology)     No  orders of the defined types were placed in this encounter.        No future appointments.      Efrain Batista MD    EMR Dragon/Transcription disclaimer:  Much of this encounter note is an electronic transcription of spoken language to printed text. Electronic transcription of spoken language may permit erroneous, or at times, nonsensical words or phrases to be inadvertently transcribed. Although I have reviewed the note for such errors, some may still exist.

## 2020-04-23 ENCOUNTER — TELEMEDICINE (OUTPATIENT)
Dept: PAIN MEDICINE | Facility: CLINIC | Age: 75
End: 2020-04-23

## 2020-04-23 DIAGNOSIS — G89.29 CHRONIC INTRACTABLE PAIN: ICD-10-CM

## 2020-04-23 DIAGNOSIS — Z98.1 STATUS POST LUMBAR SPINAL FUSION: ICD-10-CM

## 2020-04-23 DIAGNOSIS — M54.16 CHRONIC LUMBAR RADICULOPATHY: ICD-10-CM

## 2020-04-23 DIAGNOSIS — G03.9 ADHESIVE ARACHNOIDITIS: ICD-10-CM

## 2020-04-23 DIAGNOSIS — M48.061 BILATERAL STENOSIS OF LATERAL RECESS OF LUMBAR SPINE: ICD-10-CM

## 2020-04-23 DIAGNOSIS — Z96.82 PRESENCE OF NEUROSTIMULATOR: ICD-10-CM

## 2020-04-23 DIAGNOSIS — M96.1 POSTLAMINECTOMY SYNDROME OF LUMBAR REGION: ICD-10-CM

## 2020-04-23 PROCEDURE — 99214 OFFICE O/P EST MOD 30 MIN: CPT | Performed by: ANESTHESIOLOGY

## 2020-04-23 NOTE — PATIENT INSTRUCTIONS
"Mr. Avila, here are my recommendations based on what we have talked about today:      1. Follow-up: April 30 at 8 AM via telehealth     2.  Medications:  A: Continue gabapentin 800 mg 1 tablet in the morning, 1 at noon, and 2 tablets at bedtime   B. Start a trial with tramadol 1 tablet twice daily as needed for your chronic pain    3.  Surgical wound: Clean your surgical wound using soap and water.  Do not scrub.  Please avoid scratching the area. Apply an occlusive dressing or a large Band-Aid, or a sterile gauze with some tape.      4.  Intrathecal pump trial: You have already obtained psychological clearance for a pump trial. In terms of the trial process, there are two types of drugs that are commonly used for this. One is morphine or a morphine like medication such as fentanyl.  The other one is Prialt.  These medications work by a different mechanism of action.  Morphine is what we could call \"a broad-spectrum analgesic\", whereas Prialt seems to work better for nerve pain or neuropathic pain.  Regarding the dose of morphine -or morphine like drugs-: The dose required to exert pain relief when administering these drugs intrathecally (in the fluid that surrounds the nerves of the spine) is significantly lower than the dose necessary to exert a similar level of pain relief when given by oral, intramuscular, or intravenous route.  The reason is that when drugs are delivered into the fluid that surrounds the spinal cord and the nerves, the drugs exert their analgesic effect on the spinal cord without the need of crossing barriers. Intrathecal administration of drugs has less side effects because the dose necessary to provide pain relief is very small since there is no significant spread into other organs or tissues. In terms of the side effects during a trial; side effects are transient and short lasting and the most common ones are itching and urinary retention. After implantation of the intrathecal pump, we " "always start with a low dose of these medications to avoid side effects. The other drug; \"Prialt\" is not an opioid and the only route approved for administration of this drug is “intrathecal.\" Reports show that Prialt works better for neuropathic pain. One consideration with Prialt is that the initial trial must be performed using the lowest therapeutic dose of this drug because this drug has what we call a \"narrow therapeutic window.\"  This means that any adjustments need to be done in small increments to avoid side effects. So, in general, we always start with the lowest therapeutic dose and we increase it in very small doses to avoid running into side effects. We will send you additional materials with information about intrathecal pumps.    Have a great rest of the week and will see you again next week,    Dr. Batista    "

## 2020-04-28 NOTE — PROGRESS NOTES
"This consultation was provided with the use of interactive audio and video telecommunications system that permits real time communication with the patient, as patient is unable to attend an in-office appointment due to the COVID-19 crisis. Consent for assessment and treatment was provided by the patient.    You have chosen to receive care through a telehealth visit.  Do you consent to use a video/audio connection for your medical care today? Yes       Chief Complaint: \" I am still doing the same.  I keep using the stimulator but no significant relief.  I tried tramadol and it didn't work.\"       Brief History: Mr. Prashant Avila is a 75 y.o. male, who underwent implantation of a spinal cord stimulator device with Dr. Fausto Renee on 11/21/2019 with [employer] Penta paddle lead with the top electrodes projecting at the level of the superior endplate of the T8 vertebral level. IPG: [employer] Proclaim 5 XR IPG Non-Rechargeable (Full Body MRI compatible). The device was implanted primarily for treatment of chronic left hip and left lower extremity pain.   Patient was last seen via telehealth visit on 04/23/2020 for evaluation of his refractory chronic pain syndrome, when he reported no significant pain relief from the use of his SCS device along with maximization of conservative measures. I instructed Mr Cerda to change SCS programs and adjust the intensity in a stepwise manner to assess if SCS would be of benefit. Patient continues to report that he has tried all programs and has not experience significant pain relief.  He reports that he has been using the \"tingle program\" that was created back in February.  He continues on gabapentin with his great analgesic benefit and without side effects.  He also takes Tylenol and NSAIDs PRN for breakthrough pain.  He reports no significant benefit from Tylenol or NSAIDs.  He started tramadol without relief and with side effects (drowsiness).  Mr. Prashant Avila " underwent follow-up neurosurgical consultation with Sherlyn Gonzalez PA-C on March 13, 2020, after completing three rounds of Bactrim DS with decreased in the amount of drainage from the inferior aspect of his thoracic paravertebral surgical wound -at the placement site of the SCS surgical paddle lead-. Labs during that visit that were within normal parameters and included CBC, PLT count, CRP, ESR. He continues to deny fever or any other symptoms. He denies pain at the surgical sites except for mild discomfort in his left gluteal region, which coincides with the area of his chronic pain and does not seem to be related to the IPG. The sites continues to look unremarkable and he denies discomfort at the site. During his last visit, patient's wife assisted him so that he could reveal his surgical wounds in front of his camera. During that visit, they reported that the drainage had stopped over the previous 24-48 hours. Inspection of the surgical wounds revealed a very small area of erythema at the inferior margin of a paravertebral wound without evidence of drainage or fluid accumulation (patient's wife apply gentle pressure using sterile 4 x 4's on the margins of the wound). During his visit with Sherlyn, he mentioned that he was interested in removal of his spinal cord stimulator device. He has asked me during previous visits and again today timing for a potential removal of his SCS as well as timing for an intrathecal pump trial. Patient has been made aware that an intrathecal analgesic trial would be necessary prior to determining candidacy for intrathecal pump placement. Patient has already obtained psychological clearance for an intrathecal delivery system.  In the meantime, I have strongly advised the patient to continue using his a spinal cord stimulator device.  Patient has a history of a very challenging chronic pain condition refractory to conservative, interventional and surgical measures. Based on his chronic  "pain condition, the appropriate placement of his SCS lead, and the outcome of his SCS trial, one could expect that patient should experience some relief of his chronic pain.  Over his previous visits, and again today, he has confirmed that the SCS programs continue to provide stimulation over all areas of his pain including his lower back, left gluteal region, and the entire left lower extremity including his left foot.     Pain Description:   Constant pain with intermittent exacerbation, described as burning, electricity, pins and needles, and tingling sensation.   Radiation of pain: The pain radiates from the left gluteal region into the posterior aspect of the thigh, left leg and plantar aspect of the foot. The leg pain is more severe than the lower back pain  Pain pattern: Dermatomic, left L5 and S1   Pain intensity today: 2/10 (standing) 7-8/10 (sitting or lying down) with the stimulator \"turned on”  Average pain intensity last week: 6/10  Pain intensity ranges from: 1/10 (walking) to 10/10 (sitting > lying down)  Aggravating factors: Pain increases with sitting longer than 5 minutes and laying down for more than 5 minutes.  Alleviating factors: Pain decreases with standing and walking   Associated symptoms:   Patient denies numbness or weakness in the lower extremities.   Patient denies any new bladder or bowel problems.   Patient denies difficulties with his balance or recent falls.      Pain History:  Referring physician: Dr. Fausto Renee   Consultation for intractable chronic left lower extremity pain.   Patient reports a 7-year history of pain, which began without incident. Patient is status post L4-L5 hemilaminectomy and foraminotomy at the [facility] with Dr. Mackay on 03/03/2014, followed by L4-S1 lumbar decompression and fusion due to unresolved pain by Dr. Renee on 07/24/2014. Patient reports that he experienced significant improvement after his last surgery with Dr. Renee, in particular, complete " resolution of his previously severe lower back pain. Unfortunately, his left lower extremity pain, chronic radicular pain preceded his last lumbar fusion, and did not improve after surgery. He underwent surgical consultation with Dr. Cox at the [facility], who recommended decompression of the nerve roots at L3-S1. He underwent follow-up neurosurgical consultation with Dr. Fausto Renee on 11/03/2017. Dr. Renee discussed the possibility of lumbar decompression at L3-L4: Left L3-L4 laminotomy and foraminotomy. Due to potential risk of developing instability that would necessitate extending his lumbar fusion to the L3 level. Patient underwent on April 3, 2018, left L3-L4 transforaminal epidural steroid injection without any pain relief. Patient elected to hold off on surgery. Patient underwent EMG/NCV, which confirmed chronic left S1 radiculopathy.  A CT myelogram was significant for stenosis above the level of his lumbar fusion, at L3-L4 without clinical correlation.  Patient presents with pain with a left S1 dermatomal distribution pattern.  Pain Description:   Constant pain with intermittent exacerbation, described as burning, electricity, pins and needles, and tingling sensation.   Radiation of pain: The pain radiates into the posterior aspect of the thigh, left leg and plantar aspect of the foot. The leg pain is more severe than the lower back pain.   Pain pattern: dermatomic, left L5 and S1   Pain intensity today: 3/10 (standing) 9-10/10 (sitting or lying down)  Average pain intensity last week: 7/10  Pain intensity ranges from: 4/10 to 10/10  Aggravating factors: Pain increases with sitting longer than 5 minutes and laying down for more than 5 minutes.  Alleviating factors: Pain decreases with standing.   Associated symptoms:   Patient denies numbness or weakness in the lower extremities.   Patient denies any new bladder or bowel problems.   Patient denies difficulties with his balance or recent falls.  "      Review of previous therapies and additional medical records:  Prashant Avila has already failed the following measures, including:   Conservative Measures: Oral analgesics, ice and heat, physical therapy  Interventional measures:  02/06/2019: Left S1 transforaminal epidural steroid injection and experienced pain relief that was short-lived   02/20/2017: Diagnostic and therapeutic left sciatic nerve block with local anesthetics, steroids, hyaluronidase under ultrasound, PNS and fluoroscopic guidance- Patient did not receive sustained relief from this procedure.   02/06/2017: Diagnostic left S1 selective nerve root injection  01/30/2017: Nevro SCS Trial; no relief  04/25/2016: Trial of lumbar epidural steroid injections with steroids and hyaluronidase via caudal catheter with 100% pain relief lasting 48 hours  12/16/2015: Diagnostic and therapeutic left S1 transforaminal epidural steroid injection with hyaluronidase with complete resolution of pain for several weeks  11/30/2015: Spinal cord stimulator trial Saint Jude, which provided him with more than 50% pain relief and functional improvement. However, patient decided to delay implantation of a permanent spinal cord stimulator device until the arrival of new technology. Patient did not like the perception of tonic stimulation, even though stimulation overlapped successfully the areas of his chronic pain, he did not find it pleasant.   02/09/2015: Diagnostic left piriformis muscle injection  Surgical Measures:   03/03/2014: Left L4-L5 hemilaminectomy and foraminotomy at [facility] with Dr. Mackay   07/24/2014: L4-S1 decompression and fusion by Dr. Renee  Prashant Avila underwent follow-up neurosurgical consultation with Dr. Fausto Renee on 11/30/2017, and was found to be a potential surgical candidate.  Pain Psychology Evaluation (02/25/2019): \"From a psychological perspective, patient is considered to be an appropriate candidate for a spinal cord stimulator or " "an intrathecal pump implant at this time.\"  Prashant Avila presents with significant comorbidities including hypertension, rheumatoid arthritis, osteoporosis, hyperlipidemia, chronic kidney disease, hearing loss, carcinoma of prostate, engaged in treatment.  In terms of current analgesics, Prashant Avila takes: Tylenol, NSAIDs and gabapentin 800 mg 4 times a day without any side effects.  He started a trial with tramadol without significant analgesic benefit and with side effects \"drowsiness\"  I have reviewed Jon Report #[id number] consistent to medication reconciliation.  SOAPP: Low risk  Consent for treatment with controlled substances has been obtained      Global Pain Scale 04-19  2018 10-24  2018 11-08  2018 01-29  2019 03-06  2019 10-03  2019 10-17  2019 01-14  2020 01-21  2020 02-20  2020 04-14  2020           Pain 19 17 17 18 17 20  10 10 10   10  10         Feelings 1 0 0 0 0   8    4   2  0  0    0          Clinical outcomes 11 8 13 11 6 11    7   6  7  9    9          Activities 17 10 16 18 6 15    7   6  4  15  15          GPS Total: 48 35 46 47 29 54  28  24  21  34  34              Diagnostic Studies:    MRI THORACIC SPINE WO CONTRAST-10/31/2019: Minimal degenerative changes at multiple levels throughout the thoracic spine with no central spinal canal stenosis. Epidural space is unremarkable.  EMG/NCV of the bilateral lower extremities December 18, 2018: Chronic left S1 radiculopathy  CT LUMBAR SPINE WITH CONTRAST-01/21/2019: There is excellent opacification of the thecal sac. Imaging covers from T12 through the S1 level caudally. The conus terminates at the L1/L2 level.  The distal cord and conus are normal in caliber.  The nerve roots of the cauda equina are normal in caliber and distribution. L4-S1 fusion hardware is in place; the left S1 pedicle screw is fractured and there is lucency around the right S1 pedicle screw. Left L4 laminotomy. Vertebral body heights and alignment are normal. " Vertebral body heights are normal. Degenerative changes are as follows:  T12-L1: Facet arthropathy without significant foraminal or spinal canal stenosis.  L1-L2: Facet arthropathy without significant foraminal or spinal canal stenosis.  L2-L3: Right greater than left facet arthropathy. Mild narrowing of the right subarticular zone. No significant central spinal canal or foraminal stenosis.  L3-L4: Disc bulge and facet arthropathy with ligamentum flavum thickening. There is severe spinal canal stenosis and at least moderate bilateral foraminal stenosis.   L4-L5: Improved right subarticular zone narrowing. No significant osseous spinal canal or foraminal stenosis.  L5-S1: Bilateral facet arthropathy. Shallow disc osteophyte. Unchanged mild osseous foraminal stenosis. No significant osseous spinal canal stenosis.  EKG April 19, 2018 revealed sinus bradycardia with first-degree AV block.  Left bundle branch block.    X-Ray lumbar spine 08/04/2017: Posterior lumbar fusion of L4, L5 and S1. Degenerative changes at L1-L2 with anterior osteophyte formation. Normal lumbar alignment in the lateral projection with a stable alignment in the flexed and extended positions.      Duplex Venous Lower, 03/25/2017: Normal right lower extremity venous duplex scan. No evidence of DVT.  MRI Lumbar Spine, 10/28/2016: Since the previous MRI study, there has been L4-5-S1 posterior lumbar and interbody fusion. No new abnormality of alignment is seen. There is, however, significant worsening of posterior element hypertrophy at L3-4, above the fusion level, due to persistent L3-4 disc bulge, and what appears to be new spinal stenosis at this level, generally as a result of posterior element hypertrophic change. Canal appears stable elsewhere. No lumbar compression deformity or new abnormality of alignment is seen. No vertebral marrow edema is appreciated. Sagittal images suggest a small (18 mm) seroma or synovial cyst on the right at L5-S1.  Tip of conus medullaris is again noted at L1.  Axial images   L1-L2: No significant canal or foraminal stenosis   L2-L3: Canal appears relatively narrowed, due to posterior element hypertrophy but still greater than 1 cm in diameter. Foramina appear  lower limits of normal diameter.   L3-L4: Moderate canal stenosis with lateral narrowing, AP diameter 6.5 mm, transverse diameter approximately 9 mm with crowding of the nerve roots. Foramina appear mildly to moderately narrowed bilaterally due to facet DJD.   L4-L5: Fused level, canal appears normal. Mild foraminal stenosis due to facet DJD  L5-S1: Canal appears normal. Left-sided neural foramen appears normal. Right-sided neural foramen appears mildly narrowed due to facet DJD. Postcontrast images show expected postoperative enhancement of the dorsal soft tissues and no pathologic postcontrast enhancement elsewhere.     The following portions of the patient's history were reviewed and updated as appropriate: problem list, past medical history, past surgery history, social history, family history, medications, and allergies    Review of Systems   Musculoskeletal: Positive for back pain.   All other systems reviewed and are negative.    There were no vitals taken for this visit.      Physical Exam: Due to the constraints of the telemedicine format, a focused physical exam was performed. Constitutional: Healthy adult who appears younger than his chronological age. Patient was instructed to reveal the surgical wounds. The IPG placement site in the left gluteal region looks unremarkable without erythema, or drainage. Patient did not experience discomfort upon mobilization of the IPG. The thoracic paravertebral showed no drainage at this time. At the inferior aspect of the wound, there is an area of about 2 mm of erythema. Patient denies any pain.     From previous visits:  Constitutional General Appearance: No acute distress, well appearing and well nourished.   Appears  healthy, within normal limits of ideal weight, well hydrated and appearance reflects stated age.   Head and face: Normal.   Palpation of the face and sinuses: No sinus tenderness.   Eyes Conjunctiva and lids: No erythema, swelling or discharge.   Pupils: Equal, round, reactive to light.   Neck: Supple, symmetric, trachea midline, no masses.   Pulmonary Respiratory effort: No increased work of breathing or signs of respiratory distress. Respiratory rate: normal. Assessment of respiratory effort revealed normal rhythm and effort. Auscultation of lungs: Clear to auscultation. Auscultation of the lungs revealed no expiratory wheezing, normal expiratory time and no inspiratory wheezing. No rales or crackles were heard bilaterally. No rhonchi. No friction rub. No wheezing. No diminished breath sounds. No bronchial breath sounds.   Cardiovascular Auscultation of heart: Normal rate and rhythm, normal S1 and S2, no murmurs. Peripheral vascular exam: Normal. Examination of extremities for edema and/or varicosities: Normal.   Abdomen: Non-tender, no masses. Bowel sounds were normal. The abdomen was soft and nontender. No masses palpated.   Musculoskeletal Gait and station: Normal. Gait evaluation demonstrated a normal gait. The range of motion of the lumbar spine is essentially full and without significant pain. Lumbar facet joint loading maneuvers are negative. Kodak and Gaenslen's tests are negative. The range of motion of the hip joints is full and without pain. Palpation of the gluteal bursa, ischial tuberosities, and greater trochanters, unrevealing. Piriformis maneuvers are negative. There is some discomfort upon palpation of the left piriformis muscle. Active resisted external rotation and passive internal rotation did not increase pain.   Muscle strength/tone: Normal.   Motor Strength Findings: normal strength.   Motor Tone: normal tone. Involuntary movements: none. Significant shortening of the hamstrings, worse on  the left side.  Skin and subcutaneous tissue: Normal without rashes or lesions. Both surgical wounds are well healed without erythema, drainage or fluid accumulation   Neurologic   Cranial nerves: Cranial nerves 2-12 intact.   Cortical function: Normal mental status.   Reflexes: 2+ and symmetric. Deep tendon reflexes: 2+ right biceps, 2+ left biceps, 1+ right patella, 1+ left patella, 1+ right ankle jerk and 1+ left ankle jerk. Superficial/Primitive Reflexes: primitive reflexes were absent. Straight leg raising test is negative. Femoral stretch sign is negative.   Sensation: No sensory loss. Sensory exam: intact to light touch, intact pain and temperature sensation, intact vibration sensation and normal proprioception.   Coordination: Normal finger to nose and heel to shin. Coordination: normal balance and negative Romberg's sign.   Psychiatric: Judgment and insight: Normal. Orientation to person, place and time: Normal. Recent and remote memory: Intact. Mood and affect: Normal.     From previous visit:  Analysis of the spinal cord stimulator device with complex spinal cord stimulator reprogramming   Patient used the [employer] Jude spinal cord stimulator device for 288 hours since last reprogramming, and 1152 lifetime hours (average 10 hours per day). Analysis of impedence reveals normal impedence for all contacts. There are six programs:    Program FOUR (Preferred Program):    Electrode polarities: 5+, 7+, 8-, 9-, 13+  Amplitude: 0.4-1.5 mA     Pulse width: 1000 mcs  Rate: 40 Hz  IntraBurst rate: 500 Hz  Micro-dosin:90      ASSESSMENT:   1. Chronic intractable pain    2. Adhesive arachnoiditis    3. Chronic lumbar radiculopathy    4. Lumbar postlaminectomy syndrome     5. Status post L4-S1 lumbar spinal fusion    6. Bilateral stenosis of lateral recess of lumbar spine    7. Presence of neurostimulator         PLAN/MEDICAL DECISION MAKING: Patient continues struggling with his chronic pain and reports minimal  improvement with the use of his a spinal cord stimulator device and maximization in the dose of his gabapentin. He started a trial with tramadol without significant analgesic benefit and with some side effects (drowsiness).  He uses Tylenol and NSAIDs for breakthrough pain without significant relief. Mr. Yañez presents with a very challenging chronic pain history.  He is a s/p 2 lumbar surgeries: 03/03/2014: Left L4-L5 hemilaminectomy and foraminotomy at [facility] with Dr. Mackay, and on 07/24/2014: L4-S1 decompression and fusion by Dr. Renee. He underwent several neurosurgical consultations after his surgeries.  On 11/03/2017, he underwent neurosurgical consultation with Dr. Renee and discussed the possibility of lumbar decompression and extension of fusion to the L3-L4 level. Patient opted against surgery. An MRI of the lumbar spine revealed lateral recess stenosis at L3-L4 associated with crowding of the nerve roots and lumbar arachnoiditis. A Lumbar myelogram followed by CT post myelogram confirmed the diagnosis of lumbar stenosis at L3-L4 without significant clinical correlation. His clinical symptoms to follow the left L5-S1 dermatomal distribution.An EMG/NCV of the bilateral lower extremities confirmed chronic left S1 radiculopathy. Consequently, most of his symptoms appears to originate from chronic left S1 radiculopathy along with some additional symptoms consistent with lumbar arachnoiditis. He has consistently denied neurogenic claudication. In fact, he feels less pain standing in contrast to sitting or lying down. He has failed conservative measures, interventional pain management measures, and surgical measures, as referenced in the HPI and on previous encounters. Patient has reported intolerance to most oral analgesics and adjuvants, as referenced on previous encounters.  He underwent psychological evaluation and obtain clearance for spinal cord stimulation and intrathecal therapies.  He underwent a  spinal cord stimulator trial, and reported significant analgesic and functional improvement. Consequently, patient underwent implantation of his spinal cord stimulator device thereafter.  Unfortunately, after implantation of his device he has failed to notice significant relief despite the fact that we have not been able to provide a stimulation to all of his areas of pain from his lower back down to his foot.  Therefore, I have proposed the following plan:  1.  Patient has been scheduled for an in office visit on Thursday, May 7, 2020 9 AM.  I will reprogram his spinal cord stimulator device to provide him with 2 programs: 1 to use during the daytime, and now when he goes to bed.  I think that by adjusting some settings of his burst DR programs and reprogramming some of the electrodes, we may be able to find programs that may be more effective for pain control.  I will also give him the option of 2 tonic programs AM and PM, and one tonic program to be used when sitting down (his most challenging position).  In the meantime, I have encouraged the patient to continue using his spinal cord stimulator device.  Again, he confirmed that the stimulator programs are providing stimulation to his lower back, the entire left lower extremity including his left foot.  On previous visits, patient reported his interest in explantation of his a spinal cord stimulator device.  He has been requesting the possibility of an intrathecal trial and implantation of an intrathecal delivery system. He is aware that an intrathecal analgesic trial would be necessary prior to determining candidacy for intrathecal pump placement. Patient has already obtained psychological clearance for an intrathecal delivery system. I have advised him to continue using his spinal cord stimulator device to determine if we can find some stimulation modalities that could provide him with some pain relief. In cases of refractory pain, a spinal cord stimulator  "along with an intrathecal analgesic device sometimes have been necessary to alleviate the different types and sources of pain. Patient agreed with the plan.  During his last visit, I sent him information over \"my chart\" regarding a potential intrathecal analgesic trial.  I explained the use of different drugs such as morphine, a morphine like opioid, and Prialt. I explained to him that I always use the lowest therapeutic dose in order to avoid side effects.  In some cases, we may need to repeat the trial with higher doses if the first trial was unsuccessful.  After a trial, if we could demonstrate significant improvement in his pain levels and improved functional levels, particularly tolerance to sitting and lying down, then, we could move forward with implantation of an intrathecal pump.  Patient also review information that I mailed to his home.  We had a lengthy discussion regarding single shot versus continues trial.  I have recommended a single shot in order to avoid hospital admission during this COVID-19 epidemic.  For his trial, he has requested to schedule his trial around 11 AM because pain increases around the time.  We discussed particulars regarding pump implantation.  I explained to the patient that the intention is to do this procedure as an outpatient procedure.  After pump implantation, I have explained that I always select the lowest effective therapeutic dose to avoid any side effects.  He understands that intrathecal therapies may alleviate his pain and not completely eliminate his pain.  He asked me about some of the side effects particular bladder and bowel related.  I explained that those side effects are usually temporary.  Particularly during an intrathecal trial with opioids, the possibility of urinary retention is a possibility.  In most cases, it resolves without intervention, or with minimal interventions such as reversing the effect of the opioid with a low-dose of naloxone, or the use " of an in and out bladder catheter.  Patient reports some prostate issues.  We could look into starting Flomax prior to his trial and implant.  Patient also had questions regarding cognitive effects with intrathecal administration.  I have explained to the patient that since the doses given by intrathecal administration are extremely low in contrast to oral or IV administration, we do not see significant cognitive side effects at usual therapeutic doses.  He also asked about the potential for tolerance and lack of effectiveness over time.  I have explained that we avoid increasing the pump dose repeatedly, particularly with opioids because of the possibility of tolerance and opioid-induced hyperalgesia.  If higher doses are necessary, my approach is to switch to a different drug or add a second drug so that by different mechanisms we can control pain more efficiently without running into the problem of tolerance and hyperalgesia.  We have also discussed the use of alternative drug such as Prialt, and non-opioid.  Patient asked about the possibility of post dural puncture headache that an intrathecal trial or intrathecal pump placement.  I have explained to the patient that usually, this is a problem that occurs for younger patients, usually from teenager to around 60-year-old.  2. Pharmacological measures: Patient consistently refused the idea of new pharmacological trials in the past. Unfortunately, due to his abnormal EKG, he has been found not to be a candidate for a mexiletine trial for Tx of neuropathic pain. He has agreed to continue adjusting medications and introducing new trials.  A. Continue gabapentin to 4 times a day: Take gabapentin 800 mg 1 tablet every morning, 1 tablet at noon, and 2 tablets at bedtime.  He has previously failed a trial with Lyrica  B.  Patient has discontinued tramadol (no relief/side effects)  C.  Patient has agreed to start a trial with a low dose Effexor.  Start Effexor 25 mg 3  times daily, #90, no refill.  Depending on response, we may increase the dose of Effexor.  Patient has failed to obtain pain relief with conventional analgesics  3.  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  I have already instructed the patient and his wife on how to clean his surgical wound using soap and water.  They are aware not to scrub the area.  I also reminded him not to scratch the area.  Therefore, I recommended that after cleaning the surgical wound, he applies an occlusive dressing, sterile gauze with with tape, or a large Band-Aid to protect the area from friction and scratching   4. Long-term rehabilitation efforts:  A. Patient declined participation in a comprehensive physical therapy program at [facility] for water therapy, neurodynamics, gait and balance and core strengthening   B. Patient declined in the past a referral to [place]  C. I will discuss with the patient the possibility of restarting water therapy as a long-term exercise program   5. The patient has been instructed to contact my office with any questions or difficulties. I have sent instructions via [employer]. The patient understands the plan and agrees to proceed accordingly.     I spent 28 minutes with the patient (08:01 AM to 08:29 AM)      Patient Care Team:  SHERON Chun MD as PCP - General (Family Medicine)  Efrain Batista MD as Consulting Physician (Pain Medicine)  Fausto Renee MD as Consulting Physician (Neurosurgery)  Jean Warren MD as Consulting Physician (Neurosurgery)  Hank Frankel MD as Consulting Physician (Cardiology)  Marcelino Condon MD as Consulting Physician (Urology)  Brian Tatum III, MD as Consulting Physician (Cardiology)  SHERON Chun MD as Consulting Physician (Family Medicine)  Francisco Javier Clay MD as Consulting Physician (Neurosurgery)  Royer Roldan MD as Consulting Physician (General Practice)  Emigdio Watt MD as Consulting  Physician (Gastroenterology)  Shola Vila MD as Consulting Physician (Otolaryngology)     No orders of the defined types were placed in this encounter.        No future appointments.      Efrain Batista MD    EMR Dragon/Transcription disclaimer:  Much of this encounter note is an electronic transcription of spoken language to printed text. Electronic transcription of spoken language may permit erroneous, or at times, nonsensical words or phrases to be inadvertently transcribed. Although I have reviewed the note for such errors, some may still exist.

## 2020-04-30 ENCOUNTER — TELEMEDICINE (OUTPATIENT)
Dept: PAIN MEDICINE | Facility: CLINIC | Age: 75
End: 2020-04-30

## 2020-04-30 DIAGNOSIS — M54.16 CHRONIC LUMBAR RADICULOPATHY: ICD-10-CM

## 2020-04-30 DIAGNOSIS — Z96.82 PRESENCE OF NEUROSTIMULATOR: ICD-10-CM

## 2020-04-30 DIAGNOSIS — Z98.1 STATUS POST LUMBAR SPINAL FUSION: ICD-10-CM

## 2020-04-30 DIAGNOSIS — M96.1 POSTLAMINECTOMY SYNDROME OF LUMBAR REGION: ICD-10-CM

## 2020-04-30 DIAGNOSIS — G89.29 CHRONIC INTRACTABLE PAIN: ICD-10-CM

## 2020-04-30 DIAGNOSIS — G03.9 ADHESIVE ARACHNOIDITIS: ICD-10-CM

## 2020-04-30 DIAGNOSIS — M48.061 BILATERAL STENOSIS OF LATERAL RECESS OF LUMBAR SPINE: ICD-10-CM

## 2020-04-30 PROCEDURE — 99214 OFFICE O/P EST MOD 30 MIN: CPT | Performed by: ANESTHESIOLOGY

## 2020-04-30 RX ORDER — VENLAFAXINE 25 MG/1
25 TABLET ORAL 3 TIMES DAILY
Qty: 90 TABLET | Refills: 0 | Status: SHIPPED | OUTPATIENT
Start: 2020-04-30 | End: 2020-09-24

## 2020-05-05 NOTE — PROGRESS NOTES
"Chief Complaint: \"I'm feeling better with program #3 of my stimulator.\"          Brief History: Mr. Prashant Avila is a 75 y.o. male, who underwent implantation of a spinal cord stimulator device with Dr. Fausto Renee on 11/21/2019 with [employer] Penta paddle lead with the top electrodes projecting at the level of the superior endplate of the T8 vertebral level. IPG: [employer] Proclaim 5 XR IPG Non-Rechargeable (Full Body MRI compatible). The device was implanted primarily for treatment of chronic left hip and left lower extremity pain. Patient was last seen via telehealth visit on 04/30/2020 for evaluation of his refractory chronic pain syndrome, when he reported no significant pain relief from the use of his SCS device or with maximization of conservative measures.  At that point, I provided the the patient with additional instruction on how to change programs and adjust intensity so that we could assess analgesic response to the different programs available in his device.  For the first time since his implant, patient reports that using program #3 has provided him with some pain relief. He continues on gabapentin with some analgesic benefit and without side effects. He also takes Tylenol and NSAIDs PRN for breakthrough pain without significant benefit. He underwent a short trial with tramadol without relief and with side effects (drowsiness). He decided not to start a trial with a low dose of Effexor. He has failed several analgesic trials.  Mr. Prashant Avila underwent follow-up neurosurgical consultation with Sheryln Gonzalez PA-C on March 13, 2020, after completing three rounds of Bactrim DS with decreased in the amount of drainage from the inferior aspect of his thoracic paravertebral surgical wound -at the placement site of the SCS surgical paddle lead-. Labs during that visit that were within normal parameters and included CBC, PLT count, CRP, ESR. He continues to deny fever or any other symptoms. " He denies pain at the surgical sites except for mild discomfort in his left gluteal region, which coincides with the area of his chronic pain and does not seem to be related to the IPG. The sites continues to look unremarkable and he denies discomfort at the site. During previous tele-health visits, I examined his surgical wounds in front of his camera. His wife assisted with this. During that visit, they reported that the drainage had stopped over the previous 24-48 hours. Inspection of the surgical wounds revealed a very small area of erythema and superficial skin dehiscence at the inferior margin of a paravertebral wound without evidence of drainage or fluid accumulation (patient's wife applied gentle pressure on the margins of the wound using sterile 4 x 4's). During his visit with Sherlyn, he mentioned that he was interested in removal of his spinal cord stimulator device. He has asked me about timing of potential removal of his SCS as well as timing for an intrathecal pump trial. Patient has been made aware that an intrathecal analgesic trial would be necessary prior to determining candidacy for intrathecal pump placement. Patient has already obtained psychological clearance for an intrathecal delivery system. I advised the patient to continue using his a spinal cord stimulator device prior to determining failure. Patient has a history of a very challenging chronic pain condition refractory to conservative, interventional and surgical measures. Based on his chronic pain condition, appropriate placement of his SCS lead, and outcome of his SCS trial, one could expect that patient should experience at least some relief of his chronic pain. Patient reports that his SCS programs continue to provide stimulation over all the areas of his chronic pain including his lower back, left gluteal region, and the entire left lower extremity and his left foot.     Pain Description:   Constant pain with intermittent exacerbation,  "described as burning, electricity, pins and needles, and tingling sensation.   Radiation of pain: The pain radiates from the left gluteal region into the posterior aspect of the thigh, left leg and plantar aspect of the foot. The leg pain is more severe than the lower back pain  Pain pattern: Dermatomic, left L5 and S1   Pain intensity today: 2/10 (standing) 7-8/10 (sitting or lying down) with the stimulator \"turned on”  Average pain intensity last week: 6/10  Pain intensity ranges from: 1/10 (walking) to 10/10 (sitting > lying down)  Aggravating factors: Pain increases with sitting longer than 5 minutes and laying down for more than 5 minutes.  Alleviating factors: Pain decreases with standing and walking   Associated symptoms:   Patient denies numbness or weakness in the lower extremities  Patient denies any new bladder or bowel problems  Patient denies difficulties with his balance or recent falls     Pain History:  Referring physician: Dr. Fausto Renee   Consultation for intractable chronic left lower extremity pain.   Patient reports a 7-year history of pain, which began without incident. Patient is status post L4-L5 hemilaminectomy and foraminotomy at the [facility] with Dr. Mackay on 03/03/2014, followed by L4-S1 lumbar decompression and fusion due to unresolved pain by Dr. Renee on 07/24/2014. Patient reports that he experienced significant improvement after his last surgery with Dr. Renee, in particular, complete resolution of his previously severe lower back pain. Unfortunately, his left lower extremity pain, chronic radicular pain preceded his last lumbar fusion, and did not improve after surgery. He underwent surgical consultation with Dr. Cox at the [facility], who recommended decompression of the nerve roots at L3-S1. He underwent follow-up neurosurgical consultation with Dr. Fausto Renee on 11/03/2017. Dr. Renee discussed the possibility of lumbar decompression at L3-L4: Left L3-L4 laminotomy " and foraminotomy. Due to potential risk of developing instability that would necessitate extending his lumbar fusion to the L3 level. Patient underwent on April 3, 2018, left L3-L4 transforaminal epidural steroid injection without any pain relief. Patient elected to hold off on surgery. Patient underwent EMG/NCV, which confirmed chronic left S1 radiculopathy.  A CT myelogram was significant for stenosis above the level of his lumbar fusion, at L3-L4 without clinical correlation.  Patient presents with pain with a left S1 dermatomal distribution pattern.  Pain Description:   Constant pain with intermittent exacerbation, described as burning, electricity, pins and needles, and tingling sensation.   Radiation of pain: The pain radiates into the posterior aspect of the thigh, left leg and plantar aspect of the foot. The leg pain is more severe than the lower back pain.   Pain pattern: dermatomic, left L5 and S1   Pain intensity today: 3/10 (standing) 9-10/10 (sitting or lying down)  Average pain intensity last week: 7/10  Pain intensity ranges from: 4/10 to 10/10  Aggravating factors: Pain increases with sitting longer than 5 minutes and laying down for more than 5 minutes.  Alleviating factors: Pain decreases with standing.   Associated symptoms:   Patient denies numbness or weakness in the lower extremities.   Patient denies any new bladder or bowel problems.   Patient denies difficulties with his balance or recent falls.       Review of previous therapies and additional medical records:  Prashant Avila has already failed the following measures, including:   Conservative Measures: Oral analgesics, ice and heat, physical therapy  Interventional measures:  02/06/2019: Left S1 transforaminal epidural steroid injection and experienced pain relief that was short-lived   02/20/2017: Diagnostic and therapeutic left sciatic nerve block with local anesthetics, steroids, hyaluronidase under ultrasound, PNS and fluoroscopic  "guidance- Patient did not receive sustained relief from this procedure.   02/06/2017: Diagnostic left S1 selective nerve root injection  01/30/2017: Nevro SCS Trial; no relief  04/25/2016: Trial of lumbar epidural steroid injections with steroids and hyaluronidase via caudal catheter with 100% pain relief lasting 48 hours  12/16/2015: Diagnostic and therapeutic left S1 transforaminal epidural steroid injection with hyaluronidase with complete resolution of pain for several weeks  11/30/2015: Spinal cord stimulator trial Saint Jude, which provided him with more than 50% pain relief and functional improvement. However, patient decided to delay implantation of a permanent spinal cord stimulator device until the arrival of new technology. Patient did not like the perception of tonic stimulation, even though stimulation overlapped successfully the areas of his chronic pain, he did not find it pleasant.   02/09/2015: Diagnostic left piriformis muscle injection  Surgical Measures:   03/03/2014: Left L4-L5 hemilaminectomy and foraminotomy at [facility] with Dr. Mackay   07/24/2014: L4-S1 decompression and fusion by Dr. Renee  Prashant Avila underwent follow-up neurosurgical consultation with Dr. Fausto Renee on 11/30/2017, and was found to be a potential surgical candidate.  Pain Psychology Evaluation (02/25/2019): \"From a psychological perspective, patient is considered to be an appropriate candidate for a spinal cord stimulator or an intrathecal pump implant at this time.\"  Prashant Avila presents with significant comorbidities including hypertension, rheumatoid arthritis, osteoporosis, hyperlipidemia, chronic kidney disease, hearing loss, carcinoma of prostate, engaged in treatment.  In terms of current analgesics, Prashant Avila takes: Tylenol, NSAIDs and gabapentin 800 mg 4 times a day without any side effects.  He started a trial with tramadol without significant analgesic benefit and with side effects " "\"drowsiness\"  I have reviewed Jon Report #[id number] consistent to medication reconciliation.  SOAPP: Low risk  Consent for treatment with controlled substances has been obtained      Global Pain Scale 04-19  2018 10-24  2018 11-08  2018 01-29  2019 03-06  2019 10-03  2019 10-17  2019 01-14  2020 01-21  2020 02-20  2020 04-14  2020 05-07  2020          Pain 19 17 17 18 17 20  10 10 10   10  10 8        Feelings 1 0 0 0 0   8    4   2  0  0    0 0         Clinical outcomes 11 8 13 11 6 11    7   6  7  9    9 9         Activities 17 10 16 18 6 15    7   6  4  15  15 12         GPS Total: 48 35 46 47 29 54  28  24  21  34  34 28             Diagnostic Studies:    MRI THORACIC SPINE WO CONTRAST-10/31/2019: Minimal degenerative changes at multiple levels throughout the thoracic spine with no central spinal canal stenosis. Epidural space is unremarkable.  EMG/NCV of the bilateral lower extremities December 18, 2018: Chronic left S1 radiculopathy  CT LUMBAR SPINE WITH CONTRAST-01/21/2019: There is excellent opacification of the thecal sac. Imaging covers from T12 through the S1 level caudally. The conus terminates at the L1/L2 level.  The distal cord and conus are normal in caliber.  The nerve roots of the cauda equina are normal in caliber and distribution. L4-S1 fusion hardware is in place; the left S1 pedicle screw is fractured and there is lucency around the right S1 pedicle screw. Left L4 laminotomy. Vertebral body heights and alignment are normal. Vertebral body heights are normal. Degenerative changes are as follows:  T12-L1: Facet arthropathy without significant foraminal or spinal canal stenosis.  L1-L2: Facet arthropathy without significant foraminal or spinal canal stenosis.  L2-L3: Right greater than left facet arthropathy. Mild narrowing of the right subarticular zone. No significant central spinal canal or foraminal stenosis.  L3-L4: Disc bulge and facet arthropathy with ligamentum flavum thickening. There " is severe spinal canal stenosis and at least moderate bilateral foraminal stenosis.   L4-L5: Improved right subarticular zone narrowing. No significant osseous spinal canal or foraminal stenosis.  L5-S1: Bilateral facet arthropathy. Shallow disc osteophyte. Unchanged mild osseous foraminal stenosis. No significant osseous spinal canal stenosis.  EKG April 19, 2018 revealed sinus bradycardia with first-degree AV block.  Left bundle branch block.    X-Ray lumbar spine 08/04/2017: Posterior lumbar fusion of L4, L5 and S1. Degenerative changes at L1-L2 with anterior osteophyte formation. Normal lumbar alignment in the lateral projection with a stable alignment in the flexed and extended positions.      Duplex Venous Lower, 03/25/2017: Normal right lower extremity venous duplex scan. No evidence of DVT.  MRI Lumbar Spine, 10/28/2016: Since the previous MRI study, there has been L4-5-S1 posterior lumbar and interbody fusion. No new abnormality of alignment is seen. There is, however, significant worsening of posterior element hypertrophy at L3-4, above the fusion level, due to persistent L3-4 disc bulge, and what appears to be new spinal stenosis at this level, generally as a result of posterior element hypertrophic change. Canal appears stable elsewhere. No lumbar compression deformity or new abnormality of alignment is seen. No vertebral marrow edema is appreciated. Sagittal images suggest a small (18 mm) seroma or synovial cyst on the right at L5-S1. Tip of conus medullaris is again noted at L1.  Axial images   L1-L2: No significant canal or foraminal stenosis   L2-L3: Canal appears relatively narrowed, due to posterior element hypertrophy but still greater than 1 cm in diameter. Foramina appear  lower limits of normal diameter.   L3-L4: Moderate canal stenosis with lateral narrowing, AP diameter 6.5 mm, transverse diameter approximately 9 mm with crowding of the nerve roots. Foramina appear mildly to moderately  "narrowed bilaterally due to facet DJD.   L4-L5: Fused level, canal appears normal. Mild foraminal stenosis due to facet DJD  L5-S1: Canal appears normal. Left-sided neural foramen appears normal. Right-sided neural foramen appears mildly narrowed due to facet DJD. Postcontrast images show expected postoperative enhancement of the dorsal soft tissues and no pathologic postcontrast enhancement elsewhere.     The following portions of the patient's history were reviewed and updated as appropriate: problem list, past medical history, past surgery history, social history, family history, medications, and allergies    Review of Systems   Musculoskeletal: Positive for back pain.   All other systems reviewed and are negative.    /70   Pulse 57   Temp 97.3 °F (36.3 °C) (Temporal)   Resp 16   Ht 175.3 cm (69\")   Wt 82.1 kg (181 lb)   SpO2 98%   BMI 26.73 kg/m²       Physical Exam:   Constitutional General Appearance: No acute distress, well appearing and well nourished.   Appears healthy, within normal limits of ideal weight, well hydrated and appearance reflects stated age.   Head and face: Normal.   Palpation of the face and sinuses: No sinus tenderness.   Eyes Conjunctiva and lids: No erythema, swelling or discharge.   Pupils: Equal, round, reactive to light.   Neck: Supple, symmetric, trachea midline, no masses.   Pulmonary Respiratory effort: No increased work of breathing or signs of respiratory distress. Respiratory rate: normal. Assessment of respiratory effort revealed normal rhythm and effort. Auscultation of lungs: Clear to auscultation. Auscultation of the lungs revealed no expiratory wheezing, normal expiratory time and no inspiratory wheezing. No rales or crackles were heard bilaterally. No rhonchi. No friction rub. No wheezing. No diminished breath sounds. No bronchial breath sounds.   Cardiovascular Auscultation of heart: Normal rate and rhythm, normal S1 and S2, no murmurs. Peripheral vascular " exam: Normal. Examination of extremities for edema and/or varicosities: Normal.   Abdomen: Non-tender, no masses. Bowel sounds were normal. The abdomen was soft and nontender. No masses palpated.   Musculoskeletal Gait and station: Normal. Gait evaluation demonstrated a normal gait. The range of motion of the lumbar spine is essentially full and without significant pain. Lumbar facet joint loading maneuvers are negative. Kodak and Gaenslen's tests are negative. The range of motion of the hip joints is full and without pain. Palpation of the gluteal bursa, ischial tuberosities, and greater trochanters, unrevealing. Piriformis maneuvers are negative. There is some discomfort upon palpation of the left piriformis muscle. Active resisted external rotation and passive internal rotation did not increase pain.   Muscle strength/tone: Normal.   Motor Strength Findings: normal strength.   Motor Tone: normal tone. Involuntary movements: none. Significant shortening of the hamstrings, worse on the left side.  Skin and subcutaneous tissue: Normal without rashes or lesions. Both surgical wounds are well healed without erythema, drainage or fluid accumulation. All surgical wounds are well-healed without erythema, drainage, or fluid accumulation.  There is complete epithelialization of the surgical wound of his IPG. There is a 2 mm area of superficial skin dehiscence inferior portion of the paravertebral wound, the site of SCS lead placement.  Neurologic   Cranial nerves: Cranial nerves 2-12 intact.   Cortical function: Normal mental status.   Reflexes: 2+ and symmetric. Deep tendon reflexes: 2+ right biceps, 2+ left biceps, 1+ right patella, 1+ left patella, 1+ right ankle jerk and 1+ left ankle jerk. Superficial/Primitive Reflexes: primitive reflexes were absent. Straight leg raising test is negative. Femoral stretch sign is negative.   Sensation: No sensory loss. Sensory exam: intact to light touch, intact pain and  temperature sensation, intact vibration sensation and normal proprioception.   Coordination: Normal finger to nose and heel to shin. Coordination: normal balance and negative Romberg's sign.   Psychiatric: Judgment and insight: Normal. Orientation to person, place and time: Normal. Recent and remote memory: Intact. Mood and affect: Normal.     Analysis of the spinal cord stimulator device with complex spinal cord stimulator reprogramming   Patient used the [employer] spinal cord stimulator device for 1400 hours since last reprogramming, and 2595 lifetime hours (average 24 hours per day). Analysis of impedence reveals normal impedence for all contacts. The spinal cord stimulator device was reprogrammed under my direct supervision and 5 new programs, for a total of 6 programs, were created by adjusting electrode polarities, amplitude, pulse width,  pulse rate, BurstDR, micro-dosing,  in the following fashion;    Program ONE:    Electrode polarities: 2+, 5-, 6-, 10-  Amplitude: 1-2 mA     Pulse width: 1000 mcs  Rate: 40 Hz  IntraBurst rate: 500 Hz  Micro-dosin:90    Patient experienced coverage with pleasant paresthesia in all areas of chronic pain.  Time spent reprogrammin minutes  A copy of the telemetry report will be scanned in the patient's chart.    ASSESSMENT:   1. Chronic intractable pain    2. Adhesive arachnoiditis    3. Chronic lumbar radiculopathy    4. Lumbar postlaminectomy syndrome     5. Status post L4-S1 lumbar spinal fusion    6. Bilateral stenosis of lateral recess of lumbar spine    7. Presence of neurostimulator    8. Encounter for fitting and adjustment of neuropacemaker of spinal cord         PLAN/MEDICAL DECISION MAKING: Patient continues struggling with his chronic pain condition.  He continues reporting minimal analgesic improvement with the use of his a spinal cord stimulator device and maximization of conservative measures.  We have maxed out the dose of gabapentin. He started  different pharmacological trials, all with failure, secondary to side effects.  Most recently, he failed a trial with a low dose of tramadol (drowsiness).  He did no start a trial of Effexor. He continues using Tylenol and OTC NSAIDs for breakthrough pain. Mr. Yañez presents with a very challenging chronic pain history.  He is a s/p 2 lumbar surgeries: 03/03/2014: Left L4-L5 hemilaminectomy and foraminotomy at [facility] with Dr. Mackay, and on 07/24/2014: L4-S1 decompression and fusion by Dr. Renee. He underwent several neurosurgical consultations after his surgeries.  On 11/03/2017, he underwent neurosurgical consultation with Dr. Renee and discussed the possibility of lumbar decompression and extension of fusion to the L3-L4 level. Patient opted against surgery. An MRI of the lumbar spine revealed lateral recess stenosis at L3-L4 associated with crowding of the nerve roots and lumbar arachnoiditis. A Lumbar myelogram followed by CT post myelogram confirmed the diagnosis of lumbar stenosis at L3-L4 without significant clinical correlation. His clinical symptoms to follow the left L5-S1 dermatomal distribution.An EMG/NCV of the bilateral lower extremities confirmed chronic left S1 radiculopathy. Consequently, most of his symptoms appear to originate from chronic left S1 radiculopathy along with symptoms consistent with lumbar arachnoiditis. He has consistently denied neurogenic claudication. In fact, he feels less pain standing in contrast to sitting or lying down. He has failed conservative measures, interventional pain management measures, and surgical measures, as referenced in the HPI and on previous encounters. Patient has reported intolerance to most oral analgesics and adjuvants, as referenced on previous encounters.  He underwent psychological evaluation and obtained clearance for spinal cord stimulation and intrathecal therapies.  He underwent a spinal cord stimulator trial, and reported significant analgesic  and functional improvement. Consequently, patient underwent implantation of his spinal cord stimulator device thereafter.  Unfortunately, after implantation of his device, he failed to notice significant pain relief despite the fact he feels stimulation in all areas of his chronic pain- from his lower back down to his foot-. Therefore, I have proposed the following plan:  1. Follow-up with me on 05/21/2020 at 07:30 AM via telehealth as per patient's request. We have reprogrammed his spinal cord stimulator device and provided him with a total of 6 programs that he will assess over the next 5-6 weeks. He has been instructed to start with program 1 and if after a few days not effective, then use #3 for the rest of the week. Then, he will use program #2 and so forth. He now has 3 burst DR programs and 3 tonic programs.  He will use a pain diary.  I will check on him in 2 weeks or sooner if necessary. Patient confirmed that the stimulator programs provided stimulation in all areas of his chronic pain: Lower back and the entire left lower extremity including his left foot. Patient reported an interest in explantation of his spinal cord stimulator device. He also requested the possibility of an intrathecal trial and if successful; implantation of an intrathecal delivery system. He is aware that an intrathecal analgesic trial would be necessary prior to determining candidacy for intrathecal pump placement. Patient has already obtained psychological clearance for an intrathecal delivery system. I have advised him to continue using his spinal cord stimulator device to determine if we can find some stimulation modalities that may provide him with some pain relief. In cases of refractory pain, a spinal cord stimulator along with an intrathecal analgesic device sometimes have been necessary to alleviate the different types and sources of pain. Patient has agreed with the plan. Patient has reviewed the information I sent him over  "\"my chart\" and some information I mailed to his home regarding a potential intrathecal analgesic trial. I explained the use of different drugs such as morphine, a morphine like opioid, and Prialt. I explained to him that we always use the lowest therapeutic dose in order to avoid side effects. In some cases, it may be necessary to repeat the trial using higher doses if the first dose was unsuccessful. If we could demonstrate significant improvement in his pain levels and improved functional levels after his trial, particularly tolerance to sitting and lying down, then, we could move forward with implantation of an intrathecal pump. We had a lengthy discussion regarding single shot versus a continuous intrathecal catheter trial. I have recommended a single shot in order to avoid a hospital admission during the COVID-19 epidemic. For his IT trial, he has requested to schedule his IT trial after 11 AM because his pain increases around that time. We discussed different techniques regarding pump implantation site. After IT pump implantation, we always start with the lowest effective therapeutic dose in order to avoid side effects. Patient understands that intrathecal therapies may alleviate his pain but may not completely eliminate his chronic pain. I have discussed with him some of the side effects with IT drugs, particularly bladder and bowel related. I explained that those side effects are usually temporary and may resolve without intervention. Particularly, during intrathecal trial with opioids, the possibility of urinary retention is always a possibility that in most cases resolves without intervention or with minimal intervention such as reversing the effect of the opioid with a low-dose of naloxone, or an in and out bladder catheter. Patient reports some chronic prostate issues.  We could look into starting Flomax prior to IT trial and implant. Patient had some questions regarding cognitive side effects with " intrathecal therapy. Since the doses used by intrathecal administration are extremely low -in contrast to oral or IV administration- we do not see significant cognitive side effects at usual therapeutic IT doses. Patient also asked about the potential for tolerance and loss of analgesic effectiveness over time. I have explained that we avoid increasing the pump dose repeatedly, particularly with opioids because of the possibility of tolerance and the potential development of opioid-induced hyperalgesia. If higher doses are necessary, then, switching to a different drug or adding a second drug with a different mechanism of action may help control pain more efficiently while avoiding the potential for tolerance and hyperalgesia. We have also discussed the use of alternative drugs such as Prialt, an non-opioid. Patient asked about the possibility of a post dural puncture headache with an intrathecal trial or intrathecal pump placement. I have explained that this is usually seen among younger patients, usually from teenager to around 60-year-old. My advice today was again to use the new [facility] programs.   2. Pharmacological measures: Patient consistently declined new pharmacological trials due to previous failures. Unfortunately, due to his abnormal EKG, he is not a candidate for a mexiletine trial for Tx of neuropathic pain. He has agreed to continue adjusting some medications and introducing new trials.  A. Continue gabapentin: Take gabapentin 800 mg 1 tablet every morning, 1 tablet at noon, and 2 tablets at bedtime. He has previously failed a trial with Lyrica  B. Patient decided against a trial with a low dose Effexor.  Apparently, he filled the prescription.  The plan was to start Effexor 25 mg 3 times daily. Depending on response, we may increase the dose of Effexor.  Patient has failed to obtain pain relief with conventional analgesics and he has been aware that most medications used for the treatment of neuropathic  pain are off label  3. Long-term rehabilitation efforts:  A. Patient has declined participation in a comprehensive physical therapy program at [facility] for water therapy, neurodynamics, gait and balance and core strengthening   B. Patient has declined in the past a referral to [facility]. I will discuss with the patient in more detail during his next visit the possibility of restarting water therapy as a long-term exercise program   5. The patient has been instructed to contact my office with any questions or difficulties. I have sent instructions via [employer]. The patient understands the plan and agrees to proceed accordingly.     Time spent with the patient: 45 minutes, more than 50% of the time was spent counseling      Patient Care Team:  SHERON Chun MD as PCP - General (Family Medicine)  Efrain Batista MD as Consulting Physician (Pain Medicine)  Fausto Renee MD as Consulting Physician (Neurosurgery)  Jean Warren MD as Consulting Physician (Neurosurgery)  Hank Frankel MD as Consulting Physician (Cardiology)  Marcelino Condon MD as Consulting Physician (Urology)  Brian Tatum III, MD as Consulting Physician (Cardiology)  SHERON Chun MD as Consulting Physician (Family Medicine)  Francisco Javier Clay MD as Consulting Physician (Neurosurgery)  Royer Roldan MD as Consulting Physician (General Practice)  Emigdio Watt MD as Consulting Physician (Gastroenterology)  Shola Vila MD as Consulting Physician (Otolaryngology)     No orders of the defined types were placed in this encounter.        Future Appointments   Date Time Provider Department Center   5/21/2020  7:30 AM Efrain Batista MD [facility] None         Efrain Batista MD    EMR Dragon/Transcription disclaimer:  Much of this encounter note is an electronic transcription of spoken language to printed text. Electronic transcription of spoken language may permit erroneous, or at times,  nonsensical words or phrases to be inadvertently transcribed. Although I have reviewed the note for such errors, some may still exist.

## 2020-05-07 ENCOUNTER — OFFICE VISIT (OUTPATIENT)
Dept: PAIN MEDICINE | Facility: CLINIC | Age: 75
End: 2020-05-07

## 2020-05-07 VITALS
TEMPERATURE: 97.3 F | HEIGHT: 69 IN | RESPIRATION RATE: 16 BRPM | SYSTOLIC BLOOD PRESSURE: 128 MMHG | BODY MASS INDEX: 26.81 KG/M2 | DIASTOLIC BLOOD PRESSURE: 70 MMHG | HEART RATE: 57 BPM | WEIGHT: 181 LBS | OXYGEN SATURATION: 98 %

## 2020-05-07 DIAGNOSIS — Z96.82 PRESENCE OF NEUROSTIMULATOR: ICD-10-CM

## 2020-05-07 DIAGNOSIS — Z46.2 ENCOUNTER FOR FITTING AND ADJUSTMENT OF NEUROPACEMAKER OF SPINAL CORD: ICD-10-CM

## 2020-05-07 DIAGNOSIS — M96.1 POSTLAMINECTOMY SYNDROME OF LUMBAR REGION: ICD-10-CM

## 2020-05-07 DIAGNOSIS — G89.29 CHRONIC INTRACTABLE PAIN: ICD-10-CM

## 2020-05-07 DIAGNOSIS — M48.061 BILATERAL STENOSIS OF LATERAL RECESS OF LUMBAR SPINE: ICD-10-CM

## 2020-05-07 DIAGNOSIS — G03.9 ADHESIVE ARACHNOIDITIS: ICD-10-CM

## 2020-05-07 DIAGNOSIS — M54.16 CHRONIC LUMBAR RADICULOPATHY: ICD-10-CM

## 2020-05-07 DIAGNOSIS — Z98.1 STATUS POST LUMBAR SPINAL FUSION: ICD-10-CM

## 2020-05-07 PROCEDURE — 99215 OFFICE O/P EST HI 40 MIN: CPT | Performed by: ANESTHESIOLOGY

## 2020-05-07 PROCEDURE — 95972 ALYS CPLX SP/PN NPGT W/PRGRM: CPT | Performed by: ANESTHESIOLOGY

## 2020-05-16 NOTE — PROGRESS NOTES
"This consultation was provided with the use of interactive audio and video telecommunications system that permits real time communication with the patient, as patient is unable to attend an in-office appointment due to the COVID-19 crisis. Consent for assessment and treatment was provided by the patient. You have chosen to receive care through a telehealth visit.  Do you consent to use a video/audio connection for your medical care today? Yes       Chief Complaint:  \"I had not relief from the new programs.  I didn't try program #3 of my stimulator.\"        Brief History:  Mr. Prashant Avila is a 75 y.o. male, who underwent implantation of a spinal cord stimulator device with Dr. Fausto Renee on 11/21/2019 with [employer] Penta paddle lead with the top electrodes projecting at the level of the superior endplate of the T8 vertebral level. IPG: [employer] Proclaim 5 XR IPG Non-Rechargeable (Full Body MRI compatible). The device was implanted primarily for treatment of chronic left hip and left lower extremity pain. Patient was last seen on May 7, 2020 for evaluation of his chronic pain and spinal cord stimulator reprogramming.  At that point, I provided him with different spinal cord stimulator programs for him to test for at least a week at that time.  He reports no significant relief from any of the new programs. He has not tried program 3, yet.  Based on his chronic pain condition and the appropriate placement of his SCS lead, and outcome of his SCS trial, one could expect that patient should experience at least some relief of his chronic pain. Patient reports that his SCS programs continue to provide stimulation over all the areas of his chronic pain including his lower back, left gluteal region, and the entire left lower extremity and his left foot.  He continues on gabapentin with minimal analgesic benefit and without side effects.  He also takes Tylenol and NSAIDs PRN for breakthrough pain without " significant benefit. He underwent a short trial with tramadol without relief and with side effects (drowsiness). He started a low dose of Effexor and stopped after 2 days.  He has failed several analgesic trials.  Mr. Prashant Avila underwent follow-up neurosurgical consultation with Sherlyn Gonzalez PA-C on March 13, 2020, after completing three rounds of Bactrim DS with decreased in the amount of drainage from the inferior aspect of his thoracic paravertebral surgical wound -at the placement site of the SCS surgical paddle lead-. Labs during that visit that were within normal parameters and included CBC, PLT count, CRP, ESR. He continues to deny fever or any other symptoms. He denies pain at the surgical sites except for mild discomfort in his left gluteal region, which coincides with the area of his chronic pain and does not seem to be related to the IPG. The sites looks unremarkable and he denies discomfort at the site. During previous tele-health visits, I examined his surgical wounds in front of his camera. His wife assisted with this. During that visit, they reported that the drainage had stopped over the previous 24-48 hours. Inspection of the surgical wounds revealed a very small area of erythema and superficial skin dehiscence at the inferior margin of a paravertebral wound without evidence of drainage or fluid accumulation (patient's wife applied gentle pressure on the margins of the wound using sterile 4 x 4's). During his visit with Sherlyn, he mentioned that he was interested in removal of his spinal cord stimulator device. He has asked me about timing of potential removal of his SCS as well as timing for an intrathecal pump trial. Patient has been made aware that an intrathecal analgesic trial would be necessary prior to determining candidacy for intrathecal pump placement. Patient has already obtained psychological clearance for an intrathecal delivery system. I advised the patient to continue using  "his a spinal cord stimulator device prior to determining failure. Patient has a history of a very challenging chronic pain condition refractory to conservative, interventional and surgical measures.   Pain Description:   Constant pain with intermittent exacerbation, described as burning, electricity, pins and needles, and tingling sensation.   Radiation of pain: The pain radiates from the left gluteal region into the posterior aspect of the thigh, left leg and plantar aspect of the foot. The leg pain is more severe than the lower back pain  Pain pattern: Dermatomic, left L5 and S1   Pain intensity today: 2/10 (standing) 7/10 (sitting or lying down) with the stimulator \"turned on”  Average pain intensity last week: 5/10  Pain intensity ranges from: 1/10 (walking) to 10/10 (sitting > lying down)  Aggravating factors: Pain increases with sitting longer than 5 minutes and laying down for more than 5 minutes.  Alleviating factors: Pain decreases with standing and walking   Associated symptoms:   Patient denies numbness or weakness in the lower extremities  Patient denies any new bladder or bowel problems  Patient denies difficulties with his balance or recent falls     Pain History:  Referring physician: Dr. Fausto Renee   Consultation for intractable chronic left lower extremity pain.   Patient reports a 7-year history of pain, which began without incident. Patient is status post L4-L5 hemilaminectomy and foraminotomy at the [facility] with Dr. Mackay on 03/03/2014, followed by L4-S1 lumbar decompression and fusion due to unresolved pain by Dr. Renee on 07/24/2014. Patient reports that he experienced significant improvement after his last surgery with Dr. Renee, in particular, complete resolution of his previously severe lower back pain. Unfortunately, his left lower extremity pain, chronic radicular pain preceded his last lumbar fusion, and did not improve after surgery. He underwent surgical consultation with Dr. Cox " at the [facility], who recommended decompression of the nerve roots at L3-S1. He underwent follow-up neurosurgical consultation with Dr. Fausto Renee on 11/03/2017. Dr. Renee discussed the possibility of lumbar decompression at L3-L4: Left L3-L4 laminotomy and foraminotomy. Due to potential risk of developing instability that would necessitate extending his lumbar fusion to the L3 level. Patient underwent on April 3, 2018, left L3-L4 transforaminal epidural steroid injection without any pain relief. Patient elected to hold off on surgery. Patient underwent EMG/NCV, which confirmed chronic left S1 radiculopathy.  A CT myelogram was significant for stenosis above the level of his lumbar fusion, at L3-L4 without clinical correlation.  Patient presents with pain with a left S1 dermatomal distribution pattern.  Pain Description:   Constant pain with intermittent exacerbation, described as burning, electricity, pins and needles, and tingling sensation.   Radiation of pain: The pain radiates into the posterior aspect of the thigh, left leg and plantar aspect of the foot. The leg pain is more severe than the lower back pain.   Pain pattern: dermatomic, left L5 and S1   Pain intensity today: 3/10 (standing) 9-10/10 (sitting or lying down)  Average pain intensity last week: 7/10  Pain intensity ranges from: 4/10 to 10/10  Aggravating factors: Pain increases with sitting longer than 5 minutes and laying down for more than 5 minutes.  Alleviating factors: Pain decreases with standing.   Associated symptoms:   Patient denies numbness or weakness in the lower extremities.   Patient denies any new bladder or bowel problems.   Patient denies difficulties with his balance or recent falls.       Review of previous therapies and additional medical records:  Prashant Avila has already failed the following measures, including:   Conservative Measures: Oral analgesics, ice and heat, physical therapy  Interventional  "measures:  02/06/2019: Left S1 transforaminal epidural steroid injection and experienced pain relief that was short-lived   02/20/2017: Diagnostic and therapeutic left sciatic nerve block with local anesthetics, steroids, hyaluronidase under ultrasound, PNS and fluoroscopic guidance- Patient did not receive sustained relief from this procedure.   02/06/2017: Diagnostic left S1 selective nerve root injection  01/30/2017: Nevro SCS Trial; no relief  04/25/2016: Trial of lumbar epidural steroid injections with steroids and hyaluronidase via caudal catheter with 100% pain relief lasting 48 hours  12/16/2015: Diagnostic and therapeutic left S1 transforaminal epidural steroid injection with hyaluronidase with complete resolution of pain for several weeks  11/30/2015: Spinal cord stimulator trial [employer], which provided him with more than 50% pain relief and functional improvement. However, patient decided to delay implantation of a permanent spinal cord stimulator device until the arrival of new technology. Patient did not like the perception of tonic stimulation, even though stimulation overlapped successfully the areas of his chronic pain, he did not find it pleasant.   02/09/2015: Diagnostic left piriformis muscle injection  Surgical Measures:   03/03/2014: Left L4-L5 hemilaminectomy and foraminotomy at [facility] with Dr. Mackay   07/24/2014: L4-S1 decompression and fusion by Dr. Renee  Prashant Avila underwent follow-up neurosurgical consultation with Dr. Fausto Renee on 11/30/2017, and was found to be a potential surgical candidate.  Pain Psychology Evaluation (02/25/2019): \"From a psychological perspective, patient is considered to be an appropriate candidate for a spinal cord stimulator or an intrathecal pump implant at this time.\"  Prashant Avila presents with significant comorbidities including hypertension, rheumatoid arthritis, osteoporosis, hyperlipidemia, chronic kidney disease, hearing loss, carcinoma of " "prostate, engaged in treatment.  In terms of current analgesics, Prashant Avila takes: Tylenol, NSAIDs and gabapentin 800 mg 4 times a day without any side effects.  He started a trial with tramadol without significant analgesic benefit and with side effects \"drowsiness\"  I have reviewed Jon Report #[id number] consistent to medication reconciliation.  SOAPP: Low risk  Consent for treatment with controlled substances has been obtained      Global Pain Scale 04-19  2018 10-24  2018 11-08  2018 01-29  2019 03-06  2019 10-03  2019 10-17  2019 01-14  2020 01-21  2020 02-20  2020 04-14  2020 05-07  2020          Pain 19 17 17 18 17 20  10 10 10   10  10 8        Feelings 1 0 0 0 0   8    4   2  0  0    0 0         Clinical outcomes 11 8 13 11 6 11    7   6  7  9    9 9         Activities 17 10 16 18 6 15    7   6  4  15  15 12         GPS Total: 48 35 46 47 29 54  28  24  21  34  34 28             Diagnostic Studies:    MRI THORACIC SPINE WO CONTRAST-10/31/2019: Minimal degenerative changes at multiple levels throughout the thoracic spine with no central spinal canal stenosis. Epidural space is unremarkable.  EMG/NCV of the bilateral lower extremities December 18, 2018: Chronic left S1 radiculopathy  CT LUMBAR SPINE WITH CONTRAST-01/21/2019: There is excellent opacification of the thecal sac. Imaging covers from T12 through the S1 level caudally. The conus terminates at the L1/L2 level.  The distal cord and conus are normal in caliber.  The nerve roots of the cauda equina are normal in caliber and distribution. L4-S1 fusion hardware is in place; the left S1 pedicle screw is fractured and there is lucency around the right S1 pedicle screw. Left L4 laminotomy. Vertebral body heights and alignment are normal. Vertebral body heights are normal. Degenerative changes are as follows:  T12-L1: Facet arthropathy without significant foraminal or spinal canal stenosis.  L1-L2: Facet arthropathy without significant foraminal or " spinal canal stenosis.  L2-L3: Right greater than left facet arthropathy. Mild narrowing of the right subarticular zone. No significant central spinal canal or foraminal stenosis.  L3-L4: Disc bulge and facet arthropathy with ligamentum flavum thickening. There is severe spinal canal stenosis and at least moderate bilateral foraminal stenosis.   L4-L5: Improved right subarticular zone narrowing. No significant osseous spinal canal or foraminal stenosis.  L5-S1: Bilateral facet arthropathy. Shallow disc osteophyte. Unchanged mild osseous foraminal stenosis. No significant osseous spinal canal stenosis.  EKG April 19, 2018 revealed sinus bradycardia with first-degree AV block.  Left bundle branch block.    X-Ray lumbar spine 08/04/2017: Posterior lumbar fusion of L4, L5 and S1. Degenerative changes at L1-L2 with anterior osteophyte formation. Normal lumbar alignment in the lateral projection with a stable alignment in the flexed and extended positions.      Duplex Venous Lower, 03/25/2017: Normal right lower extremity venous duplex scan. No evidence of DVT.  MRI Lumbar Spine, 10/28/2016: Since the previous MRI study, there has been L4-5-S1 posterior lumbar and interbody fusion. No new abnormality of alignment is seen. There is, however, significant worsening of posterior element hypertrophy at L3-4, above the fusion level, due to persistent L3-4 disc bulge, and what appears to be new spinal stenosis at this level, generally as a result of posterior element hypertrophic change. Canal appears stable elsewhere. No lumbar compression deformity or new abnormality of alignment is seen. No vertebral marrow edema is appreciated. Sagittal images suggest a small (18 mm) seroma or synovial cyst on the right at L5-S1. Tip of conus medullaris is again noted at L1.  Axial images   L1-L2: No significant canal or foraminal stenosis   L2-L3: Canal appears relatively narrowed, due to posterior element hypertrophy but still greater than  1 cm in diameter. Foramina appear  lower limits of normal diameter.   L3-L4: Moderate canal stenosis with lateral narrowing, AP diameter 6.5 mm, transverse diameter approximately 9 mm with crowding of the nerve roots. Foramina appear mildly to moderately narrowed bilaterally due to facet DJD.   L4-L5: Fused level, canal appears normal. Mild foraminal stenosis due to facet DJD  L5-S1: Canal appears normal. Left-sided neural foramen appears normal. Right-sided neural foramen appears mildly narrowed due to facet DJD. Postcontrast images show expected postoperative enhancement of the dorsal soft tissues and no pathologic postcontrast enhancement elsewhere.     The following portions of the patient's history were reviewed and updated as appropriate: problem list, past medical history, past surgery history, social history, family history, medications, and allergies    Review of Systems   Musculoskeletal: Positive for back pain.   All other systems reviewed and are negative.    There were no vitals taken for this visit.      Physical Exam:  Due to the constraints of the telemedicine format, a physical examination was not performed today.   From previous visits:  Constitutional General Appearance: No acute distress, well appearing and well nourished.   Appears healthy, within normal limits of ideal weight, well hydrated and appearance reflects stated age.   Head and face: Normal.   Palpation of the face and sinuses: No sinus tenderness.   Eyes Conjunctiva and lids: No erythema, swelling or discharge.   Pupils: Equal, round, reactive to light.   Neck: Supple, symmetric, trachea midline, no masses.   Pulmonary Respiratory effort: No increased work of breathing or signs of respiratory distress. Respiratory rate: normal. Assessment of respiratory effort revealed normal rhythm and effort. Auscultation of lungs: Clear to auscultation. Auscultation of the lungs revealed no expiratory wheezing, normal expiratory time and no  inspiratory wheezing. No rales or crackles were heard bilaterally. No rhonchi. No friction rub. No wheezing. No diminished breath sounds. No bronchial breath sounds.   Cardiovascular Auscultation of heart: Normal rate and rhythm, normal S1 and S2, no murmurs. Peripheral vascular exam: Normal. Examination of extremities for edema and/or varicosities: Normal.   Abdomen: Non-tender, no masses. Bowel sounds were normal. The abdomen was soft and nontender. No masses palpated.   Musculoskeletal Gait and station: Normal. Gait evaluation demonstrated a normal gait. The range of motion of the lumbar spine is essentially full and without significant pain. Lumbar facet joint loading maneuvers are negative. Kodak and Gaenslen's tests are negative. The range of motion of the hip joints is full and without pain. Palpation of the gluteal bursa, ischial tuberosities, and greater trochanters, unrevealing. Piriformis maneuvers are negative. There is some discomfort upon palpation of the left piriformis muscle. Active resisted external rotation and passive internal rotation did not increase pain.   Muscle strength/tone: Normal.   Motor Strength Findings: normal strength.   Motor Tone: normal tone. Involuntary movements: none. Significant shortening of the hamstrings, worse on the left side.  Skin and subcutaneous tissue: Normal without rashes or lesions. Both surgical wounds are well healed without erythema, drainage or fluid accumulation. All surgical wounds are well-healed without erythema, drainage, or fluid accumulation.  There is complete epithelialization of the surgical wound of his IPG. There is a 2 mm area of superficial skin dehiscence inferior portion of the paravertebral wound, the site of SCS lead placement.  Neurologic   Cranial nerves: Cranial nerves 2-12 intact.   Cortical function: Normal mental status.   Reflexes: 2+ and symmetric. Deep tendon reflexes: 2+ right biceps, 2+ left biceps, 1+ right patella, 1+ left  patella, 1+ right ankle jerk and 1+ left ankle jerk. Superficial/Primitive Reflexes: primitive reflexes were absent. Straight leg raising test is negative. Femoral stretch sign is negative.   Sensation: No sensory loss. Sensory exam: intact to light touch, intact pain and temperature sensation, intact vibration sensation and normal proprioception.   Coordination: Normal finger to nose and heel to shin. Coordination: normal balance and negative Romberg's sign.   Psychiatric: Judgment and insight: Normal. Orientation to person, place and time: Normal. Recent and remote memory: Intact. Mood and affect: Normal.     From previous visit:  Analysis of the spinal cord stimulator device with complex spinal cord stimulator reprogramming   Patient used the [employer] spinal cord stimulator device for 1400 hours since last reprogramming, and 2595 lifetime hours (average 24 hours per day). Analysis of impedence reveals normal impedence for all contacts. The spinal cord stimulator device was reprogrammed under my direct supervision and 5 new programs, for a total of 6 programs, were created by adjusting electrode polarities, amplitude, pulse width,  pulse rate, BurstDR, micro-dosing,  in the following fashion;  Program ONE:    Electrode polarities: 2+, 5-, 6-, 10-  Amplitude: 1-2 mA     Pulse width: 1000 mcs  Rate: 40 Hz  IntraBurst rate: 500 Hz  Micro-dosin:90  Patient experienced coverage with pleasant paresthesia in all areas of chronic pain.  Time spent reprogrammin minutes  A copy of the telemetry report will be scanned in the patient's chart.    ASSESSMENT:   1. Chronic intractable pain    2. Adhesive arachnoiditis    3. Chronic lumbar radiculopathy    4. Lumbar postlaminectomy syndrome     5. Status post L4-S1 lumbar spinal fusion    6. Bilateral stenosis of lateral recess of lumbar spine    7. Presence of neurostimulator       PLAN/MEDICAL DECISION MAKING: Patient continues struggling with his chronic pain  condition.  Patient reports minimal analgesic improvement with the use of his spinal cord stimulator device and maximization of conservative measures. We have maxed out the dose of gabapentin.  He started different pharmacological trials, all with failure, secondary to side effects.  Most recently, he failed a trial with a low dose of tramadol (drowsiness).  He started a trial with a low-dose of Effexor, which he stopped 2 days later for unclear reasons.  He continues using Tylenol and OTC NSAIDs for breakthrough pain with no significant relief.  Mr. Yañez presents with a very challenging chronic pain history.  He is a s/p 2 lumbar surgeries: 03/03/2014: Left L4-L5 hemilaminectomy and foraminotomy at [facility] with Dr. Mackay, and on 07/24/2014: L4-S1 decompression and fusion by Dr. Renee. He underwent several neurosurgical consultations after his lumbar surgeries.  On 11/03/2017, he underwent neurosurgical consultation with Dr. Renee and discussed the possibility of lumbar decompression and extension of fusion to the L3-L4 level. Patient opted against surgery. An MRI of the lumbar spine revealed lateral recess stenosis at L3-L4 associated with crowding of the nerve roots and lumbar arachnoiditis. A Lumbar myelogram followed by CT post myelogram confirmed the diagnosis of lumbar stenosis at L3-L4 without significant clinical correlation. His clinical symptoms to follow the left L5-S1 dermatomal distribution.An EMG/NCV of the bilateral lower extremities confirmed chronic left S1 radiculopathy. Consequently, most of his symptoms appear to originate from chronic left S1 radiculopathy along with symptoms consistent with lumbar arachnoiditis. He has consistently denied neurogenic claudication. In fact, he feels less pain standing in contrast to sitting or lying down. He has failed conservative measures, interventional pain management measures, and surgical measures, as referenced in the HPI and on previous encounters.  Patient has reported intolerance to most oral analgesics and adjuvants, as referenced on previous encounters.  He underwent psychological evaluation and obtained clearance for spinal cord stimulation and intrathecal therapies.  He underwent a spinal cord stimulator trial, and reported significant analgesic and functional improvement. Consequently, patient underwent implantation of his spinal cord stimulator device thereafter.  Unfortunately, after implantation of his device, he failed to notice significant pain relief despite the fact he feels stimulation in all areas of his chronic pain- from his lower back down to his foot-. Therefore, I have proposed the following plan:  1.  Patient would like to move forward with an intrathecal trial. Patient has already obtained psychological clearance for an intrathecal delivery system.  Patient has already reviewed information regarding intrathecal trial intrathecal pump implantation.  I answered additional questions today.  I have recommended a single shot in order to avoid a hospital admission during the COVID-19 epidemic. For his IT trial, he requested to schedule his trial at or after 11 AM because pain increases around that time. We discussed different techniques regarding pump implantation site. After IT pump implantation, we always start with the lowest effective therapeutic dose in order to avoid side effects. Patient understands that intrathecal therapies may alleviate his pain but may not completely eliminate his chronic pain. I have discussed with him some of the side effects with IT drugs, particularly bladder and bowel related. I explained that those side effects are usually temporary and may resolve without intervention. Particularly, during intrathecal trial with opioids, the possibility of urinary retention is always a possibility that in most cases resolves without intervention or with minimal intervention such as reversing the effect of the opioid with a  low-dose of naloxone, or an in and out bladder catheter. Patient reports some chronic prostate issues.  We could look into starting Flomax prior to IT trial and implant. Patient had some questions regarding cognitive side effects with intrathecal therapy. Since the doses used by intrathecal administration are extremely low -in contrast to oral or IV administration- we do not see significant cognitive side effects at usual therapeutic IT doses. Patient also asked about the potential for tolerance and loss of analgesic effectiveness over time. I have explained that we avoid increasing the pump dose repeatedly, particularly with opioids because of the possibility of tolerance and the potential development of opioid-induced hyperalgesia. If higher doses are necessary, then, switching to a different drug or adding a second drug with a different mechanism of action may help control pain more efficiently while avoiding the potential for tolerance and hyperalgesia. We have also discussed the use of alternative drugs such as Prialt, an non-opioid. Patient asked about the possibility of a post dural puncture headache with an intrathecal trial or intrathecal pump placement. I have explained that this is usually seen among younger patients, usually from teenager to around 60-year-old.   2.  Regenerative therapies: Patient inquire about the benefits and risks of these therapies.  I have provided him with additional information for him to review  3.  Spinal cord stimulator device: I advised the patient to switch to per #3, which has provided him with some relief in the past.    Patient confirmed that the stimulator programs provide stimulation in all areas of his chronic pain: Lower back and the entire left lower extremity including his left foot. Patient reported an interest in explantation of his spinal cord stimulator device. I have advised him to continue using his spinal cord stimulator device to determine if we can find  stimulation modalities that may provide him with some pain relief.  In cases of refractory pain, a spinal cord stimulator along with an intrathecal analgesic device sometimes are necessary to alleviate the different types and sources of pain. Patient has agreed with the plan.   4. Pharmacological measures: Patient consistently declined new pharmacological trials due to previous failures. Unfortunately, due to his abnormal EKG, he is not a candidate for a mexiletine trial for Tx of neuropathic pain. He has agreed to continue adjusting some medications and introducing new trials.  A. Continue gabapentin: Take gabapentin 800 mg 1 tablet every morning, 1 tablet at noon, and 2 tablets at bedtime. He has previously failed a trial with Lyrica  B. Patient started and stopped Effexor for unclear reasons.  Patient has failed to obtain pain relief with conventional analgesics and he has been aware that most medications used for the treatment of neuropathic pain are off label.  He declined additional trials at this time  5. Long-term rehabilitation efforts:  A. Patient has declined participation in a comprehensive physical therapy program at [facility] for water therapy, neurodynamics, gait and balance and core strengthening   B. Patient has declined in the past a referral to [facility]. I will discuss with the patient in more detail during his next visit the possibility of restarting water therapy as a long-term exercise program   6. The patient has been instructed to contact my office with any questions or difficulties. I have sent instructions via [employer]. The patient understands the plan and agrees to proceed accordingly.       Visit was completed using audio via telephone and video from Zoom as we were able to see each other on Zoom but neither one could hear.  Time spent with the patient:  21 minutes (07:38 AM - 07:59 AM)     Patient Care Team:  SHERON Chun MD as PCP - General (Family  Medicine)  Efrain Batista MD as Consulting Physician (Pain Medicine)  Fausto Renee MD as Consulting Physician (Neurosurgery)  Jean Warren MD as Consulting Physician (Neurosurgery)  Hank Frankel MD as Consulting Physician (Cardiology)  Marcelino Condon MD as Consulting Physician (Urology)  Brian Tatum III, MD as Consulting Physician (Cardiology)  SHERON Chun MD as Consulting Physician (Family Medicine)  Francisco Javier Clay MD as Consulting Physician (Neurosurgery)  Royer Roldan MD as Consulting Physician (General Practice)  Emigdio Watt MD as Consulting Physician (Gastroenterology)  Shola Vila MD as Consulting Physician (Otolaryngology)     No orders of the defined types were placed in this encounter.        No future appointments.      Efrain Batista MD    EMR Dragon/Transcription disclaimer:  Much of this encounter note is an electronic transcription of spoken language to printed text. Electronic transcription of spoken language may permit erroneous, or at times, nonsensical words or phrases to be inadvertently transcribed. Although I have reviewed the note for such errors, some may still exist.

## 2020-05-21 ENCOUNTER — TELEMEDICINE (OUTPATIENT)
Dept: PAIN MEDICINE | Facility: CLINIC | Age: 75
End: 2020-05-21

## 2020-05-21 DIAGNOSIS — G03.9 ADHESIVE ARACHNOIDITIS: ICD-10-CM

## 2020-05-21 DIAGNOSIS — M54.16 CHRONIC LUMBAR RADICULOPATHY: ICD-10-CM

## 2020-05-21 DIAGNOSIS — G89.29 CHRONIC INTRACTABLE PAIN: Primary | ICD-10-CM

## 2020-05-21 DIAGNOSIS — G89.29 CHRONIC INTRACTABLE PAIN: ICD-10-CM

## 2020-05-21 DIAGNOSIS — Z98.1 STATUS POST LUMBAR SPINAL FUSION: ICD-10-CM

## 2020-05-21 DIAGNOSIS — M96.1 POSTLAMINECTOMY SYNDROME OF LUMBAR REGION: ICD-10-CM

## 2020-05-21 DIAGNOSIS — M48.061 BILATERAL STENOSIS OF LATERAL RECESS OF LUMBAR SPINE: ICD-10-CM

## 2020-05-21 DIAGNOSIS — Z96.82 PRESENCE OF NEUROSTIMULATOR: ICD-10-CM

## 2020-05-21 PROCEDURE — 99213 OFFICE O/P EST LOW 20 MIN: CPT | Performed by: ANESTHESIOLOGY

## 2020-06-03 ENCOUNTER — TELEPHONE (OUTPATIENT)
Dept: PAIN MEDICINE | Facility: CLINIC | Age: 75
End: 2020-06-03

## 2020-06-03 NOTE — TELEPHONE ENCOUNTER
Spoke with patient. Advised that we are not currently scheduling pump trials at this time because the [employer]onic pumps are undergoing re certification   Patient verbalizes understanding and will call back in 1 month.

## 2020-08-06 ENCOUNTER — TELEPHONE (OUTPATIENT)
Dept: PAIN MEDICINE | Facility: CLINIC | Age: 75
End: 2020-08-06

## 2020-08-06 NOTE — TELEPHONE ENCOUNTER
Spoke with patient.   Advised that we are not currently scheduling pump trials at this time because the [employer] pumps are undergoing re certification   Patient verbalizes understanding and will call back in 1 month if he has heard nothing back on possible scheduling of pain pumps.

## 2020-09-23 NOTE — PROGRESS NOTES
"Chief Complaint:  \"I am doing well.  I am ready for my pump trial.\"          Brief History:  Mr. Prashant Avila is a 75 y.o. male, who underwent implantation of a spinal cord stimulator device with Dr. Fausto Renee on 11/21/2019 with [employer] Penta paddle lead with the top electrodes projecting at the level of the superior endplate of the T8 vertebral level. IPG: [employer] Proclaim 5 XR IPG Non-Rechargeable (Full Body MRI compatible). The device was implanted primarily for treatment of chronic left hip and left lower extremity pain.  I saw Mr. Avila on May 21, 2020 for a telehealth visit following his previous visit on May 7, 2021 he underwent a spinal cord stimulator reprogramming.    Patient continues to report minimal pain relief from the spinal cord stimulator device.  I have reprogrammed his device several times and it appears that even though he perceives a stimulation in all areas of his pain, the stimulator does not control his pain.  During his last reprogramming, I created several programs and asked him to use them for several days at the time.  During that time, I told him to keep a pain diary.  Apparently, program 3 was the most effective at relieving some of his pain.  He reports that he turned off his spinal cord stimulator about 3 months ago.  Today, upon reviewing his pain levels and his global pain scale, his pain levels are higher which could be easily attributed to the lack of use of his stimulator device.  He continues experiencing pain in his lower back, left gluteal region, and his entire left lower extremity including his left foot.  He continues to struggle with pain and particularly with his inability to tolerate sitting position for more than 5 minutes.  He continues on gabapentin with minimal analgesic benefit and without side effects.  He also takes Tylenol and over-the-counter NSAIDs on an as-needed basis with a significant analgesic benefit.  He also failed numerous analgesic " trials.  He also failed numerous attempts and interventional pain management measures.  He underwent a trial with tramadol without any significant relief and with side effects (drowsiness).  He also started a trial with Effexor but stopped the medication after 2 days.  On previous visits, he inquired about the possibility of removal of his spinal cord stimulator device and the timing for intrathecal pump trial.  Patient understands that an intrathecal trial is necessary prior to determining candidacy for permanent intrathecal pump implant.  Patient has already obtained psychological clearance for an intrathecal delivery system.  Pain Description:   Constant pain with intermittent exacerbation, described as burning, electricity, pins and needles, and tingling sensation.   Radiation of pain: The pain radiates from the left gluteal region into the posterior aspect of the thigh, left leg and plantar aspect of the foot. The leg pain is more severe than the lower back pain  Pain pattern: Dermatomic, left L5 and S1   Pain intensity today: 7/10 (sitting or lying down).  His pain levels today is higher than in previous visits and likely related to the fact that he has turned off his a spinal cord stimulator device  Average pain intensity last week: 7/10  Pain intensity ranges from: 4/10 to 10/10 (sitting > lying down)  Aggravating factors: Pain increases with sitting longer than 5 minutes and laying down for more than 5 minutes.  Alleviating factors: Pain decreases with standing and walking   Associated symptoms:   Patient denies numbness or weakness in the lower extremities  Patient denies any new bladder or bowel problems  Patient denies difficulties with his balance or recent falls     Pain History:  Referring physician: Dr. Fausto Renee   Consultation for intractable chronic left lower extremity pain.   Patient reports a 7-year history of pain, which began without incident. Patient is status post L4-L5 hemilaminectomy  and foraminotomy at the [facility] with Dr. Mackay on 03/03/2014, followed by L4-S1 lumbar decompression and fusion due to unresolved pain by Dr. Renee on 07/24/2014. Patient reports that he experienced significant improvement after his last surgery with Dr. Renee, in particular, complete resolution of his previously severe lower back pain. Unfortunately, his left lower extremity pain, chronic radicular pain preceded his last lumbar fusion, and did not improve after surgery. He underwent surgical consultation with Dr. Cox at the [facility], who recommended decompression of the nerve roots at L3-S1. He underwent follow-up neurosurgical consultation with Dr. Fausto Renee on 11/03/2017. Dr. Renee discussed the possibility of lumbar decompression at L3-L4: Left L3-L4 laminotomy and foraminotomy. Due to potential risk of developing instability that would necessitate extending his lumbar fusion to the L3 level. Patient underwent on April 3, 2018, left L3-L4 transforaminal epidural steroid injection without any pain relief. Patient elected to hold off on surgery. Patient underwent EMG/NCV, which confirmed chronic left S1 radiculopathy.  A CT myelogram was significant for stenosis above the level of his lumbar fusion, at L3-L4 without clinical correlation.  Patient presents with pain with a left S1 dermatomal distribution pattern.  Pain Description:   Constant pain with intermittent exacerbation, described as burning, electricity, pins and needles, and tingling sensation.   Radiation of pain: The pain radiates into the posterior aspect of the thigh, left leg and plantar aspect of the foot. The leg pain is more severe than the lower back pain.   Pain pattern: dermatomic, left L5 and S1   Pain intensity today: 3/10 (standing) 9-10/10 (sitting or lying down)  Average pain intensity last week: 7/10  Pain intensity ranges from: 4/10 to 10/10  Aggravating factors: Pain increases with sitting longer than 5 minutes and laying  down for more than 5 minutes.  Alleviating factors: Pain decreases with standing.   Associated symptoms:   Patient denies numbness or weakness in the lower extremities.   Patient denies any new bladder or bowel problems.   Patient denies difficulties with his balance or recent falls.       Review of previous therapies and additional medical records:  Prashant Avila has already failed the following measures, including:   Conservative Measures: Oral analgesics, ice and heat, physical therapy  Interventional measures:  02/06/2019: Left S1 transforaminal epidural steroid injection and experienced pain relief that was short-lived   02/20/2017: Diagnostic and therapeutic left sciatic nerve block with local anesthetics, steroids, hyaluronidase under ultrasound, PNS and fluoroscopic guidance- Patient did not receive sustained relief from this procedure.   02/06/2017: Diagnostic left S1 selective nerve root injection  01/30/2017: Nevro SCS Trial; no relief  04/25/2016: Trial of lumbar epidural steroid injections with steroids and hyaluronidase via caudal catheter with 100% pain relief lasting 48 hours  12/16/2015: Diagnostic and therapeutic left S1 transforaminal epidural steroid injection with hyaluronidase with complete resolution of pain for several weeks  11/30/2015: Spinal cord stimulator trial [employer] Jude, which provided him with more than 50% pain relief and functional improvement. However, patient decided to delay implantation of a permanent spinal cord stimulator device until the arrival of new technology. Patient did not like the perception of tonic stimulation, even though stimulation overlapped successfully the areas of his chronic pain, he did not find it pleasant.   02/09/2015: Diagnostic left piriformis muscle injection  Surgical Measures:   03/03/2014: Left L4-L5 hemilaminectomy and foraminotomy at [facility] with Dr. Mackay   07/24/2014: L4-S1 decompression and fusion by Dr. Víctor MARIN Margarita underwent  "follow-up neurosurgical consultation with Dr. Fausto Renee on 11/30/2017, and was found to be a potential surgical candidate.  Pain Psychology Evaluation (02/25/2019): \"From a psychological perspective, patient is considered to be an appropriate candidate for a spinal cord stimulator or an intrathecal pump implant at this time.\"  Prashant Avila presents with significant comorbidities including hypertension, rheumatoid arthritis, osteoporosis, hyperlipidemia, chronic kidney disease, hearing loss, carcinoma of prostate, engaged in treatment.  In terms of current analgesics, Prashant Avila takes: Tylenol, NSAIDs and gabapentin 800 mg 4 times a day without any side effects.  He started a trial with tramadol without significant analgesic benefit and with side effects \"drowsiness\"  I have reviewed Jon Report # [id number] consistent to medication reconciliation.  SOAPP: Low risk  Consent for treatment with controlled substances has been obtained      Global Pain Scale 04-19  2018 10-24  2018 11-08  2018 01-29  2019 03-06  2019 10-03  2019 10-17  2019 01-14  2020 01-21  2020 02-20  2020 04-14  2020 05-07  2020 09-24  2020         Pain 19 17 17 18 17 20  10 10 10   10  10 8 20       Feelings 1 0 0 0 0   8    4   2  0  0    0 0 6        Clinical outcomes 11 8 13 11 6 11    7   6  7  9    9 9 11        Activities 17 10 16 18 6 15    7   6  4  15  15 12 7        GPS Total: 48 35 46 47 29 54  28  24  21  34  34 28 44            Diagnostic Studies:    MRI THORACIC SPINE WO CONTRAST-10/31/2019: Minimal degenerative changes at multiple levels throughout the thoracic spine with no central spinal canal stenosis. Epidural space is unremarkable.  EMG/NCV of the bilateral lower extremities December 18, 2018: Chronic left S1 radiculopathy  CT LUMBAR SPINE WITH CONTRAST-01/21/2019: There is excellent opacification of the thecal sac. Imaging covers from T12 through the S1 level caudally. The conus terminates at the L1/L2 level.  The " distal cord and conus are normal in caliber. The nerve roots of the cauda equina are normal in caliber and distribution. L4-S1 fusion hardware is in place; the left S1 pedicle screw is fractured and there is lucency around the right S1 pedicle screw. Left L4 laminotomy. Vertebral body heights and alignment are normal. Vertebral body heights are normal. Degenerative changes are as follows:  T12-L1: Facet arthropathy without significant foraminal or spinal canal stenosis.  L1-L2: Facet arthropathy without significant foraminal or spinal canal stenosis.  L2-L3: Right greater than left facet arthropathy. Mild narrowing of the right subarticular zone. No significant central spinal canal or foraminal stenosis.  L3-L4: Disc bulge and facet arthropathy with ligamentum flavum thickening. There is severe spinal canal stenosis and at least moderate bilateral foraminal stenosis.   L4-L5: Improved right subarticular zone narrowing. No significant osseous spinal canal or foraminal stenosis.  L5-S1: Bilateral facet arthropathy. Shallow disc osteophyte. Unchanged mild osseous foraminal stenosis. No significant osseous spinal canal stenosis.  EKG April 19, 2018 revealed sinus bradycardia with first-degree AV block.  Left bundle branch block.    X-Ray lumbar spine 08/04/2017: Posterior lumbar fusion of L4, L5 and S1. Degenerative changes at L1-L2 with anterior osteophyte formation. Normal lumbar alignment in the lateral projection with a stable alignment in the flexed and extended positions.      Duplex Venous Lower, 03/25/2017: Normal right lower extremity venous duplex scan. No evidence of DVT.  MRI Lumbar Spine, 10/28/2016: Since the previous MRI study, there has been L4-5-S1 posterior lumbar and interbody fusion. No new abnormality of alignment is seen. There is, however, significant worsening of posterior element hypertrophy at L3-4, above the fusion level, due to persistent L3-4 disc bulge, and what appears to be new spinal  "stenosis at this level, generally as a result of posterior element hypertrophic change. Canal appears stable elsewhere. No lumbar compression deformity or new abnormality of alignment is seen. No vertebral marrow edema is appreciated. Sagittal images suggest a small (18 mm) seroma or synovial cyst on the right at L5-S1. Tip of conus medullaris is again noted at L1.  Axial images   L1-L2: No significant canal or foraminal stenosis   L2-L3: Canal appears relatively narrowed, due to posterior element hypertrophy but still greater than 1 cm in diameter. Foramina appear  lower limits of normal diameter.   L3-L4: Moderate canal stenosis with lateral narrowing, AP diameter 6.5 mm, transverse diameter approximately 9 mm with crowding of the nerve roots. Foramina appear mildly to moderately narrowed bilaterally due to facet DJD.   L4-L5: Fused level, canal appears normal. Mild foraminal stenosis due to facet DJD  L5-S1: Canal appears normal. Left-sided neural foramen appears normal. Right-sided neural foramen appears mildly narrowed due to facet DJD. Postcontrast images show expected postoperative enhancement of the dorsal soft tissues and no pathologic postcontrast enhancement elsewhere.     The following portions of the patient's history were reviewed and updated as appropriate: problem list, past medical history, past surgery history, social history, family history, medications, and allergies    Review of Systems   Musculoskeletal: Positive for back pain.   All other systems reviewed and are negative.    Temp 97.8 °F (36.6 °C) (Infrared)   Ht 175.3 cm (69\")   Wt 80.3 kg (177 lb)   BMI 26.14 kg/m²       Physical Exam:   A full physical examination was performed today.  There are no changes in comparison to his previous examination.  Constitutional General Appearance: No acute distress, well appearing and well nourished.   Appears healthy, within normal limits of ideal weight, well hydrated and appearance reflects stated " age.   Head and face: Normal.   Palpation of the face and sinuses: No sinus tenderness.   Eyes Conjunctiva and lids: No erythema, swelling or discharge.   Pupils: Equal, round, reactive to light.   Neck: Supple, symmetric, trachea midline, no masses.   Pulmonary Respiratory effort: No increased work of breathing or signs of respiratory distress. Respiratory rate: normal. Assessment of respiratory effort revealed normal rhythm and effort. Auscultation of lungs: Clear to auscultation. Auscultation of the lungs revealed no expiratory wheezing, normal expiratory time and no inspiratory wheezing. No rales or crackles were heard bilaterally. No rhonchi. No friction rub. No wheezing. No diminished breath sounds. No bronchial breath sounds.   Cardiovascular Auscultation of heart: Normal rate and rhythm, normal S1 and S2, no murmurs. Peripheral vascular exam: Normal. Examination of extremities for edema and/or varicosities: Normal.   Abdomen: Non-tender, no masses. Bowel sounds were normal. The abdomen was soft and nontender. No masses palpated.   Musculoskeletal Gait and station: Normal. Gait evaluation demonstrated a normal gait. The range of motion of the lumbar spine is essentially full and without significant pain. Lumbar facet joint loading maneuvers are negative. Kodak and Gaenslen's tests are negative. The range of motion of the hip joints is full and without pain. Palpation of the gluteal bursa, ischial tuberosities, and greater trochanters, unrevealing. Piriformis maneuvers are negative. There is some discomfort upon palpation of the left piriformis muscle. Active resisted external rotation and passive internal rotation did not increase pain.   Muscle strength/tone: Normal.   Motor Strength Findings: normal strength.   Motor Tone: normal tone. Involuntary movements: none. Significant shortening of the hamstrings, worse on the left side.  Skin and subcutaneous tissue: Normal without rashes or lesions. Both  surgical wounds are well healed without erythema, drainage or fluid accumulation. All surgical wounds are well-healed without erythema, drainage, or fluid accumulation.  There is complete epithelialization of the surgical wound of his IPG. There is a 2 mm area of superficial skin dehiscence inferior portion of the paravertebral wound, the site of SCS lead placement.  Neurologic   Cranial nerves: Cranial nerves 2-12 intact.   Cortical function: Normal mental status.   Reflexes: 2+ and symmetric. Deep tendon reflexes: 2+ right biceps, 2+ left biceps, 1+ right patella, 1+ left patella, 1+ right ankle jerk and 1+ left ankle jerk. Superficial/Primitive Reflexes: primitive reflexes were absent. Straight leg raising test is negative. Femoral stretch sign is negative.   Sensation: No sensory loss. Sensory exam: intact to light touch, intact pain and temperature sensation, intact vibration sensation and normal proprioception.   Coordination: Normal finger to nose and heel to shin. Coordination: normal balance and negative Romberg's sign.   Psychiatric: Judgment and insight: Normal. Orientation to person, place and time: Normal. Recent and remote memory: Intact. Mood and affect: Normal.     From previous visit:  Analysis of the spinal cord stimulator device with complex spinal cord stimulator reprogramming   Patient used the [employer] spinal cord stimulator device for 1400 hours since last reprogramming, and 2595 lifetime hours (average 24 hours per day). Analysis of impedence reveals normal impedence for all contacts. The spinal cord stimulator device was reprogrammed under my direct supervision and 5 new programs, for a total of 6 programs, were created by adjusting electrode polarities, amplitude, pulse width,  pulse rate, BurstDR, micro-dosing,  in the following fashion;  Program ONE:    Electrode polarities: 2+, 5-, 6-, 10-  Amplitude: 1-2 mA     Pulse width: 1000 mcs  Rate: 40 Hz  IntraBurst rate: 500 Hz  Micro-dosing:  30:90  Patient experienced coverage with pleasant paresthesia in all areas of chronic pain.  A copy of the telemetry report will be scanned in the patient's chart.    ASSESSMENT:   1. Adhesive arachnoiditis    2. Lumbar postlaminectomy syndrome     3. Status post L4-S1 lumbar spinal fusion    4. Chronic lumbar radiculopathy    5. Chronic intractable pain    6. Physical deconditioning    7. Presence of neurostimulator         PLAN/MEDICAL DECISION MAKING: Patient presents with a complex and very challenging chronic pain condition. His pain levels today are higher than in his previous visits and likely related to the fact that he has turned off his a spinal cord stimulator device. Patient has failed conservative measures including numerous analgesic trials, interventional pain management measures including implantation of a spinal cord stimulator device, and previous surgical interventions.  Patient is s/p 2 lumbar surgeries: On March 30, 2014: Left L4-L5 hemilaminectomy and foraminotomy at [facility] by Dr. Mackay and on July 24, 2014: L4 S1 decompression and fusion by Dr. Renee.  Patient underwent several neurosurgical consultations after his lumbar surgeries.  On November 30, 2017, he underwent neurosurgical consultation with Dr. Renee and discussed the possibility of lumbar decompression and extension of lumbar fusion to the L3-L4 level.  Patient declined surgical intervention.  We have maxed out the dose of gabapentin.  He did not tolerate or discontinued trials with several medications including opioid and adjuvant medications.  He failed to obtain relief with different interventional pain management measures, as referenced under HPI.  He experienced significant pain relief during his a spinal cord stimulator trial.  Unfortunately, after his a spinal cord stimulator implant pain relief has been minimal.  Multiple attempts at reprogramming failed to provide effective pain relief. MRI of the lumbar spine revealed  lateral recess stenosis at L3-L4 associated with crowding of the nerve roots and lumbar arachnoiditis. Lumbar myelogram followed by CT post myelogram confirmed the diagnosis of lumbar stenosis at L3-L4 without significant clinical correlation. Patient's clinical symptoms follow the left L5-S1 dermatomal distribution. EMG/NCV of the bilateral lower extremities confirmed chronic left S1 radiculopathy with clinical correlation. Consequently, most of his symptoms appear to originate from chronic left S1 radiculopathy along with lumbar arachnoiditis.  Patient has consistently denied the presence of neurogenic claudication.  In fact, he experiences relief when he standing and walking and experiences intolerable pain when sitting down or lying down.  Patient has already failed conservative measures, interventional pain management measures, and previous surgical measures.  Patient reports intolerance to most oral analgesics and adjuvant analgesics, as referenced on previous encounters.  Patient has already undergone psychological evaluation and has been found to be an appropriate candidate for spinal cord stimulation and intrathecal therapies. Therefore, I have proposed the following plan:  1.  Interventional pain management measures: Patient will like to move forward with an intrathecal trial.  Patient will be scheduled for an intrathecal opioid trial. Patient has already reviewed information regarding intrathecal trial and intrathecal pump implantation. We have already discussed different techniques regarding pump implantation site.  In addition, he understands that after intrathecal pump implantation, we always start on the lowest effective therapeutic dose to provide appropriate pain relief and minimize the occurrence of side effects.  Patient verbalized understanding that intrathecal therapies may alleviate his pain but will not eradicate his pain.  There is a possibility that he may fail this therapy as well. We have  also discussed the incidence, severity, and treatment of common side effects with intrathecal therapies particularly bladder and bowel side effects.  Patient understands that most of the side effects are temporary and may resolve without intervention.  Constipation could be a long-term issue.  He also understands that there is a possibility that during his trial and also after his pump implant, he may experience some urinary difficulties and that those side effects may resolve with a low-dose of naloxone, and in and out bladder catheter, or they may require a Queen catheter placement.  Patient also has some questions regarding potential cognitive side effects with intrathecal therapy.  I have explained to him that due to the fact that the doses used for intrathecal administration are extremely low in comparison to oral or IV equipotent dosages, in clinical practice we do not see cognitive side effects at the usual intrathecal therapeutic doses.  On previous visits also we discussed the possibility of cross tolerance, tolerance, and loss of analgesic effectiveness over time.  Patient understands that increasing the dose repeatedly and too often may continues to the development of tolerance and opioid-induced hyperalgesia.  He understands that if the need of higher dosages is required, then, we may need to add a second or third drug or change to a different class of drug to circumvent this problem.  We have discussed the possibility of the use of Prialt, and non-opioid.  On previous visits he also asked a question about post dural puncture headaches after an intrathecal trial and intrathecal pump placement.  From previous conversation, he understands that this is usually seen in younger patients from teenagers to around 60 years of age  2. Spinal cord stimulator device: I advised the patient to switch to per #3, which has provided him with some relief in the past.    Patient confirmed that the stimulator programs  provide stimulation in all areas of his chronic pain: Lower back and the entire left lower extremity including his left foot. Patient reported an interest in explantation of his spinal cord stimulator device. I have advised him to continue using his spinal cord stimulator device to determine if we can find stimulation modalities that may provide him with some pain relief.  In cases of refractory pain, a spinal cord stimulator along with an intrathecal analgesic device sometimes are necessary to alleviate the different types and sources of pain. Patient has agreed with the plan.   3. Pharmacological measures: Patient consistently declined new pharmacological trials due to previous failures. Unfortunately, due to his abnormal EKG, he is not a candidate for a mexiletine trial for Tx of neuropathic pain. He has agreed to continue adjusting some medications and introducing new trials.  A. Continue gabapentin: Take gabapentin 800 mg 1 tablet every morning, 1 tablet at noon, and 2 tablets at bedtime. He has previously failed a trial with Lyrica  B. Patient started and stopped Effexor for unclear reasons.  Patient has failed to obtain pain relief with conventional analgesics and he has been aware that most medications used for the treatment of neuropathic pain are off label.  He declined additional trials at this time  4. Long-term rehabilitation efforts:  A. Patient has declined participation in a comprehensive physical therapy program at [facility] for water therapy, neurodynamics, gait and balance and core strengthening   B. Patient has declined in the past a referral to [facility]. I will discuss with the patient in more detail during his next visit the possibility of restarting water therapy as a long-term exercise program   5. The patient has been instructed to contact my office with any questions or difficulties. I have sent instructions via [employer] The patient understands the plan and agrees to  proceed accordingly.          Patient Care Team:  SHERON Chun MD as PCP - General (Family Medicine)  Efrain Batista MD as Consulting Physician (Pain Medicine)  Fausto Renee MD as Consulting Physician (Neurosurgery)  Jean Warren MD as Consulting Physician (Neurosurgery)  Hank Frankel MD as Consulting Physician (Cardiology)  Marcelino Condon MD as Consulting Physician (Urology)  Brian Tatum III, MD as Consulting Physician (Cardiology)  SHERON Chun MD as Consulting Physician (Family Medicine)  Francisco Javier Clay MD as Consulting Physician (Neurosurgery)  Royer Roldan MD as Consulting Physician (General Practice)  Emigdio Watt MD as Consulting Physician (Gastroenterology)  Shola Vila MD as Consulting Physician (Otolaryngology)     No orders of the defined types were placed in this encounter.        Future Appointments   Date Time Provider Department Center   10/7/2020  7:00 AM Efrain Batista MD [facility]         Efrain Batista MD    EMR Dragon/Transcription disclaimer:  Much of this encounter note is an electronic transcription of spoken language to printed text. Electronic transcription of spoken language may permit erroneous, or at times, nonsensical words or phrases to be inadvertently transcribed. Although I have reviewed the note for such errors, some may still exist.

## 2020-09-24 ENCOUNTER — OFFICE VISIT (OUTPATIENT)
Dept: PAIN MEDICINE | Facility: CLINIC | Age: 75
End: 2020-09-24

## 2020-09-24 VITALS — TEMPERATURE: 97.8 F | BODY MASS INDEX: 26.22 KG/M2 | HEIGHT: 69 IN | WEIGHT: 177 LBS

## 2020-09-24 DIAGNOSIS — G89.29 CHRONIC INTRACTABLE PAIN: ICD-10-CM

## 2020-09-24 DIAGNOSIS — M54.16 CHRONIC LUMBAR RADICULOPATHY: ICD-10-CM

## 2020-09-24 DIAGNOSIS — R53.81 PHYSICAL DECONDITIONING: ICD-10-CM

## 2020-09-24 DIAGNOSIS — G03.9 ADHESIVE ARACHNOIDITIS: ICD-10-CM

## 2020-09-24 DIAGNOSIS — Z96.82 PRESENCE OF NEUROSTIMULATOR: ICD-10-CM

## 2020-09-24 DIAGNOSIS — M96.1 POSTLAMINECTOMY SYNDROME OF LUMBAR REGION: ICD-10-CM

## 2020-09-24 DIAGNOSIS — Z98.1 STATUS POST LUMBAR SPINAL FUSION: ICD-10-CM

## 2020-09-24 PROCEDURE — 99214 OFFICE O/P EST MOD 30 MIN: CPT | Performed by: ANESTHESIOLOGY

## 2020-10-04 ENCOUNTER — APPOINTMENT (OUTPATIENT)
Dept: PREADMISSION TESTING | Facility: HOSPITAL | Age: 75
End: 2020-10-04

## 2020-10-04 LAB — SARS-COV-2 RNA NOSE QL NAA+PROBE: NOT DETECTED

## 2020-10-04 PROCEDURE — C9803 HOPD COVID-19 SPEC COLLECT: HCPCS

## 2020-10-04 PROCEDURE — U0004 COV-19 TEST NON-CDC HGH THRU: HCPCS

## 2020-10-07 ENCOUNTER — PREP FOR SURGERY (OUTPATIENT)
Dept: PAIN MEDICINE | Facility: CLINIC | Age: 75
End: 2020-10-07

## 2020-10-07 ENCOUNTER — OUTSIDE FACILITY SERVICE (OUTPATIENT)
Dept: PAIN MEDICINE | Facility: CLINIC | Age: 75
End: 2020-10-07

## 2020-10-07 DIAGNOSIS — G03.9 ADHESIVE ARACHNOIDITIS: Primary | ICD-10-CM

## 2020-10-07 DIAGNOSIS — M96.1 POSTLAMINECTOMY SYNDROME OF LUMBAR REGION: ICD-10-CM

## 2020-10-07 DIAGNOSIS — R53.81 PHYSICAL DECONDITIONING: ICD-10-CM

## 2020-10-07 DIAGNOSIS — M54.16 CHRONIC LUMBAR RADICULOPATHY: ICD-10-CM

## 2020-10-07 DIAGNOSIS — G89.29 CHRONIC INTRACTABLE PAIN: ICD-10-CM

## 2020-10-07 DIAGNOSIS — Z98.1 STATUS POST LUMBAR SPINAL FUSION: ICD-10-CM

## 2020-10-07 DIAGNOSIS — Z96.82 PRESENCE OF NEUROSTIMULATOR: ICD-10-CM

## 2020-10-07 PROCEDURE — 62327 NJX INTERLAMINAR LMBR/SAC: CPT | Performed by: ANESTHESIOLOGY

## 2020-10-07 PROCEDURE — 99152 MOD SED SAME PHYS/QHP 5/>YRS: CPT | Performed by: ANESTHESIOLOGY

## 2020-10-09 ENCOUNTER — APPOINTMENT (OUTPATIENT)
Dept: PREADMISSION TESTING | Facility: HOSPITAL | Age: 75
End: 2020-10-09

## 2020-10-09 PROCEDURE — C9803 HOPD COVID-19 SPEC COLLECT: HCPCS

## 2020-10-09 PROCEDURE — U0004 COV-19 TEST NON-CDC HGH THRU: HCPCS | Performed by: INTERNAL MEDICINE

## 2020-10-10 LAB — SARS-COV-2 RNA RESP QL NAA+PROBE: NOT DETECTED

## 2020-10-11 RX ORDER — FAMOTIDINE 10 MG/ML
20 INJECTION, SOLUTION INTRAVENOUS ONCE
Status: CANCELLED | OUTPATIENT
Start: 2020-10-11 | End: 2020-10-11

## 2020-10-12 ENCOUNTER — HOSPITAL ENCOUNTER (OUTPATIENT)
Facility: HOSPITAL | Age: 75
Discharge: HOME | End: 2020-10-12
Attending: ANESTHESIOLOGY | Admitting: ANESTHESIOLOGY

## 2020-10-12 ENCOUNTER — APPOINTMENT (OUTPATIENT)
Dept: GENERAL RADIOLOGY | Facility: HOSPITAL | Age: 75
End: 2020-10-12

## 2020-10-12 VITALS
HEIGHT: 69 IN | BODY MASS INDEX: 26.22 KG/M2 | WEIGHT: 177 LBS | RESPIRATION RATE: 18 BRPM | DIASTOLIC BLOOD PRESSURE: 77 MMHG | TEMPERATURE: 98.2 F | SYSTOLIC BLOOD PRESSURE: 167 MMHG | OXYGEN SATURATION: 97 % | HEART RATE: 56 BPM

## 2020-10-12 PROCEDURE — 0 IOPAMIDOL 41 % SOLUTION 20 ML VIAL: Performed by: ANESTHESIOLOGY

## 2020-10-12 PROCEDURE — 76000 FLUOROSCOPY <1 HR PHYS/QHP: CPT

## 2020-10-12 PROCEDURE — 62323 NJX INTERLAMINAR LMBR/SAC: CPT | Performed by: ANESTHESIOLOGY

## 2020-10-12 PROCEDURE — 93010 ELECTROCARDIOGRAM REPORT: CPT | Performed by: INTERNAL MEDICINE

## 2020-10-12 PROCEDURE — 25010000002 ZICONOTIDE PER 1 MCG: Performed by: ANESTHESIOLOGY

## 2020-10-12 PROCEDURE — 93005 ELECTROCARDIOGRAM TRACING: CPT | Performed by: ANESTHESIOLOGY

## 2020-10-12 RX ORDER — SODIUM CHLORIDE 0.9 % (FLUSH) 0.9 %
10 SYRINGE (ML) INJECTION AS NEEDED
Status: DISCONTINUED | OUTPATIENT
Start: 2020-10-12 | End: 2020-10-12 | Stop reason: HOSPADM

## 2020-10-12 RX ORDER — LIDOCAINE HYDROCHLORIDE 10 MG/ML
INJECTION, SOLUTION EPIDURAL; INFILTRATION; INTRACAUDAL; PERINEURAL AS NEEDED
Status: DISCONTINUED | OUTPATIENT
Start: 2020-10-12 | End: 2020-10-12 | Stop reason: HOSPADM

## 2020-10-12 RX ORDER — FAMOTIDINE 20 MG/1
20 TABLET, FILM COATED ORAL ONCE
Status: COMPLETED | OUTPATIENT
Start: 2020-10-12 | End: 2020-10-12

## 2020-10-12 RX ORDER — SODIUM CHLORIDE 0.9 % (FLUSH) 0.9 %
10 SYRINGE (ML) INJECTION EVERY 12 HOURS SCHEDULED
Status: DISCONTINUED | OUTPATIENT
Start: 2020-10-12 | End: 2020-10-12 | Stop reason: HOSPADM

## 2020-10-12 RX ORDER — LIDOCAINE HYDROCHLORIDE 10 MG/ML
0.5 INJECTION, SOLUTION EPIDURAL; INFILTRATION; INTRACAUDAL; PERINEURAL ONCE AS NEEDED
Status: COMPLETED | OUTPATIENT
Start: 2020-10-12 | End: 2020-10-12

## 2020-10-12 RX ORDER — SODIUM CHLORIDE, SODIUM LACTATE, POTASSIUM CHLORIDE, CALCIUM CHLORIDE 600; 310; 30; 20 MG/100ML; MG/100ML; MG/100ML; MG/100ML
9 INJECTION, SOLUTION INTRAVENOUS CONTINUOUS
Status: DISCONTINUED | OUTPATIENT
Start: 2020-10-12 | End: 2020-10-12 | Stop reason: HOSPADM

## 2020-10-12 RX ADMIN — FAMOTIDINE 20 MG: 20 TABLET, FILM COATED ORAL at 10:02

## 2020-10-12 RX ADMIN — ZICONOTIDE 25 MCG: 25 INJECTION, SOLUTION INTRATHECAL at 11:19

## 2020-10-12 RX ADMIN — LIDOCAINE HYDROCHLORIDE 0.5 ML: 10 INJECTION, SOLUTION EPIDURAL; INFILTRATION; INTRACAUDAL; PERINEURAL at 10:00

## 2020-10-12 RX ADMIN — SODIUM CHLORIDE, POTASSIUM CHLORIDE, SODIUM LACTATE AND CALCIUM CHLORIDE 9 ML/HR: 600; 310; 30; 20 INJECTION, SOLUTION INTRAVENOUS at 10:00

## 2020-10-12 NOTE — OP NOTE
PREOPERATIVE DIAGNOSES:  1. Adhesive arachnoiditis   2. Lumbar postlaminectomy syndrome    3. Status post L4-S1 lumbar spinal fusion   4. Chronic lumbar radiculopathy   5. Chronic intractable pain   6. Physical deconditioning     POSTOPERATIVE DIAGNOSES:  1. Adhesive arachnoiditis   2. Lumbar postlaminectomy syndrome    3. Status post L4-S1 lumbar spinal fusion   4. Chronic lumbar radiculopathy   5. Chronic intractable pain   6. Physical deconditioning     PROCEDURE: Intrathecal trial with Prialt    ANESTHESIA: Local anesthesia only    COMPLICATIONS: None    PROCEDURE SUMMARY: After explaining all the risks and benefits of the procedure, an informed consent was obtained. Patient's surgical site was confirmed with the patient and marked in the holding room accordingly. The patient was transferred to the operating room and placed on the operating room table in prone position. Time out was completed. The lumbar spine area was prepped and draped in the sterile fashion. Using C-arm fluoroscopic guidance the L2-L3 interlaminar space was identified (target). The skin and subcutaneous tissues overlying the target were anesthetized with five ml of 1% lidocaine followed by 10 ml of 0.25% bupivacaine with epinephrine 1:200.000. A 22-gauge styletted needle was advanced into the intrathecal space without difficulties. Clear CSF was seen dripping through the needle-hub. One ml of Isovue-200 was injected and a myelogram was obtained. Then, 1.25 mcg of Prialt were injected. The needle was removed without difficulties. Fluoroscopy was utilized using low-dose of radiation applying collimation, pulsed mode, and shielding following ALARA recommendations. Fluoroscopy time: 3 seconds.   The patient tolerated the procedure well and without incident.  Upon completion of the procedure, the patient was transferred to the recovery room in stable condition. The patient was discharged neurologically intact and with appropriate discharge  instructions.     PLAN:     1. Patient will be transferred to OBS to monitor analgesic and side effects. If patient experiences significant pain relief without significant side effects, then, he will be scheduled for implantation of a 20 Synchromed II pump with a patient therapy manager device (PTM), catheter tip at T9, with Prialt 25 mcg/ml, starting dose of Prialt 1.25 mcg/day  2. The patient and her family have been instructed to contact my office with any questions or difficulties. The patient understands the plan and agrees to proceed accordingly.    Efrain Batista MD    Patient Care Team:  SHERON Chun MD as PCP - General (Family Medicine)  Efrain Batista MD as Consulting Physician (Pain Medicine)  Fausto Renee MD as Consulting Physician (Neurosurgery)  Jean Warren MD as Consulting Physician (Neurosurgery)  Hank Frankel MD as Consulting Physician (Cardiology)  Marcelino Condon MD as Consulting Physician (Urology)  Brian Tatum III, MD as Consulting Physician (Cardiology)  SHERON Chun MD as Consulting Physician (Family Medicine)  Francisco Javier Clay MD as Consulting Physician (Neurosurgery)  Royer Roldan MD as Consulting Physician (General Practice)  Emigdio Watt MD as Consulting Physician (Gastroenterology)  Shola Vila MD as Consulting Physician (Otolaryngology)      Efrain Batista MD    EMR Dragon/Transcription disclaimer:   Much of this encounter note is an electronic transcription of spoken language to printed text. Electronic transcription of spoken language may permit erroneous, or at times, nonsensical words or phrases to be inadvertently transcribed. Although I have reviewed the note for such errors, some may still exist.

## 2020-10-12 NOTE — INTERVAL H&P NOTE
"  H&P reviewed. The patient was examined and there are no changes to the H&P.     [facility] Pre-op    Full history and physical note from office is up to date.  See office note attached.  HPI  chronic lumbar radiculopathy s/p L4-S1 fusion, post laminectomy syndrome  Plan: trial of intrathecal pump    Review of Systems -   Denies HA, sore throat, cough, congestion, chest pain, SOA, palpitations, abdominal pain, n/v/d/c, weakness of extremities, no bowel/bladder dysfunction     /76 (BP Location: Right arm, Patient Position: Lying)   Pulse 60   Temp 98.5 °F (36.9 °C) (Temporal)   Resp 16   Ht 175.3 cm (69\")   Wt 80.3 kg (177 lb)   SpO2 94%   BMI 26.14 kg/m²   Exam  Awake, alert, oriented  CV: RRR  Lungs: CTAB  Abd: + BS, soft, nontender  Ext: + pulses, no swelling    IMM:  Influenza:  2020   Pneumococcal:  Up to date  Tetanus: unknown    LAB Results:  Lab Results   Component Value Date    WBC 8.61 03/20/2020    HGB 13.2 03/20/2020    HCT 38.9 03/20/2020    MCV 91.3 03/20/2020     03/20/2020    NEUTROABS 5.37 03/20/2020    GLUCOSE 174 (H) 11/21/2019    BUN 18 11/21/2019    CREATININE 1.11 11/21/2019    EGFRIFNONA 64 11/21/2019     (L) 11/21/2019    K 4.3 11/21/2019    CL 91 (L) 11/21/2019    CO2 21.0 (L) 11/21/2019    CALCIUM 9.0 11/21/2019    ALBUMIN 4.20 11/21/2019    AST 26 11/21/2019    ALT 13 11/21/2019    BILITOT 0.5 11/21/2019    PTT 38.8 (H) 10/03/2019    INR 1.12 10/03/2019     COVID negative 10/9    A/P chronic lumbar pain  Plan: intrathecal pain pump trial    [place] PA-C 10/12/2020 10:18 EDT      "

## 2020-10-13 ENCOUNTER — DOCUMENTATION (OUTPATIENT)
Dept: PAIN MEDICINE | Facility: CLINIC | Age: 75
End: 2020-10-13

## 2020-10-13 ENCOUNTER — HOSPITAL ENCOUNTER (EMERGENCY)
Facility: HOSPITAL | Age: 75
Discharge: HOME | End: 2020-10-13
Attending: EMERGENCY MEDICINE | Admitting: EMERGENCY MEDICINE

## 2020-10-13 ENCOUNTER — APPOINTMENT (OUTPATIENT)
Dept: GENERAL RADIOLOGY | Facility: HOSPITAL | Age: 75
End: 2020-10-13

## 2020-10-13 ENCOUNTER — APPOINTMENT (OUTPATIENT)
Dept: MRI IMAGING | Facility: HOSPITAL | Age: 75
End: 2020-10-13

## 2020-10-13 ENCOUNTER — TELEPHONE (OUTPATIENT)
Dept: PAIN MEDICINE | Facility: CLINIC | Age: 75
End: 2020-10-13

## 2020-10-13 VITALS
HEIGHT: 69 IN | DIASTOLIC BLOOD PRESSURE: 97 MMHG | BODY MASS INDEX: 26.07 KG/M2 | HEART RATE: 50 BPM | OXYGEN SATURATION: 98 % | WEIGHT: 176 LBS | SYSTOLIC BLOOD PRESSURE: 131 MMHG | RESPIRATION RATE: 16 BRPM | TEMPERATURE: 97.3 F

## 2020-10-13 DIAGNOSIS — H53.2 DIPLOPIA: Primary | ICD-10-CM

## 2020-10-13 DIAGNOSIS — T50.905A MEDICATION REACTION, INITIAL ENCOUNTER: ICD-10-CM

## 2020-10-13 LAB
ALBUMIN SERPL-MCNC: 4.2 G/DL (ref 3.5–5.2)
ALBUMIN/GLOB SERPL: 1.4 G/DL
ALP SERPL-CCNC: 76 U/L (ref 39–117)
ALT SERPL W P-5'-P-CCNC: 12 U/L (ref 1–41)
ANION GAP SERPL CALCULATED.3IONS-SCNC: 11 MMOL/L (ref 5–15)
AST SERPL-CCNC: 20 U/L (ref 1–40)
BASOPHILS # BLD AUTO: 0.09 10*3/MM3 (ref 0–0.2)
BASOPHILS NFR BLD AUTO: 1.2 % (ref 0–1.5)
BILIRUB SERPL-MCNC: 0.4 MG/DL (ref 0–1.2)
BUN SERPL-MCNC: 20 MG/DL (ref 8–23)
BUN/CREAT SERPL: 19.4 (ref 7–25)
CALCIUM SPEC-SCNC: 9.4 MG/DL (ref 8.6–10.5)
CHLORIDE SERPL-SCNC: 101 MMOL/L (ref 98–107)
CO2 SERPL-SCNC: 22 MMOL/L (ref 22–29)
CREAT SERPL-MCNC: 1.03 MG/DL (ref 0.76–1.27)
DEPRECATED RDW RBC AUTO: 41.1 FL (ref 37–54)
EOSINOPHIL # BLD AUTO: 0.32 10*3/MM3 (ref 0–0.4)
EOSINOPHIL NFR BLD AUTO: 4.3 % (ref 0.3–6.2)
ERYTHROCYTE [DISTWIDTH] IN BLOOD BY AUTOMATED COUNT: 12.2 % (ref 12.3–15.4)
GFR SERPL CREATININE-BSD FRML MDRD: 70 ML/MIN/1.73
GLOBULIN UR ELPH-MCNC: 3 GM/DL
GLUCOSE SERPL-MCNC: 109 MG/DL (ref 65–99)
HCT VFR BLD AUTO: 41.1 % (ref 37.5–51)
HGB BLD-MCNC: 13.9 G/DL (ref 13–17.7)
HOLD SPECIMEN: NORMAL
HOLD SPECIMEN: NORMAL
IMM GRANULOCYTES # BLD AUTO: 0.04 10*3/MM3 (ref 0–0.05)
IMM GRANULOCYTES NFR BLD AUTO: 0.5 % (ref 0–0.5)
LYMPHOCYTES # BLD AUTO: 1.31 10*3/MM3 (ref 0.7–3.1)
LYMPHOCYTES NFR BLD AUTO: 17.5 % (ref 19.6–45.3)
MAGNESIUM SERPL-MCNC: 2 MG/DL (ref 1.6–2.4)
MCH RBC QN AUTO: 31 PG (ref 26.6–33)
MCHC RBC AUTO-ENTMCNC: 33.8 G/DL (ref 31.5–35.7)
MCV RBC AUTO: 91.5 FL (ref 79–97)
MONOCYTES # BLD AUTO: 0.79 10*3/MM3 (ref 0.1–0.9)
MONOCYTES NFR BLD AUTO: 10.5 % (ref 5–12)
NEUTROPHILS NFR BLD AUTO: 4.95 10*3/MM3 (ref 1.7–7)
NEUTROPHILS NFR BLD AUTO: 66 % (ref 42.7–76)
NRBC BLD AUTO-RTO: 0 /100 WBC (ref 0–0.2)
PLATELET # BLD AUTO: 261 10*3/MM3 (ref 140–450)
PMV BLD AUTO: 9.9 FL (ref 6–12)
POTASSIUM SERPL-SCNC: 3.8 MMOL/L (ref 3.5–5.2)
PROT SERPL-MCNC: 7.2 G/DL (ref 6–8.5)
RBC # BLD AUTO: 4.49 10*6/MM3 (ref 4.14–5.8)
SODIUM SERPL-SCNC: 134 MMOL/L (ref 136–145)
TROPONIN T SERPL-MCNC: <0.01 NG/ML (ref 0–0.03)
WBC # BLD AUTO: 7.5 10*3/MM3 (ref 3.4–10.8)
WHOLE BLOOD HOLD SPECIMEN: NORMAL
WHOLE BLOOD HOLD SPECIMEN: NORMAL

## 2020-10-13 PROCEDURE — 93005 ELECTROCARDIOGRAM TRACING: CPT

## 2020-10-13 PROCEDURE — 84484 ASSAY OF TROPONIN QUANT: CPT

## 2020-10-13 PROCEDURE — 70551 MRI BRAIN STEM W/O DYE: CPT

## 2020-10-13 PROCEDURE — 85025 COMPLETE CBC W/AUTO DIFF WBC: CPT

## 2020-10-13 PROCEDURE — 83735 ASSAY OF MAGNESIUM: CPT

## 2020-10-13 PROCEDURE — 96374 THER/PROPH/DIAG INJ IV PUSH: CPT

## 2020-10-13 PROCEDURE — 99284 EMERGENCY DEPT VISIT MOD MDM: CPT

## 2020-10-13 PROCEDURE — 25010000002 LORAZEPAM PER 2 MG: Performed by: EMERGENCY MEDICINE

## 2020-10-13 PROCEDURE — 80053 COMPREHEN METABOLIC PANEL: CPT

## 2020-10-13 PROCEDURE — 71045 X-RAY EXAM CHEST 1 VIEW: CPT

## 2020-10-13 PROCEDURE — 99285 EMERGENCY DEPT VISIT HI MDM: CPT

## 2020-10-13 RX ORDER — LORAZEPAM 2 MG/ML
0.5 INJECTION INTRAMUSCULAR ONCE
Status: COMPLETED | OUTPATIENT
Start: 2020-10-13 | End: 2020-10-13

## 2020-10-13 RX ORDER — SODIUM CHLORIDE 0.9 % (FLUSH) 0.9 %
10 SYRINGE (ML) INJECTION AS NEEDED
Status: DISCONTINUED | OUTPATIENT
Start: 2020-10-13 | End: 2020-10-13 | Stop reason: HOSPADM

## 2020-10-13 RX ADMIN — LORAZEPAM 0.5 MG: 2 INJECTION INTRAMUSCULAR; INTRAVENOUS at 10:38

## 2020-10-13 NOTE — PROGRESS NOTES
0811- paged by exchange.   0814- returned call to Zeyad.   Patient presented to the ER reporting nausea, double vision, ataxia and nystagmus that is worsening. Patient reportedly had symptoms last night and went to bed at approximately   9 pm, slept until 6 am. Denies headache and dizziness. Stroke scale 0.   Spoke with Dr. Batista, who reports that presentation of symptoms is very rare and would generally occur within 5 hours. Patient reported no symptoms yesterday, and if there is question MRI recommended.   0819- returned call to Zeyad and advised of Dr. Batista's recommendations. Understanding verbalized.

## 2020-10-13 NOTE — TELEPHONE ENCOUNTER
"I called Mr. Cerda to check on him. He had an intrathecal trial with a low-dose of Prialt and without sedation. Patient did not report pain relief or side effects during his stay in the PACU and then during his stay in observation.  I saw him frequently in the recovery room and in the observation unit when he reported no pain relief or any side effects.  He was actually asking me to let him go home.  Once the trial was determined a failure due to lack of pain relief, and without any evidence of side effects, patient was discharged home.  He left the hospital walking and without any difficulties.  Patient stated: \"When I came home, I ate dinner.  I was fine.  I did not have any pain relief.  Later on, mild pain increased.  In the evening, I had some nausea and nothing too prominent. I told Ignacio when he called me that I had pain and some nausea. I went to bed at 9 pm. When I woke up this morning at 6 am, I had double vision and some issues with my balance. I came to the ER. They did blood work and an MRI of my brain and everything was good. Then, they let me go home.  I have an appointment with an ophthalmologist tomorrow to see why I have double vision.  I feel better now but still have visual problems and I guess related to that I still has some issues with my balance.\"  I advised Mr. Avila side effects related to Prialt occur soon after administration of the drug during a trial.  Although some of his symptoms described by him sound like potential side effects from Prialt, it would be exceedingly rare to expect no pain relief whatsoever and side effects almost 24 hours after a single shot trial.  Patient understood.  Patient will see the ophthalmologist tomorrow and will keep me informed about his progress.  Patient was very appreciative of the call and verbalized understanding.  "

## 2020-10-13 NOTE — ED PROVIDER NOTES
Subjective   Mr. Avila 75-year-old male that presents the emergency department today with double vision and some ataxia.  He reports that yesterday he saw Dr. Montano and he was given an intrathecal infusion of Prialt which is some type of medication for pain control.  He states that he had no loss of function of the upper extremities or lower extremities.  Reports that he had no fevers no chills no headache associated with this.  He states he had no difficulty with speech.  He states that he did not call Dr. Dias back to let him know of the side effects.  Apparently had this done with some watch for couple hours and then discharged home.  He had the symptoms last evening and then last night went to bed around 9 PM when he woke up at 6 and the double vision seem to be worse.  States his double vision is lateral.  He states that affects his ability to walk because he has difficulty determining depth.  He is not on anticoagulant and he is not hit his head or have a fall and hit his head.      History provided by:  Patient and significant other   used: No    Neurologic Problem  The patient's primary symptoms include a visual change. Primary symptoms comment: Double vision. This is a new problem. The current episode started yesterday. The neurological problem developed gradually. The problem has been gradually worsening since onset. There was no focality noted. Pertinent negatives include no abdominal pain, aura, bowel incontinence, diaphoresis, dizziness, fatigue, fever, headaches, nausea, neck pain, palpitations or shortness of breath. Past treatments include nothing. The treatment provided no relief. There is no history of a bleeding disorder, a clotting disorder, a CVA, dementia, head trauma, liver disease, mood changes or seizures.       Review of Systems   Constitutional: Negative for diaphoresis, fatigue and fever.   Respiratory: Negative for chest tightness, shortness of breath and  wheezing.    Cardiovascular: Negative for palpitations.   Gastrointestinal: Negative for abdominal pain, bowel incontinence and nausea.   Genitourinary: Negative for dysuria, frequency and urgency.   Musculoskeletal: Negative for neck pain.   Neurological: Negative for dizziness and headaches.   Psychiatric/Behavioral: Negative.    All other systems reviewed and are negative.      Past Medical History:   Diagnosis Date   • Arthritis    • Carcinoma of prostate (CMS/HCC)    • Chronic pain disorder    • History of chronic kidney disease    • Hyperlipidemia    • Hypertension    • Kidney stone    • Left bundle branch block    • Osteoarthritis    • Rheumatoid arthritis (CMS/HCC)    • Wears hearing aid in both ears        Allergies   Allergen Reactions   • Adhesive Tape Rash     Rash with blisters     • Doxycycline Rash       Past Surgical History:   Procedure Laterality Date   • BACK SURGERY      Lumbar Fusion, 2014   • BACK SURGERY      Lumbar Laminectomy, 2014   • COLONOSCOPY  2016   • EPIDURAL BLOCK  2016   • OTHER SURGICAL HISTORY      Enteroscopic Polypectomy   • OTHER SURGICAL HISTORY      Vocal Cordectomy   • PAIN PUMP INSERTION/REVISION N/A 10/12/2020    Procedure: PAIN PUMP TRIAL;  Surgeon: Efrain Batista MD;  Location:  [facility];  Service: Pain Management;  Laterality: N/A;   • DC MYELOGRAPHY VIA LUMBAR INJECT RS&I LUMBOSACRAL N/A 1/21/2019    Procedure: IR myelogram, lumbar;  Surgeon: Francisco Javier Clay MD;  Location:  [facility];  Service: Interventional Radiology   • PROSTATE SURGERY     • SPINAL CORD STIMULATOR IMPLANT N/A 11/21/2019    Procedure: SPINAL CORD STIMULATOR INSERTION;  Surgeon: Fausto Renee MD;  Location:  [facility];  Service: Neurosurgery   • VOCAL CORD BIOPSY         Family History   Problem Relation Age of Onset   • Congenital heart disease Mother    • Multiple myeloma Father        Social History     Socioeconomic History   • Marital status:      Spouse name:  Not on file   • Number of children: Not on file   • Years of education: Not on file   • Highest education level: Not on file   Tobacco Use   • Smoking status: Never Smoker   • Smokeless tobacco: Never Used   Substance and Sexual Activity   • Alcohol use: No   • Drug use: No   • Sexual activity: Defer           Objective   Physical Exam  Vitals signs and nursing note reviewed.   Constitutional:       Appearance: He is well-developed.   HENT:      Head: Normocephalic and atraumatic.      Right Ear: External ear normal.      Left Ear: External ear normal.      Nose: Nose normal.   Eyes:      General: No scleral icterus.     Conjunctiva/sclera: Conjunctivae normal.      Pupils: Pupils are equal, round, and reactive to light.      Comments: Mild lateral nystagmus with extraocular movements.   Neck:      Musculoskeletal: Normal range of motion.      Thyroid: No thyromegaly.   Cardiovascular:      Rate and Rhythm: Normal rate and regular rhythm.      Heart sounds: Normal heart sounds.   Pulmonary:      Effort: Pulmonary effort is normal. No respiratory distress.      Breath sounds: Normal breath sounds. No wheezing or rales.   Chest:      Chest wall: No tenderness.   Abdominal:      General: Bowel sounds are normal. There is no distension.      Palpations: Abdomen is soft.      Tenderness: There is no abdominal tenderness.   Musculoskeletal: Normal range of motion.   Lymphadenopathy:      Cervical: No cervical adenopathy.   Skin:     General: Skin is warm and dry.   Neurological:      Mental Status: He is alert and oriented to person, place, and time.      Cranial Nerves: No cranial nerve deficit.      Coordination: Coordination normal.      Deep Tendon Reflexes: Reflexes are normal and symmetric. Reflexes normal.   Psychiatric:         Behavior: Behavior normal.         Thought Content: Thought content normal.         Judgment: Judgment normal.         Procedures           ED Course  ED Course as of Oct 13 1713   Tue Oct  13, 2020 0855 Spoke to Dr. Dial he did not feel like that the medication was the direct cause of this thought it would be at least prudent to get an MRI to rule out any other cause.    [KEYONA]   1240 Spoke to Dr. Wills neurology on-call.  He feels like with a normal MRI that the needs follow-up with ophthalmology and this has to be a toxic effect of the medication.  Have him follow-up with Dr. Montano    [KEYONA]      ED Course User Index  [KEYONA] Brian Latif PA      Recent Results (from the past 24 hour(s))   Comprehensive Metabolic Panel    Collection Time: 10/13/20  8:00 AM    Specimen: Blood   Result Value Ref Range    Glucose 109 (H) 65 - 99 mg/dL    BUN 20 8 - 23 mg/dL    Creatinine 1.03 0.76 - 1.27 mg/dL    Sodium 134 (L) 136 - 145 mmol/L    Potassium 3.8 3.5 - 5.2 mmol/L    Chloride 101 98 - 107 mmol/L    CO2 22.0 22.0 - 29.0 mmol/L    Calcium 9.4 8.6 - 10.5 mg/dL    Total Protein 7.2 6.0 - 8.5 g/dL    Albumin 4.20 3.50 - 5.20 g/dL    ALT (SGPT) 12 1 - 41 U/L    AST (SGOT) 20 1 - 40 U/L    Alkaline Phosphatase 76 39 - 117 U/L    Total Bilirubin 0.4 0.0 - 1.2 mg/dL    eGFR Non African Amer 70 >60 mL/min/1.73    Globulin 3.0 gm/dL    A/G Ratio 1.4 g/dL    BUN/Creatinine Ratio 19.4 7.0 - 25.0    Anion Gap 11.0 5.0 - 15.0 mmol/L   Troponin    Collection Time: 10/13/20  8:00 AM    Specimen: Blood   Result Value Ref Range    Troponin T <0.010 0.000 - 0.030 ng/mL   Magnesium    Collection Time: 10/13/20  8:00 AM    Specimen: Blood   Result Value Ref Range    Magnesium 2.0 1.6 - 2.4 mg/dL   Light Blue Top    Collection Time: 10/13/20  8:00 AM   Result Value Ref Range    Extra Tube hold for add-on    Green Top (Gel)    Collection Time: 10/13/20  8:00 AM   Result Value Ref Range    Extra Tube Hold for add-ons.    Lavender Top    Collection Time: 10/13/20  8:00 AM   Result Value Ref Range    Extra Tube hold for add-on    Gold Top - SST    Collection Time: 10/13/20  8:00 AM   Result Value Ref Range    Extra Tube  Hold for add-ons.    CBC Auto Differential    Collection Time: 10/13/20  8:00 AM    Specimen: Blood   Result Value Ref Range    WBC 7.50 3.40 - 10.80 10*3/mm3    RBC 4.49 4.14 - 5.80 10*6/mm3    Hemoglobin 13.9 13.0 - 17.7 g/dL    Hematocrit 41.1 37.5 - 51.0 %    MCV 91.5 79.0 - 97.0 fL    MCH 31.0 26.6 - 33.0 pg    MCHC 33.8 31.5 - 35.7 g/dL    RDW 12.2 (L) 12.3 - 15.4 %    RDW-SD 41.1 37.0 - 54.0 fl    MPV 9.9 6.0 - 12.0 fL    Platelets 261 140 - 450 10*3/mm3    Neutrophil % 66.0 42.7 - 76.0 %    Lymphocyte % 17.5 (L) 19.6 - 45.3 %    Monocyte % 10.5 5.0 - 12.0 %    Eosinophil % 4.3 0.3 - 6.2 %    Basophil % 1.2 0.0 - 1.5 %    Immature Grans % 0.5 0.0 - 0.5 %    Neutrophils, Absolute 4.95 1.70 - 7.00 10*3/mm3    Lymphocytes, Absolute 1.31 0.70 - 3.10 10*3/mm3    Monocytes, Absolute 0.79 0.10 - 0.90 10*3/mm3    Eosinophils, Absolute 0.32 0.00 - 0.40 10*3/mm3    Basophils, Absolute 0.09 0.00 - 0.20 10*3/mm3    Immature Grans, Absolute 0.04 0.00 - 0.05 10*3/mm3    nRBC 0.0 0.0 - 0.2 /100 WBC     Note: In addition to lab results from this visit, the labs listed above may include labs taken at another facility or during a different encounter within the last 24 hours. Please correlate lab times with ED admission and discharge times for further clarification of the services performed during this visit.    MRI Brain Without Contrast   Final Result   There is no acute intracranial abnormality with chronic   changes seen within the periventricular and subcortical white matter and   extensive chronic paranasal sinusitis. No definite acute sinusitis   present. No acute intracranial abnormality is identified.         D:  10/13/2020   E:  10/13/2020       This report was finalized on 10/13/2020 3:49 PM by Dr. Karlene García MD.          XR Chest 1 View   Final Result   No acute cardiopulmonary disease.       D:  10/13/2020   E:  10/13/2020       This report was finalized on 10/13/2020 10:48 AM by Dr. Karlene García,  MD.            Vitals:    10/13/20 1300 10/13/20 1310 10/13/20 1320 10/13/20 1330   BP: 162/98   131/97   BP Location:       Patient Position:       Pulse:       Resp:       Temp:       SpO2: 99% 98% 99% 98%   Weight:       Height:         Medications   LORazepam (ATIVAN) injection 0.5 mg (0.5 mg Intravenous Given 10/13/20 1038)     ECG/EMG Results (last 24 hours)     Procedure Component Value Units Date/Time    ECG 12 Lead [[id number]] Collected: 10/13/20 0755     Updated: 10/13/20 0920    Narrative:      Test Reason : Weak/Dizzy/AMS protocol  Blood Pressure : **/** mmHG  Vent. Rate : 056 BPM     Atrial Rate : 056 BPM     P-R Int : 248 ms          QRS Dur : 160 ms      QT Int : 498 ms       P-R-T Axes : 068 -41 073 degrees     QTc Int : 480 ms    Sinus bradycardia with 1st degree AV block  Left axis deviation  Left bundle branch block  Abnormal ECG  When compared with ECG of 12-OCT-2020 10:16,  No significant change was found  Confirmed by BROOKS VALENCIA MD ([id number]) on 10/13/2020 9:20:05 AM    Referred By:  ER           Confirmed By:BROOKS VALENCIA MD        ECG 12 Lead   Final Result   Test Reason : Weak/Dizzy/AMS protocol   Blood Pressure : **/** mmHG   Vent. Rate : 056 BPM     Atrial Rate : 056 BPM      P-R Int : 248 ms          QRS Dur : 160 ms       QT Int : 498 ms       P-R-T Axes : 068 -41 073 degrees      QTc Int : 480 ms      Sinus bradycardia with 1st degree AV block   Left axis deviation   Left bundle branch block   Abnormal ECG   When compared with ECG of 12-OCT-2020 10:16,   No significant change was found   Confirmed by BROOKS VALENCIA MD ([id number]) on 10/13/2020 9:20:05 AM      Referred By:  ER           Confirmed By:BROOKS VALENCIA MD                                               MDM  Number of Diagnoses or Management Options  Diplopia: new and requires workup  Medication reaction, initial encounter: new and requires workup     Amount and/or Complexity of Data Reviewed  Clinical lab tests: reviewed and  ordered  Tests in the radiology section of CPT®: reviewed and ordered  Tests in the medicine section of CPT®: reviewed and ordered  Discuss the patient with other providers: yes    Patient Progress  Patient progress: stable      Final diagnoses:   Diplopia   Medication reaction, initial encounter            Brian Latif PA  10/13/20 8118

## 2020-11-11 RX ORDER — GABAPENTIN 800 MG/1
TABLET ORAL
Qty: 120 TABLET | Refills: 0 | Status: SHIPPED | OUTPATIENT
Start: 2020-11-11 | End: 2020-12-11

## 2020-11-11 NOTE — TELEPHONE ENCOUNTER
Request [id number]  01/20/2020 Gabapentin 800MG 1943 120 30 [address]URKE [place] [facility] 10- [phone]  [place]  02/28/2020 Gabapentin 800MG 1943 120 30 [address]URKE [place] [facility] 10- [phone]  [place] 1  04/09/2020 Gabapentin 800MG 1943 120 30 [address] BURKE [place] [facility] 10- [phone]  [place] 1  04/14/2020 Tramadol Hcl  50MG/50MG/50MG/50MG/50MG/  1943 14 7 [address]LL[address] BURKE [place] [facility] 10- 0829  [place] 10 1  05/11/2020 Gabapentin 800MG 1943 120 30 [address] BURKE [place] [facility] 10- [phone]  [place] 1  06/11/2020 Gabapentin 800MG 1943 120 30 [address] BURKE [place] [facility] 10- [phone]  [place] 1  07/11/2020 Gabapentin 800MG 1943 120 30 [address]LL[address] BURKE [place] [facility] 10- [phone]  [place] 1  08/10/2020 Gabapentin 800MG 1943 120 30 [address]LL[address] BURKE [place] [facility] 10- 7801  [place] 1  09/11/2020 Gabapentin 800MG 1943 120 30 [address]ASCELL[address] BURKE [place] [facility] 10- [phone]  [place] 1  10/11/2020 Gabapentin 800MG 1943 120 30 [address]ASCELL[address] BURKE [place] [facility] 10- [phone]  [place] 1

## 2020-12-08 ENCOUNTER — TELEPHONE (OUTPATIENT)
Dept: PAIN MEDICINE | Facility: CLINIC | Age: 75
End: 2020-12-08

## 2020-12-11 RX ORDER — GABAPENTIN 800 MG/1
TABLET ORAL
Qty: 120 TABLET | Refills: 0 | Status: SHIPPED | OUTPATIENT
Start: 2020-12-11 | End: 2021-01-12

## 2021-01-12 DIAGNOSIS — G03.9 ADHESIVE ARACHNOIDITIS: Primary | ICD-10-CM

## 2021-01-12 RX ORDER — GABAPENTIN 800 MG/1
TABLET ORAL
Qty: 120 TABLET | Refills: 0 | Status: SHIPPED | OUTPATIENT
Start: 2021-01-12 | End: 2021-02-10

## 2021-01-21 ENCOUNTER — TELEPHONE (OUTPATIENT)
Dept: PAIN MEDICINE | Facility: CLINIC | Age: 76
End: 2021-01-21

## 2021-01-21 NOTE — TELEPHONE ENCOUNTER
Spoke with patient regarding spinal cord stimulator and intrathecal pump.   He reports that his stimulator has been used without success in the recent past and has hence turned it off. He reports that the physical location of the SCS is uncomfortable for him and he would like it removed.   Went over implications of removal of stimulator to include that he would have to be sent back to Dr. Renee for removal as it is a surgical lead, rather than a percutaneous lead. Explained that we would be glad to make that referral. Patient verbalized understanding.    Spoke with patient regarding trialing different medications for intrathecal use to include bupivacaine.  Patient is to research bupivacaine, and formulate any questions about the medication that he may have and its intrathecal use.  Explained to patient that as it is also used as a local anesthetic and he may find an abundance of literature referring to its use as a local anesthetic over intrathecal use.  Advised that it is a safe medication for intrathecal use.  After explaining both options to the patient, the patient desires an in person consultation with Dr. Batista prior to moving forward with either option that was presented to him.  Patient is completely open and available for a Tuesday or Thursday appointment.

## 2021-02-03 ENCOUNTER — OFFICE VISIT (OUTPATIENT)
Dept: NEUROSURGERY | Facility: CLINIC | Age: 76
End: 2021-02-03

## 2021-02-03 ENCOUNTER — PREP FOR SURGERY (OUTPATIENT)
Dept: OTHER | Facility: HOSPITAL | Age: 76
End: 2021-02-03

## 2021-02-03 VITALS — BODY MASS INDEX: 27.37 KG/M2 | RESPIRATION RATE: 15 BRPM | HEIGHT: 69 IN | WEIGHT: 184.8 LBS

## 2021-02-03 DIAGNOSIS — M96.1 POSTLAMINECTOMY SYNDROME OF LUMBAR REGION: ICD-10-CM

## 2021-02-03 DIAGNOSIS — T85.192A: Primary | ICD-10-CM

## 2021-02-03 DIAGNOSIS — T85.192A FAILURE OF SPINAL CORD STIMULATOR, INITIAL ENCOUNTER (HCC): Primary | ICD-10-CM

## 2021-02-03 PROCEDURE — 99214 OFFICE O/P EST MOD 30 MIN: CPT | Performed by: NEUROLOGICAL SURGERY

## 2021-02-03 RX ORDER — FAMOTIDINE 20 MG/1
20 TABLET, FILM COATED ORAL
Status: CANCELLED | OUTPATIENT
Start: 2021-02-03

## 2021-02-03 RX ORDER — CEFAZOLIN SODIUM 2 G/100ML
2 INJECTION, SOLUTION INTRAVENOUS ONCE
Status: CANCELLED | OUTPATIENT
Start: 2021-02-03 | End: 2021-02-03

## 2021-02-03 NOTE — H&P
Patient: Prashant Avila  : 1945     Primary Care Provider: SHERON Chun MD     Requesting Provider: As above           History     Chief Complaint:   1.  Left buttock and leg pain.  2.  Nonfunctioning Saint Harsha epidural spinal cord stimulator.     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  He is now a 76-year-old gentleman who is well-known to my service. In the past the patient had undergone a left L4-5 foraminotomy at the [facility] but this was not helpful.  Subsequently on 14 I performed an L4-S1 decompression and fusion.  He's continued to have pain in his back and left hip that extends down his leg.  The curious issue about his symptoms is that they are better with standing and walking and much worse with sitting or lying down.  He has no right lower extremity symptoms.  In the past he has had extensive pain management interventions.  At one point a number of years ago he was seen at the [facility] and Dr. Cox who recommended additional decompression at multiple levels including L3-4.  Prior to that I performed a CT myelogram which did not reveal evidence of nerve root compromise at L3-4 and I was not particularly enthusiastic about that.   On 2019 he underwent Saint Harsha epidural spinal cord stimulator placement.  Unfortunately despite very extensive programming this has not been effective in addressing his symptoms.  He has had a trial of intrathecal pain medicine which was not helpful.  He may consider a trial of additional or different medications.  The IPG site is uncomfortable for him.  He has trouble lying down on his back given the unit.  Given the discomfort at that area he would like to have the entire system removed.     Review of Systems   Constitutional: Negative for activity change, appetite change, chills, diaphoresis, fatigue, fever and unexpected weight change.   HENT: Negative for congestion, dental problem, drooling, ear discharge, ear  pain, facial swelling, hearing loss, mouth sores, nosebleeds, postnasal drip, rhinorrhea, sinus pressure, sneezing, sore throat, tinnitus, trouble swallowing and voice change.    Eyes: Negative for photophobia, pain, discharge, redness, itching and visual disturbance.   Respiratory: Negative for apnea, cough, choking, chest tightness, shortness of breath, wheezing and stridor.    Cardiovascular: Negative for chest pain, palpitations and leg swelling.   Gastrointestinal: Negative for abdominal distention, abdominal pain, anal bleeding, blood in stool, constipation, diarrhea, nausea, rectal pain and vomiting.   Endocrine: Negative for cold intolerance, heat intolerance, polydipsia, polyphagia and polyuria.   Genitourinary: Negative for decreased urine volume, difficulty urinating, dysuria, enuresis, flank pain, frequency, genital sores, hematuria and urgency.   Musculoskeletal: Negative for arthralgias, back pain, gait problem, joint swelling, myalgias, neck pain and neck stiffness.   Skin: Negative for color change, pallor, rash and wound.   Allergic/Immunologic: Negative for environmental allergies, food allergies and immunocompromised state.   Neurological: Negative for dizziness, tremors, seizures, syncope, facial asymmetry, speech difficulty, weakness, light-headedness, numbness and headaches.   Hematological: Negative for adenopathy. Does not bruise/bleed easily.   Psychiatric/Behavioral: Negative for agitation, behavioral problems, confusion, decreased concentration, dysphoric mood, hallucinations, self-injury, sleep disturbance and suicidal ideas. The patient is not nervous/anxious and is not hyperactive.    All other systems reviewed and are negative.        The patient's past medical history, past surgical history, family history, and social history have been reviewed at length in the electronic medical record.     Past Medical History:   Diagnosis Date   • Arthritis    • Carcinoma of prostate (CMS/HCC)    •  Chronic pain disorder    • History of chronic kidney disease    • Hyperlipidemia    • Hypertension    • Kidney stone    • Left bundle branch block    • Osteoarthritis    • Rheumatoid arthritis (CMS/HCC)    • Wears hearing aid in both ears      Past Surgical History:   Procedure Laterality Date   • BACK SURGERY      Lumbar Fusion, 2014   • BACK SURGERY      Lumbar Laminectomy, 2014   • COLONOSCOPY  2016   • EPIDURAL BLOCK  2016   • OTHER SURGICAL HISTORY      Enteroscopic Polypectomy   • OTHER SURGICAL HISTORY      Vocal Cordectomy   • PAIN PUMP INSERTION/REVISION N/A 10/12/2020    Procedure: PAIN PUMP TRIAL;  Surgeon: Efrain Batista MD;  Location:  [facility];  Service: Pain Management;  Laterality: N/A;   • UT MYELOGRAPHY VIA LUMBAR INJECT RS&I LUMBOSACRAL N/A 1/21/2019    Procedure: IR myelogram, lumbar;  Surgeon: Francisco Javier Clay MD;  Location:  [facility];  Service: Interventional Radiology   • PROSTATE SURGERY     • SPINAL CORD STIMULATOR IMPLANT N/A 11/21/2019    Procedure: SPINAL CORD STIMULATOR INSERTION;  Surgeon: Fausto Renee MD;  Location:  [facility];  Service: Neurosurgery   • VOCAL CORD BIOPSY       Family History   Problem Relation Age of Onset   • Congenital heart disease Mother    • Multiple myeloma Father      Social History     Socioeconomic History   • Marital status:      Spouse name: Not on file   • Number of children: Not on file   • Years of education: Not on file   • Highest education level: Not on file   Tobacco Use   • Smoking status: Never Smoker   • Smokeless tobacco: Never Used   Substance and Sexual Activity   • Alcohol use: No   • Drug use: No   • Sexual activity: Defer       Allergies   Allergen Reactions   • Adhesive Tape Rash     Rash with blisters     • Doxycycline Rash       Current Outpatient Medications on File Prior to Visit   Medication Sig Dispense Refill   • gabapentin (NEURONTIN) 800 MG tablet TAKE 1 TABLET BY MOUTH IN THE MORNING, 1 TABLET IN  "THE AFTERNOON, AND TAKE 2 TABLETS AT BEDTIME 120 tablet 0   • hydrocortisone 2.5 % cream APPLY CREAM TOPICALLY TO AFFECTED AREA THREE TIMES DAILY     • pravastatin (PRAVACHOL) 10 MG tablet Take 10 mg by mouth Daily.     • triamcinolone (KENALOG) 0.1 % ointment APPLY OINTMENT TOPICALLY TWICE DAILY TO FOOT FOR 2 WEEKS THEN AS NEEDED     • triamterene-hydrochlorothiazide (MAXZIDE-25) 37.5-25 MG per tablet Take 1 tablet by mouth Daily.       No current facility-administered medications on file prior to visit.         Physical Exam:   Resp 15   Ht 175.3 cm (69\")   Wt 83.8 kg (184 lb 12.8 oz)   BMI 27.29 kg/m²   MUSCULOSKELETAL:  Straight leg raising is negative.  Kodak's Sign is negative.  ROM in back normal.  Tenderness in the back to palpation is not observed.  His left upper buttock IPG site is intact.  NEUROLOGICAL:  Strength is intact in the lower extremities to direct testing.  Muscle tone is normal throughout.  Station and gait are normal.  Sensation is intact to light touch testing throughout.        Medical Decision Making     Diagnosis:   1.  Nonfunctioning and painful epidural spinal cord stimulator.  2.  Lumbar postlaminectomy syndrome.     Treatment Options:   I am going to make arrangements for complete removal of the ineffective spinal cord stimulator.  At length I explained the nature of this intervention as well as the potential risks, complications, and limitations.  I have also discussed simply removing the IPG but he would really like the entire system removed.          Diagnosis Plan   1. Failure of spinal cord stimulator, initial encounter (CMS/[facility])      2. Lumbar postlaminectomy syndrome               "

## 2021-02-03 NOTE — PROGRESS NOTES
Patient: Prashant Avila  : 1945    Primary Care Provider: SHERON Chun MD    Requesting Provider: As above        History    Chief Complaint:   1.  Left buttock and leg pain.  2.  Nonfunctioning Saint Harsha epidural spinal cord stimulator.    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  He is now a 76-year-old gentleman who is well-known to my service. In the past the patient had undergone a left L4-5 foraminotomy at the [facility] but this was not helpful.  Subsequently on 14 I performed an L4-S1 decompression and fusion.  He's continued to have pain in his back and left hip that extends down his leg.  The curious issue about his symptoms is that they are better with standing and walking and much worse with sitting or lying down.  He has no right lower extremity symptoms.  In the past he has had extensive pain management interventions.  At one point a number of years ago he was seen at the [facility] and Dr. Cox who recommended additional decompression at multiple levels including L3-4.  Prior to that I performed a CT myelogram which did not reveal evidence of nerve root compromise at L3-4 and I was not particularly enthusiastic about that.   On 2019 he underwent Saint Harsha epidural spinal cord stimulator placement.  Unfortunately despite very extensive programming this has not been effective in addressing his symptoms.  He has had a trial of intrathecal pain medicine which was not helpful.  He may consider a trial of additional or different medications.  The IPG site is uncomfortable for him.  He has trouble lying down on his back given the unit.  Given the discomfort at that area he would like to have the entire system removed.    Review of Systems   Constitutional: Negative for activity change, appetite change, chills, diaphoresis, fatigue, fever and unexpected weight change.   HENT: Negative for congestion, dental problem, drooling, ear discharge, ear pain,  "facial swelling, hearing loss, mouth sores, nosebleeds, postnasal drip, rhinorrhea, sinus pressure, sneezing, sore throat, tinnitus, trouble swallowing and voice change.    Eyes: Negative for photophobia, pain, discharge, redness, itching and visual disturbance.   Respiratory: Negative for apnea, cough, choking, chest tightness, shortness of breath, wheezing and stridor.    Cardiovascular: Negative for chest pain, palpitations and leg swelling.   Gastrointestinal: Negative for abdominal distention, abdominal pain, anal bleeding, blood in stool, constipation, diarrhea, nausea, rectal pain and vomiting.   Endocrine: Negative for cold intolerance, heat intolerance, polydipsia, polyphagia and polyuria.   Genitourinary: Negative for decreased urine volume, difficulty urinating, dysuria, enuresis, flank pain, frequency, genital sores, hematuria and urgency.   Musculoskeletal: Negative for arthralgias, back pain, gait problem, joint swelling, myalgias, neck pain and neck stiffness.   Skin: Negative for color change, pallor, rash and wound.   Allergic/Immunologic: Negative for environmental allergies, food allergies and immunocompromised state.   Neurological: Negative for dizziness, tremors, seizures, syncope, facial asymmetry, speech difficulty, weakness, light-headedness, numbness and headaches.   Hematological: Negative for adenopathy. Does not bruise/bleed easily.   Psychiatric/Behavioral: Negative for agitation, behavioral problems, confusion, decreased concentration, dysphoric mood, hallucinations, self-injury, sleep disturbance and suicidal ideas. The patient is not nervous/anxious and is not hyperactive.    All other systems reviewed and are negative.      The patient's past medical history, past surgical history, family history, and social history have been reviewed at length in the electronic medical record.    Physical Exam:   Resp 15   Ht 175.3 cm (69\")   Wt 83.8 kg (184 lb 12.8 oz)   BMI 27.29 kg/m² "   MUSCULOSKELETAL:  Straight leg raising is negative.  Kodak's Sign is negative.  ROM in back normal.  Tenderness in the back to palpation is not observed.  His left upper buttock IPG site is intact.  NEUROLOGICAL:  Strength is intact in the lower extremities to direct testing.  Muscle tone is normal throughout.  Station and gait are normal.  Sensation is intact to light touch testing throughout.      Medical Decision Making    Diagnosis:   1.  Nonfunctioning and painful epidural spinal cord stimulator.  2.  Lumbar postlaminectomy syndrome.    Treatment Options:   I am going to make arrangements for complete removal of the ineffective spinal cord stimulator.  At length I explained the nature of this intervention as well as the potential risks, complications, and limitations.  I have also discussed simply removing the IPG but he would really like the entire system removed.       Diagnosis Plan   1. Failure of spinal cord stimulator, initial encounter (CMS/[facility])     2. Lumbar postlaminectomy syndrome          Scribed for Fausto Renee MD by Jess Camp CMA on 2/3/2021 17:40 EST       I, Dr. Renee, personally performed the services described in the documentation, as scribed in my presence, and it is both accurate and complete.

## 2021-02-10 DIAGNOSIS — G03.9 ADHESIVE ARACHNOIDITIS: ICD-10-CM

## 2021-02-10 RX ORDER — GABAPENTIN 800 MG/1
TABLET ORAL
Qty: 120 TABLET | Refills: 0 | Status: SHIPPED | OUTPATIENT
Start: 2021-02-10 | End: 2021-11-09

## 2021-02-10 NOTE — TELEPHONE ENCOUNTER
Request # : [id number]  01/20/2020 Gabapentin 800MG 1945 120 30 VASCELLO BURKE [place] [facility] 10  [phone]  [place] 1  02/28/2020 Gabapentin 800MG 1945 120 30 VASCELLO BURKE [place] [facility] 10  4600  [place] 1  04/09/2020 Gabapentin 800MG 1945 120 30 VASCELLO BURKE [place] [facility] 10  4600  [place] 1  04/14/2020 Tramadol Hcl  50MG/50MG/50MG/50MG/50MG/  1945 14 7 VASCELLO BURKE [place] [facility] 10  4606  [place] 10 1  05/11/2020 Gabapentin 800MG 1945 120 30 VASCELLO BURKE [place] [facility] 10  460  [place] 1  06/11/2020 Gabapentin 800MG 1945 120 30 VASCELLO BURKE [place] [facility] 10- 7409  [place] 1  07/11/2020 Gabapentin 800MG 1945 120 30 VASCELLO BURKE [place] [facility] 10- 2914  [place] 1  08/10/2020 Gabapentin 800MG 1945 120 30 VASCELLO BURKE [place] [facility] 10- 8617  [place] 1  09/11/2020 Gabapentin 800MG 1945 120 30 VASCELLO BURKE [place] [facility] 10-  4607  [place] 1  10/11/2020 Gabapentin 800MG 1945 120 30 VASCELLO BURKE [place] [facility] 10-  4607  [place] 1  11/11/2020 Gabapentin 800MG 1945 120 30 VASCELLO BURKE [place] [facility] 10-  4607  [place] 1  12/11/2020 Gabapentin 800MG 1945 120 30 VASCELLO BURKE [place] [facility] 10-  460  [place] 1  01/12/2021 Gabapentin 800MG 1945 120 30 VASCELLO BURKE [place] [facility] 10-  4602  [place] 1

## 2021-02-12 ENCOUNTER — APPOINTMENT (OUTPATIENT)
Dept: PREADMISSION TESTING | Facility: HOSPITAL | Age: 76
End: 2021-02-12

## 2021-02-12 VITALS — WEIGHT: 186.4 LBS | HEIGHT: 69 IN | BODY MASS INDEX: 27.61 KG/M2

## 2021-02-26 ENCOUNTER — APPOINTMENT (OUTPATIENT)
Dept: PREADMISSION TESTING | Facility: HOSPITAL | Age: 76
End: 2021-02-26

## 2021-02-26 VITALS — HEIGHT: 70 IN | BODY MASS INDEX: 26.88 KG/M2 | WEIGHT: 187.8 LBS

## 2021-02-26 LAB
DEPRECATED RDW RBC AUTO: 41.8 FL (ref 37–54)
ERYTHROCYTE [DISTWIDTH] IN BLOOD BY AUTOMATED COUNT: 12 % (ref 12.3–15.4)
HCT VFR BLD AUTO: 42.6 % (ref 37.5–51)
HGB BLD-MCNC: 13.9 G/DL (ref 13–17.7)
MCH RBC QN AUTO: 30.8 PG (ref 26.6–33)
MCHC RBC AUTO-ENTMCNC: 32.6 G/DL (ref 31.5–35.7)
MCV RBC AUTO: 94.5 FL (ref 79–97)
MRSA DNA SPEC QL NAA+PROBE: NEGATIVE
PLATELET # BLD AUTO: 235 10*3/MM3 (ref 140–450)
PMV BLD AUTO: 10.5 FL (ref 6–12)
POTASSIUM SERPL-SCNC: 4.4 MMOL/L (ref 3.5–5.2)
RBC # BLD AUTO: 4.51 10*6/MM3 (ref 4.14–5.8)
WBC # BLD AUTO: 8.52 10*3/MM3 (ref 3.4–10.8)

## 2021-02-26 PROCEDURE — U0005 INFEC AGEN DETEC AMPLI PROBE: HCPCS

## 2021-02-26 PROCEDURE — C9803 HOPD COVID-19 SPEC COLLECT: HCPCS

## 2021-02-26 PROCEDURE — 84132 ASSAY OF SERUM POTASSIUM: CPT

## 2021-02-26 PROCEDURE — 36415 COLL VENOUS BLD VENIPUNCTURE: CPT

## 2021-02-26 PROCEDURE — 85027 COMPLETE CBC AUTOMATED: CPT

## 2021-02-26 PROCEDURE — U0004 COV-19 TEST NON-CDC HGH THRU: HCPCS

## 2021-02-26 PROCEDURE — 87641 MR-STAPH DNA AMP PROBE: CPT

## 2021-02-27 LAB — SARS-COV-2 RNA RESP QL NAA+PROBE: NOT DETECTED

## 2021-02-28 ENCOUNTER — ANESTHESIA EVENT (OUTPATIENT)
Dept: PERIOP | Facility: HOSPITAL | Age: 76
End: 2021-02-28

## 2021-03-01 ENCOUNTER — APPOINTMENT (OUTPATIENT)
Dept: GENERAL RADIOLOGY | Facility: HOSPITAL | Age: 76
End: 2021-03-01

## 2021-03-01 ENCOUNTER — HOSPITAL ENCOUNTER (OUTPATIENT)
Facility: HOSPITAL | Age: 76
Discharge: HOME | End: 2021-03-01
Attending: NEUROLOGICAL SURGERY | Admitting: NEUROLOGICAL SURGERY

## 2021-03-01 ENCOUNTER — ANESTHESIA (OUTPATIENT)
Dept: PERIOP | Facility: HOSPITAL | Age: 76
End: 2021-03-01

## 2021-03-01 VITALS
OXYGEN SATURATION: 98 % | RESPIRATION RATE: 16 BRPM | HEART RATE: 49 BPM | TEMPERATURE: 97.2 F | SYSTOLIC BLOOD PRESSURE: 134 MMHG | DIASTOLIC BLOOD PRESSURE: 67 MMHG

## 2021-03-01 DIAGNOSIS — T85.192A: ICD-10-CM

## 2021-03-01 PROCEDURE — 25010000003 LIDOCAINE 1 % SOLUTION: Performed by: NURSE ANESTHETIST, CERTIFIED REGISTERED

## 2021-03-01 PROCEDURE — 25010000002 FENTANYL CITRATE (PF) 100 MCG/2ML SOLUTION: Performed by: NURSE ANESTHETIST, CERTIFIED REGISTERED

## 2021-03-01 PROCEDURE — 63710000001 FAMOTIDINE 20 MG TABLET: Performed by: ANESTHESIOLOGY

## 2021-03-01 PROCEDURE — 25010000002 PROPOFOL 10 MG/ML EMULSION: Performed by: NURSE ANESTHETIST, CERTIFIED REGISTERED

## 2021-03-01 PROCEDURE — A9270 NON-COVERED ITEM OR SERVICE: HCPCS | Performed by: ANESTHESIOLOGY

## 2021-03-01 PROCEDURE — 25010000002 NEOSTIGMINE 10 MG/10ML SOLUTION: Performed by: NURSE ANESTHETIST, CERTIFIED REGISTERED

## 2021-03-01 PROCEDURE — 63688 REV/RMV IMP SP NPG/R DTCH CN: CPT | Performed by: NEUROLOGICAL SURGERY

## 2021-03-01 PROCEDURE — 63662 REMOVE SPINE ELTRD PLATE: CPT | Performed by: PHYSICIAN ASSISTANT

## 2021-03-01 PROCEDURE — 63003 REMOVE SPINE LAMINA 1/2 THRC: CPT | Performed by: PHYSICIAN ASSISTANT

## 2021-03-01 PROCEDURE — 25010000003 CEFAZOLIN IN DEXTROSE 2-4 GM/100ML-% SOLUTION: Performed by: NEUROLOGICAL SURGERY

## 2021-03-01 PROCEDURE — 25010000002 DEXAMETHASONE PER 1 MG: Performed by: NURSE ANESTHETIST, CERTIFIED REGISTERED

## 2021-03-01 PROCEDURE — 76000 FLUOROSCOPY <1 HR PHYS/QHP: CPT

## 2021-03-01 PROCEDURE — 63003 REMOVE SPINE LAMINA 1/2 THRC: CPT | Performed by: NEUROLOGICAL SURGERY

## 2021-03-01 PROCEDURE — 25010000002 ONDANSETRON PER 1 MG: Performed by: NURSE ANESTHETIST, CERTIFIED REGISTERED

## 2021-03-01 PROCEDURE — 63662 REMOVE SPINE ELTRD PLATE: CPT | Performed by: NEUROLOGICAL SURGERY

## 2021-03-01 DEVICE — FLOSEAL HEMOSTATIC MATRIX, 10ML
Type: IMPLANTABLE DEVICE | Site: SPINE THORACIC | Status: FUNCTIONAL
Brand: FLOSEAL HEMOSTATIC MATRIX

## 2021-03-01 RX ORDER — ROCURONIUM BROMIDE 10 MG/ML
INJECTION, SOLUTION INTRAVENOUS AS NEEDED
Status: DISCONTINUED | OUTPATIENT
Start: 2021-03-01 | End: 2021-03-01 | Stop reason: SURG

## 2021-03-01 RX ORDER — MAGNESIUM HYDROXIDE 1200 MG/15ML
LIQUID ORAL AS NEEDED
Status: DISCONTINUED | OUTPATIENT
Start: 2021-03-01 | End: 2021-03-01 | Stop reason: HOSPADM

## 2021-03-01 RX ORDER — LIDOCAINE HYDROCHLORIDE 10 MG/ML
0.5 INJECTION, SOLUTION EPIDURAL; INFILTRATION; INTRACAUDAL; PERINEURAL ONCE AS NEEDED
Status: COMPLETED | OUTPATIENT
Start: 2021-03-01 | End: 2021-03-01

## 2021-03-01 RX ORDER — SODIUM CHLORIDE 0.9 % (FLUSH) 0.9 %
10 SYRINGE (ML) INJECTION AS NEEDED
Status: DISCONTINUED | OUTPATIENT
Start: 2021-03-01 | End: 2021-03-01 | Stop reason: HOSPADM

## 2021-03-01 RX ORDER — MIDAZOLAM HYDROCHLORIDE 1 MG/ML
0.5 INJECTION INTRAMUSCULAR; INTRAVENOUS
Status: DISCONTINUED | OUTPATIENT
Start: 2021-03-01 | End: 2021-03-01 | Stop reason: HOSPADM

## 2021-03-01 RX ORDER — PROPOFOL 10 MG/ML
VIAL (ML) INTRAVENOUS AS NEEDED
Status: DISCONTINUED | OUTPATIENT
Start: 2021-03-01 | End: 2021-03-01 | Stop reason: SURG

## 2021-03-01 RX ORDER — CEFAZOLIN SODIUM 2 G/100ML
2 INJECTION, SOLUTION INTRAVENOUS ONCE
Status: COMPLETED | OUTPATIENT
Start: 2021-03-01 | End: 2021-03-01

## 2021-03-01 RX ORDER — PROMETHAZINE HYDROCHLORIDE 25 MG/1
25 SUPPOSITORY RECTAL ONCE AS NEEDED
Status: DISCONTINUED | OUTPATIENT
Start: 2021-03-01 | End: 2021-03-01 | Stop reason: HOSPADM

## 2021-03-01 RX ORDER — FENTANYL CITRATE 50 UG/ML
50 INJECTION, SOLUTION INTRAMUSCULAR; INTRAVENOUS
Status: DISCONTINUED | OUTPATIENT
Start: 2021-03-01 | End: 2021-03-01 | Stop reason: HOSPADM

## 2021-03-01 RX ORDER — HYDROMORPHONE HYDROCHLORIDE 1 MG/ML
0.5 INJECTION, SOLUTION INTRAMUSCULAR; INTRAVENOUS; SUBCUTANEOUS
Status: DISCONTINUED | OUTPATIENT
Start: 2021-03-01 | End: 2021-03-01 | Stop reason: HOSPADM

## 2021-03-01 RX ORDER — ONDANSETRON 2 MG/ML
4 INJECTION INTRAMUSCULAR; INTRAVENOUS ONCE AS NEEDED
Status: DISCONTINUED | OUTPATIENT
Start: 2021-03-01 | End: 2021-03-01 | Stop reason: HOSPADM

## 2021-03-01 RX ORDER — MIDAZOLAM HYDROCHLORIDE 1 MG/ML
1 INJECTION INTRAMUSCULAR; INTRAVENOUS
Status: DISCONTINUED | OUTPATIENT
Start: 2021-03-01 | End: 2021-03-01 | Stop reason: HOSPADM

## 2021-03-01 RX ORDER — OXYCODONE HYDROCHLORIDE AND ACETAMINOPHEN 5; 325 MG/1; MG/1
1 TABLET ORAL 3 TIMES DAILY PRN
Qty: 15 TABLET | Refills: 0 | Status: SHIPPED | OUTPATIENT
Start: 2021-03-01 | End: 2021-10-19

## 2021-03-01 RX ORDER — NEOSTIGMINE METHYLSULFATE 1 MG/ML
INJECTION, SOLUTION INTRAVENOUS AS NEEDED
Status: DISCONTINUED | OUTPATIENT
Start: 2021-03-01 | End: 2021-03-01 | Stop reason: SURG

## 2021-03-01 RX ORDER — FENTANYL CITRATE 50 UG/ML
INJECTION, SOLUTION INTRAMUSCULAR; INTRAVENOUS AS NEEDED
Status: DISCONTINUED | OUTPATIENT
Start: 2021-03-01 | End: 2021-03-01 | Stop reason: SURG

## 2021-03-01 RX ORDER — EPHEDRINE SULFATE 50 MG/ML
INJECTION, SOLUTION INTRAVENOUS AS NEEDED
Status: DISCONTINUED | OUTPATIENT
Start: 2021-03-01 | End: 2021-03-01 | Stop reason: SURG

## 2021-03-01 RX ORDER — SODIUM CHLORIDE, SODIUM LACTATE, POTASSIUM CHLORIDE, CALCIUM CHLORIDE 600; 310; 30; 20 MG/100ML; MG/100ML; MG/100ML; MG/100ML
9 INJECTION, SOLUTION INTRAVENOUS CONTINUOUS
Status: DISCONTINUED | OUTPATIENT
Start: 2021-03-01 | End: 2021-03-01 | Stop reason: HOSPADM

## 2021-03-01 RX ORDER — SODIUM CHLORIDE 0.9 % (FLUSH) 0.9 %
10 SYRINGE (ML) INJECTION EVERY 12 HOURS SCHEDULED
Status: DISCONTINUED | OUTPATIENT
Start: 2021-03-01 | End: 2021-03-01 | Stop reason: HOSPADM

## 2021-03-01 RX ORDER — FAMOTIDINE 10 MG/ML
20 INJECTION, SOLUTION INTRAVENOUS ONCE
Status: DISCONTINUED | OUTPATIENT
Start: 2021-03-01 | End: 2021-03-01 | Stop reason: HOSPADM

## 2021-03-01 RX ORDER — SODIUM CHLORIDE 9 MG/ML
INJECTION, SOLUTION INTRAVENOUS AS NEEDED
Status: DISCONTINUED | OUTPATIENT
Start: 2021-03-01 | End: 2021-03-01 | Stop reason: HOSPADM

## 2021-03-01 RX ORDER — DEXAMETHASONE SODIUM PHOSPHATE 4 MG/ML
INJECTION, SOLUTION INTRA-ARTICULAR; INTRALESIONAL; INTRAMUSCULAR; INTRAVENOUS; SOFT TISSUE AS NEEDED
Status: DISCONTINUED | OUTPATIENT
Start: 2021-03-01 | End: 2021-03-01 | Stop reason: SURG

## 2021-03-01 RX ORDER — PROMETHAZINE HYDROCHLORIDE 25 MG/1
25 TABLET ORAL ONCE AS NEEDED
Status: DISCONTINUED | OUTPATIENT
Start: 2021-03-01 | End: 2021-03-01 | Stop reason: HOSPADM

## 2021-03-01 RX ORDER — ONDANSETRON 2 MG/ML
INJECTION INTRAMUSCULAR; INTRAVENOUS AS NEEDED
Status: DISCONTINUED | OUTPATIENT
Start: 2021-03-01 | End: 2021-03-01 | Stop reason: SURG

## 2021-03-01 RX ORDER — LIDOCAINE HYDROCHLORIDE 10 MG/ML
INJECTION, SOLUTION INFILTRATION; PERINEURAL AS NEEDED
Status: DISCONTINUED | OUTPATIENT
Start: 2021-03-01 | End: 2021-03-01 | Stop reason: SURG

## 2021-03-01 RX ORDER — SODIUM CHLORIDE, SODIUM LACTATE, POTASSIUM CHLORIDE, CALCIUM CHLORIDE 600; 310; 30; 20 MG/100ML; MG/100ML; MG/100ML; MG/100ML
INJECTION, SOLUTION INTRAVENOUS CONTINUOUS PRN
Status: DISCONTINUED | OUTPATIENT
Start: 2021-03-01 | End: 2021-03-01 | Stop reason: SURG

## 2021-03-01 RX ORDER — GLYCOPYRROLATE 0.2 MG/ML
INJECTION INTRAMUSCULAR; INTRAVENOUS AS NEEDED
Status: DISCONTINUED | OUTPATIENT
Start: 2021-03-01 | End: 2021-03-01 | Stop reason: SURG

## 2021-03-01 RX ORDER — FAMOTIDINE 20 MG/1
20 TABLET, FILM COATED ORAL ONCE
Status: COMPLETED | OUTPATIENT
Start: 2021-03-01 | End: 2021-03-01

## 2021-03-01 RX ADMIN — FENTANYL CITRATE 50 MCG: 50 INJECTION, SOLUTION INTRAMUSCULAR; INTRAVENOUS at 11:23

## 2021-03-01 RX ADMIN — CEFAZOLIN SODIUM 2 G: 2 INJECTION, SOLUTION INTRAVENOUS at 09:54

## 2021-03-01 RX ADMIN — ROCURONIUM BROMIDE 30 MG: 10 INJECTION INTRAVENOUS at 09:58

## 2021-03-01 RX ADMIN — DEXAMETHASONE SODIUM PHOSPHATE 8 MG: 4 INJECTION, SOLUTION INTRA-ARTICULAR; INTRALESIONAL; INTRAMUSCULAR; INTRAVENOUS; SOFT TISSUE at 10:04

## 2021-03-01 RX ADMIN — SODIUM CHLORIDE, POTASSIUM CHLORIDE, SODIUM LACTATE AND CALCIUM CHLORIDE: 600; 310; 30; 20 INJECTION, SOLUTION INTRAVENOUS at 09:54

## 2021-03-01 RX ADMIN — EPHEDRINE SULFATE 10 MG: 50 INJECTION, SOLUTION INTRAVENOUS at 09:58

## 2021-03-01 RX ADMIN — ONDANSETRON 4 MG: 2 INJECTION INTRAMUSCULAR; INTRAVENOUS at 11:06

## 2021-03-01 RX ADMIN — FAMOTIDINE 20 MG: 20 TABLET, FILM COATED ORAL at 08:59

## 2021-03-01 RX ADMIN — PROPOFOL 150 MG: 10 INJECTION, EMULSION INTRAVENOUS at 09:58

## 2021-03-01 RX ADMIN — LIDOCAINE HYDROCHLORIDE 0.5 ML: 10 INJECTION, SOLUTION EPIDURAL; INFILTRATION; INTRACAUDAL; PERINEURAL at 08:59

## 2021-03-01 RX ADMIN — SODIUM CHLORIDE, POTASSIUM CHLORIDE, SODIUM LACTATE AND CALCIUM CHLORIDE 9 ML/HR: 600; 310; 30; 20 INJECTION, SOLUTION INTRAVENOUS at 09:00

## 2021-03-01 RX ADMIN — FENTANYL CITRATE 50 MCG: 50 INJECTION, SOLUTION INTRAMUSCULAR; INTRAVENOUS at 12:39

## 2021-03-01 RX ADMIN — LIDOCAINE HYDROCHLORIDE 50 MG: 10 INJECTION, SOLUTION INFILTRATION; PERINEURAL at 09:58

## 2021-03-01 RX ADMIN — FENTANYL CITRATE 50 MCG: 50 INJECTION, SOLUTION INTRAMUSCULAR; INTRAVENOUS at 09:58

## 2021-03-01 RX ADMIN — GLYCOPYRROLATE 0.4 MG: 0.4 INJECTION INTRAMUSCULAR; INTRAVENOUS at 11:06

## 2021-03-01 RX ADMIN — NEOSTIGMINE 3 MG: 1 INJECTION INTRAVENOUS at 11:06

## 2021-03-01 NOTE — ANESTHESIA PREPROCEDURE EVALUATION
Anesthesia Evaluation     Patient summary reviewed and Nursing notes reviewed   no history of anesthetic complications:  NPO Solid Status: > 8 hours  NPO Liquid Status: > 2 hours           Airway   Mallampati: II  TM distance: >3 FB  Neck ROM: full  No difficulty expected  Dental - normal exam     Pulmonary - negative pulmonary ROS and normal exam   Cardiovascular - normal exam    ECG reviewed    (+) hypertension, dysrhythmias, hyperlipidemia,     ROS comment: EKG 10/13/20 (no change in EKG from 2018)  Sinus bradycardia with 1st degree AV block  Left axis deviation  Left bundle branch block    Neuro/Psych  (+) headaches, numbness,     (-) seizures, TIA  GI/Hepatic/Renal/Endo    (+)   renal disease CRI,   (-) GERD, liver disease, diabetes, no thyroid disorder    Musculoskeletal     Abdominal    Substance History      OB/GYN          Other   arthritis,    history of cancer (prostate)                    Anesthesia Plan    ASA 3     general     intravenous induction     Anesthetic plan, all risks, benefits, and alternatives have been provided, discussed and informed consent has been obtained with: patient.    Plan discussed with CRNA.

## 2021-03-01 NOTE — ANESTHESIA PROCEDURE NOTES
Airway  Urgency: elective    Date/Time: 3/1/2021 10:01 AM  Airway not difficult    General Information and Staff    Patient location during procedure: OR  CRNA: Loni Oconnor CRNA    Indications and Patient Condition  Indications for airway management: airway protection    Preoxygenated: yes  MILS not maintained throughout  Mask difficulty assessment: 1 - vent by mask    Final Airway Details  Final airway type: endotracheal airway      Successful airway: ETT  Cuffed: yes   Successful intubation technique: direct laryngoscopy  Facilitating devices/methods: cricoid pressure  Endotracheal tube insertion site: oral  Blade: Lucian  Blade size: 3  ETT size (mm): 7.5  Cormack-Lehane Classification: grade IIa - partial view of glottis  Placement verified by: chest auscultation and capnometry   Measured from: lips  ETT/EBT  to lips (cm): 20  Number of attempts at approach: 1  Assessment: lips, teeth, and gum same as pre-op and atraumatic intubation    Additional Comments  Negative epigastric sounds, Breath sound equal bilaterally with symmetric chest rise and fall

## 2021-03-01 NOTE — OP NOTE
NEUROSURGICAL OPERATIVE NOTE        PREOPERATIVE DIAGNOSIS:    Failed Saint Harsha epidural spinal cord stimulator  Lumbar postlaminectomy syndrome      POSTOPERATIVE DIAGNOSIS:  Same      PROCEDURE:  T10 laminectomy for removal of entire Saint Harsha epidural spinal cord stimulator system      SURGEON:  Fausto Renee M.D.      ASSISTANT: Sherlyn Gonzalez PA-C    PAC assisted with:   Suctioning   Retraction   Tying   Suturing   Closing   Application of dressing   Skilled neurosurgery PA assistance was necessary to perform this procedure.        ANESTHESIA:  General      ESTIMATED BLOOD LOSS: Minimal      SPECIMEN: None      DRAINS: None      COMPLICATIONS:  None      CLINICAL NOTE:  The patient is a 76-year-old gentleman with longstanding back and leg pain.  He has undergone numerous interventions including Saint Harsha epidural spinal cord stimulator placement.  That has never provided much relief and this actually causes him significant discomfort particularly at the IPG site.  As such, he presents at this time for removal of the entire spinal cord stimulator system.  At very great length I have gone over the risks and potential complications associated with removing the IPG and the lead.  He was well aware of all of this and has desired removal of the entire system.      TECHNICAL NOTE:  The patient was brought to the operating room and while on his cart general endotracheal anesthesia was achieved. He was then turned prone onto blanket rolls with special care to ensure pressure points. His mid back, low back and upper buttocks were prepared and draped in the usual fashion. The previous left upper buttock incision was reopened. The IPG was freed up and removed from the pocket. The lead wires were severed with heavy scissors. Attention was turned to the midline low thoracic region where the previous incision was opened. Lead wires were identified and pulled through from the upper buttock incision. Granulation tissue  was taken down with Kerrison punches and Leksell rongeur. Bovie cautery was also utilized. It was necessary to pursue a T10 laminectomy given the extensive granulation tissue. It was necessary to do this to expose the lead wires and ultimately the lead. Once again a series of 1 and 2 mm Kerrison punches were utilized to remove granulation tissue in the epidural space. I was then ultimately able to withdraw the lead in its entirety. Preoperative fluoroscopic imaging of the hardware was pursued and then postoperative imaging with the C-arm confirmed removal of the entire spinal cord stimulator system. There was no significant bleeding from the thoracic incision. A little bit of Floseal was utilized. The thoracic incision was washed out with a saline solution. The paraspinous muscle and fascia were reapproximated in interrupted fashion with #0 Vicryl suture. The subcutaneous tissues were closed in layers with 3-0 Vicryl suture. The pocket was closed with some 2-0 Vicryl sutures well deep within the pocket. Then 2-0 Vicryl was utilized to close the deep subcutaneous tissues at the incision site itself. Then 3-0 Vicryl suture was used to close the more superficial subcutaneous tissues. The skin at each site was closed in a running subcuticular fashion with 3-0 Vicryl suture. Telfa and Tegaderm dressings were applied given his adhesive allergy. He did receive preoperative antibiotics. There were no overt intraoperative complications. He was subsequently rolled onto his cart, extubated, and taken to the recovery room in satisfactory condition.             Fausto Renee M.D.

## 2021-03-01 NOTE — INTERVAL H&P NOTE
[facility] Pre-op    Full history and physical note from office is attached.    /92 (BP Location: Right arm, Patient Position: Lying)   Pulse 56   Temp 97.3 °F (36.3 °C) (Temporal)   Resp 18   SpO2 95%     Immunizations:  Influenza:  2020  Pneumococcal:  UTD  Tetanus:  UTD  Covid x2: 2021    LAB Results:  Lab Results   Component Value Date    WBC 8.52 02/26/2021    HGB 13.9 02/26/2021    HCT 42.6 02/26/2021    MCV 94.5 02/26/2021     02/26/2021    NEUTROABS 4.95 10/13/2020    GLUCOSE 109 (H) 10/13/2020    BUN 20 10/13/2020    CREATININE 1.03 10/13/2020    EGFRIFNONA 70 10/13/2020     (L) 10/13/2020    K 4.4 02/26/2021     10/13/2020    CO2 22.0 10/13/2020    MG 2.0 10/13/2020    CALCIUM 9.4 10/13/2020    ALBUMIN 4.20 10/13/2020    AST 20 10/13/2020    ALT 12 10/13/2020    BILITOT 0.4 10/13/2020    PTT 38.8 (H) 10/03/2019    INR 1.12 10/03/2019       Cancer Staging (if applicable)  Cancer Patient: __ yes __no __unknown__N/A; If yes, clinical stage T:__ N:__M:__, stage group or __N/A      Impression: Failed spinal cord stimulator (CMS/HCC)      Plan: Removal of entire system, SPINAL CORD STIMULAR REMOVAL      NABOR Rodas   3/1/2021   09:09 EST

## 2021-03-01 NOTE — ANESTHESIA POSTPROCEDURE EVALUATION
Patient: Prashant Avila    Procedure Summary     Date: 03/01/21 [facility] / [facility]  [facility]    Anesthesia Start: 0954 Anesthesia Stop:     Procedure: Removal of entire system, SPINAL CORD STIMULAR REMOVAL (N/A Back) Diagnosis:       Failed spinal cord stimulator (CMS/HCC)      (Failed spinal cord stimulator (CMS/HCC) [T85.192A])    Surgeon: Fausto Renee MD Provider: Sofy Zabala MD    Anesthesia Type: general ASA Status: 3          Anesthesia Type: general    Vitals  Vitals Value Taken Time   BP     Temp     Pulse 58 03/01/21 1113   Resp     SpO2 98 % 03/01/21 1113   Vitals shown include unvalidated device data.        Post Anesthesia Care and Evaluation    Patient location during evaluation: PACU  Patient participation: complete - patient participated  Level of consciousness: awake and alert  Pain management: adequate  Airway patency: patent  Anesthetic complications: No anesthetic complications  PONV Status: none  Cardiovascular status: hemodynamically stable and acceptable  Respiratory status: nonlabored ventilation, acceptable and nasal cannula  Hydration status: acceptable

## 2021-03-04 ENCOUNTER — OFFICE VISIT (OUTPATIENT)
Dept: NEUROSURGERY | Facility: CLINIC | Age: 76
End: 2021-03-04

## 2021-03-04 ENCOUNTER — TELEPHONE (OUTPATIENT)
Dept: NEUROSURGERY | Facility: CLINIC | Age: 76
End: 2021-03-04

## 2021-03-04 VITALS
TEMPERATURE: 97.3 F | DIASTOLIC BLOOD PRESSURE: 70 MMHG | SYSTOLIC BLOOD PRESSURE: 122 MMHG | WEIGHT: 186.6 LBS | BODY MASS INDEX: 27.64 KG/M2 | HEIGHT: 69 IN

## 2021-03-04 DIAGNOSIS — M54.16 CHRONIC LUMBAR RADICULOPATHY: Primary | ICD-10-CM

## 2021-03-04 PROCEDURE — 99024 POSTOP FOLLOW-UP VISIT: CPT | Performed by: PHYSICIAN ASSISTANT

## 2021-03-04 NOTE — PROGRESS NOTES
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  He has a history of multiple lumbar interventions including [employer]ha epidural spinal cord stimulator placement.  Patient felt this was never effective and reported significant discomfort within the IPG site.  As such he presented for removal of the entire system on 3/1/2021.  Surgery was without complication.  Patient contacted our office this morning 3 days postop with complaints of diffuse wound drainage.  He requested an office follow-up for further evaluation.     Patient reports noticing wound drainage that began yesterday.  Initially the drainage was bloody, however now it is yellow in color.  He states that he has soaked several bandages.  He reports that he scrubbed the area with soap in the shower.  He denies positional headache, nausea or vomiting.  No fever chills or flulike symptoms.      Past Medical and Surgical History:  Past Medical History:   Diagnosis Date   • Arthritis    • Carcinoma of prostate (CMS/HCC)     surgery    • Chronic pain disorder    • Eczema    • History of chronic kidney disease    • Hyperlipidemia    • Hypertension    • Kidney stone     passed    • Left bundle branch block    • Neuropathic pain of flank, left    • Osteoarthritis    • Rheumatoid arthritis (CMS/HCC)    • Spinal headache    • Wears hearing aid in both ears      Past Surgical History:   Procedure Laterality Date   • BACK SURGERY      Lumbar Fusion, 2014   • BACK SURGERY      Lumbar Laminectomy, 2014   • COLONOSCOPY     • EPIDURAL BLOCK     • OTHER SURGICAL HISTORY      Enteroscopic Polypectomy   • PAIN PUMP INSERTION/REVISION N/A 10/12/2020    Procedure: PAIN PUMP TRIAL;  Surgeon: Efrain Batista  MD ANKUSH;  Location:  [facility];  Service: Pain Management;  Laterality: N/A;   • SD MYELOGRAPHY VIA LUMBAR INJECT RS&I LUMBOSACRAL N/A 1/21/2019    Procedure: IR myelogram, lumbar;  Surgeon: Francisco Javier Clay MD;  Location:  [facility];  Service: Interventional Radiology   • PROSTATE SURGERY     • SPINAL CORD STIMULATOR IMPLANT N/A 11/21/2019    Procedure: SPINAL CORD STIMULATOR INSERTION;  Surgeon: Fausto Renee MD;  Location:  [facility];  Service: Neurosurgery   • SPINAL CORD STIMULATOR IMPLANT N/A 3/1/2021    Procedure: Removal of entire system, SPINAL CORD STIMULAR REMOVAL;  Surgeon: Fausto Renee MD;  Location:  [facility];  Service: Neurosurgery;  Laterality: N/A;   • TESTICLE UNDESCENDED REPAIR  1980   • VOCAL CORD BIOPSY      polyps removed        Current Medications:    Current Outpatient Medications:   •  gabapentin (NEURONTIN) 800 MG tablet, TAKE 1 TABLET BY MOUTH IN THE MORNING AND 1 AT NOON AND 2 AT BEDTIME (Patient taking differently: Take 800 mg by mouth 3 (Three) Times a Day. TAKE 1 TABLET BY MOUTH IN THE MORNING AND 1 AT NOON AND 2 AT BEDTIME), Disp: 120 tablet, Rfl: 0  •  hydrocortisone 2.5 % cream, Apply 1 application topically to the appropriate area as directed Daily., Disp: , Rfl:   •  oxyCODONE-acetaminophen (PERCOCET) 5-325 MG per tablet, Take 1 tablet by mouth 3 (Three) Times a Day As Needed (Pain)., Disp: 15 tablet, Rfl: 0  •  pravastatin (PRAVACHOL) 10 MG tablet, Take 10 mg by mouth Every Night., Disp: , Rfl:   •  triamterene-hydrochlorothiazide (MAXZIDE-25) 37.5-25 MG per tablet, Take 1 tablet by mouth Every Morning., Disp: , Rfl:     Allergies:  Allergies   Allergen Reactions   • Adhesive Tape Rash     Rash with blisters     • Doxycycline Rash         Review of Systems   Constitutional: Negative.    HENT: Negative.    Eyes: Negative.    Respiratory: Negative.    Cardiovascular: Negative.    Gastrointestinal: Negative.    Endocrine: Negative.    Genitourinary: Negative.  "   Musculoskeletal: Negative.    Skin: Positive for color change.        Change around the wound   Allergic/Immunologic: Negative.    Neurological: Negative.    Hematological: Negative.    Psychiatric/Behavioral: Positive for agitation and sleep disturbance. The patient is nervous/anxious.          Physical Exam  Thoracic incision is well intact without evidence of dehiscence.  I am unable to express any drainage with palpation of the surrounding tissue.  There appears to be mild erythema approximately 1 cm away surrounding the incision site consistent with mild contact dermatitis.  The bandage shows shows a moderate amount of serosanguineous drainage.  Left upper buttock incision is well intact with skin glue remaining in place.  There is a mild amount of serosanguineous drainage on the bandage.  I am unable to express drainage from this incision.  The area is nontender to palpation.  There is no overt erythema, swelling or fluctuance.     Vitals:    03/04/21 1016   BP: 122/70   BP Location: Right arm   Patient Position: Sitting   Cuff Size: Adult   Temp: 97.3 °F (36.3 °C)   Weight: 84.6 kg (186 lb 9.6 oz)   Height: 175.3 cm (69\")       Patient's Body mass index is 27.56 kg/m². BMI is above normal parameters. Recommendations include: educational material.      Assessment:  1.  Status post removal of entire SCS system  2.  Wound drainage    Plan:  Reviewed photos of patient's incision with Dr. Renee.  The drainage appears consistent with seroma.  Patient has no evidence of positional headache concerning for CSF leak.  Advised patient to keep the areas clean and dry with a fresh bandage frequently.  I advised him to refrain from scrubbing his incisions with soap.  He will follow up in our clinic mid week next week for another wound check.  Patient was advised to email a photo if he has any additional concerns.  He will closely monitor for headache, nausea, vomiting, fever or chills.        Frieda Bowling PA-C  "

## 2021-03-04 NOTE — TELEPHONE ENCOUNTER
Provider:  Víctor  Caller: patient  Time of call:   9:07  Phone #:  [phone]  Surgery:  Removal of SCS  Surgery Date:  03/01/21  Last visit:   same  Next visit: 03/16/21    JOCELIN:         Reason for call:     Patient called and said he has excessive drainage coming from his surgical site and would like to be seen today.    Please advise.

## 2021-03-08 ENCOUNTER — TELEPHONE (OUTPATIENT)
Dept: NEUROSURGERY | Facility: CLINIC | Age: 76
End: 2021-03-08

## 2021-03-08 NOTE — TELEPHONE ENCOUNTER
I called Mr. Avila  And left him a message and advised him that on his next visit the PA would make the decision whether or not to cancel the appointment on the 16th.

## 2021-03-08 NOTE — TELEPHONE ENCOUNTER
THIS PT HAD ''SCS REMOVAL' ON 3/1/21 AND WAS ORIGINALLY SCHEDULED FOR A 2 WK POST OP ON 3/16/21. THE PT WAS SEEN FOR INCISION BLEEDING ON 3/4/21 AND AT THAT APPT WAS SCHEDULED FOR A 3/10 FU, WHICH HAS SINCE BEEN R/S TO 3/12 DU TO PROVIDER BEING IN SURGERY. WILL THE PT STILL NEED THE 3/16/21 PO VISIT?.

## 2021-03-12 ENCOUNTER — OFFICE VISIT (OUTPATIENT)
Dept: NEUROSURGERY | Facility: CLINIC | Age: 76
End: 2021-03-12

## 2021-03-12 VITALS
SYSTOLIC BLOOD PRESSURE: 140 MMHG | TEMPERATURE: 97.5 F | BODY MASS INDEX: 27.37 KG/M2 | WEIGHT: 184.8 LBS | HEIGHT: 69 IN | DIASTOLIC BLOOD PRESSURE: 80 MMHG

## 2021-03-12 DIAGNOSIS — T14.8XXD WOUND HEALING, DELAYED: Primary | ICD-10-CM

## 2021-03-12 DIAGNOSIS — Z96.89 S/P INSERTION OF SPINAL CORD STIMULATOR: ICD-10-CM

## 2021-03-12 DIAGNOSIS — M96.1 POSTLAMINECTOMY SYNDROME OF LUMBAR REGION: Primary | ICD-10-CM

## 2021-03-12 DIAGNOSIS — M96.1 POSTLAMINECTOMY SYNDROME OF LUMBAR REGION: ICD-10-CM

## 2021-03-12 DIAGNOSIS — T85.192A FAILURE OF SPINAL CORD STIMULATOR, INITIAL ENCOUNTER (HCC): ICD-10-CM

## 2021-03-12 PROCEDURE — 99024 POSTOP FOLLOW-UP VISIT: CPT | Performed by: PHYSICIAN ASSISTANT

## 2021-03-12 RX ORDER — GABAPENTIN 800 MG/1
800 TABLET ORAL 4 TIMES DAILY
Qty: 120 TABLET | Refills: 2 | Status: SHIPPED | OUTPATIENT
Start: 2021-03-12 | End: 2021-06-14 | Stop reason: SDUPTHER

## 2021-03-12 NOTE — PROGRESS NOTES
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  On , patient underwent placement of a [place] Harsha epidural spinal cord stimulator.  Postoperatively, patient complained of discomfort within his IPG site, and never felt that his stimulator was \"effective\".  Given his continued struggles, patient presented for removal of his entire system on 3/1/2021.  His early postoperative recovery was complicated by a modest amount of serosanguineous drainage.  This was treated conservatively with wound check/dressing changes.  Since last seen in the office is wound drainage resolved.  He denies associated positional headache, nausea/vomiting, or other flulike symptoms.  He has successfully weaned off of all pain medicines.  He is continued on his gabapentin 800 mg 4 times daily.  He has no other complaints at this time.    Review of Systems   Constitutional: Negative for activity change, appetite change, chills, diaphoresis, fatigue, fever and unexpected weight change.   HENT: Negative for congestion, dental problem, drooling, ear discharge, ear pain, facial swelling, hearing loss, mouth sores, nosebleeds, postnasal drip, rhinorrhea, sinus pressure, sinus pain, sneezing, sore throat, tinnitus, trouble swallowing and voice change.    Eyes: Negative for photophobia, pain, discharge, redness, itching and visual disturbance.   Respiratory: Negative for apnea, cough, choking, chest tightness, shortness of breath, wheezing and stridor.    Cardiovascular: Negative for chest pain, palpitations and leg swelling.   Gastrointestinal: Negative for abdominal distention, abdominal pain, anal bleeding, blood in stool, constipation, diarrhea, nausea, rectal " pain and vomiting.   Endocrine: Negative for cold intolerance, heat intolerance, polydipsia, polyphagia and polyuria.   Genitourinary: Negative for decreased urine volume, difficulty urinating, discharge, dysuria, enuresis, flank pain, frequency, genital sores, hematuria, penile pain, penile swelling, scrotal swelling, testicular pain and urgency.   Musculoskeletal: Negative for arthralgias, back pain, gait problem, joint swelling, myalgias, neck pain and neck stiffness.   Skin: Negative for color change, pallor, rash and wound.   Allergic/Immunologic: Negative for environmental allergies, food allergies and immunocompromised state.   Neurological: Negative for dizziness, tremors, seizures, syncope, facial asymmetry, speech difficulty, weakness, light-headedness, numbness and headaches.   Hematological: Negative for adenopathy. Does not bruise/bleed easily.   Psychiatric/Behavioral: Negative for agitation, behavioral problems, confusion, decreased concentration, dysphoric mood, hallucinations, self-injury, sleep disturbance and suicidal ideas. The patient is not nervous/anxious and is not hyperactive.    All other systems reviewed and are negative.      Past Medical History:   Diagnosis Date   • Arthritis    • Carcinoma of prostate (CMS/HCC)     surgery    • Chronic pain disorder    • Eczema    • History of chronic kidney disease    • Hyperlipidemia    • Hypertension    • Kidney stone 1990    passed    • Left bundle branch block    • Neuropathic pain of flank, left    • Osteoarthritis    • Rheumatoid arthritis (CMS/HCC)    • Spinal headache    • Wears hearing aid in both ears      Past Surgical History:   Procedure Laterality Date   • BACK SURGERY  2014    Lumbar Fusion, july 2014   • BACK SURGERY  2014    Lumbar Laminectomy, march 2014   • COLONOSCOPY  2016   • EPIDURAL BLOCK  2016   • OTHER SURGICAL HISTORY      Enteroscopic Polypectomy   • PAIN PUMP INSERTION/REVISION N/A 10/12/2020    Procedure: PAIN PUMP TRIAL;   Surgeon: Efrain Batista MD;  Location:  [facility];  Service: Pain Management;  Laterality: N/A;   • KS MYELOGRAPHY VIA LUMBAR INJECT RS&I LUMBOSACRAL N/A 1/21/2019    Procedure: IR myelogram, lumbar;  Surgeon: Francisco Javier Clay MD;  Location:  [facility];  Service: Interventional Radiology   • PROSTATE SURGERY     • SPINAL CORD STIMULATOR IMPLANT N/A 11/21/2019    Procedure: SPINAL CORD STIMULATOR INSERTION;  Surgeon: Fausto Renee MD;  Location:  [facility];  Service: Neurosurgery   • SPINAL CORD STIMULATOR IMPLANT N/A 3/1/2021    Procedure: Removal of entire system, SPINAL CORD STIMULAR REMOVAL;  Surgeon: Fausto Renee MD;  Location:  [facility];  Service: Neurosurgery;  Laterality: N/A;   • TESTICLE UNDESCENDED REPAIR  1980   • VOCAL CORD BIOPSY      polyps removed        Current Outpatient Medications:   •  gabapentin (NEURONTIN) 800 MG tablet, TAKE 1 TABLET BY MOUTH IN THE MORNING AND 1 AT NOON AND 2 AT BEDTIME (Patient taking differently: Take 800 mg by mouth 3 (Three) Times a Day. TAKE 1 TABLET BY MOUTH IN THE MORNING AND 1 AT NOON AND 2 AT BEDTIME), Disp: 120 tablet, Rfl: 0  •  hydrocortisone 2.5 % cream, Apply 1 application topically to the appropriate area as directed Daily., Disp: , Rfl:   •  oxyCODONE-acetaminophen (PERCOCET) 5-325 MG per tablet, Take 1 tablet by mouth 3 (Three) Times a Day As Needed (Pain)., Disp: 15 tablet, Rfl: 0  •  pravastatin (PRAVACHOL) 10 MG tablet, Take 10 mg by mouth Every Night., Disp: , Rfl:   •  triamterene-hydrochlorothiazide (MAXZIDE-25) 37.5-25 MG per tablet, Take 1 tablet by mouth Every Morning., Disp: , Rfl:     Allergies   Allergen Reactions   • Adhesive Tape Rash     Rash with blisters     • Doxycycline Rash       The patient's review of systems, past medical history, past surgical history, family history, and social history have been reviewed at length in the electronic medical record.    Physical Exam:   /80 (BP Location: Right arm,  "Patient Position: Sitting, Cuff Size: Adult)   Temp 97.5 °F (36.4 °C)   Ht 175.3 cm (69.02\")   Wt 83.8 kg (184 lb 12.8 oz)   BMI 27.28 kg/m²   Consitutional: A&Ox3, pleasant, no acute distress  Skin:   - Well healed surgical incision   - No evidence of wound dehiscence  - NSOI   - Non-TTP  -Modest amount of surrounding irritant dermatitis from adhesive bandaging    Patient's Body mass index is 27.28 kg/m². BMI is above normal parameters. Recommendations include: educational material, exercise counseling and nutrition counseling.         Medical Decision Making    Data Review:   - no new images     Diagnosis/Treatment Options:  1. Wound healing, delayed  2. Failure of spinal cord stimulator, initial encounter (CMS/[facility])  3. Lumbar postlaminectomy syndrome   4. S/P insertion of spinal cord stimulator  5. BMI 27.0-27.9,adult       Follow up:  Reverend Avila is seen today in follow-up 1.5 weeks after undergoing an uncomplicated spinal cord stimulator system removal on 3/1/2021.  Fortunately, his wound drainage resolved.  I encouraged him to take oral Benadryl, and apply cortisone-10 cream around his incisional sites for associated irritant/contact dermatitis.  Patient verbalized understanding.  I additionally refilled his gabapentin 800 mg 4 times daily with 2 refills.  Future refills will need to come from Dr. Weeks's office or from his PCP's office.  He will continue to observe and will be seen back in our office on an as-needed basis.    Sherlyn Gonzalez PA-C   3/12/2021   09:39 EST   "

## 2021-06-14 DIAGNOSIS — M96.1 POSTLAMINECTOMY SYNDROME OF LUMBAR REGION: ICD-10-CM

## 2021-06-14 RX ORDER — GABAPENTIN 800 MG/1
800 TABLET ORAL 4 TIMES DAILY
Qty: 120 TABLET | Refills: 2 | Status: SHIPPED | OUTPATIENT
Start: 2021-06-14 | End: 2021-10-06

## 2021-06-14 RX ORDER — GABAPENTIN 800 MG/1
TABLET ORAL
Qty: 120 TABLET | Refills: 0 | OUTPATIENT
Start: 2021-06-14

## 2021-06-14 NOTE — TELEPHONE ENCOUNTER
Provider:  Víctor  Caller: Pharmacy  Surgery:  Removal of SCS  Surgery Date:  3/1/21  Last visit:   3/12/21  Next visit: PRN    Per Sherlyn's last note- ongoing refills to come from PCP or Dr. Batista. Please refuse med.     Pharmacy notified as well.

## 2021-07-16 PROBLEM — Z46.2 ENCOUNTER FOR FITTING AND ADJUSTMENT OF NEUROPACEMAKER OF SPINAL CORD: Status: RESOLVED | Noted: 2019-10-14 | Resolved: 2021-07-16

## 2021-07-16 PROBLEM — T85.192A FAILED SPINAL CORD STIMULATOR: Status: RESOLVED | Noted: 2021-02-03 | Resolved: 2021-07-16

## 2021-07-16 PROBLEM — Z96.82 PRESENCE OF NEUROSTIMULATOR: Status: RESOLVED | Noted: 2020-01-14 | Resolved: 2021-07-16

## 2021-07-16 NOTE — PROGRESS NOTES
"Chief Complaint: \"I am still having the same pain; lower back, left hip and into the back of my left leg into my left foot.\"       Brief History:  Mr. Prashant Avila is a 76 y.o. male, who underwent implantation of a spinal cord stimulator device with Dr. Fausto Renee on 11/21/2019 with [employer] Penta paddle lead with the top electrodes projecting at the level of the superior endplate of the T8 vertebral level. IPG: [employer] Proclaim 5 XR IPG Non-Rechargeable (Full Body MRI compatible). The device was implanted primarily for treatment of chronic left hip and left lower extremity pain.  Patient reported no significant pain relief with use of his stimulator device despite numerous attempts at reprogramming his SCS device.  I have myself reprogrammed his stimulator device several times and was able to capture all areas of his chronic pain.  At one point, I created different programs and instructed the Reverend to use them on different days and to log on a pain diary the outcome with each program. Unfortunately, he ended up stopping the trial and turning off his spinal cord stimulator device.  Eventually, he became quite persistent in having the stimulator removed. Patient underwent explantation of the entire system on 3/1/2021 by Dr. Fausto Renee.  He had some immediate postoperative complications of modest serosanguineous drainage that was treated conservatively with wound checks and dressing changes. On his last visit with neurosurgery, the wound drainage had resolved.  Patient is here once again to discuss prospects of therapies for treatment of his unrelenting pain.  Patient continues reporting his usual chronic pain affecting his lower back, left gluteal region, and the entire left lower extremity including his left foot.  Patient continues to struggle with pain and inability to tolerate sitting position for more than 5 minutes.  Upon reviewing his previous treatments, patient has failed numerous " analgesic trials including tramadol (drowsiness), Effexor (total from 2 days and then stopped for unclear reasons), tapentadol, different NSAIDs, Tylenol, different topical analgesics, muscle relaxers, to name a few.  He reported discrete analgesic benefit from gabapentin without significant side effects.  Patient also failed several minimally invasive interventional pain management procedures, as referenced on previous notes.  Patient underwent an intrathecal pump trial with fentanyl and reported minimal analgesic benefit.  He underwent a trial with intrathecal Prialt and experienced no significant side effects.  Later after his intrathecal Prialt trial, he reported some visual disturbances for which he saw an ophthalmologist and it was determined unrelated to Prialt.  Patient is interested in pursuing an intrathecal trial with a higher dose of fentanyl, or otherwise other medications such as bupivacaine or clonidine.  We discussed this prior to implantation of his spinal cord stimulator device and advised the patient to contact me once he is healed from his surgery.  He is here to discuss prospects of intrathecal trial.  He has already obtained psychological clearance for intrathecal therapies.  Pain Description:  REVIEWED TODAY AND UNCHANGED, EXCEPT FOR CHANGES IN PAIN LEVELS, ETC: Constant pain with intermittent exacerbation, described as burning, electricity, pins and needles, and tingling sensation.   Radiation of pain: The pain radiates from the left gluteal region into the posterior aspect of the thigh, left leg and plantar aspect of the foot. The leg pain is more severe than the lower back pain  Pain pattern: Dermatomic, left L5 and S1   Pain intensity today: 10/10 (sitting or lying down) 7/10 (standing or walking)  Average pain intensity last week: 9/10  Pain intensity ranges from: 5/10 (standing or walking) to 10/10 (sitting > lying down)  Aggravating factors: Pain increases with sitting longer than 2-5  minutes and laying down for more than a few minutes.  Alleviating factors: Pain decreases with standing and walking   Associated symptoms:   Patient denies numbness or weakness in the lower extremities  Patient denies any new bladder or bowel problems  Patient denies difficulties with his balance or recent falls  Pain interferes with his sleep     Pain History:  Referring physician: Dr. Fausto Renee   Consultation for intractable chronic left lower extremity pain.   Patient reports a 7-year history of pain, which began without incident. Patient is status post L4-L5 hemilaminectomy and foraminotomy at the [facility] with Dr. Mackay on 03/03/2014, followed by L4-S1 lumbar decompression and fusion due to unresolved pain by Dr. Renee on 07/24/2014. Patient reports that he experienced significant improvement after his last surgery with Dr. Renee, in particular, complete resolution of his previously severe lower back pain. Unfortunately, his left lower extremity pain, chronic radicular pain preceded his last lumbar fusion, and did not improve after surgery. He underwent surgical consultation with Dr. Cox at the [facility], who recommended decompression of the nerve roots at L3-S1. He underwent follow-up neurosurgical consultation with Dr. Fausto Renee on 11/03/2017. Dr. Renee discussed the possibility of lumbar decompression at L3-L4: Left L3-L4 laminotomy and foraminotomy. Due to potential risk of developing instability that would necessitate extending his lumbar fusion to the L3 level. Patient underwent on April 3, 2018, left L3-L4 transforaminal epidural steroid injection without any pain relief. Patient elected to hold off on surgery. Patient underwent EMG/NCV, which confirmed chronic left S1 radiculopathy.  A CT myelogram was significant for stenosis above the level of his lumbar fusion, at L3-L4 without clinical correlation.  Patient presents with pain with a left S1 dermatomal distribution pattern.  Pain  Description:   Constant pain with intermittent exacerbation, described as burning, electricity, pins and needles, and tingling sensation.   Radiation of pain: The pain radiates into the posterior aspect of the thigh, left leg and plantar aspect of the foot. The leg pain is more severe than the lower back pain.   Pain pattern: dermatomic, left L5 and S1   Pain intensity today: 3/10 (standing) 9-10/10 (sitting or lying down)  Average pain intensity last week: 7/10  Pain intensity ranges from: 4/10 to 10/10  Aggravating factors: Pain increases with sitting longer than 5 minutes and laying down for more than 5 minutes.  Alleviating factors: Pain decreases with standing.   Associated symptoms:   Patient denies numbness or weakness in the lower extremities.   Patient denies any new bladder or bowel problems.   Patient denies difficulties with his balance or recent falls.       Review of previous therapies and additional medical records:  Prashant Avila has already failed the following measures, including:   Conservative Measures: Oral analgesics, ice and heat, physical therapy  Interventional measures:  02/06/2019: Left S1 transforaminal epidural steroid injection and experienced pain relief that was short-lived   02/20/2017: Diagnostic and therapeutic left sciatic nerve block with local anesthetics, steroids, hyaluronidase under ultrasound, PNS and fluoroscopic guidance- Patient did not receive sustained relief from this procedure.   02/06/2017: Diagnostic left S1 selective nerve root injection  01/30/2017: Nevro SCS Trial; no relief  04/25/2016: Trial of lumbar epidural steroid injections with steroids and hyaluronidase via caudal catheter with 100% pain relief lasting 48 hours  12/16/2015: Diagnostic and therapeutic left S1 transforaminal epidural steroid injection with hyaluronidase with complete resolution of pain for several weeks  11/30/2015: Spinal cord stimulator trial [employer] Jude, which provided him with more  "than 50% pain relief and functional improvement. However, patient decided to delay implantation of a permanent spinal cord stimulator device until the arrival of new technology. Patient did not like the perception of tonic stimulation, even though stimulation overlapped successfully the areas of his chronic pain, he did not find it pleasant.   02/09/2015: Diagnostic left piriformis muscle injection  Surgical Measures:   11/21/2019: SCS IMPLANT: [employer] Penta paddle lead with the top electrodes projecting at the level of the superior endplate of the T8 vertebral level. IPG: [employer] Proclaim 5 XR IPG Non-Rechargeable by Dr. Fausto Renee   03/01/2021: SCS EXPLANT by Dr. Fausto Renee.   03/03/2014: Left L4-L5 hemilaminectomy and foraminotomy at [facility] with Dr. Mackay   07/24/2014: L4-S1 decompression and fusion by Dr. Renee  Prashant Avila underwent follow-up neurosurgical consultation with Dr. Fausto Renee on 11/30/2017, and was found to be a potential surgical candidate.  Pain Psychology Evaluation (02/25/2019): \"From a psychological perspective, patient is considered to be an appropriate candidate for a spinal cord stimulator or an intrathecal pump implant at this time.\"  Prashant vAila presents with significant comorbidities including hypertension, rheumatoid arthritis, osteoporosis, hyperlipidemia, chronic kidney disease, hearing loss, carcinoma of prostate, engaged in treatment.  In terms of current analgesics, Prashant Avila takes: Tylenol, NSAIDs and gabapentin 800 mg 4 times a day without any side effects. He started a trial with tramadol without significant analgesic benefit and with side effects \"drowsiness\"  I have reviewed Jon Report # [id number] (gabapentin 800 mg 4 times daily) consistent to medication reconciliation.  SOAPP: Low risk  Consent for treatment with controlled substances has been obtained and filed    Global Pain Scale 04-19  2018 10-24  2018 11-08  2018 01-29  2019 03-06  2019 " 10-03  2019 10-17  2019 01-14  2020 01-21  2020 02-20  2020 04-14  2020 05-07  2020 09-24  2020 07-20  2021        Pain 19 17 17 18 17 20  10 10 10   10  10 8 20 22      Feelings 1 0 0 0 0   8    4   2  0  0    0 0 6 12       Clinical outcomes 11 8 13 11 6 11    7   6  7  9    9 9 11 12       Activities 17 10 16 18 6 15    7   6  4  15  15 12 7 10       GPS Total: 48 35 46 47 29 54  28  24  21  34  34 28 44 56         Review of Previous Diagnostic Studies:   There are no new diagnostic studies for review at this time.  MRI THORACIC SPINE WO CONTRAST-10/31/2019: Minimal degenerative changes at multiple levels throughout the thoracic spine with no central spinal canal stenosis. Epidural space is unremarkable.  EMG/NCV of the bilateral lower extremities December 18, 2018: Chronic left S1 radiculopathy  CT LUMBAR SPINE WITH CONTRAST-01/21/2019: There is excellent opacification of the thecal sac. Imaging covers from T12 through the S1 level caudally. The conus terminates at the L1/L2 level.  The distal cord and conus are normal in caliber. The nerve roots of the cauda equina are normal in caliber and distribution. L4-S1 fusion hardware is in place; the left S1 pedicle screw is fractured and there is lucency around the right S1 pedicle screw. Left L4 laminotomy. Vertebral body heights and alignment are normal. Vertebral body heights are normal. Degenerative changes are as follows:  T12-L1: Facet arthropathy without significant foraminal or spinal canal stenosis.  L1-L2: Facet arthropathy without significant foraminal or spinal canal stenosis.  L2-L3: Right greater than left facet arthropathy. Mild narrowing of the right subarticular zone. No significant central spinal canal or foraminal stenosis.  L3-L4: Disc bulge and facet arthropathy with ligamentum flavum thickening. There is severe spinal canal stenosis and at least moderate bilateral foraminal stenosis.   L4-L5: Improved right subarticular zone narrowing. No  significant osseous spinal canal or foraminal stenosis.  L5-S1: Bilateral facet arthropathy. Shallow disc osteophyte. Unchanged mild osseous foraminal stenosis. No significant osseous spinal canal stenosis.  EKG April 19, 2018 revealed sinus bradycardia with first-degree AV block.  Left bundle branch block.    X-Ray lumbar spine 08/04/2017: Posterior lumbar fusion of L4, L5 and S1. Degenerative changes at L1-L2 with anterior osteophyte formation. Normal lumbar alignment in the lateral projection with a stable alignment in the flexed and extended positions.      Duplex Venous Lower, 03/25/2017: Normal right lower extremity venous duplex scan. No evidence of DVT.  MRI Lumbar Spine, 10/28/2016: Since the previous MRI study, there has been L4-5-S1 posterior lumbar and interbody fusion. No new abnormality of alignment is seen. There is, however, significant worsening of posterior element hypertrophy at L3-4, above the fusion level, due to persistent L3-4 disc bulge, and what appears to be new spinal stenosis at this level, generally as a result of posterior element hypertrophic change. Canal appears stable elsewhere. No lumbar compression deformity or new abnormality of alignment is seen. No vertebral marrow edema is appreciated. Sagittal images suggest a small (18 mm) seroma or synovial cyst on the right at L5-S1. Tip of conus medullaris is again noted at L1.  Axial images   L1-L2: No significant canal or foraminal stenosis   L2-L3: Canal appears relatively narrowed, due to posterior element hypertrophy but still greater than 1 cm in diameter. Foramina appear  lower limits of normal diameter.   L3-L4: Moderate canal stenosis with lateral narrowing, AP diameter 6.5 mm, transverse diameter approximately 9 mm with crowding of the nerve roots. Foramina appear mildly to moderately narrowed bilaterally due to facet DJD.   L4-L5: Fused level, canal appears normal. Mild foraminal stenosis due to facet DJD  L5-S1: Canal appears  "normal. Left-sided neural foramen appears normal. Right-sided neural foramen appears mildly narrowed due to facet DJD. Postcontrast images show expected postoperative enhancement of the dorsal soft tissues and no pathologic postcontrast enhancement elsewhere.     The following portions of the patient's history were reviewed and updated as appropriate: problem list, past medical history, past surgery history, social history, family history, medications, and allergies    Review of Systems   Musculoskeletal: Positive for back pain.   All other systems reviewed and are negative.    /70 (BP Location: Right arm, Patient Position: Sitting, Cuff Size: Adult)   Pulse 67   Temp 96.4 °F (35.8 °C)   Ht 175.3 cm (69\")   Wt 83.9 kg (185 lb)   SpO2 98%   BMI 27.32 kg/m²      Physical Exam:  Constitutional: Patient appears well-developed, well-nourished, and younger than his chronological age  HEENT: Head normocephalic and atraumatic.  Pupils equal round and reactive to light  Neck: Supple, no LAD, no JVD, trachea midline  Lungs: Clear to auscultation, good air entrance bilaterally  Heart: Regular rhythm, S1-S2, no murmur  Peripheral vascular exam: Peripheral pulses 2+ throughout  Musculoskeletal:  Gait and station: Normal.  Patient was able to walk on heels and toes, and tandem walking  Lumbar spine: The range of motion of the lumbar spine is appropriate for his age and condition.  Lumbar facet joint loading maneuvers are negative  Sacroiliac joints: Kodak's test, Gaenslen's tests, Yeoman's test: Negative  Hip joints: The range of motion of the hip joints is slightly reduced, symmetrical, and without pain  Palpation of the ischial tuberosities, greater trochanters, and deep gluteal bursa, unrevealing  Piriformis maneuvers: Negative.  There is mild discomfort upon palpation of the left piriformis muscle although active resisted external rotation and passive internal rotation did not increase or reproduce pain  Motor " strength: 5/5  Motor tone: Normal  Involuntary movements: None  Skin and subcutaneous tissues: The surgical wounds appear well-healed and without drainage, erythema, fluid accumulation  Neurological:  Cranial nerves II through XII: Intact  Cortical function: Normal mental status  Deep tendon reflexes:  Right patellar: 0+  Left patellar: 0+  Left Achilles: 0+  Right Achilles: 0+  Long tract signs: Negative.  Straight leg raising test: Negative on the right, positive on the left at 40 degrees.  Femoral stretch sign: Negative on the right, questionably positive on the left  Sensory exam: Intact to light touch, pain, temperature, vibration, and proprioception  Coordination: Rhomberg's sign negative.  Normal balance  Psychiatric: Judgment and insight: Normal.  Recent and remote memory: Intact.  Mood and affect: Normal    ASSESSMENT:   1. Adhesive arachnoiditis    2. Chronic lumbar radiculopathy    3. Lumbar postlaminectomy syndrome    4. Status post L4-S1 lumbar spinal fusion    5. Bilateral stenosis of lateral recess of lumbar spine    6. Chronic intractable pain    7. Physical deconditioning         PLAN/MEDICAL DECISION MAKING: Mr. Prashant Avila presents with a very complex and challenging chronic pain condition. Patient has failed to obtain pain relief with conservative measures including numerous analgesic trials, interventional pain management measures including epidurals, spinal cord stimulator trial, and previous surgical interventions including 2 previous lumbar surgeries and implantation of a spinal cord stimulator device. Patient requested explantation of the SCS device due to dissatisfaction with pain relief and discomfort at the IPG site. He also underwent 2 intrathecal pump trials without significant analgesic benefit. In regards to his previous surgeries, he underwent on March 30, 2014 left L4-L5 hemilaminectomy and foraminotomy at the [facility] by Dr. Jef Mackay, and on  July 24, 2014 L4 S1 decompression and fusion by Dr. Renee. Patient underwent several neurosurgical consultations after his lumbar surgeries. Initially, in 2017 he discussed with Dr. Renee the possibility of additional decompression and extension of his lumbar fusion to L3. Patient declined the possibility of surgical intervention.  I have provided the patient with numerous analgesic trials, which he discontinued soon after starting the trial due to some sort of side effect. He underwent a spinal cord stimulator trial and experienced relief during his trial but failed to obtain significant relief after implantation of his spinal cord stimulator device despite numerous attempts at SCS reprogramming.  Upon reviewing his diagnostic studies, MRI of the lumbar spine revealed lateral recess stenosis at L3-L4 associated with crowding of the nerve roots given the clear appearance of lumbar arachnoiditis. Due to the complexity of the treatment of his chronic pain, I assume that most of his symptoms are related to lumbar arachnoiditis.  Lumbar myelogram followed by CT postmyelogram confirm the diagnosis of lumbar stenosis at L3-L4 without clinical correlation. Patient is actually with less pain when standing and walking. He reports intolerance to sitting position or lying down. Patient's clinical symptoms follow globally the distribution of the left L4-L5 and S1 dermatomal distribution. EMG/NCV of the bilateral lower extremities revealed chronic left S1 radiculopathy which also provided clinical correlation. The combination of chronic radiculopathy and adhesive arachnoiditis may explain the difficulties at finding effective methods for pain relief. Patient continues to deny neurogenic claudication. He is actually better and with significantly less pain when standing and walking, as he spends most of his office visits with me sometimes standing for longer than 45 minutes.  His pain becomes intolerable when sitting or lying  down. Patient has already failed to obtain pain relief with conservative measures, interventional pain management measures, and previous surgical measures.  Patient underwent psychological evaluation with Dr. Praveen Navarro and was found to be an appropriate candidate for spinal cord stimulation and intrathecal therapies. Patient is interested in pursuing regenerative therapies or eventually additional intrathecal trials with alternative medications such as a higher dose of fentanyl, clonidine or bupivacaine.  Therefore, I have proposed the following plan:  1. Interventional pain management measures.  Patient would like to move forward with with regenerative therapies.  Patient will need to stop NSAIDs 1 week prior to the procedure and remain off of his NSAIDs for a week after.  Specifically, we have discussed the possibility of epidural administration of platelet rich leukocyte poor plasma therapy with A2M via caudal approach using a Racz catheter versus left L4-L5, left L5-S1, and left S1 transforaminal approach.  I have explained to the patient and his wife that usually the treatment is repeated 3 weeks after the first administration, and sometimes 6 weeks after the first administration.  Patient has reviewed information regarding regenerative therapies and understands that the treatment could be effective at making his pain more tolerable, or he could also failed this treatment as he has failed so many other treatments in the past.  In addition, we discussed prospects of a new intrathecal trial.  We have discussed the possibility of repeating a trial with fentanyl at a higher dose or using a secondary drug along with fentanyl such as lidocaine or bupivacaine, or using a different medication such as bupivacaine or clonidine. Patient reported no significant benefit from Prialt. I again emphasized the fact that even if we find an intrathecal drug that may provide him with some relief, the possibility of eradicating  his pain is not a realistic goal based on patient's history. There is a possibility that he may fail intrathecal therapies even after a successful trial. We have discussed the incidence, severity, and treatment of common side effects with intrathecal therapies.  Patient understands that most of the side effects are temporary and may resolve without intervention.  Constipation could be a long-term issue with opioids but he understands that he might be able to control this with the use of appropriate bowel regimen. He does have a history of some urinary difficulties. He understands that urinary retention could be an issue with intrathecal opioids, usually short-term. Acutely, it can be treated with a low-dose of naloxone, and in and out bladder catheter, or otherwise, some patients may require the placement of a Queen catheter. We have also discussed concerns regarding adverse cognitive side effects with some intrathecal drugs, particularly with Prialt.  I explained to the patient that due to the fact that the dosages used for intrathecal administration are extremely low, the possibility of side effects are extremely rare.  On the other hand, with Prialt, patients can experience cognitive side effects at low doses. In terms of continued use of intrathecal opioids, I explained to the patient that my philosophy is to always maintain the patient's at the lowest effective dose and to avoid rapid increases of frequent increases in the dose of intrathecal opioids due to the possibility of development of opioid-induced hyperalgesia.  I explained that in situations when dose increases are required to frequently, pain, consideration and addition of a second or third drug to the intrathecal mixture could help circumvent this issue. He also had a question about post dural puncture headaches after an intrathecal trial and intrathecal pump placement.  I emphasized that on his 2 previous intrathecal trials he did not develop a  headache. After intrathecal pump placement, this might be different because of the size of the needle and catheter.  However, with his advanced age, headaches are quite rare.  2. Pharmacological measures: Patient has failed numerous analgesic trials. Unfortunately, patient is not a candidate for a trial with mexiletine due to his abnormal EKG.  This is the only drug for the treatment of neuropathic pain that we have been unable to prescribe.    A. Continue gabapentin: Take gabapentin 800 mg 1 tablet every morning, 1 tablet at noon, and 2 tablets at bedtime. He has previously failed a trial with Lyrica and other anticonvulsants  B. Trial with Rheumate one tablet twice daily  C. Start pyridoxine 100 mg one tablet by mouth daily, take for 30 days  D. Start alpha lipoid acid 4717-4768 mg per day divided into 3 doses  E. Trial with low dose naltrexone 0.5 mg once a day.  We will assess analgesic response and titrate the dose accordingly  3. Diagnostics:   A. UDS with gabapentin  B. CBC  4. Long-term rehabilitation efforts:  A. Patient will start a comprehensive physical therapy program at [facility] for Alter-G, neurodynamics, gait and balance and core strengthening   B. Referral to [facility] for Pilates  C. Follow-up with Dr. Praveen Navarro for intensive cognitive behavioral therapy, biofeedback, and new clearance for intrathecal therapies  5.  The patient and his wife have been instructed to contact my office with any questions or difficulties.  The patient understands the plan and agrees to proceed accordingly.       I spent 44 minutes face-to-face with the patient and family, of which more than 50% of the time were spent counseling regarding diagnosis, prognosis, diagnostic testing, potential referrals, treatment options for chronic pain condition and overall rehabilitation, long-term management of concurrent comorbidities affecting effective pain control, risk and benefits of different  interventions, alternative therapies, risks and benefits as it relates to intrathecal devices for the trial and implantation and long-term management and functional goals of intrathecal therapy     Patient Care Team:  SHERON Chun MD as PCP - General (Family Medicine)  Efrain Batista MD as Consulting Physician (Pain Medicine)  Fausto Renee MD as Consulting Physician (Neurosurgery)  Jean Warren MD as Consulting Physician (Neurosurgery)  Hank Frankel MD as Consulting Physician (Cardiology)  Marcelino Condon MD as Consulting Physician (Urology)  Brian Tatum III, MD as Consulting Physician (Cardiology)  SHERON Chun MD as Consulting Physician (Family Medicine)  Francisco Javier Clay MD as Consulting Physician (Neurosurgery)  Royer Roldan MD as Consulting Physician (General Practice)  Emigdio Watt MD as Consulting Physician (Gastroenterology)  Shola Vila MD as Consulting Physician (Otolaryngology)  Hank Frankel MD as Consulting Physician (Cardiology)     No orders of the defined types were placed in this encounter.        No future appointments.      Efrain Batista MD    EMR Dragon/Transcription disclaimer:  Much of this encounter note is an electronic transcription of spoken language to printed text. Electronic transcription of spoken language may permit erroneous, or at times, nonsensical words or phrases to be inadvertently transcribed. Although I have reviewed the note for such errors, some may still exist.

## 2021-07-20 ENCOUNTER — OFFICE VISIT (OUTPATIENT)
Dept: PAIN MEDICINE | Facility: CLINIC | Age: 76
End: 2021-07-20

## 2021-07-20 ENCOUNTER — LAB (OUTPATIENT)
Dept: LAB | Facility: HOSPITAL | Age: 76
End: 2021-07-20

## 2021-07-20 VITALS
WEIGHT: 185 LBS | BODY MASS INDEX: 27.4 KG/M2 | TEMPERATURE: 96.4 F | HEIGHT: 69 IN | OXYGEN SATURATION: 98 % | HEART RATE: 67 BPM | SYSTOLIC BLOOD PRESSURE: 148 MMHG | DIASTOLIC BLOOD PRESSURE: 70 MMHG

## 2021-07-20 DIAGNOSIS — M54.16 CHRONIC LUMBAR RADICULOPATHY: ICD-10-CM

## 2021-07-20 DIAGNOSIS — R53.81 PHYSICAL DECONDITIONING: ICD-10-CM

## 2021-07-20 DIAGNOSIS — G03.9 ADHESIVE ARACHNOIDITIS: ICD-10-CM

## 2021-07-20 DIAGNOSIS — Z98.1 STATUS POST LUMBAR SPINAL FUSION: ICD-10-CM

## 2021-07-20 DIAGNOSIS — Z01.812 PRE-PROCEDURAL LABORATORY EXAMINATION: ICD-10-CM

## 2021-07-20 DIAGNOSIS — Z51.81 ENCOUNTER FOR THERAPEUTIC DRUG MONITORING: ICD-10-CM

## 2021-07-20 DIAGNOSIS — G03.9 ADHESIVE ARACHNOIDITIS: Primary | ICD-10-CM

## 2021-07-20 DIAGNOSIS — M48.061 BILATERAL STENOSIS OF LATERAL RECESS OF LUMBAR SPINE: ICD-10-CM

## 2021-07-20 DIAGNOSIS — G89.29 CHRONIC INTRACTABLE PAIN: ICD-10-CM

## 2021-07-20 DIAGNOSIS — M96.1 LUMBAR POSTLAMINECTOMY SYNDROME: ICD-10-CM

## 2021-07-20 LAB
AMPHET+METHAMPHET UR QL: NEGATIVE
AMPHETAMINES UR QL: NEGATIVE
BARBITURATES UR QL SCN: NEGATIVE
BASOPHILS # BLD AUTO: 0.09 10*3/MM3 (ref 0–0.2)
BASOPHILS NFR BLD AUTO: 1.1 % (ref 0–1.5)
BENZODIAZ UR QL SCN: NEGATIVE
BUPRENORPHINE SERPL-MCNC: NEGATIVE NG/ML
CANNABINOIDS SERPL QL: NEGATIVE
COCAINE UR QL: NEGATIVE
DEPRECATED RDW RBC AUTO: 44.1 FL (ref 37–54)
EOSINOPHIL # BLD AUTO: 0.29 10*3/MM3 (ref 0–0.4)
EOSINOPHIL NFR BLD AUTO: 3.7 % (ref 0.3–6.2)
ERYTHROCYTE [DISTWIDTH] IN BLOOD BY AUTOMATED COUNT: 13.1 % (ref 12.3–15.4)
HCT VFR BLD AUTO: 43.7 % (ref 37.5–51)
HGB BLD-MCNC: 14.9 G/DL (ref 13–17.7)
IMM GRANULOCYTES # BLD AUTO: 0.05 10*3/MM3 (ref 0–0.05)
IMM GRANULOCYTES NFR BLD AUTO: 0.6 % (ref 0–0.5)
LYMPHOCYTES # BLD AUTO: 1.31 10*3/MM3 (ref 0.7–3.1)
LYMPHOCYTES NFR BLD AUTO: 16.7 % (ref 19.6–45.3)
MCH RBC QN AUTO: 31.3 PG (ref 26.6–33)
MCHC RBC AUTO-ENTMCNC: 34.1 G/DL (ref 31.5–35.7)
MCV RBC AUTO: 91.8 FL (ref 79–97)
METHADONE UR QL SCN: NEGATIVE
MONOCYTES # BLD AUTO: 0.59 10*3/MM3 (ref 0.1–0.9)
MONOCYTES NFR BLD AUTO: 7.5 % (ref 5–12)
NEUTROPHILS NFR BLD AUTO: 5.53 10*3/MM3 (ref 1.7–7)
NEUTROPHILS NFR BLD AUTO: 70.4 % (ref 42.7–76)
NRBC BLD AUTO-RTO: 0 /100 WBC (ref 0–0.2)
OPIATES UR QL: NEGATIVE
OXYCODONE UR QL SCN: NEGATIVE
PCP UR QL SCN: NEGATIVE
PLATELET # BLD AUTO: 282 10*3/MM3 (ref 140–450)
PMV BLD AUTO: 10.7 FL (ref 6–12)
PROPOXYPH UR QL: NEGATIVE
RBC # BLD AUTO: 4.76 10*6/MM3 (ref 4.14–5.8)
TRICYCLICS UR QL SCN: NEGATIVE
WBC # BLD AUTO: 7.86 10*3/MM3 (ref 3.4–10.8)

## 2021-07-20 PROCEDURE — 99215 OFFICE O/P EST HI 40 MIN: CPT | Performed by: ANESTHESIOLOGY

## 2021-07-20 PROCEDURE — 85025 COMPLETE CBC W/AUTO DIFF WBC: CPT

## 2021-07-20 PROCEDURE — 80306 DRUG TEST PRSMV INSTRMNT: CPT

## 2021-07-20 PROCEDURE — 36415 COLL VENOUS BLD VENIPUNCTURE: CPT

## 2021-07-20 RX ORDER — ME-TETRAHYDROFOLATE/B12/HRB236 1-1-500 MG
1 CAPSULE ORAL DAILY
Qty: 90 CAPSULE | Refills: 1 | Status: SHIPPED | OUTPATIENT
Start: 2021-07-20 | End: 2021-11-23

## 2021-07-20 RX ORDER — NALTREXONE 100 %
0.5 POWDER (GRAM) MISCELLANEOUS DAILY
Qty: 15 G | Refills: 0 | Status: SHIPPED | OUTPATIENT
Start: 2021-07-20 | End: 2021-08-02 | Stop reason: SDUPTHER

## 2021-07-20 RX ORDER — ST. JOHN'S WORT 300 MG
400 CAPSULE ORAL 3 TIMES DAILY
Qty: 180 CAPSULE | Refills: 5 | Status: SHIPPED | OUTPATIENT
Start: 2021-07-20 | End: 2021-11-23

## 2021-07-20 RX ORDER — MULTIVITAMIN WITH IRON
100 TABLET ORAL DAILY
Qty: 30 TABLET | Refills: 0 | Status: SHIPPED | OUTPATIENT
Start: 2021-07-20 | End: 2021-10-19

## 2021-07-20 NOTE — PROGRESS NOTES
Left message for patient on what medications he was prescribed as Dr. Batista just completed the note.   Advised to :    A. Continue gabapentin: Take gabapentin 800 mg 1 tablet every morning, 1 tablet at noon, and 2 tablets at bedtime   B. Trial with Rheumate one tablet twice daily- [facility] [phone]   C. Start pyridoxine 100 mg one tablet by mouth daily, take for 30 days- regular pharmacy   D. Start alpha lipoid acid 3883-5208 mg per day divided into 3 doses- regular pharmacy    E. Trial with low dose naltrexone 0.5 mg once a day- [facility]  [phone]    Numbers given to all pharmacies with dosing instructions and that numbers were also on the paper from this am.

## 2021-08-02 DIAGNOSIS — G03.9 ADHESIVE ARACHNOIDITIS: ICD-10-CM

## 2021-08-02 RX ORDER — NALTREXONE 100 %
0.5 POWDER (GRAM) MISCELLANEOUS DAILY
Qty: 15 G | Refills: 0 | Status: SHIPPED | OUTPATIENT
Start: 2021-08-02 | End: 2021-09-01 | Stop reason: SDUPTHER

## 2021-08-05 ENCOUNTER — TELEPHONE (OUTPATIENT)
Dept: PAIN MEDICINE | Facility: CLINIC | Age: 76
End: 2021-08-05

## 2021-08-05 DIAGNOSIS — G03.9 ADHESIVE ARACHNOIDITIS: Primary | ICD-10-CM

## 2021-08-05 DIAGNOSIS — Z00.8 PRE-SURGICAL PSYCHOLOGICAL ASSESSMENT, ENCOUNTER FOR: ICD-10-CM

## 2021-08-05 NOTE — TELEPHONE ENCOUNTER
Spoke with patient about PRP/A2M. Advised that he would be responsible for whatever fee he discussed with Kitty OTTO in the [facility]. Advised that he would be responsible for Dr. Batista's physician fee as well.   Advised that he would pay Dr. Batista's fee ($500)  at our office and that he would pay the other fees at [facility].   Procedure scheduled. Reiterated to patient that he must take care of all billing with appropriate departments ([facility]/our office) prior to the day of his procedure and that if the exact same procedure was performed again he would owe a similar fee. Patient took down [facility] billing number [phone] and verbalized understanding.

## 2021-08-11 NOTE — TELEPHONE ENCOUNTER
Spoke with patient and advised that he should hold all NSAID's for 7 days prior to procedure. Advised that he may take Tylenol as needed. No further needs expressed. Patient verbalized understanding.

## 2021-08-12 ENCOUNTER — TELEPHONE (OUTPATIENT)
Dept: PAIN MEDICINE | Facility: CLINIC | Age: 76
End: 2021-08-12

## 2021-08-12 NOTE — TELEPHONE ENCOUNTER
Patient LVM. Returned call to patient. He reports that he has numerous questions that he would like answered prior to his appointment on 08/25  He would prefer a telephone visit, but can also do an in person visit with Dr. Batista.

## 2021-08-17 ENCOUNTER — TELEPHONE (OUTPATIENT)
Dept: PAIN MEDICINE | Facility: CLINIC | Age: 76
End: 2021-08-17

## 2021-08-17 NOTE — TELEPHONE ENCOUNTER
I spoke with Prashant and his wife about his upcoming procedure.  They have some questions regarding the use of prolotherapy prior to PRP, the effects of PRP and A2 M, etc.  I answered all of his questions and provided additional information about these therapies.  He is aware that he may need a repeat procedure if he responds favorably but incompletely to the first treatment.  Patient verbalized understanding and was very appreciative of the call.

## 2021-08-27 DIAGNOSIS — G03.9 ADHESIVE ARACHNOIDITIS: Primary | ICD-10-CM

## 2021-08-27 DIAGNOSIS — M54.16 CHRONIC LUMBAR RADICULOPATHY: ICD-10-CM

## 2021-09-01 DIAGNOSIS — G03.9 ADHESIVE ARACHNOIDITIS: ICD-10-CM

## 2021-09-01 RX ORDER — NALTREXONE 100 %
0.5 POWDER (GRAM) MISCELLANEOUS DAILY
Qty: 15 G | Refills: 0 | Status: SHIPPED | OUTPATIENT
Start: 2021-09-01 | End: 2021-10-19 | Stop reason: SDUPTHER

## 2021-09-01 NOTE — TELEPHONE ENCOUNTER
Rx Refill Note  Requested Prescriptions     Pending Prescriptions Disp Refills   • Naltrexone powder 15 g 0     Si.5 mg Daily.      Last office visit with prescribing clinician: 2021      Next office visit with prescribing clinician: 2021            Kathleen Lee RN  21, 13:15 EDT

## 2021-09-01 NOTE — TELEPHONE ENCOUNTER
Reminded patient to hold all NSAID's prior to his procedure. Patient verbalized understanding. No further needs expressed .

## 2021-09-08 ENCOUNTER — OUTSIDE FACILITY SERVICE (OUTPATIENT)
Dept: PAIN MEDICINE | Facility: CLINIC | Age: 76
End: 2021-09-08

## 2021-09-08 PROCEDURE — 0232T NJX PLATELET PLASMA: CPT | Performed by: ANESTHESIOLOGY

## 2021-09-08 PROCEDURE — 99152 MOD SED SAME PHYS/QHP 5/>YRS: CPT | Performed by: ANESTHESIOLOGY

## 2021-09-09 ENCOUNTER — TELEPHONE (OUTPATIENT)
Dept: PAIN MEDICINE | Facility: CLINIC | Age: 76
End: 2021-09-09

## 2021-09-09 NOTE — TELEPHONE ENCOUNTER
Called and spoke with pt regarding how he was feeling after yesterdays procedure. Pt stated he was feeling well. Advised of f/u appointment. Reminder card in the mail.

## 2021-10-06 DIAGNOSIS — M96.1 POSTLAMINECTOMY SYNDROME OF LUMBAR REGION: ICD-10-CM

## 2021-10-06 RX ORDER — GABAPENTIN 800 MG/1
TABLET ORAL
Qty: 120 TABLET | Refills: 0 | Status: SHIPPED | OUTPATIENT
Start: 2021-10-06 | End: 2021-10-19 | Stop reason: SDUPTHER

## 2021-10-18 NOTE — PROGRESS NOTES
"Chief Complaint: \"I am still having the same pain.\"       Brief History:  Mr. Prashant Avila is a 76 y.o. male, has a history of implantation of a spinal cord stimulator device with Dr. Fausto Renee on 11/21/2019 with [employer] Penta paddle lead with the top electrodes projecting at the level of the superior endplate of the T8 vertebral level. IPG: [employer] Proclaim 5 XR IPG Non-Rechargeable (Full Body MRI compatible). The device was implanted primarily for treatment of chronic left hip and left lower extremity pain, unfortunately he continued to report no significant pain relief with the use of his stimulator device despite numerous attempts at reprogramming, therefore on 3/1/2021, he underwent explantation of the entire system with Dr. Fausto Renee.  Patient has underwent multiple interventional procedures such as transforaminal epidurals, piriformis injection, 2 trials of spinal cord stimulators, previous pain pump trials with fentanyl and Prialt, to name a few with minimal reported relief.  At his last follow-up consultation with Dr. Batista on July 20, 2021, due to the recurrence of his pain in the same distribution and failure of multiple procedures, Dr. Batista recommended epidural administration of platelet rich leukocyte poor plasma therapy with A2M via caudal approach using a Racz catheter versus left L4-L5, left L5-S1, and left S1 transforaminal approach.  As such, on 9/8/2021 he underwent platelet rich leukocyte poor plasma therapy and A2M therapy, via lumbar epidural administration with caudal epidural catheter placement approach, from which he is reporting no significant overall pain relief, or reduction in his symptoms.  He remains on gabapentin 800 mg 4 times daily, he was additionally started on a trial of low-dose naltrexone, from which he reports he does not notice much of a difference with medication.  He has also underwent an intrathecal pump trial with fentanyl, from which he " reported minimal analgesic benefit, therefore it was recommended an intrathecal pump trial with Prialt, from which after his trial he reported some visual disturbances and saw an ophthalmologist which was determined unrelated to prialt.  He continues with interest in pursuing an intrathecal trial with a higher dose of fentanyl, or other medications such as bupivacaine or clonidine.  He did undergo updated psychological clearance with Dr. Praveen Navarro on 8/30/2021, and it was recommended the patient be seen for psychological treatment of his pain disorder including biofeedback, from a psychological perspective he was found to be an appropriate candidate for interventional pain procedures such as a pain pump with opioids or Prialt.  He returns today for post procedure follow-up and evaluation.  Pain Description: Constant pain with intermittent exacerbation, described as burning, electricity, pins and needles, and tingling sensation.   Radiation of pain: The pain radiates from the left gluteal region into the posterior aspect of the thigh, left leg and plantar aspect of the foot. The leg pain is more severe than the lower back pain  Pain intensity today: 8/10   Average pain intensity last week: 9/10  Pain intensity ranges from: 4/10 to 10/10  Aggravating factors: Pain increases with sitting longer than 2-5 minutes and laying down for more than a few minutes.  Alleviating factors: Pain decreases with standing and walking   Associated symptoms:   Patient denies numbness or weakness in the lower extremities  Patient denies any new bladder or bowel problems  Patient denies difficulties with his balance or recent falls  Pain interferes with his sleep     Pain History:  Referring physician: Dr. Fausto Renee   Consultation for intractable chronic left lower extremity pain.   Patient reports a 7-year history of pain, which began without incident. Patient is status post L4-L5 hemilaminectomy and foraminotomy at the [facility] with  Dr. Mackay on 03/03/2014, followed by L4-S1 lumbar decompression and fusion due to unresolved pain by Dr. Renee on 07/24/2014. Patient reports that he experienced significant improvement after his last surgery with Dr. Renee, in particular, complete resolution of his previously severe lower back pain. Unfortunately, his left lower extremity pain, chronic radicular pain preceded his last lumbar fusion, and did not improve after surgery. He underwent surgical consultation with Dr. Cox at the [facility], who recommended decompression of the nerve roots at L3-S1. He underwent follow-up neurosurgical consultation with Dr. Fausto Renee on 11/03/2017. Dr. Renee discussed the possibility of lumbar decompression at L3-L4: Left L3-L4 laminotomy and foraminotomy. Due to potential risk of developing instability that would necessitate extending his lumbar fusion to the L3 level. Patient underwent on April 3, 2018, left L3-L4 transforaminal epidural steroid injection without any pain relief. Patient elected to hold off on surgery. Patient underwent EMG/NCV, which confirmed chronic left S1 radiculopathy.  A CT myelogram was significant for stenosis above the level of his lumbar fusion, at L3-L4 without clinical correlation.  Patient presents with pain with a left S1 dermatomal distribution pattern.  Pain Description:   Constant pain with intermittent exacerbation, described as burning, electricity, pins and needles, and tingling sensation.   Radiation of pain: The pain radiates into the posterior aspect of the thigh, left leg and plantar aspect of the foot. The leg pain is more severe than the lower back pain.   Pain pattern: dermatomic, left L5 and S1   Pain intensity today: 3/10 (standing) 9-10/10 (sitting or lying down)  Average pain intensity last week: 7/10  Pain intensity ranges from: 4/10 to 10/10  Aggravating factors: Pain increases with sitting longer than 5 minutes and laying down for more than 5  minutes.  Alleviating factors: Pain decreases with standing.   Associated symptoms:   Patient denies numbness or weakness in the lower extremities.   Patient denies any new bladder or bowel problems.   Patient denies difficulties with his balance or recent falls.       Review of previous therapies and additional medical records:  Prashant Avila has already failed the following measures, including:   Conservative Measures: Oral analgesics, ice and heat, physical therapy  Interventional measures:  09/08/2021: platelet rich leukocyte poor plasma therapy and A2 M therapy, via lumbar epidural administration with caudal epidural catheter placement approach  02/06/2019: Left S1 transforaminal epidural steroid injection and experienced pain relief that was short-lived   02/20/2017: Diagnostic and therapeutic left sciatic nerve block with local anesthetics, steroids, hyaluronidase under ultrasound, PNS and fluoroscopic guidance- Patient did not receive sustained relief from this procedure.   02/06/2017: Diagnostic left S1 selective nerve root injection  01/30/2017: Nevro SCS Trial; no relief  04/25/2016: Trial of lumbar epidural steroid injections with steroids and hyaluronidase via caudal catheter with 100% pain relief lasting 48 hours  12/16/2015: Diagnostic and therapeutic left S1 transforaminal epidural steroid injection with hyaluronidase with complete resolution of pain for several weeks  11/30/2015: Spinal cord stimulator trial [employer], which provided him with more than 50% pain relief and functional improvement. However, patient decided to delay implantation of a permanent spinal cord stimulator device until the arrival of new technology. Patient did not like the perception of tonic stimulation, even though stimulation overlapped successfully the areas of his chronic pain, he did not find it pleasant.   02/09/2015: Diagnostic left piriformis muscle injection  Surgical Measures:   11/21/2019: SCS IMPLANT: Saint  "Harsha Penta paddle lead with the top electrodes projecting at the level of the superior endplate of the T8 vertebral level. IPG: [employer] Proclaim 5 XR IPG Non-Rechargeable by Dr. Fausto Renee   03/01/2021: SCS EXPLANT by Dr. Fausto Renee.   03/03/2014: Left L4-L5 hemilaminectomy and [facility]aminotomy at [facility] with Dr. Mackay   07/24/2014: L4-S1 decompression and fusion by Dr. Renee  Prashant Avila underwent follow-up neurosurgical consultation with Dr. Fausto Renee on 11/30/2017, and was found to be a potential surgical candidate.  [facility] psychological evaluation with Dr. Praveen Navarro on 8/30/2021, per note: \"It is recommended the patient be seen for psychological treatment of his pain disorder including biofeedback.  From a psychological perspective, patient is an appropriate candidate for interventional pain procedures, such as a pain pump with opioids or Prialt.\"  Prashant Avila presents with significant comorbidities including hypertension, rheumatoid arthritis, osteoporosis, hyperlipidemia, chronic kidney disease, hearing loss, carcinoma of prostate, engaged in treatment.  In terms of current analgesics, Prashant Avila takes: Tylenol, NSAIDs and gabapentin.   I have reviewed Jon Report #[id number] consistent to medication reconciliation.  SOAPP: Low risk      Global Pain Scale 04-19  2018 10-24  2018 11-08  2018 01-29  2019 03-06  2019 10-03  2019 10-17  2019 01-14  2020 01-21  2020 02-20  2020 04-14  2020 05-07  2020 09-24  2020 07-20  2021 10-19  2021       Pain 19 17 17 18 17 20  10 10 10   10  10 8 20 22 19     Feelings 1 0 0 0 0   8    4   2  0  0    0 0 6 12 4      Clinical outcomes 11 8 13 11 6 11    7   6  7  9    9 9 11 12 9      Activities 17 10 16 18 6 15    7   6  4  15  15 12 7 10 8      GPS Total: 48 35 46 47 29 54  28  24  21  34  34 28 44 56 40        Review of Previous Diagnostic Studies:   There are no new diagnostic studies for review at this time.  MRI THORACIC SPINE WO " CONTRAST-10/31/2019: Minimal degenerative changes at multiple levels throughout the thoracic spine with no central spinal canal stenosis. Epidural space is unremarkable.  EMG/NCV of the bilateral lower extremities December 18, 2018: Chronic left S1 radiculopathy  CT LUMBAR SPINE WITH CONTRAST-01/21/2019: There is excellent opacification of the thecal sac. Imaging covers from T12 through the S1 level caudally. The conus terminates at the L1/L2 level.  The distal cord and conus are normal in caliber. The nerve roots of the cauda equina are normal in caliber and distribution. L4-S1 fusion hardware is in place; the left S1 pedicle screw is fractured and there is lucency around the right S1 pedicle screw. Left L4 laminotomy. Vertebral body heights and alignment are normal. Vertebral body heights are normal. Degenerative changes are as follows:  T12-L1: Facet arthropathy without significant foraminal or spinal canal stenosis.  L1-L2: Facet arthropathy without significant foraminal or spinal canal stenosis.  L2-L3: Right greater than left facet arthropathy. Mild narrowing of the right subarticular zone. No significant central spinal canal or foraminal stenosis.  L3-L4: Disc bulge and facet arthropathy with ligamentum flavum thickening. There is severe spinal canal stenosis and at least moderate bilateral foraminal stenosis.   L4-L5: Improved right subarticular zone narrowing. No significant osseous spinal canal or foraminal stenosis.  L5-S1: Bilateral facet arthropathy. Shallow disc osteophyte. Unchanged mild osseous foraminal stenosis. No significant osseous spinal canal stenosis.  EKG April 19, 2018 revealed sinus bradycardia with first-degree AV block.  Left bundle branch block.    X-Ray lumbar spine 08/04/2017: Posterior lumbar fusion of L4, L5 and S1. Degenerative changes at L1-L2 with anterior osteophyte formation. Normal lumbar alignment in the lateral projection with a stable alignment in the flexed and extended  positions.      Duplex Venous Lower, 03/25/2017: Normal right lower extremity venous duplex scan. No evidence of DVT.  MRI Lumbar Spine, 10/28/2016: Since the previous MRI study, there has been L4-5-S1 posterior lumbar and interbody fusion. No new abnormality of alignment is seen. There is, however, significant worsening of posterior element hypertrophy at L3-4, above the fusion level, due to persistent L3-4 disc bulge, and what appears to be new spinal stenosis at this level, generally as a result of posterior element hypertrophic change. Canal appears stable elsewhere. No lumbar compression deformity or new abnormality of alignment is seen. No vertebral marrow edema is appreciated. Sagittal images suggest a small (18 mm) seroma or synovial cyst on the right at L5-S1. Tip of conus medullaris is again noted at L1.  Axial images   L1-L2: No significant canal or foraminal stenosis   L2-L3: Canal appears relatively narrowed, due to posterior element hypertrophy but still greater than 1 cm in diameter. Foramina appear  lower limits of normal diameter.   L3-L4: Moderate canal stenosis with lateral narrowing, AP diameter 6.5 mm, transverse diameter approximately 9 mm with crowding of the nerve roots. Foramina appear mildly to moderately narrowed bilaterally due to facet DJD.   L4-L5: Fused level, canal appears normal. Mild foraminal stenosis due to facet DJD  L5-S1: Canal appears normal. Left-sided neural foramen appears normal. Right-sided neural foramen appears mildly narrowed due to facet DJD. Postcontrast images show expected postoperative enhancement of the dorsal soft tissues and no pathologic postcontrast enhancement elsewhere.     The following portions of the patient's history were reviewed and updated as appropriate: problem list, past medical history, past surgery history, social history, family history, medications, and allergies    Review of Systems   All other systems reviewed and are negative.    /72  "(BP Location: Right arm, Patient Position: Sitting, Cuff Size: Adult)   Temp 97.1 °F (36.2 °C) (Infrared)   Ht 175.3 cm (69\")   Wt 85.4 kg (188 lb 3.2 oz)   BMI 27.79 kg/m²      Physical Exam:  Constitutional: Patient appears well-developed, well-nourished, and younger than his chronological age  HEENT: Head normocephalic and atraumatic.  Pupils equal round and reactive to light  Neck: Supple, no LAD, no JVD  Musculoskeletal:  Gait and station: Gait evaluation demonstrated a normal gate.   Lumbar spine: The range of motion of the lumbar spine is appropriate for his age and condition.  Lumbar facet joint loading maneuvers are negative  Hip joints: The range of motion of the hip joints is slightly reduced, symmetrical, and without pain  Motor strength: 5/5  Motor tone: Normal  Involuntary movements: None  Neurological:  Cranial nerves II through XII: Intact  Cortical function: Normal mental status  Deep tendon reflexes:  Right patellar: 0+  Left patellar: 0+  Left Achilles: 0+  Right Achilles: 0+  Long tract signs: Negative.  Straight leg raising test: Negative on the right, positive on the left at 40 degrees.  Femoral stretch sign: Negative on the right, questionably positive on the left  Sensory exam: Intact to light touch, pain, temperature, vibration, and proprioception  Coordination: Rhomberg's sign negative.  Normal balance  Psychiatric: Judgment and insight: Normal.  Recent and remote memory: Intact.  Mood and affect: Normal    ASSESSMENT:   1. Postlaminectomy syndrome of lumbar region    2. Adhesive arachnoiditis    3. Chronic lumbar radiculopathy    4. Status post L4-S1 lumbar spinal fusion    5. Chronic intractable pain    6. Physical deconditioning         PLAN/MEDICAL DECISION MAKING: Mr. Prashant Avila presents with a very complex and challenging chronic pain condition. Patient has failed to obtain pain relief with conservative measures including numerous analgesic trials, interventional pain " management measures including epidurals, spinal cord stimulator trial, and previous surgical interventions including 2 previous lumbar surgeries and implantation of a spinal cord stimulator device. He has a history of implantation of a spinal cord stimulator device with Dr. Fausto Renee on 11/21/2019 with [employer] Penta paddle lead with the top electrodes projecting at the level of the superior endplate of the T8 vertebral level. IPG: [employer]ha Proclaim 5 XR IPG Non-Rechargeable (Full Body MRI compatible). The device was implanted primarily for treatment of chronic left hip and left lower extremity pain, unfortunately he continued to report no significant pain relief with the use of his stimulator device despite numerous attempts at reprogramming, therefore on 3/1/2021, he underwent explantation of the entire system with Dr. Fausto Renee.  In regards to his previous surgeries, he underwent on March 30, 2014 left L4-L5 hemilaminectomy and foraminotomy at the [facility] by Dr. Jef Mackay, and on July 24, 2014 L4 S1 decompression and fusion by Dr. Renee. Patient underwent several neurosurgical consultations after his lumbar surgeries. Initially, in 2017 he discussed with Dr. Renee the possibility of additional decompression and extension of his lumbar fusion to L3. Patient declined the possibility of surgical intervention.  He has underwent numerous analgesic trials initiated by Dr. Batista, from which he discontinues soon thereafter due to ineffectiveness or a short side effect.  His last MRI of the lumbar spine revealed lateral recess stenosis at L3-L4 associated with crowding of the nerve roots given the clear appearance of lumbar arachnoiditis. Due to the complexity of the treatment of his chronic pain, I assume that most of his symptoms are related to lumbar arachnoiditis.  Lumbar myelogram followed by CT postmyelogram confirm the diagnosis of lumbar stenosis at L3-L4 without  clinical correlation. EMG/NCV of the bilateral lower extremities revealed chronic left S1 radiculopathy which also provided clinical correlation. Patient has already failed to obtain pain relief with conservative measures, interventional pain management measures, and previous surgical measures.  Patient underwent updated psychological evaluation with Dr. Praveen Navarro and was found to be an appropriate candidate for spinal cord stimulation and intrathecal therapies.  Unfortunately, he did not experience any significant relief with PRP as referenced under HPI.  Myself and Dr. Batista discussed with the patient further recommendations.  We will increase naltrexone, to attempt to alleviate some of his symptoms, otherwise if he is not maintained conservatively we may consider pursuing additional intrathecal trials with alternative medications such as a higher dose of fentanyl, clonidine or bupivacaine.  He will follow-up with me in about 1 month to assess his progress with medication change.  Therefore, I have proposed the following plan:  1. Interventional pain management measures.  None indicated at this time.  Neck steps in his treatment plan if he is unable to achieve benefit with medication and conservatism we may consider doing forward with repeating a trial with fentanyl at a higher dose or using a secondary drug along with fentanyl such as lidocaine or bupivacaine, or using a different medication such as bupivacaine or clonidine. Patient reported no significant benefit from Prialt. There is a possibility that he may fail intrathecal therapies even after a successful trial. We have discussed the incidence, severity, and treatment of common side effects with intrathecal therapies. Constipation could be a long-term issue with opioids but he understands that he might be able to control this with the use of appropriate bowel regimen. He does have a history of some urinary difficulties.  Although, unfortunately there  are many medications and intrathecal therapies that will produce side effects, with his given history of medications and side effects, he could experience short-term effects.    2. Pharmacological measures: Patient has failed numerous analgesic trials. Unfortunately, patient is not a candidate for a trial with mexiletine due to his abnormal EKG.  This is the only drug for the treatment of neuropathic pain that we have been unable to prescribe.    A. Continue gabapentin: Take gabapentin 800 mg 1 tablet every morning, 1 tablet at noon, and 2 tablets at bedtime. He has previously failed a trial with Lyrica and other anticonvulsants  B. Continue Rheumate one tablet twice daily  C. Continue alpha lipoid acid [id number] mg per day divided into 3 doses  D. Increase low dose naltrexone to 1 mg once a day.  We will assess analgesic response and titrate the dose accordingly  3. Follow-up with me in about 4 weeks to assess progress of medication change.  4. Long-term rehabilitation efforts:  A. Patient will start a comprehensive physical therapy program at [facility] for Alter-G, neurodynamics, gait and balance and core strengthening   B. Follow-up with Dr. Praveen Navarro for intensive cognitive behavioral therapy, biofeedback, as recommended.  5. The patient and his wife have been instructed to contact my office with any questions or difficulties.  The patient understands the plan and agrees to proceed accordingly.      I spent 30  minutes face-to-face with the patient, of which 30  minutes of the time were spent counseling regarding evaluation, prognosis, treatment options for chronic pain condition and overall rehabilitation, providing links and reading materials applicable to treatment of current condition, long-term management of concurrent comorbidities affecting effective pain control, risk and benefits of different interventions, alternative therapies and medications          Patient Care Team:  SHERON Chun  MD Jay as PCP - General (Family Medicine)  Efrain Batista MD as Consulting Physician (Pain Medicine)  Fausto Renee MD as Consulting Physician (Neurosurgery)  Jean Warren MD as Consulting Physician (Neurosurgery)  Hank Frankel MD as Consulting Physician (Cardiology)  Marcelino Condon MD as Consulting Physician (Urology)  Brian Tatum III, MD as Consulting Physician (Cardiology)  SHERON Chun MD as Consulting Physician (Family Medicine)  Francisco Javier Clay MD as Consulting Physician (Neurosurgery)  Royer Roldan MD as Consulting Physician (General Practice)  Emigdio Watt MD as Consulting Physician (Gastroenterology)  Shola Vila MD as Consulting Physician (Otolaryngology)  Hank Frankel MD as Consulting Physician (Cardiology)     No orders of the defined types were placed in this encounter.        No future appointments.      NABOR Solomon    EMR Dragon/Transcription disclaimer:  Much of this encounter note is an electronic transcription of spoken language to printed text. Electronic transcription of spoken language may permit erroneous, or at times, nonsensical words or phrases to be inadvertently transcribed. Although I have reviewed the note for such errors, some may still exist.

## 2021-10-19 ENCOUNTER — OFFICE VISIT (OUTPATIENT)
Dept: PAIN MEDICINE | Facility: CLINIC | Age: 76
End: 2021-10-19

## 2021-10-19 VITALS
TEMPERATURE: 97.1 F | BODY MASS INDEX: 27.88 KG/M2 | SYSTOLIC BLOOD PRESSURE: 138 MMHG | WEIGHT: 188.2 LBS | HEIGHT: 69 IN | DIASTOLIC BLOOD PRESSURE: 72 MMHG

## 2021-10-19 DIAGNOSIS — G03.9 ADHESIVE ARACHNOIDITIS: ICD-10-CM

## 2021-10-19 DIAGNOSIS — R53.81 PHYSICAL DECONDITIONING: ICD-10-CM

## 2021-10-19 DIAGNOSIS — Z98.1 STATUS POST LUMBAR SPINAL FUSION: ICD-10-CM

## 2021-10-19 DIAGNOSIS — G89.29 CHRONIC INTRACTABLE PAIN: ICD-10-CM

## 2021-10-19 DIAGNOSIS — M54.16 CHRONIC LUMBAR RADICULOPATHY: ICD-10-CM

## 2021-10-19 DIAGNOSIS — M96.1 POSTLAMINECTOMY SYNDROME OF LUMBAR REGION: ICD-10-CM

## 2021-10-19 PROCEDURE — 99214 OFFICE O/P EST MOD 30 MIN: CPT | Performed by: NURSE PRACTITIONER

## 2021-10-19 RX ORDER — NALTREXONE 100 %
1 POWDER (GRAM) MISCELLANEOUS DAILY
Qty: 30 G | Refills: 0 | Status: SHIPPED | OUTPATIENT
Start: 2021-10-19 | End: 2021-11-15 | Stop reason: SDUPTHER

## 2021-11-09 DIAGNOSIS — G03.9 ADHESIVE ARACHNOIDITIS: ICD-10-CM

## 2021-11-09 RX ORDER — GABAPENTIN 800 MG/1
800 TABLET ORAL 3 TIMES DAILY
Qty: 120 TABLET | Refills: 0 | Status: SHIPPED | OUTPATIENT
Start: 2021-11-09 | End: 2021-12-09

## 2021-11-15 DIAGNOSIS — G03.9 ADHESIVE ARACHNOIDITIS: ICD-10-CM

## 2021-11-15 RX ORDER — NALTREXONE 100 %
1 POWDER (GRAM) MISCELLANEOUS DAILY
Qty: 30 G | Refills: 0 | Status: SHIPPED | OUTPATIENT
Start: 2021-11-15 | End: 2021-12-15 | Stop reason: SDUPTHER

## 2021-11-15 NOTE — TELEPHONE ENCOUNTER
Rx Refill Note  Requested Prescriptions     Pending Prescriptions Disp Refills   • Naltrexone powder 30 g 0     Si mg Daily.      Last office visit with prescribing clinician: 10/19/2021      Next office visit with prescribing clinician: 2021            Kathleen Lee RN  11/15/21, 15:04 EST   Post-application: Motor, sensory, and vascular responses intact in the injured extremity./Pre-application: Motor, sensory, and vascular responses intact in the injured extremity.

## 2021-11-22 NOTE — PROGRESS NOTES
"Chief Complaint: \"Nerve pain.\"       Brief History:  Mr. Prashant Avila is a 76 y.o. male, has a history of implantation of a spinal cord stimulator device with Dr. Fausto Renee on 11/21/2019 with [employer] Penta paddle lead with the top electrodes projecting at the level of the superior endplate of the T8 vertebral level. IPG: [employer] Proclaim 5 XR IPG Non-Rechargeable (Full Body MRI compatible). The device was implanted primarily for treatment of chronic left hip and left lower extremity pain, unfortunately he continued to report no significant pain relief with the use of his stimulator device despite numerous attempts at reprogramming, therefore on 3/1/2021, he underwent explantation of the entire system with Dr. Fausto Renee.  Patient has underwent multiple interventional procedures such as transforaminal epidurals, piriformis injection, 2 trials of spinal cord stimulators, previous pain pump trials with fentanyl and Prialt, to name a few with minimal reported relief.  His last procedure on 9/8/2021 was platelet rich leukocyte poor plasma therapy and A2M therapy, via lumbar epidural administration with caudal epidural catheter placement approach, from which he unfortunately did not experience any action in his symptoms.  He remains on gabapentin 800 mg 4 times daily, he was additionally started on a trial of low-dose naltrexone, from which he reports he does not notice much of a difference with medication. He continues with interest in pursuing an intrathecal trial with a higher dose of fentanyl, or other medications such as bupivacaine or clonidine.  He did undergo updated psychological clearance with Dr. Praveen Navarro on 8/30/2021, and it was recommended the patient be seen for psychological treatment of his pain disorder including biofeedback, from a psychological perspective he was found to be an appropriate candidate for interventional pain procedures such as a pain pump with opioids or Prialt.  " At his last follow-up consultation with me on 10/19/2021, we opted to adjust the medications in attempt to help reduce his symptoms.  We increased his naltrexone to 1 mg once per day, from which he reports he did not experience much difference in his overall pain levels.  He continues to suffer from more neuropathic related symptoms.  He returns today for follow-up evaluation.  Pain Description: Constant pain with intermittent exacerbation, described as burning, electricity, pins and needles, and tingling sensation.   Radiation of pain: The pain radiates from the left gluteal region into the posterior aspect of the thigh, left leg and plantar aspect of the foot. The leg pain is more severe than the lower back pain  Pain intensity today: 8/10   Average pain intensity last week: 9/10  Pain intensity ranges from: 4/10 to 10/10  Aggravating factors: Pain increases with sitting longer than 2-5 minutes and laying down for more than a few minutes.  Alleviating factors: Pain decreases with standing and walking   Associated symptoms:   Patient denies numbness or weakness in the lower extremities  Patient denies any new bladder or bowel problems  Patient denies difficulties with his balance or recent falls  Pain interferes with his sleep     Pain History:  Referring physician: Dr. Fausto Renee   Consultation for intractable chronic left lower extremity pain.   Patient reports a 7-year history of pain, which began without incident. Patient is status post L4-L5 hemilaminectomy and foraminotomy at the [facility] with Dr. Mackay on 03/03/2014, followed by L4-S1 lumbar decompression and fusion due to unresolved pain by Dr. Renee on 07/24/2014. Patient reports that he experienced significant improvement after his last surgery with Dr. Renee, in particular, complete resolution of his previously severe lower back pain. Unfortunately, his left lower extremity pain, chronic radicular pain preceded his last lumbar fusion, and did not  improve after surgery. He underwent surgical consultation with Dr. Cox at the [facility], who recommended decompression of the nerve roots at L3-S1. He underwent follow-up neurosurgical consultation with Dr. Fausto Renee on 11/03/2017. Dr. Renee discussed the possibility of lumbar decompression at L3-L4: Left L3-L4 laminotomy and foraminotomy. Due to potential risk of developing instability that would necessitate extending his lumbar fusion to the L3 level. Patient underwent on April 3, 2018, left L3-L4 transforaminal epidural steroid injection without any pain relief. Patient elected to hold off on surgery. Patient underwent EMG/NCV, which confirmed chronic left S1 radiculopathy.  A CT myelogram was significant for stenosis above the level of his lumbar fusion, at L3-L4 without clinical correlation.  Patient presents with pain with a left S1 dermatomal distribution pattern.  Pain Description:   Constant pain with intermittent exacerbation, described as burning, electricity, pins and needles, and tingling sensation.   Radiation of pain: The pain radiates into the posterior aspect of the thigh, left leg and plantar aspect of the foot. The leg pain is more severe than the lower back pain.   Pain pattern: dermatomic, left L5 and S1   Pain intensity today: 3/10 (standing) 9-10/10 (sitting or lying down)  Average pain intensity last week: 7/10  Pain intensity ranges from: 4/10 to 10/10  Aggravating factors: Pain increases with sitting longer than 5 minutes and laying down for more than 5 minutes.  Alleviating factors: Pain decreases with standing.   Associated symptoms:   Patient denies numbness or weakness in the lower extremities.   Patient denies any new bladder or bowel problems.   Patient denies difficulties with his balance or recent falls.       Review of previous therapies and additional medical records:  Prashant Avila has already failed the following measures, including:   Conservative Measures:  Oral analgesics, ice and heat, physical therapy  Interventional measures:  09/08/2021: platelet rich leukocyte poor plasma therapy and A2 M therapy, via lumbar epidural administration with caudal epidural catheter placement approach  02/06/2019: Left S1 transforaminal epidural steroid injection and experienced pain relief that was short-lived   02/20/2017: Diagnostic and therapeutic left sciatic nerve block with local anesthetics, steroids, hyaluronidase under ultrasound, PNS and fluoroscopic guidance- Patient did not receive sustained relief from this procedure.   02/06/2017: Diagnostic left S1 selective nerve root injection  01/30/2017: Nevro SCS Trial; no relief  04/25/2016: Trial of lumbar epidural steroid injections with steroids and hyaluronidase via caudal catheter with 100% pain relief lasting 48 hours  12/16/2015: Diagnostic and therapeutic left S1 transforaminal epidural steroid injection with hyaluronidase with complete resolution of pain for several weeks  11/30/2015: Spinal cord stimulator trial [employer], which provided him with more than 50% pain relief and functional improvement. However, patient decided to delay implantation of a permanent spinal cord stimulator device until the arrival of new technology. Patient did not like the perception of tonic stimulation, even though stimulation overlapped successfully the areas of his chronic pain, he did not find it pleasant.   02/09/2015: Diagnostic left piriformis muscle injection  Surgical Measures:   11/21/2019: SCS IMPLANT: [employer] Penta paddle lead with the top electrodes projecting at the level of the superior endplate of the T8 vertebral level. IPG: [employer] Proclaim 5 XR IPG Non-Rechargeable by Dr. Fausto Renee   03/01/2021: SCS EXPLANT by Dr. Fausto Renee.   03/03/2014: Left L4-L5 hemilaminectomy and foraminotomy at [facility] with Dr. Mackay   07/24/2014: L4-S1 decompression and fusion by Dr. Víctor Avila underwent follow-up  "neurosurgical consultation with Dr. Fausto Renee on 11/30/2017, and was found to be a potential surgical candidate.  [facility] psychological evaluation with Dr. Praveen Navarro on 8/30/2021, per note: \"It is recommended the patient be seen for psychological treatment of his pain disorder including biofeedback.  From a psychological perspective, patient is an appropriate candidate for interventional pain procedures, such as a pain pump with opioids or Prialt.\"  Prashant Avila presents with significant comorbidities including hypertension, rheumatoid arthritis, osteoporosis, hyperlipidemia, chronic kidney disease, hearing loss, carcinoma of prostate, engaged in treatment.  In terms of current analgesics, Prashant Avila takes: Tylenol, NSAIDs and gabapentin.   I have reviewed Jon Report #[id number] consistent to medication reconciliation.  SOAPP: Low risk      Global Pain Scale 04-19  2018 10-24  2018 11-08  2018 01-29  2019 03-06  2019 10-03  2019 10-17  2019 01-14  2020 01-21  2020 02-20  2020 04-14  2020 05-07  2020 09-24  2020 07-20  2021 10-19  2021 11-23  2021      Pain 19 17 17 18 17 20  10 10 10   10  10 8 20 22 19 17    Feelings 1 0 0 0 0   8    4   2  0  0    0 0 6 12 4 1     Clinical outcomes 11 8 13 11 6 11    7   6  7  9    9 9 11 12 9 11     Activities 17 10 16 18 6 15    7   6  4  15  15 12 7 10 8 21     GPS Total: 48 35 46 47 29 54  28  24  21  34  34 28 44 56 40 50       Review of Previous Diagnostic Studies:   There are no new diagnostic studies for review at this time.  MRI THORACIC SPINE WO CONTRAST-10/31/2019: Minimal degenerative changes at multiple levels throughout the thoracic spine with no central spinal canal stenosis. Epidural space is unremarkable.  EMG/NCV of the bilateral lower extremities December 18, 2018: Chronic left S1 radiculopathy  CT LUMBAR SPINE WITH CONTRAST-01/21/2019: There is excellent opacification of the thecal sac. Imaging covers from T12 through the S1 level " caudally. The conus terminates at the L1/L2 level.  The distal cord and conus are normal in caliber. The nerve roots of the cauda equina are normal in caliber and distribution. L4-S1 fusion hardware is in place; the left S1 pedicle screw is fractured and there is lucency around the right S1 pedicle screw. Left L4 laminotomy. Vertebral body heights and alignment are normal. Vertebral body heights are normal. Degenerative changes are as follows:  T12-L1: Facet arthropathy without significant foraminal or spinal canal stenosis.  L1-L2: Facet arthropathy without significant foraminal or spinal canal stenosis.  L2-L3: Right greater than left facet arthropathy. Mild narrowing of the right subarticular zone. No significant central spinal canal or foraminal stenosis.  L3-L4: Disc bulge and facet arthropathy with ligamentum flavum thickening. There is severe spinal canal stenosis and at least moderate bilateral foraminal stenosis.   L4-L5: Improved right subarticular zone narrowing. No significant osseous spinal canal or foraminal stenosis.  L5-S1: Bilateral facet arthropathy. Shallow disc osteophyte. Unchanged mild osseous foraminal stenosis. No significant osseous spinal canal stenosis.  EKG April 19, 2018 revealed sinus bradycardia with first-degree AV block.  Left bundle branch block.    X-Ray lumbar spine 08/04/2017: Posterior lumbar fusion of L4, L5 and S1. Degenerative changes at L1-L2 with anterior osteophyte formation. Normal lumbar alignment in the lateral projection with a stable alignment in the flexed and extended positions.      Duplex Venous Lower, 03/25/2017: Normal right lower extremity venous duplex scan. No evidence of DVT.  MRI Lumbar Spine, 10/28/2016: Since the previous MRI study, there has been L4-5-S1 posterior lumbar and interbody fusion. No new abnormality of alignment is seen. There is, however, significant worsening of posterior element hypertrophy at L3-4, above the fusion level, due to  persistent L3-4 disc bulge, and what appears to be new spinal stenosis at this level, generally as a result of posterior element hypertrophic change. Canal appears stable elsewhere. No lumbar compression deformity or new abnormality of alignment is seen. No vertebral marrow edema is appreciated. Sagittal images suggest a small (18 mm) seroma or synovial cyst on the right at L5-S1. Tip of conus medullaris is again noted at L1.  Axial images   L1-L2: No significant canal or foraminal stenosis   L2-L3: Canal appears relatively narrowed, due to posterior element hypertrophy but still greater than 1 cm in diameter. Foramina appear  lower limits of normal diameter.   L3-L4: Moderate canal stenosis with lateral narrowing, AP diameter 6.5 mm, transverse diameter approximately 9 mm with crowding of the nerve roots. Foramina appear mildly to moderately narrowed bilaterally due to facet DJD.   L4-L5: Fused level, canal appears normal. Mild foraminal stenosis due to facet DJD  L5-S1: Canal appears normal. Left-sided neural foramen appears normal. Right-sided neural foramen appears mildly narrowed due to facet DJD. Postcontrast images show expected postoperative enhancement of the dorsal soft tissues and no pathologic postcontrast enhancement elsewhere.     The following portions of the patient's history were reviewed and updated as appropriate: problem list, past medical history, past surgery history, social history, family history, medications, and allergies    Review of Systems   Musculoskeletal: Positive for back pain.   All other systems reviewed and are negative.    There were no vitals taken for this visit.     Physical Exam:  Constitutional: Patient appears well-developed, well-nourished, and younger than his chronological age  HEENT: Head normocephalic and atraumatic.  Pupils equal round and reactive to light  Neck: Supple, no LAD, no JVD  Musculoskeletal:  Gait and station: Gait evaluation demonstrated a normal gate.    Lumbar spine: The range of motion of the lumbar spine is appropriate for his age and condition.  Lumbar facet joint loading maneuvers are negative  Hip joints: The range of motion of the hip joints is slightly reduced, symmetrical, and without pain  Motor strength: 5/5  Motor tone: Normal  Involuntary movements: None  Neurological:  Cranial nerves II through XII: Intact  Cortical function: Normal mental status  Deep tendon reflexes:  Right patellar: 0+  Left patellar: 0+  Left Achilles: 0+  Right Achilles: 0+  Long tract signs: Negative.  Straight leg raising test: Negative on the right, positive on the left at 40 degrees.  Femoral stretch sign: Negative on the right, questionably positive on the left  Sensory exam: Intact to light touch, pain, temperature, vibration, and proprioception  Coordination: Rhomberg's sign negative.  Normal balance  Psychiatric: Judgment and insight: Normal.  Recent and remote memory: Intact.  Mood and affect: Normal    ASSESSMENT:   1. Adhesive arachnoiditis    2. Postlaminectomy syndrome of lumbar region    3. Chronic lumbar radiculopathy    4. Status post L4-S1 lumbar spinal fusion    5. Chronic intractable pain    6. Physical deconditioning         PLAN/MEDICAL DECISION MAKING: Mr. Prashant Avila presents with a very complex and challenging chronic pain condition. Patient has failed to obtain pain relief with conservative measures including numerous analgesic trials, interventional pain management measures including epidurals, spinal cord stimulator trial, and previous surgical interventions including 2 previous lumbar surgeries and implantation of a spinal cord stimulator device. He has a history of implantation of a spinal cord stimulator device with Dr. Fausto Renee on 11/21/2019 with [employer] Penta paddle lead with the top electrodes projecting at the level of the superior endplate of the T8 vertebral level. IPG: [employer] Proclaim 5 XR IPG Non-Rechargeable (Full Body  MRI compatible). The device was implanted primarily for treatment of chronic left hip and left lower extremity pain, unfortunately he continued to report no significant pain relief with the use of his stimulator device despite numerous attempts at reprogramming, therefore on 3/1/2021, he underwent explantation of the entire system with Dr. Fausto Renee.  In regards to his previous surgeries, he underwent on March 30, 2014 left L4-L5 hemilaminectomy and foraminotomy at the [facility] by Dr. Jef Mackay, and on July 24, 2014 L4 S1 decompression and fusion by Dr. Renee. Patient underwent several neurosurgical consultations after his lumbar surgeries. Initially, in 2017 he discussed with Dr. Renee the possibility of additional decompression and extension of his lumbar fusion to L3. Patient declined the possibility of surgical intervention.  He has underwent numerous analgesic trials initiated by Dr. Batista, from which he discontinues soon thereafter due to ineffectiveness or a short side effect.  His last MRI of the lumbar spine revealed lateral recess stenosis at L3-L4 associated with crowding of the nerve roots given the clear appearance of lumbar arachnoiditis. Due to the complexity of the treatment of his chronic pain, most of his symptoms appear to be related to lumbar arachnoiditis.  Lumbar myelogram followed by CT postmyelogram confirm the diagnosis of lumbar stenosis at L3-L4 without clinical correlation. EMG/NCV of the bilateral lower extremities revealed chronic left S1 radiculopathy which also provided clinical correlation. Patient has already failed to obtain pain relief with conservative measures, interventional pain management measures, and previous surgical measures.  Patient underwent updated psychological evaluation with Dr. Praveen Navarro and was found to be an appropriate candidate for spinal cord stimulation and intrathecal therapies.  Unfortunately, he did not  experience any significant relief with PRP as referenced under HPI.  He would like to continue on his current dose of naltrexone, and follow-up with me in the new year.  If we are unable to achieve pain relief  conservatively we may consider pursuing additional intrathecal trials with alternative medications such as a higher dose of fentanyl, clonidine or bupivacaine.  We have also discussed possibly titrating down on his gabapentin, we will discuss that at his follow-up visit.  Therefore, I have proposed the following plan:  1. Interventional pain management measures.  None indicated at this time.  Next steps in his treatment plan if he is unable to achieve benefit with medication and conservatism we may consider moving forward with repeating a trial with fentanyl at a higher dose or using a secondary drug along with fentanyl such as lidocaine or bupivacaine, or using a different medication such as bupivacaine or clonidine. Patient reported no significant benefit from Prialt. There is a possibility that he may fail intrathecal therapies even after a successful trial. We have discussed the incidence, severity, and treatment of common side effects with intrathecal therapies. Constipation could be a long-term issue with opioids but he understands that he might be able to control this with the use of appropriate bowel regimen. He does have a history of some urinary difficulties.  Although, unfortunately there are many medications and intrathecal therapies that will produce side effects, with his given history of medications and side effects, he could experience short-term effects.    2. Pharmacological measures: Patient has failed numerous analgesic trials. Unfortunately, patient is not a candidate for a trial with mexiletine due to his abnormal EKG.  This is the only drug for the treatment of neuropathic pain that we have been unable to prescribe.    A. Continue gabapentin: Take gabapentin 800 mg 1 tablet every  morning, 1 tablet at noon, and 2 tablets at bedtime. He has previously failed a trial with Lyrica and other anticonvulsants  B. Continue Rheumate one tablet twice daily  C. Continue alpha lipoid acid [id number] mg per day divided into 3 doses  D. Continue low dose naltrexone to 1 mg once a day.  We will assess analgesic response and titrate the dose accordingly  3. Follow-up with me in about 2 months to assess progress of medication change.  4. Long-term rehabilitation efforts:  A. Patient will start a comprehensive physical therapy program at [facility] for Alter-G, neurodynamics, gait and balance and core strengthening   B. Follow-up with Dr. Praveen Navarro for intensive cognitive behavioral therapy, biofeedback, as recommended.  5. The patient has been instructed to contact my office with any questions or difficulties.  The patient understands the plan and agrees to proceed accordingly.             Patient Care Team:  SHERON Chun MD as PCP - General (Family Medicine)  Efrain Batista MD as Consulting Physician (Pain Medicine)  Fausto Renee MD as Consulting Physician (Neurosurgery)  Jean Warren MD as Consulting Physician (Neurosurgery)  Hank Frankel MD as Consulting Physician (Cardiology)  Marcelino Condon MD as Consulting Physician (Urology)  Brian Tatum III, MD as Consulting Physician (Cardiology)  SHERON Chun MD as Consulting Physician (Family Medicine)  Francisco Javier Clay MD as Consulting Physician (Neurosurgery)  Royer Roldan MD as Consulting Physician (General Practice)  Emigdio Watt MD as Consulting Physician (Gastroenterology)  Shola Vila MD as Consulting Physician (Otolaryngology)  Hank Frankel MD as Consulting Physician (Cardiology)     No orders of the defined types were placed in this encounter.        Future Appointments   Date Time Provider Department Center   11/23/2021 10:00 AM nIez Regan APRN [facility]          Inez Regan, APRN

## 2021-11-23 ENCOUNTER — OFFICE VISIT (OUTPATIENT)
Dept: PAIN MEDICINE | Facility: CLINIC | Age: 76
End: 2021-11-23

## 2021-11-23 VITALS
WEIGHT: 188.2 LBS | TEMPERATURE: 97.3 F | HEART RATE: 66 BPM | OXYGEN SATURATION: 98 % | RESPIRATION RATE: 14 BRPM | BODY MASS INDEX: 27.88 KG/M2 | DIASTOLIC BLOOD PRESSURE: 70 MMHG | SYSTOLIC BLOOD PRESSURE: 127 MMHG | HEIGHT: 69 IN

## 2021-11-23 DIAGNOSIS — G03.9 ADHESIVE ARACHNOIDITIS: ICD-10-CM

## 2021-11-23 DIAGNOSIS — R53.81 PHYSICAL DECONDITIONING: ICD-10-CM

## 2021-11-23 DIAGNOSIS — Z98.1 STATUS POST LUMBAR SPINAL FUSION: ICD-10-CM

## 2021-11-23 DIAGNOSIS — M54.16 CHRONIC LUMBAR RADICULOPATHY: ICD-10-CM

## 2021-11-23 DIAGNOSIS — G89.29 CHRONIC INTRACTABLE PAIN: ICD-10-CM

## 2021-11-23 DIAGNOSIS — M96.1 POSTLAMINECTOMY SYNDROME OF LUMBAR REGION: ICD-10-CM

## 2021-11-23 PROCEDURE — 99213 OFFICE O/P EST LOW 20 MIN: CPT | Performed by: NURSE PRACTITIONER

## 2021-12-09 DIAGNOSIS — G03.9 ADHESIVE ARACHNOIDITIS: ICD-10-CM

## 2021-12-09 RX ORDER — GABAPENTIN 800 MG/1
TABLET ORAL
Qty: 120 TABLET | Refills: 2 | Status: SHIPPED | OUTPATIENT
Start: 2021-12-09 | End: 2022-03-17

## 2021-12-15 DIAGNOSIS — G03.9 ADHESIVE ARACHNOIDITIS: ICD-10-CM

## 2021-12-15 RX ORDER — NALTREXONE 100 %
1 POWDER (GRAM) MISCELLANEOUS DAILY
Qty: 30 G | Refills: 1 | Status: SHIPPED | OUTPATIENT
Start: 2021-12-15 | End: 2023-03-07

## 2022-01-12 NOTE — PROGRESS NOTES
"Chief Complaint: \"Leg pain.\"       Brief History:  Mr. Prashant Avila is a 77 y.o. male, has a history of implantation of a spinal cord stimulator device with Dr. Fausto Renee on 11/21/2019 with [employer] Penta paddle lead with the top electrodes projecting at the level of the superior endplate of the T8 vertebral level. IPG: [employer] Proclaim 5 XR IPG Non-Rechargeable (Full Body MRI compatible). The device was implanted primarily for treatment of chronic left hip and left lower extremity pain, unfortunately he continued to report no significant pain relief with the use of his stimulator device despite numerous attempts at reprogramming, therefore on 3/1/2021, he underwent explantation of the entire system with Dr. Fausto Renee.  Patient has underwent multiple interventional procedures such as transforaminal epidurals, piriformis injection, 2 trials of spinal cord stimulators, previous pain pump trials with fentanyl and Prialt, and most recently PRP injections to name a few with minimal reported relief.  His last procedure on 9/8/2021 was platelet rich leukocyte poor plasma therapy and A2M therapy, via lumbar epidural administration with caudal epidural catheter placement approach, from which he unfortunately did not experience any reduction in his symptoms.  He remains on gabapentin 800 mg 4 times daily, he was additionally started on a trial of low-dose naltrexone, from which he reports he does not notice much of a difference with medication. He continues with interest in pursuing an intrathecal trial with a higher dose of fentanyl, or other medications such as bupivacaine or clonidine.  He did undergo updated psychological clearance with Dr. Praveen Navarro on 8/30/2021, from a psychological perspective he was found to be an appropriate candidate for interventional pain procedures such as a pain pump with opioids or Prialt.  At his last follow-up consultation with me on November 23, 2021, we discussed " remaining on current dose of naltrexone, and discussed the possibility of titrating down on gabapentin at his next visit.  He presents with significant neuropathic pain, but feels the gabapentin has become ineffective.  He remains on naltrexone 1 mg/day, from which she still does not report much difference in his pain levels.  He continues to suffer from more neuropathic related symptoms.  He returns today for follow-up evaluation.  Pain Description: Constant pain with intermittent exacerbation, described as burning, electricity, pins and needles, and tingling sensation.   Radiation of pain: The pain radiates from the left gluteal region into the posterior aspect of the thigh, on rare occasions into the left leg and plantar aspect of the foot. The leg pain is more severe than the lower back pain  Pain intensity today: 8/10   Average pain intensity last week: 9/10  Pain intensity ranges from: 8/10 to 10/10  Aggravating factors: Pain increases with sitting longer than 2-5 minutes and laying down for more than a few minutes.  Alleviating factors: Pain decreases with standing and walking   Associated symptoms:   Patient denies numbness or weakness in the lower extremities  Patient denies any new bladder or bowel problems  Patient denies difficulties with his balance or recent falls       Pain History:  Referring physician: Dr. Fausto Renee   Consultation for intractable chronic left lower extremity pain.   Patient reports a 7-year history of pain, which began without incident. Patient is status post L4-L5 hemilaminectomy and foraminotomy at the [facility] with Dr. Mackay on 03/03/2014, followed by L4-S1 lumbar decompression and fusion due to unresolved pain by Dr. Renee on 07/24/2014. Patient reports that he experienced significant improvement after his last surgery with Dr. Renee, in particular, complete resolution of his previously severe lower back pain. Unfortunately, his left lower extremity pain, chronic radicular  pain preceded his last lumbar fusion, and did not improve after surgery. He underwent surgical consultation with Dr. Cox at the [facility], who recommended decompression of the nerve roots at L3-S1. He underwent follow-up neurosurgical consultation with Dr. Fausto Renee on 11/03/2017. Dr. Renee discussed the possibility of lumbar decompression at L3-L4: Left L3-L4 laminotomy and foraminotomy. Due to potential risk of developing instability that would necessitate extending his lumbar fusion to the L3 level. Patient underwent on April 3, 2018, left L3-L4 transforaminal epidural steroid injection without any pain relief. Patient elected to hold off on surgery. Patient underwent EMG/NCV, which confirmed chronic left S1 radiculopathy.  A CT myelogram was significant for stenosis above the level of his lumbar fusion, at L3-L4 without clinical correlation.  Patient presents with pain with a left S1 dermatomal distribution pattern.  Pain Description:   Constant pain with intermittent exacerbation, described as burning, electricity, pins and needles, and tingling sensation.   Radiation of pain: The pain radiates into the posterior aspect of the thigh, left leg and plantar aspect of the foot. The leg pain is more severe than the lower back pain.   Pain pattern: dermatomic, left L5 and S1   Pain intensity today: 3/10 (standing) 9-10/10 (sitting or lying down)  Average pain intensity last week: 7/10  Pain intensity ranges from: 4/10 to 10/10  Aggravating factors: Pain increases with sitting longer than 5 minutes and laying down for more than 5 minutes.  Alleviating factors: Pain decreases with standing.   Associated symptoms:   Patient denies numbness or weakness in the lower extremities.   Patient denies any new bladder or bowel problems.   Patient denies difficulties with his balance or recent falls.       Review of previous therapies and additional medical records:  Prashant Avila has already failed the  following measures, including:   Conservative Measures: Oral analgesics, ice and heat, physical therapy  Interventional measures:  09/08/2021: platelet rich leukocyte poor plasma therapy and A2 M therapy, via lumbar epidural administration with caudal epidural catheter placement approach  02/06/2019: Left S1 transforaminal epidural steroid injection and experienced pain relief that was short-lived   02/20/2017: Diagnostic and therapeutic left sciatic nerve block with local anesthetics, steroids, hyaluronidase under ultrasound, PNS and fluoroscopic guidance- Patient did not receive sustained relief from this procedure.   02/06/2017: Diagnostic left S1 selective nerve root injection  01/30/2017: Nevro SCS Trial; no relief  04/25/2016: Trial of lumbar epidural steroid injections with steroids and hyaluronidase via caudal catheter with 100% pain relief lasting 48 hours  12/16/2015: Diagnostic and therapeutic left S1 transforaminal epidural steroid injection with hyaluronidase with complete resolution of pain for several weeks  11/30/2015: Spinal cord stimulator trial [employer], which provided him with more than 50% pain relief and functional improvement. However, patient decided to delay implantation of a permanent spinal cord stimulator device until the arrival of new technology. Patient did not like the perception of tonic stimulation, even though stimulation overlapped successfully the areas of his chronic pain, he did not find it pleasant.   02/09/2015: Diagnostic left piriformis muscle injection  Surgical Measures:   11/21/2019: SCS IMPLANT: [employer] Penta paddle lead with the top electrodes projecting at the level of the superior endplate of the T8 vertebral level. IPG: [employer] Proclaim 5 XR IPG Non-Rechargeable by Dr. Fausto Renee   03/01/2021: SCS EXPLANT by Dr. Fausto Renee.   03/03/2014: Left L4-L5 hemilaminectomy and foraminotomy at [facility] with Dr. Mackay   07/24/2014: L4-S1 decompression and fusion by  "Dr. Víctor Bustosboone Avila underwent follow-up neurosurgical consultation with Dr. Fausto Renee on 11/30/2017, and was found to be a potential surgical candidate.  [facility] psychological evaluation with Dr. Praveen Navarro on 8/30/2021, per note: \"It is recommended the patient be seen for psychological treatment of his pain disorder including biofeedback.  From a psychological perspective, patient is an appropriate candidate for interventional pain procedures, such as a pain pump with opioids or Prialt.\"  Prashant Avila presents with significant comorbidities including hypertension, rheumatoid arthritis, osteoporosis, hyperlipidemia, chronic kidney disease, hearing loss, carcinoma of prostate, engaged in treatment.  In terms of current analgesics, Prashant Avila takes: Tylenol, NSAIDs and gabapentin.   I have reviewed Jon Report is consistent to medication reconciliation.  SOAPP: Low risk      Global Pain Scale 04-19  2018 10-24  2018 11-08  2018 01-29  2019 03-06  2019 10-03  2019 10-17  2019 01-14  2020 01-21  2020 02-20  2020 04-14  2020 05-07  2020 09-24  2020 07-20  2021 10-19  2021 11-23  2021 01-13  2022        Pain 19 17 17 18 17 20  10 10 10   10  10 8 20 22 19 17 22      Feelings 1 0 0 0 0   8    4   2  0  0    0 0 6 12 4 1 0       Clinical outcomes 11 8 13 11 6 11    7   6  7  9    9 9 11 12 9 11 10       Activities 17 10 16 18 6 15    7   6  4  15  15 12 7 10 8 21 7       GPS Total: 48 35 46 47 29 54  28  24  21  34  34 28 44 56 40 50 39         Review of Previous Diagnostic Studies:   There are no new diagnostic studies for review at this time.  MRI THORACIC SPINE WO CONTRAST-10/31/2019: Minimal degenerative changes at multiple levels throughout the thoracic spine with no central spinal canal stenosis. Epidural space is unremarkable.  EMG/NCV of the bilateral lower extremities December 18, 2018: Chronic left S1 radiculopathy  CT LUMBAR SPINE WITH CONTRAST-01/21/2019: There is excellent " opacification of the thecal sac. Imaging covers from T12 through the S1 level caudally. The conus terminates at the L1/L2 level.  The distal cord and conus are normal in caliber. The nerve roots of the cauda equina are normal in caliber and distribution. L4-S1 fusion hardware is in place; the left S1 pedicle screw is fractured and there is lucency around the right S1 pedicle screw. Left L4 laminotomy. Vertebral body heights and alignment are normal. Vertebral body heights are normal. Degenerative changes are as follows:  T12-L1: Facet arthropathy without significant foraminal or spinal canal stenosis.  L1-L2: Facet arthropathy without significant foraminal or spinal canal stenosis.  L2-L3: Right greater than left facet arthropathy. Mild narrowing of the right subarticular zone. No significant central spinal canal or foraminal stenosis.  L3-L4: Disc bulge and facet arthropathy with ligamentum flavum thickening. There is severe spinal canal stenosis and at least moderate bilateral foraminal stenosis.   L4-L5: Improved right subarticular zone narrowing. No significant osseous spinal canal or foraminal stenosis.  L5-S1: Bilateral facet arthropathy. Shallow disc osteophyte. Unchanged mild osseous foraminal stenosis. No significant osseous spinal canal stenosis.  EKG April 19, 2018 revealed sinus bradycardia with first-degree AV block.  Left bundle branch block.    X-Ray lumbar spine 08/04/2017: Posterior lumbar fusion of L4, L5 and S1. Degenerative changes at L1-L2 with anterior osteophyte formation. Normal lumbar alignment in the lateral projection with a stable alignment in the flexed and extended positions.      Duplex Venous Lower, 03/25/2017: Normal right lower extremity venous duplex scan. No evidence of DVT.  MRI Lumbar Spine, 10/28/2016: Since the previous MRI study, there has been L4-5-S1 posterior lumbar and interbody fusion. No new abnormality of alignment is seen. There is, however, significant worsening of  posterior element hypertrophy at L3-4, above the fusion level, due to persistent L3-4 disc bulge, and what appears to be new spinal stenosis at this level, generally as a result of posterior element hypertrophic change. Canal appears stable elsewhere. No lumbar compression deformity or new abnormality of alignment is seen. No vertebral marrow edema is appreciated. Sagittal images suggest a small (18 mm) seroma or synovial cyst on the right at L5-S1. Tip of conus medullaris is again noted at L1.  Axial images   L1-L2: No significant canal or foraminal stenosis   L2-L3: Canal appears relatively narrowed, due to posterior element hypertrophy but still greater than 1 cm in diameter. Foramina appear  lower limits of normal diameter.   L3-L4: Moderate canal stenosis with lateral narrowing, AP diameter 6.5 mm, transverse diameter approximately 9 mm with crowding of the nerve roots. Foramina appear mildly to moderately narrowed bilaterally due to facet DJD.   L4-L5: Fused level, canal appears normal. Mild foraminal stenosis due to facet DJD  L5-S1: Canal appears normal. Left-sided neural foramen appears normal. Right-sided neural foramen appears mildly narrowed due to facet DJD. Postcontrast images show expected postoperative enhancement of the dorsal soft tissues and no pathologic postcontrast enhancement elsewhere.     The following portions of the patient's history were reviewed and updated as appropriate: problem list, past medical history, past surgery history, social history, family history, medications, and allergies    Review of Systems   All other systems reviewed and are negative.    There were no vitals taken for this visit.     Physical Exam:  Constitutional: Patient appears well-developed, well-nourished, and younger than his chronological age  HEENT: Head normocephalic and atraumatic.  Pupils equal round and reactive to light  Neck: Supple, no LAD, no JVD  Musculoskeletal:  Gait and station: Gait evaluation  demonstrated a normal gait.   Lumbar spine: The range of motion of the lumbar spine is appropriate for his age and condition.  Lumbar facet joint loading maneuvers are negative  Hip joints: The range of motion of the hip joints is slightly reduced, symmetrical, and without pain  Motor strength: 5/5  Motor tone: Normal  Involuntary movements: None  Neurological:  Cortical function: Normal mental status  Deep tendon reflexes:  Right patellar: 0+  Left patellar: 0+  Left Achilles: 0+  Right Achilles: 0+  Long tract signs: Negative.  Straight leg raising test: Negative on the right, positive on the left at 40 degrees.  Femoral stretch sign: Negative on the right, questionably positive on the left  Sensory exam: Intact to light touch, pain, temperature, vibration, and proprioception  Coordination: Rhomberg's sign negative.  Normal balance  Psychiatric: Judgment and insight: Normal.  Recent and remote memory: Intact.  Mood and affect: Normal    ASSESSMENT:   1. Postlaminectomy syndrome of lumbar region    2. Chronic lumbar radiculopathy    3. Adhesive arachnoiditis    4. Chronic intractable pain    5. Physical deconditioning    6. Bilateral stenosis of lateral recess of lumbar spine         PLAN/MEDICAL DECISION MAKING: Mr. Prashant Avila presents with a very complex and challenging chronic pain condition. Patient has failed to obtain pain relief with conservative measures including numerous analgesic trials, interventional pain management measures including epidurals, spinal cord stimulator trial, and previous surgical interventions including 2 previous lumbar surgeries and implantation of a spinal cord stimulator device. He has a history of implantation of a spinal cord stimulator device with Dr. Fausto Renee on 11/21/2019 with [employer] Penta paddle lead with the top electrodes projecting at the level of the superior endplate of the T8 vertebral level. IPG: [employer] Proclaim 5 XR IPG Non-Rechargeable (Full Body  MRI compatible). The device was implanted primarily for treatment of chronic left hip and left lower extremity pain, unfortunately he continued to report no significant pain relief with the use of his stimulator device despite numerous attempts at reprogramming, therefore on 3/1/2021, he underwent explantation of the entire system with Dr. Fausto Renee.  In regards to his previous surgeries, he underwent on March 30, 2014 left L4-L5 hemilaminectomy and foraminotomy at the [facility] by Dr. Jef Mackay, and on July 24, 2014 L4 S1 decompression and fusion by Dr. Renee. Patient underwent several neurosurgical consultations after his lumbar surgeries. Initially, in 2017 he discussed with Dr. Renee the possibility of additional decompression and extension of his lumbar fusion to L3. Patient declined the possibility of surgical intervention.  He has underwent numerous analgesic trials initiated by Dr. Batista, from which he discontinues soon thereafter due to ineffectiveness or a short side effect.  His last MRI of the lumbar spine revealed lateral recess stenosis at L3-L4 associated with crowding of the nerve roots given the clear appearance of lumbar arachnoiditis. Due to the complexity of the treatment of his chronic pain, most of his symptoms appear to be related to lumbar arachnoiditis.  Lumbar myelogram followed by CT postmyelogram confirm the diagnosis of lumbar stenosis at L3-L4 without clinical correlation. EMG/NCV of the bilateral lower extremities revealed chronic left S1 radiculopathy which also provided clinical correlation. Patient has already failed to obtain pain relief with conservative measures, interventional pain management measures, and previous surgical measures.  Patient underwent updated psychological evaluation with Dr. Praveen Navarro and was found to be an appropriate candidate for spinal cord stimulation and intrathecal therapies.  Unfortunately, he did not  experience any significant relief with PRP as referenced under HPI.  He would like to continue on his current dose of naltrexone, and we have discussed at today's visit downward titration of his gabapentin. He feels at such a high dose it has become ineffective in regards to his neuropathic symptoms. I have educated him on a titration schedule. He will follow-up with me in a few months to discuss how effective or not effective the gabapentin seems to be. He is very realistic with his expectations in regards to achieving pain relief. I additionally did discuss if we are unable to achieve pain relief  conservatively we may consider pursuing additional intrathecal trials with alternative medications such as a higher dose of fentanyl, clonidine or bupivacaine. Prashant Avila reports a pain score of 9.  Given his pain assessment as noted, treatment options were discussed and the following options were decided upon as a follow-up plan to address the patient's pain: continuation of current treatment plan for pain. Therefore, I have proposed the following plan:  1. Interventional pain management measures.  None indicated at this time.  Next steps in his treatment plan if he is unable to achieve benefit with medication and conservatism we may consider moving forward with repeating a trial with fentanyl at a higher dose or using a secondary drug along with fentanyl such as lidocaine or bupivacaine, or using a different medication such as bupivacaine or clonidine. Patient reported no significant benefit from Prialt. There is a possibility that he may fail intrathecal therapies even after a successful trial and we have discussed this today.. We have also discussed the incidence, severity, and treatment of common side effects with intrathecal therapies. Constipation could be a long-term issue with opioids but he understands that he might be able to control this with the use of appropriate bowel regimen. He does have a  history of some urinary difficulties.  Although, unfortunately there are many medications and intrathecal therapies that will produce side effects, with his given history of medications and side effects, he could experience short-term effects.    2. Pharmacological measures: Patient has failed numerous analgesic trials. Unfortunately, patient is not a candidate for a trial with mexiletine due to his abnormal EKG.  This is the only drug for the treatment of neuropathic pain that we have been unable to prescribe.    A. Continue gabapentin: He is going to begin titrating his gabapentin down to determine effectiveness. He will keep me updated on his progress. His current dosage is gabapentin 800 mg 1 tablet every morning, 1 tablet at noon, and 2 tablets at bedtime. He has previously failed a trial with Lyrica and other anticonvulsants  B. Continue Rheumate one tablet twice daily  C. Continue alpha lipoid acid [id number] mg per day divided into 3 doses  D. Continue low dose naltrexone to 1 mg once a day.  We will assess analgesic response and titrate the dose accordingly, he would like to remain at this dose.  3. Follow-up with me in about 2 months to assess progress of medication change.  4. The patient has been instructed to contact my office with any questions or difficulties.  The patient understands the plan and agrees to proceed accordingly.             Patient Care Team:  SHERON Chun MD as PCP - General (Family Medicine)  Efrain Batista MD as Consulting Physician (Pain Medicine)  Fausto Renee MD as Consulting Physician (Neurosurgery)  Jean Warren MD as Consulting Physician (Neurosurgery)  Hank Frankel MD as Consulting Physician (Cardiology)  Marcelino Condon MD as Consulting Physician (Urology)  Brian Tatum III, MD as Consulting Physician (Cardiology)  SHERON Chun MD as Consulting Physician (Family Medicine)  Francisco Javier Clay MD as Consulting Physician (Neurosurgery)  Jamar  Royer BARNES MD as Consulting Physician (General Practice)  Emigdio Watt MD as Consulting Physician (Gastroenterology)  Shola Vila MD as Consulting Physician (Otolaryngology)  Hank Frankel MD as Consulting Physician (Cardiology)     No orders of the defined types were placed in this encounter.        Future Appointments   Date Time Provider Department Center   1/13/2022 10:00 AM Inez Regan APRN [facility]         [facility]

## 2022-01-13 ENCOUNTER — OFFICE VISIT (OUTPATIENT)
Dept: PAIN MEDICINE | Facility: CLINIC | Age: 77
End: 2022-01-13

## 2022-01-13 VITALS
TEMPERATURE: 97.9 F | SYSTOLIC BLOOD PRESSURE: 138 MMHG | BODY MASS INDEX: 27.85 KG/M2 | DIASTOLIC BLOOD PRESSURE: 76 MMHG | HEIGHT: 69 IN | HEART RATE: 58 BPM | WEIGHT: 188 LBS | OXYGEN SATURATION: 95 %

## 2022-01-13 DIAGNOSIS — G03.9 ADHESIVE ARACHNOIDITIS: ICD-10-CM

## 2022-01-13 DIAGNOSIS — M48.061 BILATERAL STENOSIS OF LATERAL RECESS OF LUMBAR SPINE: ICD-10-CM

## 2022-01-13 DIAGNOSIS — G89.29 CHRONIC INTRACTABLE PAIN: ICD-10-CM

## 2022-01-13 DIAGNOSIS — R53.81 PHYSICAL DECONDITIONING: ICD-10-CM

## 2022-01-13 DIAGNOSIS — M96.1 POSTLAMINECTOMY SYNDROME OF LUMBAR REGION: ICD-10-CM

## 2022-01-13 DIAGNOSIS — M54.16 CHRONIC LUMBAR RADICULOPATHY: ICD-10-CM

## 2022-01-13 PROCEDURE — 99213 OFFICE O/P EST LOW 20 MIN: CPT | Performed by: NURSE PRACTITIONER

## 2022-03-13 NOTE — PROGRESS NOTES
"Chief Complaint: \"Nerve pain.\"       Brief History:  Mr. Prashant Avila is a 77 y.o. male, has a history of chronic intractable left lower extremity pain related to lumbar radiculopathy, neuropathic pain and postlaminectomy syndrome of the lumbar region. His pain is localized to his left hip and left gluteal region.  Patient has underwent multiple interventional procedures such as transforaminal epidurals, piriformis injection, 2 trials of spinal cord stimulators, spinal cord stimulator implant, previous pain pump trials with fentanyl and Prialt, and most recently PRP injections to name a few with minimal reported relief.  His last procedure on 9/8/2021 was platelet rich leukocyte poor plasma therapy and A2M therapy, via lumbar epidural administration with caudal epidural catheter placement approach, from which he unfortunately did not experience any reduction in his symptoms.  He continues with interest in pursuing an intrathecal trial with a higher dose of fentanyl, or other medications such as bupivacaine or clonidine.  At his last follow-up consultation with me on January 13, 2022, we discussed remaining on current dose of naltrexone, and discussed the possibility of titrating down on gabapentin at his next visit.  He presents with significant neuropathic pain, but feels the gabapentin has become ineffective.  He remains on naltrexone 1 mg/day, from which she still does not report much difference in his pain levels.  He continues to suffer from more neuropathic related symptoms from his chronic lumbar radiculopathy. He has began to titrate his dose of gabapentin down, but experienced exacerbation of his symptoms and went back on medication prior dose.  His symptoms remain localized to his left gluteal region, he is not reporting much extension beyond that area, he reports sitting exacerbates his pain, he does not experience much with standing or walking.  He returns today for follow-up evaluation.  Pain " Description: Constant pain with intermittent exacerbation, described as burning, electricity, pins and needles, and tingling sensation.   Radiation of pain: The pain appears to be localized to the left gluteal region, he does not experience much pain into the posterior aspect of the thigh, or extending into the left leg and plantar aspect of the foot. The left gluteal pain is more severe than the lower back pain  Pain intensity today: 8/10   Average pain intensity last week: 9/10  Pain intensity ranges from: 6/10 to 10/10  Aggravating factors: Pain increases with sitting longer than 2-5 minutes and laying down for more than a few minutes.  Alleviating factors: Pain decreases with standing and walking   Associated symptoms:   Patient denies numbness or weakness in the lower extremities  Patient denies any new bladder or bowel problems  Patient denies difficulties with his balance or recent falls       Pain History:  Referring physician: Dr. Fausto Renee   Consultation for intractable chronic left lower extremity pain.   Patient reports a 7-year history of pain, which began without incident. Patient is status post L4-L5 hemilaminectomy and foraminotomy at the [facility] with Dr. Mackay on 03/03/2014, followed by L4-S1 lumbar decompression and fusion due to unresolved pain by Dr. Renee on 07/24/2014. Patient reports that he experienced significant improvement after his last surgery with Dr. Renee, in particular, complete resolution of his previously severe lower back pain. Unfortunately, his left lower extremity pain, chronic radicular pain preceded his last lumbar fusion, and did not improve after surgery. He underwent surgical consultation with Dr. Cox at the [facility], who recommended decompression of the nerve roots at L3-S1. He underwent follow-up neurosurgical consultation with Dr. Fausto Renee on 11/03/2017. Dr. Renee discussed the possibility of lumbar decompression at L3-L4: Left L3-L4 laminotomy and  foraminotomy. Due to potential risk of developing instability that would necessitate extending his lumbar fusion to the L3 level. Patient underwent on April 3, 2018, left L3-L4 transforaminal epidural steroid injection without any pain relief. Patient elected to hold off on surgery. Patient underwent EMG/NCV, which confirmed chronic left S1 radiculopathy.  A CT myelogram was significant for stenosis above the level of his lumbar fusion, at L3-L4 without clinical correlation.  Patient presents with pain with a left S1 dermatomal distribution pattern.He  has a history of implantation of a spinal cord stimulator device with Dr. Fausto Renee on 11/21/2019 with [employer] Penta paddle lead with the top electrodes projecting at the level of the superior endplate of the T8 vertebral level. IPG: [employer] Proclaim 5 XR IPG Non-Rechargeable (Full Body MRI compatible). The device was implanted primarily for treatment of chronic left hip and left lower extremity pain, unfortunately he continued to report no significant pain relief with the use of his stimulator device despite numerous attempts at reprogramming, therefore on 3/1/2021, he underwent explantation of the entire system with Dr. Fausto Renee.  Pain Description:   Constant pain with intermittent exacerbation, described as burning, electricity, pins and needles, and tingling sensation.   Radiation of pain: The pain radiates into the posterior aspect of the thigh, left leg and plantar aspect of the foot. The leg pain is more severe than the lower back pain.   Pain pattern: dermatomic, left L5 and S1   Pain intensity today: 3/10 (standing) 9-10/10 (sitting or lying down)  Average pain intensity last week: 7/10  Pain intensity ranges from: 4/10 to 10/10  Aggravating factors: Pain increases with sitting longer than 5 minutes and laying down for more than 5 minutes.  Alleviating factors: Pain decreases with standing.   Associated symptoms:   Patient denies numbness or  weakness in the lower extremities.   Patient denies any new bladder or bowel problems.   Patient denies difficulties with his balance or recent falls.       Review of previous therapies and additional medical records:  Prashant Avila has already failed the following measures, including:   Conservative Measures: Oral analgesics, ice and heat, physical therapy  Interventional measures:  09/08/2021: platelet rich leukocyte poor plasma therapy and A2 M therapy, via lumbar epidural administration with caudal epidural catheter placement approach  02/06/2019: Left S1 transforaminal epidural steroid injection and experienced pain relief that was short-lived   02/20/2017: Diagnostic and therapeutic left sciatic nerve block with local anesthetics, steroids, hyaluronidase under ultrasound, PNS and fluoroscopic guidance- Patient did not receive sustained relief from this procedure.   02/06/2017: Diagnostic left S1 selective nerve root injection  01/30/2017: Nevro SCS Trial; no relief  04/25/2016: Trial of lumbar epidural steroid injections with steroids and hyaluronidase via caudal catheter with 100% pain relief lasting 48 hours  12/16/2015: Diagnostic and therapeutic left S1 transforaminal epidural steroid injection with hyaluronidase with complete resolution of pain for several weeks  11/30/2015: Spinal cord stimulator trial Saint Jude, which provided him with more than 50% pain relief and functional improvement. However, patient decided to delay implantation of a permanent spinal cord stimulator device until the arrival of new technology. Patient did not like the perception of tonic stimulation, even though stimulation overlapped successfully the areas of his chronic pain, he did not find it pleasant.   02/09/2015: Diagnostic left piriformis muscle injection  Surgical Measures:   11/21/2019: SCS IMPLANT: [employer] Penta paddle lead with the top electrodes projecting at the level of the superior endplate of the T8  "vertebral level. IPG: [employer] Proclaim 5 XR IPG Non-Rechargeable by Dr. Fausto Renee   03/01/2021: SCS EXPLANT by Dr. Fausto Renee.   03/03/2014: Left L4-L5 hemilaminectomy and foraminotomy at [facility] with Dr. Mackay   07/24/2014: L4-S1 decompression and fusion by Dr. Renee  Prashant Avila underwent follow-up neurosurgical consultation with Dr. Fausto Renee on 11/30/2017, and was found to be a potential surgical candidate.  [facility] psychological evaluation with Dr. Praveen Navarro on 8/30/2021, per note: \"It is recommended the patient be seen for psychological treatment of his pain disorder including biofeedback.  From a psychological perspective, patient is an appropriate candidate for interventional pain procedures, such as a pain pump with opioids or Prialt.\"  Prashant Avila presents with significant comorbidities including hypertension, rheumatoid arthritis, osteoporosis, hyperlipidemia, chronic kidney disease, hearing loss, carcinoma of prostate, engaged in treatment.  In terms of current analgesics, Prashant Avila takes: Tylenol, NSAIDs and gabapentin.   I have reviewed Jon Report is consistent to medication reconciliation.  SOAPP: Low risk      Global Pain Scale 04-19  2018 10-24  2018 11-08  2018 01-29  2019 03-06  2019 10-03  2019 10-17  2019 01-14  2020 01-21  2020 02-20  2020 04-14  2020 05-07  2020 09-24  2020 07-20  2021 10-19  2021 11-23  2021 01-13  2022 03-15  2022       Pain 19 17 17 18 17 20  10 10 10   10  10 8 20 22 19 17 22 21     Feelings 1 0 0 0 0   8    4   2  0  0    0 0 6 12 4 1 0 0      Clinical outcomes 11 8 13 11 6 11    7   6  7  9    9 9 11 12 9 11 10 10      Activities 17 10 16 18 6 15    7   6  4  15  15 12 7 10 8 21 7 15      GPS Total: 48 35 46 47 29 54  28  24  21  34  34 28 44 56 40 50 39 46        Review of Previous Diagnostic Studies:   There are no new diagnostic studies for review at this time.  MRI THORACIC SPINE WO CONTRAST-10/31/2019: Minimal " degenerative changes at multiple levels throughout the thoracic spine with no central spinal canal stenosis. Epidural space is unremarkable.  EMG/NCV of the bilateral lower extremities December 18, 2018: Chronic left S1 radiculopathy  CT LUMBAR SPINE WITH CONTRAST-01/21/2019: There is excellent opacification of the thecal sac. Imaging covers from T12 through the S1 level caudally. The conus terminates at the L1/L2 level.  The distal cord and conus are normal in caliber. The nerve roots of the cauda equina are normal in caliber and distribution. L4-S1 fusion hardware is in place; the left S1 pedicle screw is fractured and there is lucency around the right S1 pedicle screw. Left L4 laminotomy. Vertebral body heights and alignment are normal. Vertebral body heights are normal. Degenerative changes are as follows:  T12-L1: Facet arthropathy without significant foraminal or spinal canal stenosis.  L1-L2: Facet arthropathy without significant foraminal or spinal canal stenosis.  L2-L3: Right greater than left facet arthropathy. Mild narrowing of the right subarticular zone. No significant central spinal canal or foraminal stenosis.  L3-L4: Disc bulge and facet arthropathy with ligamentum flavum thickening. There is severe spinal canal stenosis and at least moderate bilateral foraminal stenosis.   L4-L5: Improved right subarticular zone narrowing. No significant osseous spinal canal or foraminal stenosis.  L5-S1: Bilateral facet arthropathy. Shallow disc osteophyte. Unchanged mild osseous foraminal stenosis. No significant osseous spinal canal stenosis.  EKG April 19, 2018 revealed sinus bradycardia with first-degree AV block.  Left bundle branch block.    X-Ray lumbar spine 08/04/2017: Posterior lumbar fusion of L4, L5 and S1. Degenerative changes at L1-L2 with anterior osteophyte formation. Normal lumbar alignment in the lateral projection with a stable alignment in the flexed and extended positions.      Duplex Venous  Lower, 03/25/2017: Normal right lower extremity venous duplex scan. No evidence of DVT.  MRI Lumbar Spine, 10/28/2016: Since the previous MRI study, there has been L4-5-S1 posterior lumbar and interbody fusion. No new abnormality of alignment is seen. There is, however, significant worsening of posterior element hypertrophy at L3-4, above the fusion level, due to persistent L3-4 disc bulge, and what appears to be new spinal stenosis at this level, generally as a result of posterior element hypertrophic change. Canal appears stable elsewhere. No lumbar compression deformity or new abnormality of alignment is seen. No vertebral marrow edema is appreciated. Sagittal images suggest a small (18 mm) seroma or synovial cyst on the right at L5-S1. Tip of conus medullaris is again noted at L1.  Axial images   L1-L2: No significant canal or foraminal stenosis   L2-L3: Canal appears relatively narrowed, due to posterior element hypertrophy but still greater than 1 cm in diameter. Foramina appear  lower limits of normal diameter.   L3-L4: Moderate canal stenosis with lateral narrowing, AP diameter 6.5 mm, transverse diameter approximately 9 mm with crowding of the nerve roots. Foramina appear mildly to moderately narrowed bilaterally due to facet DJD.   L4-L5: Fused level, canal appears normal. Mild foraminal stenosis due to facet DJD  L5-S1: Canal appears normal. Left-sided neural foramen appears normal. Right-sided neural foramen appears mildly narrowed due to facet DJD. Postcontrast images show expected postoperative enhancement of the dorsal soft tissues and no pathologic postcontrast enhancement elsewhere.     The following portions of the patient's history were reviewed and updated as appropriate: problem list, past medical history, past surgery history, social history, family history, medications, and allergies    Review of Systems   Musculoskeletal: Positive for back pain.   All other systems reviewed and are  "negative.    /72   Pulse 57   Temp 97.1 °F (36.2 °C)   Ht 175.3 cm (69\")   Wt 85.5 kg (188 lb 6.4 oz)   SpO2 98%   BMI 27.82 kg/m²      Physical Exam:  Constitutional: Patient appears well-developed, well-nourished, and younger than his chronological age  HEENT: Head normocephalic and atraumatic.   Neck: Supple, no LAD, no JVD  Musculoskeletal:  Gait and station: Gait evaluation demonstrated a normal gait.   Lumbar spine: The range of motion of the lumbar spine is appropriate for his age and condition.  Lumbar facet joint loading maneuvers are negative  Hip joints: The range of motion of the hip joints is slightly reduced, symmetrical, and without pain  Motor strength: 5/5  Motor tone: Normal  Involuntary movements: None  Neurological:  Cortical function: Normal mental status  Deep tendon reflexes:  Right patellar: 0+  Left patellar: 0+  Left Achilles: 0+  Right Achilles: 0+  Long tract signs: Negative.  Straight leg raising test: Negative on the right, positive on the left eliciting his gluteal pain.  Femoral stretch sign: Negative on the right, questionably positive on the left  Sensory exam: Intact to light touch, pain, temperature, vibration, and proprioception  Coordination: Rhomberg's sign negative.  Normal balance  Psychiatric: Judgment and insight: Normal.  Recent and remote memory: Intact.  Mood and affect: Normal    ASSESSMENT:   1. Chronic lumbar radiculopathy    2. Adhesive arachnoiditis    3. Postlaminectomy syndrome of lumbar region    4. Chronic intractable pain    5. Status post L4-S1 lumbar spinal fusion    6. Bilateral stenosis of lateral recess of lumbar spine    7. Physical deconditioning         PLAN/MEDICAL DECISION MAKING: Mr. Prashant Avila presents with a very complex and challenging chronic pain condition. Patient has failed to obtain pain relief with conservative measures including numerous analgesic trials, interventional pain management measures including epidurals, " spinal cord stimulator trial, and previous surgical interventions including 2 previous lumbar surgeries and implantation of a spinal cord stimulator device. He has a history of implantation of a spinal cord stimulator device with Dr. Fausto Renee on 11/21/2019 with [employer] Harsha Penta paddle lead with the top electrodes projecting at the level of the superior endplate of the T8 vertebral level. IPG: [employer] Proclaim 5 XR IPG Non-Rechargeable (Full Body MRI compatible). The device was implanted primarily for treatment of chronic left hip and left lower extremity pain, unfortunately he continued to report no significant pain relief with the use of his stimulator device despite numerous attempts at reprogramming, therefore on 3/1/2021, he underwent explantation of the entire system with Dr. Fausto Renee.  In regards to his previous surgeries, he underwent on March 30, 2014 left L4-L5 hemilaminectomy and foraminotomy at the [facility] by Dr. Jef Mackay, and on July 24, 2014 L4 S1 decompression and fusion by Dr. Renee. Patient underwent several neurosurgical consultations after his lumbar surgeries. Initially, in 2017 he discussed with Dr. Renee the possibility of additional decompression and extension of his lumbar fusion to L3. Patient declined the possibility of surgical intervention.  He has underwent numerous analgesic trials initiated by Dr. Batista, from which he discontinues soon thereafter due to ineffectiveness or a short side effect.  His last MRI of the lumbar spine revealed lateral recess stenosis at L3-L4 associated with crowding of the nerve roots given the clear appearance of lumbar arachnoiditis. Due to the complexity of the treatment of his chronic pain, most of his symptoms appear to be related to lumbar arachnoiditis.  Lumbar myelogram followed by CT postmyelogram confirm the diagnosis of lumbar stenosis at L3-L4. EMG/NCV of the bilateral lower extremities revealed  chronic left S1 radiculopathy which also provided clinical correlation. Patient has already failed to obtain pain relief with conservative measures, interventional pain management measures, and previous surgical measures.  Patient underwent updated psychological evaluation with Dr. Praveen Navarro and was found to be an appropriate candidate for spinal cord stimulation and intrathecal therapies.  Unfortunately, he did not experience any significant relief with PRP as referenced under HPI.  He trialed a decrease in his gabapentin, but his symptoms exacerbated therefore, he went back on prior dose.  He continues experiencing symptoms related to his chronic lumbar radiculopathy, his pain remains localized in his left gluteal region, more prominent with sitting and lying down.  He would like to continue on his current dose of naltrexone.  We once again discussed if we are unable to achieve pain relief  conservatively we may consider pursuing additional intrathecal trials with alternative medications such as a higher dose of fentanyl, clonidine or bupivacaine. Prashant Avila reports a pain score of 8.  Given his pain assessment as noted, treatment options were discussed and the following options were decided upon as a follow-up plan to address the patient's pain: continuation of current treatment plan for pain. Therefore, I have proposed the following plan:  1. Interventional pain management measures.  None indicated at this time.  Next steps in his treatment plan if he is unable to achieve benefit with medication and conservatism we may consider moving forward with repeating a trial with fentanyl at a higher dose or using a secondary drug along with fentanyl such as lidocaine or bupivacaine, or using a different medication such as bupivacaine or clonidine. Patient reported no significant benefit from Prialt. There is a possibility that he may fail intrathecal therapies even after a successful trial and we have  discussed this today. We have also discussed the incidence, severity, and treatment of common side effects with intrathecal therapies.  Although, unfortunately there are many medications and intrathecal therapies that will produce side effects, with his given history of medications and side effects, he could experience short-term effects.    2. Pharmacological measures: Patient has failed numerous analgesic trials. Unfortunately, patient is not a candidate for a trial with mexiletine due to his abnormal EKG.  This is the only drug for the treatment of neuropathic pain that we have been unable to prescribe.    A. Continue gabapentin gabapentin 800 mg 1 tablet every morning, 1 tablet at noon, and 2 tablets at bedtime. He has previously failed a trial with Lyrica and other anticonvulsants  B. Continue Rheumate one tablet twice daily  C. Continue alpha lipoid acid [id number] mg per day divided into 3 doses  D. Continue low dose naltrexone to 1 mg once a day.  We will assess analgesic response and titrate the dose accordingly, he would like to remain at this dose.  E. Trial with hydroxyzine 25 mg may take 1 to 2 tablets at night for sleep.  3. Follow-up with me in about 3 months to assess progress   4. The patient has been instructed to contact my office with any questions or difficulties.  The patient understands the plan and agrees to proceed accordingly.             Patient Care Team:  SHERON Chun MD as PCP - General (Family Medicine)  Efrain Batista MD as Consulting Physician (Pain Medicine)  Fausto Renee MD as Consulting Physician (Neurosurgery)  Jean Warren MD as Consulting Physician (Neurosurgery)  Hank Frankel MD as Consulting Physician (Cardiology)  Marcelino Condon MD as Consulting Physician (Urology)  Brian Tatum III, MD as Consulting Physician (Cardiology)  SHERON Chun MD as Consulting Physician (Family Medicine)  Francisco Javier Clay MD as Consulting Physician  (Neurosurgery)  Royer Roldan MD as Consulting Physician (General Practice)  Emigdio Watt MD as Consulting Physician (Gastroenterology)  Shola Vila MD as Consulting Physician (Otolaryngology)  Hank Frankel MD as Consulting Physician (Cardiology)     No orders of the defined types were placed in this encounter.        Future Appointments   Date Time Provider Department Center   6/7/2022 10:00 AM Inez Regan APRN [facility]         [facility]

## 2022-03-15 ENCOUNTER — OFFICE VISIT (OUTPATIENT)
Dept: PAIN MEDICINE | Facility: CLINIC | Age: 77
End: 2022-03-15

## 2022-03-15 VITALS
BODY MASS INDEX: 27.91 KG/M2 | DIASTOLIC BLOOD PRESSURE: 72 MMHG | OXYGEN SATURATION: 98 % | HEART RATE: 57 BPM | WEIGHT: 188.4 LBS | TEMPERATURE: 97.1 F | SYSTOLIC BLOOD PRESSURE: 130 MMHG | HEIGHT: 69 IN

## 2022-03-15 DIAGNOSIS — M96.1 POSTLAMINECTOMY SYNDROME OF LUMBAR REGION: ICD-10-CM

## 2022-03-15 DIAGNOSIS — G03.9 ADHESIVE ARACHNOIDITIS: ICD-10-CM

## 2022-03-15 DIAGNOSIS — M54.16 CHRONIC LUMBAR RADICULOPATHY: ICD-10-CM

## 2022-03-15 DIAGNOSIS — Z98.1 STATUS POST LUMBAR SPINAL FUSION: ICD-10-CM

## 2022-03-15 DIAGNOSIS — G89.29 CHRONIC INTRACTABLE PAIN: ICD-10-CM

## 2022-03-15 DIAGNOSIS — M48.061 BILATERAL STENOSIS OF LATERAL RECESS OF LUMBAR SPINE: ICD-10-CM

## 2022-03-15 DIAGNOSIS — R53.81 PHYSICAL DECONDITIONING: ICD-10-CM

## 2022-03-15 PROCEDURE — 99213 OFFICE O/P EST LOW 20 MIN: CPT | Performed by: NURSE PRACTITIONER

## 2022-03-15 RX ORDER — HYDROXYZINE PAMOATE 25 MG/1
25 CAPSULE ORAL NIGHTLY PRN
Qty: 60 CAPSULE | Refills: 1 | Status: SHIPPED | OUTPATIENT
Start: 2022-03-15 | End: 2023-03-07

## 2022-03-17 DIAGNOSIS — G03.9 ADHESIVE ARACHNOIDITIS: ICD-10-CM

## 2022-03-17 RX ORDER — GABAPENTIN 800 MG/1
TABLET ORAL
Qty: 120 TABLET | Refills: 3 | Status: SHIPPED | OUTPATIENT
Start: 2022-03-17 | End: 2022-07-19

## 2022-06-06 NOTE — PROGRESS NOTES
"Chief Complaint: \"Nerve pain in my left gluteal.\"       Brief History:  Mr. Prashant Avila is a 77 y.o. male, has a history of chronic intractable left lower extremity pain related to lumbar radiculopathy, neuropathic pain and postlaminectomy syndrome of the lumbar region. His pain is localized to his left hip and left gluteal region.  Patient has underwent multiple interventional procedures such as transforaminal epidurals, piriformis injection, 2 trials of spinal cord stimulators, spinal cord stimulator implant, previous pain pump trials with fentanyl and Prialt, and most recently PRP injections to name a few with minimal reported relief.  His last procedure on 9/8/2021 was platelet rich leukocyte poor plasma therapy and A2M therapy, via lumbar epidural administration with caudal epidural catheter placement approach, from which he unfortunately did not experience any reduction in his symptoms.  He continues with interest in pursuing an intrathecal trial with a higher dose of fentanyl, or other medications such as bupivacaine or clonidine, as we have discussed this on multiple occasions.  He presents with significant neuropathic pain, and has been on gabapentin for many years.  He has additionally failed multiple analgesic trials with neuropathic meds due to side effects.  At his last office visit with me he felt the gabapentin was becoming ineffective, and attempted to try to titrate off medication, unfortunately his symptoms exacerbated, and he went back on medication to prior doses.  He remains on naltrexone 1 mg/day, from which she still does not report much difference in his pain levels.  He continues to suffer from more neuropathic related symptoms from his chronic lumbar radiculopathy. His symptoms remain localized to his left gluteal region, he is not reporting much extension beyond that area, he reports sitting exacerbates his pain, he does not experience much with standing or walking.  He denies back " pain.  He returns today for follow-up evaluation.  Pain Description: Constant pain with intermittent exacerbation, described as burning, electricity, pins and needles, and tingling sensation.   Radiation of pain: The pain appears to be localized to the left gluteal region, he does not experience much pain into the posterior aspect of the thigh, or extending into the left leg and plantar aspect of the foot. The left gluteal pain is more severe than the lower back pain  Pain intensity today: 7/10   Average pain intensity last week: 7/10  Pain intensity ranges from: 5/10 to 10/10  Aggravating factors: Pain increases with sitting longer than 2-5 minutes and laying down for more than a few minutes.  Alleviating factors: Pain decreases with standing and walking   Associated symptoms:   Patient denies numbness or weakness in the lower extremities  Patient denies any new bladder or bowel problems  Patient denies difficulties with his balance or recent falls       Pain History:  Referring physician: Dr. Fausto Renee   Consultation for intractable chronic left lower extremity pain.   Patient reports a 7-year history of pain, which began without incident. Patient is status post L4-L5 hemilaminectomy and foraminotomy at the [facility] with Dr. Mackay on 03/03/2014, followed by L4-S1 lumbar decompression and fusion due to unresolved pain by Dr. Renee on 07/24/2014. Patient reports that he experienced significant improvement after his last surgery with Dr. Renee, in particular, complete resolution of his previously severe lower back pain. Unfortunately, his left lower extremity pain, chronic radicular pain preceded his last lumbar fusion, and did not improve after surgery. He underwent surgical consultation with Dr. Cox at the [facility], who recommended decompression of the nerve roots at L3-S1. He underwent follow-up neurosurgical consultation with Dr. Fausto Renee on 11/03/2017. Dr. Renee discussed the possibility of  lumbar decompression at L3-L4: Left L3-L4 laminotomy and foraminotomy. Due to potential risk of developing instability that would necessitate extending his lumbar fusion to the L3 level. Patient underwent on April 3, 2018, left L3-L4 transforaminal epidural steroid injection without any pain relief. Patient elected to hold off on surgery. Patient underwent EMG/NCV, which confirmed chronic left S1 radiculopathy.  A CT myelogram was significant for stenosis above the level of his lumbar fusion, at L3-L4 without clinical correlation.  Patient presents with pain with a left S1 dermatomal distribution pattern.He  has a history of implantation of a spinal cord stimulator device with Dr. Fausto Renee on 11/21/2019 with [employer] Penta paddle lead with the top electrodes projecting at the level of the superior endplate of the T8 vertebral level. IPG: [employer] Proclaim 5 XR IPG Non-Rechargeable (Full Body MRI compatible). The device was implanted primarily for treatment of chronic left hip and left lower extremity pain, unfortunately he continued to report no significant pain relief with the use of his stimulator device despite numerous attempts at reprogramming, therefore on 3/1/2021, he underwent explantation of the entire system with Dr. Fausto Renee.  Pain Description:   Constant pain with intermittent exacerbation, described as burning, electricity, pins and needles, and tingling sensation.   Radiation of pain: The pain radiates into the posterior aspect of the thigh, left leg and plantar aspect of the foot. The leg pain is more severe than the lower back pain.   Pain pattern: dermatomic, left L5 and S1   Pain intensity today: 3/10 (standing) 9-10/10 (sitting or lying down)  Average pain intensity last week: 7/10  Pain intensity ranges from: 4/10 to 10/10  Aggravating factors: Pain increases with sitting longer than 5 minutes and laying down for more than 5 minutes.  Alleviating factors: Pain decreases with  standing.   Associated symptoms:   Patient denies numbness or weakness in the lower extremities.   Patient denies any new bladder or bowel problems.   Patient denies difficulties with his balance or recent falls.       Review of previous therapies and additional medical records:  Prashant Avila has already failed the following measures, including:   Conservative Measures: Oral analgesics, ice and heat, physical therapy  Interventional measures:  09/08/2021: platelet rich leukocyte poor plasma therapy and A2 M therapy, via lumbar epidural administration with caudal epidural catheter placement approach  02/06/2019: Left S1 transforaminal epidural steroid injection and experienced pain relief that was short-lived   02/20/2017: Diagnostic and therapeutic left sciatic nerve block with local anesthetics, steroids, hyaluronidase under ultrasound, PNS and fluoroscopic guidance- Patient did not receive sustained relief from this procedure.   02/06/2017: Diagnostic left S1 selective nerve root injection  01/30/2017: Nevro SCS Trial; no relief  04/25/2016: Trial of lumbar epidural steroid injections with steroids and hyaluronidase via caudal catheter with 100% pain relief lasting 48 hours  12/16/2015: Diagnostic and therapeutic left S1 transforaminal epidural steroid injection with hyaluronidase with complete resolution of pain for several weeks  11/30/2015: Spinal cord stimulator trial [employer], which provided him with more than 50% pain relief and functional improvement. However, patient decided to delay implantation of a permanent spinal cord stimulator device until the arrival of new technology. Patient did not like the perception of tonic stimulation, even though stimulation overlapped successfully the areas of his chronic pain, he did not find it pleasant.   02/09/2015: Diagnostic left piriformis muscle injection  Surgical Measures:   11/21/2019: SCS IMPLANT: [employer] Penta paddle lead with the top electrodes  "projecting at the level of the superior endplate of the T8 vertebral level. IPG: [employer] Proclaim 5 XR IPG Non-Rechargeable by Dr. Fausto Renee   03/01/2021: SCS EXPLANT by Dr. Fausto Renee.   03/03/2014: Left L4-L5 hemilaminectomy and foraminotomy at [facility] with Dr. Mackay   07/24/2014: L4-S1 decompression and fusion by Dr. Renee  Prashant Avila underwent follow-up neurosurgical consultation with Dr. Fausto Renee on 11/30/2017, and was found to be a potential surgical candidate.  [facility] psychological evaluation with Dr. Praveen Navarro on 8/30/2021, per note: \"It is recommended the patient be seen for psychological treatment of his pain disorder including biofeedback.  From a psychological perspective, patient is an appropriate candidate for interventional pain procedures, such as a pain pump with opioids or Prialt.\"  Prashant Avila presents with significant comorbidities including hypertension, rheumatoid arthritis, osteoporosis, hyperlipidemia, chronic kidney disease, hearing loss, carcinoma of prostate, engaged in treatment.  In terms of current analgesics, Prashant Avila takes: Tylenol, NSAIDs and gabapentin.   I have reviewed Jon Report is consistent to medication reconciliation.  SOAPP: Low risk      Global Pain Scale 04-19  2018 10-24  2018 11-08  2018 01-29  2019 03-06  2019 10-03  2019 10-17  2019 01-14  2020 01-21  2020 02-20  2020 04-14  2020 05-07  2020 09-24  2020 07-20  2021 10-19  2021 11-23  2021 01-13  2022 03-15  2022 06-07  2022      Pain 19 17 17 18 17 20  10 10 10   10  10 8 20 22 19 17 22 21 19    Feelings 1 0 0 0 0   8    4   2  0  0    0 0 6 12 4 1 0 0 0     Clinical outcomes 11 8 13 11 6 11    7   6  7  9    9 9 11 12 9 11 10 10 10     Activities 17 10 16 18 6 15    7   6  4  15  15 12 7 10 8 21 7 15 12     GPS Total: 48 35 46 47 29 54  28  24  21  34  34 28 44 56 40 50 39 46 41       Review of Previous Diagnostic Studies:   There are no new diagnostic studies for " review at this time.  MRI THORACIC SPINE WO CONTRAST-10/31/2019: Minimal degenerative changes at multiple levels throughout the thoracic spine with no central spinal canal stenosis. Epidural space is unremarkable.  EMG/NCV of the bilateral lower extremities December 18, 2018: Chronic left S1 radiculopathy  CT LUMBAR SPINE WITH CONTRAST-01/21/2019: There is excellent opacification of the thecal sac. Imaging covers from T12 through the S1 level caudally. The conus terminates at the L1/L2 level.  The distal cord and conus are normal in caliber. The nerve roots of the cauda equina are normal in caliber and distribution. L4-S1 fusion hardware is in place; the left S1 pedicle screw is fractured and there is lucency around the right S1 pedicle screw. Left L4 laminotomy. Vertebral body heights and alignment are normal. Vertebral body heights are normal. Degenerative changes are as follows:  T12-L1: Facet arthropathy without significant foraminal or spinal canal stenosis.  L1-L2: Facet arthropathy without significant foraminal or spinal canal stenosis.  L2-L3: Right greater than left facet arthropathy. Mild narrowing of the right subarticular zone. No significant central spinal canal or foraminal stenosis.  L3-L4: Disc bulge and facet arthropathy with ligamentum flavum thickening. There is severe spinal canal stenosis and at least moderate bilateral foraminal stenosis.   L4-L5: Improved right subarticular zone narrowing. No significant osseous spinal canal or foraminal stenosis.  L5-S1: Bilateral facet arthropathy. Shallow disc osteophyte. Unchanged mild osseous foraminal stenosis. No significant osseous spinal canal stenosis.  EKG April 19, 2018 revealed sinus bradycardia with first-degree AV block.  Left bundle branch block.    X-Ray lumbar spine 08/04/2017: Posterior lumbar fusion of L4, L5 and S1. Degenerative changes at L1-L2 with anterior osteophyte formation. Normal lumbar alignment in the lateral projection with a  stable alignment in the flexed and extended positions.      Duplex Venous Lower, 03/25/2017: Normal right lower extremity venous duplex scan. No evidence of DVT.  MRI Lumbar Spine, 10/28/2016: Since the previous MRI study, there has been L4-5-S1 posterior lumbar and interbody fusion. No new abnormality of alignment is seen. There is, however, significant worsening of posterior element hypertrophy at L3-4, above the fusion level, due to persistent L3-4 disc bulge, and what appears to be new spinal stenosis at this level, generally as a result of posterior element hypertrophic change. Canal appears stable elsewhere. No lumbar compression deformity or new abnormality of alignment is seen. No vertebral marrow edema is appreciated. Sagittal images suggest a small (18 mm) seroma or synovial cyst on the right at L5-S1. Tip of conus medullaris is again noted at L1.  Axial images   L1-L2: No significant canal or foraminal stenosis   L2-L3: Canal appears relatively narrowed, due to posterior element hypertrophy but still greater than 1 cm in diameter. Foramina appear  lower limits of normal diameter.   L3-L4: Moderate canal stenosis with lateral narrowing, AP diameter 6.5 mm, transverse diameter approximately 9 mm with crowding of the nerve roots. Foramina appear mildly to moderately narrowed bilaterally due to facet DJD.   L4-L5: Fused level, canal appears normal. Mild foraminal stenosis due to facet DJD  L5-S1: Canal appears normal. Left-sided neural foramen appears normal. Right-sided neural foramen appears mildly narrowed due to facet DJD. Postcontrast images show expected postoperative enhancement of the dorsal soft tissues and no pathologic postcontrast enhancement elsewhere.     The following portions of the patient's history were reviewed and updated as appropriate: problem list, past medical history, past surgery history, social history, family history, medications, and allergies    Review of Systems  "  Musculoskeletal: Positive for back pain.   All other systems reviewed and are negative.    /60   Pulse 56   Resp 14   Ht 175.3 cm (69\")   Wt 85.8 kg (189 lb 3.2 oz)   SpO2 99%   BMI 27.94 kg/m²      Physical Exam:  Constitutional: Patient appears well-developed, well-nourished, and younger than his stated age  HEENT: Head normocephalic and atraumatic.   Neck: Supple, no LAD, no JVD  Musculoskeletal:  Gait and station: Gait evaluation demonstrated a normal gait.   Lumbar spine: The range of motion of the lumbar spine is appropriate for his age and condition.  Lumbar facet joint loading maneuvers are negative  Motor strength: 5/5  Motor tone: Normal  Involuntary movements: None  Neurological:  Cortical function: Normal mental status  Deep tendon reflexes:  Right patellar: 0+  Left patellar: 0+  Left Achilles: 0+  Right Achilles: 0+  Long tract signs: Negative.  Sensory exam: Intact to light touch, pain, temperature, vibration, and proprioception  Coordination: Rhomberg's sign negative.  Normal balance  Psychiatric: Judgment and insight: Normal.  Recent and remote memory: Intact.  Mood and affect: Normal    ASSESSMENT:   1. Chronic lumbar radiculopathy    2. Postlaminectomy syndrome of lumbar region    3. Adhesive arachnoiditis    4. Status post L4-S1 lumbar spinal fusion    5. Chronic intractable pain         PLAN/MEDICAL DECISION MAKING: Mr. Prashant Avila presents with a very complex and challenging chronic pain condition. Patient has failed to obtain pain relief with conservative measures including numerous analgesic trials, interventional pain management measures including epidurals, spinal cord stimulator trial, and previous surgical interventions including 2 previous lumbar surgeries and implantation of a spinal cord stimulator device. He has a history of implantation of a spinal cord stimulator device with Dr. Fausto Renee on 11/21/2019 with [employer] Penta paddle lead with the top " electrodes projecting at the level of the superior endplate of the T8 vertebral level. IPG: [employer] Proclaim 5 XR IPG Non-Rechargeable (Full Body MRI compatible). The device was implanted primarily for treatment of chronic left hip and left lower extremity pain, unfortunately he continued to report no significant pain relief with the use of his stimulator device despite numerous attempts at reprogramming, therefore on 3/1/2021, he underwent explantation of the entire system with Dr. Fausto Renee.  In regards to his previous surgeries, he underwent on March 30, 2014 left L4-L5 hemilaminectomy and foraminotomy at the [facility] by Dr. Jef Mackay, and on July 24, 2014 L4 S1 decompression and fusion by Dr. Renee. Patient underwent several neurosurgical consultations after his lumbar surgeries. Initially, in 2017 he discussed with Dr. Renee the possibility of additional decompression and extension of his lumbar fusion to L3. Patient declined the possibility of surgical intervention.  He has underwent numerous analgesic trials initiated by Dr. Batista, from which he discontinues soon thereafter due to ineffectiveness or a short side effect.  His last MRI of the lumbar spine revealed lateral recess stenosis at L3-L4 associated with crowding of the nerve roots given the clear appearance of lumbar arachnoiditis. Due to the complexity of the treatment of his chronic pain, most of his symptoms appear to be related to lumbar arachnoiditis.  Lumbar myelogram followed by CT postmyelogram confirm the diagnosis of lumbar stenosis at L3-L4. EMG/NCV of the bilateral lower extremities revealed chronic left S1 radiculopathy which also provides clinical correlation. Patient has already failed to obtain pain relief with conservative measures, interventional pain management measures, and previous surgical measures.  Patient underwent updated psychological evaluation with Dr. Praveen Navarro and was found  "to be an appropriate candidate for spinal cord stimulation and intrathecal therapies.  Unfortunately, he did not experience any significant relief with PRP as referenced under HPI.  He trialed a decrease in his gabapentin, but his symptoms exacerbated therefore, he went back on prior dose.  He continues experiencing symptoms related to his chronic lumbar radiculopathy, his pain remains localized in his left gluteal region, more prominent with sitting and lying down.  He denies back pain.  He would like to continue on his current dose of naltrexone, I have discussed increasing this medication, which he is not interested at this time.  His pain remains with a waxing and waning effect, he reports he has \"good days and bad days\" but remains quite active.  Seen him on multiple occasions to discuss further recommendations, and his pain condition has remained the same or stable.  He tells me he has recently been referred to neurologist Dr. Kodak Alonso who he will see in the upcoming weeks.  We once again discussed if we are unable to achieve pain relief conservatively we may consider pursuing additional intrathecal trials with alternative medications such as a higher dose of fentanyl, clonidine or bupivacaine.  He is apprehensive to proceed, but has continued to consider this option.  Prashant Avila reports a pain score of 7.  Given his pain assessment as noted, treatment options were discussed and the following options were decided upon as a follow-up plan to address the patient's pain: continuation of current treatment plan for pain. Therefore, I have proposed the following plan:  1. Interventional pain management measures.  None indicated at this time.  Next steps in his treatment plan if he is unable to achieve benefit with medication and conservatism we may consider moving forward with repeating a trial with fentanyl at a higher dose or using a secondary drug along with fentanyl such as lidocaine or bupivacaine, " or using a different medication such as bupivacaine or clonidine. Patient reported no significant benefit from Prialt.     2. Pharmacological measures: Patient has failed numerous analgesic trials. Unfortunately, patient is not a candidate for a trial with mexiletine due to his abnormal EKG.  This is the only drug for the treatment of neuropathic pain that we have been unable to prescribe.    A. Continue gabapentin gabapentin 800 mg 1 tablet every morning, 1 tablet at noon, and 2 tablets at bedtime. He has previously failed a trial with Lyrica and other anticonvulsants  B. Continue low dose naltrexone to 1 mg once a day.  We will assess analgesic response and titrate the dose accordingly, he would like to remain at this dose.  3. He has requested a follow-up with Dr. Batista in about 3 months to assess his progress after he has been evaluated by Dr. Alonso.   4. The patient has been instructed to contact my office with any questions or difficulties.  The patient understands the plan and agrees to proceed accordingly.             Patient Care Team:  SHERON Chun MD as PCP - General (Family Medicine)  Efrain Batista MD as Consulting Physician (Pain Medicine)  Fausto Renee MD as Consulting Physician (Neurosurgery)  Jean Warren MD as Consulting Physician (Neurosurgery)  Marcelino Condon MD as Consulting Physician (Urology)  Brian Tatum III, MD as Consulting Physician (Cardiology)  SHERON Chun MD as Consulting Physician (Family Medicine)  Francisco Javier Clay MD as Consulting Physician (Neurosurgery)  Royer Roldan MD as Consulting Physician (General Practice)  Emigdio Watt MD as Consulting Physician (Gastroenterology)  Shola Vila MD as Consulting Physician (Otolaryngology)  Hank Frankel MD as Consulting Physician (Cardiology)  Inez Regan APRN as Nurse Practitioner (Pain Medicine)     No orders of the defined types were placed in this encounter.        Future  Appointments   Date Time Provider Department Center   8/16/2022 11:30 AM Efrain Batista MD [facility]         Inez Regan, APRN

## 2022-06-07 ENCOUNTER — OFFICE VISIT (OUTPATIENT)
Dept: PAIN MEDICINE | Facility: CLINIC | Age: 77
End: 2022-06-07

## 2022-06-07 VITALS
RESPIRATION RATE: 14 BRPM | SYSTOLIC BLOOD PRESSURE: 120 MMHG | WEIGHT: 189.2 LBS | HEIGHT: 69 IN | DIASTOLIC BLOOD PRESSURE: 60 MMHG | BODY MASS INDEX: 28.02 KG/M2 | OXYGEN SATURATION: 99 % | HEART RATE: 56 BPM

## 2022-06-07 DIAGNOSIS — G89.29 CHRONIC INTRACTABLE PAIN: ICD-10-CM

## 2022-06-07 DIAGNOSIS — M96.1 POSTLAMINECTOMY SYNDROME OF LUMBAR REGION: ICD-10-CM

## 2022-06-07 DIAGNOSIS — G03.9 ADHESIVE ARACHNOIDITIS: ICD-10-CM

## 2022-06-07 DIAGNOSIS — M54.16 CHRONIC LUMBAR RADICULOPATHY: ICD-10-CM

## 2022-06-07 DIAGNOSIS — Z98.1 STATUS POST LUMBAR SPINAL FUSION: ICD-10-CM

## 2022-06-07 PROCEDURE — 99213 OFFICE O/P EST LOW 20 MIN: CPT | Performed by: NURSE PRACTITIONER

## 2022-06-07 RX ORDER — DICLOFENAC SODIUM 75 MG/1
75 TABLET, DELAYED RELEASE ORAL 2 TIMES DAILY PRN
COMMUNITY
Start: 2022-04-15 | End: 2023-03-07

## 2022-07-19 DIAGNOSIS — G03.9 ADHESIVE ARACHNOIDITIS: ICD-10-CM

## 2022-07-19 RX ORDER — GABAPENTIN 800 MG/1
TABLET ORAL
Qty: 120 TABLET | Refills: 3 | Status: SHIPPED | OUTPATIENT
Start: 2022-07-19 | End: 2022-11-21

## 2022-08-11 ENCOUNTER — TRANSCRIBE ORDERS (OUTPATIENT)
Dept: ADMINISTRATIVE | Facility: HOSPITAL | Age: 77
End: 2022-08-11

## 2022-08-11 DIAGNOSIS — N64.4 PAINFUL LUMPY LEFT BREAST: Primary | ICD-10-CM

## 2022-08-11 DIAGNOSIS — N63.20 PAINFUL LUMPY LEFT BREAST: Primary | ICD-10-CM

## 2022-08-29 PROBLEM — Z51.81 ENCOUNTER FOR THERAPEUTIC DRUG MONITORING: Status: ACTIVE | Noted: 2022-08-29

## 2022-08-29 NOTE — PROGRESS NOTES
"Chief Complaint: \"Pain in my left buttock.  Pain in my left leg.\"        History of Present Illness:  Mr. Prashant Avila, 77 y.o. male presents with a longstanding history of chronic intractable left lower extremity pain secondary to chronic lumbar radiculopathy/postlaminectomy syndrome of the lumbar region.  Patient complains of pain in his left gluteal region, left hip that radiates into the left lower extremity into the left foot.  The left gluteal pain is more severe than the lower back pain.  Patient has failed to obtain relief with conservative measures, several minimally invasive interventional pain management measures, and previous surgical interventions, and referenced on previous notes.  In terms of interventional pain management measures he underwent selective nerve root injections, transforaminal epidurals, piriformis injections, 2 trials of a spinal cord stimulators, a spinal cord stimulator implant followed by explant, pain pump trials with fentanyl and Prialt, and more recently, PRP injections, to name a few.  His last procedure was on 9/8/2021: Platelet rich leukocyte poor plasma therapy and A2M therapy delivered into the lumbar epidural space via caudal epidural catheter placement.  Patient reports minimal relief following the procedure.  He reports interest in pursuing an intrathecal trial with a higher dose of opioid or alternative medication such as bupivacaine or clonidine.  We have discussed these treatments in detail on previous encounters.  He continues struggling with neuropathic pain and has been on gabapentin for many years.  He has failed numerous analgesic trials for treatment of neuropathic pain mainly due to side effects.  He experiences pain mainly when he is in sitting position and pain decreases or resolves when standing or walking.  He denies significant lower back pain and reports that most of his pain is localized in the left gluteal region.  He recently saw Dr. Kodak Alonso " who concurred with current conservative measures.  He is on with the recommendation was changing Celebrex for diclofenac.  He wants to continue on the current dose of naltrexone and the current dose of gabapentin.  He denies new symptoms or any changes in his medical history.  Pain History:  Referring physician: Dr. Fausto Renee   Consultation for intractable chronic left lower extremity pain.   Patient is status post L4-L5 hemilaminectomy and foraminotomy at the [facility] with Dr. Mackay on 03/03/2014, followed by L4-S1 lumbar decompression and fusion due to unresolved pain by Dr. Renee on 07/24/2014. Patient reports that he experienced significant improvement after his last surgery with Dr. Renee, in particular, complete resolution of his previously severe lower back pain. Unfortunately, his left gluteal and left lower extremity pain, chronic radicular pain preceded his last lumbar fusion, did not improve after surgery. He underwent surgical consultation with Dr. Cox at the [facility], who recommended decompression of the nerve roots at L3-S1. He underwent follow-up neurosurgical consultation with Dr. Fausto Renee on 11/03/2017. Dr. Renee discussed the possibility of lumbar decompression at L3-L4: Left L3-L4 laminotomy and foraminotomy. Due to potential risk of developing instability that would necessitate extending his lumbar fusion to the L3 level. Patient underwent on April 3, 2018, left L3-L4 transforaminal epidural steroid injection without any pain relief. Patient elected to hold off on surgery. Patient underwent EMG/NCV, which confirmed chronic left S1 radiculopathy.  A CT myelogram was significant for stenosis above the level of his lumbar fusion, at L3-L4 without clinical correlation. 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 Fausto Renee on 11/21/2019 with [employer] Penta paddle lead with the top electrodes projecting at the level of the superior endplate of the T8 vertebral level. IPG: [employer] Proclaim 5 XR IPG Non-Rechargeable (Full Body MRI compatible). The device was implanted primarily for treatment of chronic left hip and left lower extremity pain, unfortunately he continued to report no significant pain relief with the use of his stimulator device despite numerous attempts at reprogramming, therefore on 3/1/2021, he underwent explantation of the entire system with Dr. Fausto Renee.    Pain Description: Constant pain with intermittent exacerbation, described as burning, Pins-and-needles, electricity, and tingling sensation.   Radiation of Pain: The pain is localized in the left gluteal region.  He does not experience significant pain in the posterior aspect of his thigh, then, pain extends into the left calf on the plantar aspect of his left foot.  He denies significant lower back pain.  Pain intensity today: 8/10  Average pain intensity last week: 8/10  Pain intensity ranges from: 4/10 to 10/10  Aggravating factors: Pain increases with sitting for more than 5 minutes, lying down for more than a few minutes (takes gabapentin before going to bed), walking   Alleviating factors: Pain decreases with standing  Associated Symptoms:   Patient denies numbness or weakness in the lower extremities.   Patient denies any new bladder or bowel problems.   Patient denies difficulties with his balance or recent falls.   Pain interferes with general activities and affects patient's quality of life  Pain interferes with sleep causing sleep fragmentation     Review of previous therapies and additional medical records:  Prashant Avila has already failed the following measures, including:   Conservative Measures: Oral analgesics, opioids, topical analgesics, ice, heat, physical therapy   Interventional Measures:   09/08/2021: Platelet  rich leukocyte poor plasma therapy and A2 M therapy, via lumbar epidural administration with caudal epidural catheter placement approach  02/06/2019: Left S1 transforaminal epidural steroid injection/pain relief that was short-lived   02/20/2017: Diagnostic and therapeutic left sciatic nerve block with local anesthetics, steroids, hyaluronidase under ultrasound, PNS and fluoroscopic guidance- Patient did not receive sustained relief from this procedure.   02/06/2017: Diagnostic left S1 selective nerve root injection, relief  01/30/2017: Nevro SCS Trial; no relief  04/25/2016: Trial of lumbar epidural steroid injections with steroids and hyaluronidase via caudal catheter with 100% pain relief lasting 48 hours  12/16/2015: Diagnostic and therapeutic left S1 transforaminal epidural steroid injection with hyaluronidase with complete resolution of pain for several weeks  11/30/2015: Spinal cord stimulator trial [employer], which provided him with more than 50% pain relief and functional improvement. However, patient decided to delay implantation of a permanent spinal cord stimulator device until the arrival of new technology. Patient did not like the perception of tonic stimulation, even though stimulation overlapped successfully the areas of his chronic pain, he did not find it pleasant.   02/09/2015: Diagnostic left piriformis muscle injection, no relief  Surgical Measures:   11/21/2019: SCS IMPLANT: [employer] Penta paddle lead with the top electrodes projecting at the level of the superior endplate of the T8 vertebral level. IPG: [employer] Proclaim 5 XR IPG Non-Rechargeable by Dr. Fausto Renee   03/01/2021: SCS EXPLANT by Dr. Fausto eRnee.   03/03/2014: Left L4-L5 hemilaminectomy and foraminotomy at [facility] with Dr. Mackay   07/24/2014: L4-S1 decompression and fusion by Dr. Víctor Cerda Adan Avila underwent neurosurgical consultation with Dr. Fausto Renee on 11/30/2017, and was found not to be a surgical  "candidate.  [facility] psychological evaluation with Dr. Praveen Navarro on 8/30/2021, per note: \"It is recommended the patient be seen for psychological treatment of his pain disorder including biofeedback.  From a psychological perspective, patient is an appropriate candidate for interventional pain procedures, such as a pain pump with opioids or Prialt.\"  Prashant Avila presents with significant comorbidities including carcinoma of prostate, eczema, HLP, HTN, Left bundle branch block, osteoporosis, rheumatoid arthritis   In terms of current analgesics, Prashant Avila takes: Diclofenac, gabapentin 800 mg QAM, QPM, TT QHS, Naltrexone 1 mg Daily. Patient also takes hydroxyzine  I have reviewed Jon Report consistent with medication reconciliation.  SOAPP: Low Risk     PHQ-2 Depression Screening  Little interest or pleasure in doing things?  0   Feeling down, depressed, or hopeless?  0   PHQ-2 Total Score  0       Global Pain Scale 04-19  2018 10-24  2018 11-08  2018 01-29  2019 03-06  2019 10-03  2019 10-17  2019 01-14  2020 01-21  2020 02-20  2020 04-14  2020 05-07  2020 09-24  2020 07-20  2021 10-19  2021 11-23  2021 01-13  2022 03-15  2022 06-07  2022 08-30  2022     Pain 19 17 17 18 17 20  10 10 10   10  10 8 20 22 19 17 22 21 19 21   Feelings 1 0 0 0 0   8    4   2  0  0    0 0 6 12 4 1 0 0 0  9   Clinical outcomes 11 8 13 11 6 11    7   6  7  9    9 9 11 12 9 11 10 10 10 15   Activities 17 10 16 18 6 15    7   6  4  15  15 12 7 10 8 21 7 15 12 15   GPS Total: 48 35 46 47 29 54  28  24  21  34  34 28 44 56 40 50 39 46 41  60     The [place] Back Pain Disability Scale  DATE 08-30 2022          Sleep through the night 3          Turn over in bed 0          Get out of bed 0          Make your bed 0          Put on socks (pantyhose) 0          Ride in a car 5          Sit in a chair for several hours 5          Stand up for 20-30 minutes 0          Climb one flight of stairs 2          Walk a few " blocks (200-300 yards)  3          Walk several miles 5          Run one block (about 50 yards) 5          Take food out of the refrigerator 0          Reach up to high shelves 0          Move a chair 0          Pull or push heavy doors 0          Bend over to clean the bathtub 0          Throw a ball 0          Carry two bags of groceries 2          Lift and carry a heavy suitcase 3          Total score 33            LANSS pain scale  LANSS item Answer choices (points)   1. Does the pain produce unpleasant sensations such as tingling, pricking or pins and needles? 5   2. Is there a different skin aspect in the painful areas, i.e. skin redder than usual or appearing mottled? 0   3. Does stroking the skin in the painful area or wearing tight clothing items produce unpleasant sensations? 0   4. Do you experience any sensations like electric shocks, bursting or jumping corresponding to painful episodes, i.e. unexplained bursts of pain? 2   5. Do you experience burning sensations in the painful areas or a sudden temperature change? 1   6. Result to stroking the non painful area and the described painful area with cotton wool: 0   7. Result to touching (pinprick) both areas with a 23 gauge needle: 3   Total Score: 13 (likely neuropathic pain)    Review of Previous Diagnostic Studies:  I have reviewed the images of his previous diagnostic studies with the patient and used the images and a tridimensional spine model to explain findings. I have reviewed the reports, as well.  MRI THORACIC SPINE WO CONTRAST-10/31/2019: Minimal degenerative changes at multiple levels throughout the thoracic spine with no central spinal canal stenosis.   EMG/NCV of the bilateral lower extremities 12/28/2018: Chronic left S1 radiculopathy  CT LUMBAR SPINE WITH CONTRAST-01/21/2019: Excellent opacification of the thecal sac. Imaging covers from T12 through the S1 level caudally. The conus terminates at the L1/L2 level.  The distal cord and conus  are normal in caliber. The nerve roots of the cauda equina are normal in caliber and distribution. L4-S1 fusion hardware is in place; the left S1 pedicle screw is fractured and there is lucency around the right S1 pedicle screw. Left L4 laminotomy. Vertebral body heights and alignment are normal. Vertebral body heights are normal. Degenerative changes are as follows:  T12-L1: Facet arthropathy without significant foraminal or spinal canal stenosis.  L1-L2: Facet arthropathy without significant foraminal or spinal canal stenosis.  L2-L3: Right greater than left facet arthropathy. Mild narrowing of the right subarticular zone. No significant central spinal canal or foraminal stenosis.  L3-L4: Disc bulge and facet arthropathy with ligamentum flavum thickening. There is severe spinal canal stenosis and at least moderate bilateral foraminal stenosis.   L4-L5: Improved right subarticular zone narrowing. No significant osseous spinal canal or foraminal stenosis.  L5-S1: Bilateral facet arthropathy. Shallow disc osteophyte. Unchanged mild osseous foraminal stenosis. No significant osseous spinal canal stenosis.  EKG April 19, 2018 revealed sinus bradycardia with first-degree AV block.  Left bundle branch block.    X-Ray lumbar spine 08/04/2017: Posterior lumbar fusion of L4, L5 and S1. Degenerative changes at L1-L2 with anterior osteophyte formation. Normal lumbar alignment in the lateral projection with a stable alignment in the flexed and extended positions.      Duplex Venous Lower, 03/25/2017: Normal right lower extremity venous duplex scan. No evidence of DVT.  MRI Lumbar Spine, 10/28/2016: Since the previous MRI study, there has been L4-5-S1 posterior lumbar and interbody fusion. No new abnormality of alignment is seen. There is, however, significant worsening of posterior element hypertrophy at L3-4, above the fusion level, due to persistent L3-4 disc bulge, and what appears to be new spinal stenosis at this level,  generally as a result of posterior element hypertrophic change. Canal appears stable elsewhere. No lumbar compression deformity or new abnormality of alignment is seen. No vertebral marrow edema is appreciated. Sagittal images suggest a small (18 mm) seroma or synovial cyst on the right at L5-S1. Tip of conus medullaris is again noted at L1.  Axial images   L1-L2: No significant canal or foraminal stenosis   L2-L3: Canal appears relatively narrowed, due to posterior element hypertrophy but still greater than 1 cm in diameter. Foramina appear  lower limits of normal diameter.   L3-L4: Moderate canal stenosis with lateral narrowing, AP diameter 6.5 mm, transverse diameter approximately 9 mm with crowding of the nerve roots. Foramina appear mildly to moderately narrowed bilaterally due to facet DJD.   L4-L5: Fused level, canal appears normal. Mild foraminal stenosis due to facet DJD  L5-S1: Canal appears normal. Left-sided neural foramen appears normal. Right-sided neural foramen appears mildly narrowed due to facet DJD. Postcontrast images show expected postoperative enhancement of the dorsal soft tissues and no pathologic postcontrast enhancement elsewhere.     The following portions of the patient's history were reviewed and updated as appropriate: problem list, past medical history, past surgery history, social history, family history, medication reconciliation, and allergies    Review of Systems   Musculoskeletal: Positive for back pain and gait problem.   All other systems reviewed and are negative.        Patient Active Problem List   Diagnosis   • Chronic lumbar radiculopathy   • Lumbar postlaminectomy syndrome   • Status post L4-S1 lumbar spinal fusion   • Physical deconditioning   • Adhesive arachnoiditis   • Bilateral stenosis of lateral recess of lumbar spine   • Osteoarthritis   • Chronic intractable pain   • Encounter for therapeutic drug monitoring       Past Medical History:   Diagnosis Date   •  Arthritis    • Carcinoma of prostate (HCC)     surgery    • Chronic pain disorder    • Eczema    • History of chronic kidney disease    • Hyperlipidemia    • Hypertension    • Kidney stone 1990    passed    • Left bundle branch block    • Neuropathic pain of flank, left    • Osteoarthritis    • Rheumatoid arthritis (HCC)    • Spinal headache    • Wears hearing aid in both ears          Past Surgical History:   Procedure Laterality Date   • BACK SURGERY  2014    Lumbar Fusion, july 2014   • BACK SURGERY  2014    Lumbar Laminectomy, march 2014   • COLONOSCOPY  2016   • EPIDURAL BLOCK  2016   • OTHER SURGICAL HISTORY      Enteroscopic Polypectomy   • PAIN PUMP INSERTION/REVISION N/A 10/12/2020    Procedure: PAIN PUMP TRIAL;  Surgeon: Efrain Batista MD;  Location:  [facility];  Service: Pain Management;  Laterality: N/A;   • DE MYELOGRAPHY VIA LUMBAR INJECT RS & I LUMBOSACRAL N/A 1/21/2019    Procedure: IR myelogram, lumbar;  Surgeon: Farncisco Javier Clay MD;  Location:  [facility];  Service: Interventional Radiology   • PROSTATE SURGERY     • SPINAL CORD STIMULATOR IMPLANT N/A 11/21/2019    Procedure: SPINAL CORD STIMULATOR INSERTION;  Surgeon: Fausto Renee MD;  Location:  [facility];  Service: Neurosurgery   • SPINAL CORD STIMULATOR IMPLANT N/A 3/1/2021    Procedure: Removal of entire system, SPINAL CORD STIMULAR REMOVAL;  Surgeon: Fausto Renee MD;  Location:  [facility];  Service: Neurosurgery;  Laterality: N/A;   • TESTICLE UNDESCENDED REPAIR  1980   • VOCAL CORD BIOPSY      polyps removed          Family History   Problem Relation Age of Onset   • Congenital heart disease Mother    • Multiple myeloma Father          Social History     Socioeconomic History   • Marital status:    Tobacco Use   • Smoking status: Never Smoker   • Smokeless tobacco: Never Used   Vaping Use   • Vaping Use: Never used   Substance and Sexual Activity   • Alcohol use: No   • Drug use: No   • Sexual activity:  "Defer           Current Outpatient Medications:   •  celecoxib (CeleBREX) 200 MG capsule, , Disp: , Rfl:   •  diclofenac (VOLTAREN) 75 MG EC tablet, Take 75 mg by mouth 2 (Two) Times a Day As Needed. for pain, Disp: , Rfl:   •  gabapentin (NEURONTIN) 800 MG tablet, TAKE 1 TABLET BY MOUTH ONCE DAILY IN THE MORNING AND 1 AT NOON AND 2 AT BEDTIME, Disp: 120 tablet, Rfl: 3  •  hydrocortisone 2.5 % cream, Apply 1 application topically to the appropriate area as directed Daily., Disp: , Rfl:   •  Naltrexone powder, 1 mg Daily., Disp: 30 g, Rfl: 1  •  pravastatin (PRAVACHOL) 10 MG tablet, Take 10 mg by mouth Every Night., Disp: , Rfl:   •  triamterene-hydrochlorothiazide (MAXZIDE-25) 37.5-25 MG per tablet, Take 1 tablet by mouth Every Morning., Disp: , Rfl:   •  hydrOXYzine pamoate (Vistaril) 25 MG capsule, Take 1 capsule by mouth At Night As Needed (for sleep). May take 1-2 tablets as needed for sleep, Disp: 60 capsule, Rfl: 1      Allergies   Allergen Reactions   • Adhesive Tape Rash     Rash with blisters     • Doxycycline Rash         /81   Pulse 60   Temp 95.9 °F (35.5 °C)   Ht 175.3 cm (69\")   Wt 84.6 kg (186 lb 6.4 oz)   SpO2 98%   BMI 27.53 kg/m²       Physical Exam:  Constitutional: Patient appears well-developed, well-nourished, well-hydrated, appears younger than stated age  HEENT: Head: Normocephalic and atraumatic  Eyes: Conjunctivae and lids are normal  Pupils: Equal, round, reactive to light  Peripheral vascular exam: Femoral: right 2+, left 2+. Posterior tibialis: right 2+ and left 2+. Dorsalis pedis: right 2+ and left 2+. No edema.   Musculoskeletal   Gait and station: Gait evaluation demonstrated a normal gait. Able to walk on heels, toes, tandem walking   Lumbar spine: Passive and active range of motion are appropriate for his age and condition and without pain. Extension, flexion, lateral flexion, rotation of the lumbar spine did not increase or reproduce pain. Lumbar facet joint loading " maneuvers are negative.   Kodak test, Gaenslen's test, thigh thrust test, SI compression test, Yeoman's test are negative   Piriformis maneuvers are negative   Palpation of the bilateral ischial tuberosities, unrevealing   Hip joints: The range of motion of the hip joints is full and without pain   Palpation of the bilateral psoas tendons and iliopsoas bursas, unrevealing   Palpation of the bilateral greater trochanter, unrevealing   Examination of the Iliotibial band: unrevealing   Neurological:   Patient is alert and oriented to person, place, and time.   Speech: Normal.   Cortical function: Normal mental status.   Reflex Scores:  Right patellar: 0+  Left patellar: 0+  Right Achilles: 0+  Left Achilles: 0+  Motor strength: 5/5  Motor Tone: Normal  Involuntary movements: None.   Superficial/Primitive Reflexes: Primitive reflexes were absent.   Right Lamas: Absent  Left Lamas: Absent  Right ankle clonus: Absent  Left ankle clonus: Absent   Babinsky: Absent  Long tract signs: Negative. Straight leg raising test: Negative on the right, positive on the left at 30-40 degrees with positive Lasegue. Femoral stretch sign: Negative on the right, questionably positive on the left.   Sensory exam: Intact to light touch, intact pain and temperature sensation, intact vibration sensation and normal proprioception.   Coordination: Normal finger to nose and heel to shin. Normal balance and negative Romberg's sign   Skin and subcutaneous tissue: Skin is warm and intact. No rash noted. No cyanosis.   Psychiatric: Judgment and insight: Normal. Recent and remote memory: Intact. Mood and affect: Normal.     ASSESSMENT:   1. Chronic lumbar radiculopathy    2. Adhesive arachnoiditis    3. Chronic intractable pain    4. Lumbar postlaminectomy syndrome    5. Status post L4-S1 lumbar spinal fusion    6. Bilateral stenosis of lateral recess of lumbar spine    7. Physical deconditioning    8. Encounter for therapeutic drug monitoring         PLAN/MEDICAL DECISION MAKING:  Mr. Prashant Avila, 77 y.o. male presents with a very complex and challenging chronic neuropathic pain condition refractory to conservative measures, interventional pain management measures, and previous surgical interventions, as referenced under HPI.  He underwent follow-up neurosurgical consultation with Dr. Fausto Renee and was found not to be a surgical candidate.  Previously, the neurosurgical group at the [facility] proposed decompressing the nerve roots from L3-S1. EMG/NCV revealed chronic S1 radiculopathy that was present prior to his last surgery and unlikely to respond to additional decompression. In addition, he has some lumbar arachnoiditis and upon physical examination it appears that he may be developing some higher lumbar radicular pain, likely left L3.  Patient is not interested in additional surgical options. Patient underwent one round of regenerative therapies with platelet rich leukocyte poor plasma and reports no significant analgesic but perhaps some functional benefit at his able to tolerate sitting for a little bit longer than in the past.  Patient underwent psychological consultation with Dr. Praveen Navarro and was found to be an appropriate candidate for spinal cord stimulation and intrathecal therapies.  He tried to wean off gabapentin but experienced exacerbation of his chronic pain.  He underwent neurology consultation with Dr. Kodak Alonso who recommended to continue gabapentin and changing Celebrex for diclofenac.  He would like to continue on the current dose of naltrexone.  Pain still interferes with his quality of life as he is unable to sit or lie down for prolonged periods of time.  He remains quite active.  He is here today to discuss additional measures. He is open to discussing additional pharmacological measures.  On the other hand, he also asked me about potential interventional pain management measures.  I have reviewed all  the previous procedures that he has undergone with me and remarked that he seemed to respond from treatments that were targeted left S1.  Treatments targeting the piriformis or sciatic nerve were entirely unsuccessful.  A comprehensive initial evaluation including history and physical exam were performed including pertinent physiologic and functional assessment. Patient presents with intractable pain due to the diagnoses listed above. Patient has failed to respond to conservative modalities, previous minimally invasive interventional pain management measures, previous surgical interventions, as referenced under HPI including the impact of patient's moderate-to-severe pain contributing to significant impairment in daily activities, ADLs, and a negative impact on quality of life. Supporting diagnostic studies of patient's chronic pain condition have been reviewed. I have reviewed all available patient's medical records as well as previous therapies as referenced above. I had a lengthy conversation with Mr. Prashant Avila regarding his chronic pain condition and potential therapeutic options including risks, benefits, alternative therapies, to name a few. We have discussed using a stepwise approach starting with the shortest or least intense level of treatment, care, or service as determined by the extent required to diagnose and or treat patient's condition. The treatments proposed are consistent with the patient's medical condition and are known to be as safe and effective by current guidelines and standard of care. There is no evidence of absolute contraindications for the proposed procedures under the current circumstances. These treatments are not considered experimental or investigational. The duration and frequency proposed are considered appropriate for the service in accordance with accepted standards of medical practice for the diagnosis and or treatment of the patient's condition and or intended to  improve the patient's level of function. These services will be furnished in a setting appropriate to the patient's medical needs and condition. Therefore, I have proposed the following plan:  1. Follow-up:  I have encouraged the patient to contact me via [facility] for a follow-up and to report progress in about 3 to 4 weeks.  We could do telehealth visit or an office visit.  I just asked the patient to contact me to decide what works best.  In the meantime, we will maximize conservative measures.  In terms of potential interventional pain management measures:  We discussed repeating a trial with a higher dose of opioids, or trying alternative medications such as lidocaine, bupivacaine, clonidine, or combination of medications.  Patient reported no benefit from Prialt.  In terms of neuromodulation, we discussed prospects of a potential spinal cord stimulator trial with CELtrak or peripheral nerve stimulator.  In terms of other procedures, we discussed the possibility of a Racz procedure.  2. Pharmacological measures: Patient has failed numerous analgesic trials. Unfortunately, patient is not a candidate for a trial with mexiletine due to abnormal EKG.    A. Continue gabapentin gabapentin 800 mg 1 tablet every morning, 1 tablet at noon, and 2 tablets at bedtime. He has previously failed a trial with Lyrica and other anticonvulsants  B. Continue low dose naltrexone to 1 mg once a day.  We will assess analgesic response and titrate the dose accordingly, he would like to remain at this dose.  C. Trial with a low-dose of tramadol 50 mg half to 1 tablet twice daily as needed for pain, #60, 1 refill.  We may continue titrating tramadol or move forward with a trial with Butrans patches  D. Trial with Rheumate one tablet daily  E. Start pyridoxine 100 mg one tablet by mouth daily, #30, take for 30 days, no refills  F. Start alpha lipoid acid 6861-6940 mg per day divided into 3 doses  G. Trial with prilocaine 2%, lidocaine  10%, imipramine 3%, capsaicin 0.001% and mannitol 20% cream, apply 1 to 2 grams of cream to the affected areas every 4 to 6 hours as needed  Screening and compliance with controlled substances:  Patient has completed a SOAPP and ORT questionnaires (revealing low risk). Jon report has been reviewed and appropriate. Random urine drug screenings have been obtained and appropriate. Patient has signed a consent for treatment with controlled substances after reviewing the document and verbalizing full understanding of the risks, benefits, alternatives, and consequences of compliance and adherence with treatment and comprehensive plan of care. Patient received in hand a copy of this document.  3. Diagnostic studies:  A. Random UDS + gabapentin  B. MRI of the thoracic spine without contrast to assess capacity of the spinal canal and epidural space for a potential spinal cord stimulator trial and implant  C. We could repeat an MRI of the lumbar spine and sacrum with and without contrast if he continues to struggle with pain  4. Long-term rehabilitation efforts:  A. The patient does not have a history of falls. I did complete a risk assessment for falls.   B. Patient will start a comprehensive physical therapy program at [facility] for Alter-G, core strengthening, gait and balance training, neurodynamics, E-STIM, myofascial release, cupping, dry needling, home exercises,  once pain is under control  C. Start an exercise program such as water therapy and swimming  D. Referral to [facility] for Pilates  E. Contrast therapy: Apply ice-packs for 15-20 minutes, followed by heating pads for 15-20 minutes to affected area   F. Prashant Avila  reports that he has never smoked. He has never used smokeless tobacco.   5. The patient has been instructed to contact my office with any questions or difficulties. The patient understands the plan and agrees to proceed accordingly.    The patient has a documented  plan of care to address chronic pain. Prashant Avila reports a pain score of  8/10.  Given his pain assessment as noted, treatment options were discussed and the following options were decided upon as a follow-up plan to address the patient's pain: continuation of current treatment plan for pain, educational materials on pain management, home exercises and therapy, prescription for non-opiod analgesics, referral to specialist for assistance in pain treatment guidance, steroid injections, use of non-medical modalities (ice, heat, stretching and/or behavior modifications) and interventional pain management measures including neuromodulation techniques, Racz procedure, etc.             Pain Management Panel     Pain Management Panel Latest Ref Rng & Units 7/20/2021    AMPHETAMINES SCREEN, URINE Negative Negative    BARBITURATES SCREEN Negative Negative    BENZODIAZEPINE SCREEN, URINE Negative Negative    BUPRENORPHINEUR Negative Negative    COCAINE SCREEN, URINE Negative Negative    METHADONE SCREEN, URINE Negative Negative    METHAMPHETAMINEUR Negative Negative           JOCELIN query complete. JOCELIN reviewed by Efrain Batista MD.     Pain Medications             celecoxib (CeleBREX) 200 MG capsule     diclofenac (VOLTAREN) 75 MG EC tablet Take 75 mg by mouth 2 (Two) Times a Day As Needed. for pain    gabapentin (NEURONTIN) 800 MG tablet TAKE 1 TABLET BY MOUTH ONCE DAILY IN THE MORNING AND 1 AT NOON AND 2 AT BEDTIME           I spent 55 minutes face-to-face with the patient and family, of which more than 50% of the time was spent counseling regarding evaluation, diagnosis, prognosis, diagnostic testing, potential referrals, treatment options for chronic pain condition and overall rehabilitation, implications of compliance with medical care, coordination of care with other providers involved in patient's care, long-term management of concurrent comorbidities affecting effective pain control, risk and benefits of  different interventions, alternative therapies, risks and benefits as it relates to spinal cord stimulator devices for trial and implantation, risks and benefits as it relates to intrathecal devices for the trial and implantation, long-term management and functional goals of spinal cord stimulation and long-term management and functional goals of intrathecal therapy5     Please note that portions of this note were completed with a voice recognition program.     Efrain Batista MD    Patient Care Team:  SHERON Chun MD as PCP - General (Family Medicine)  Fausto Renee MD as Consulting Physician (Neurosurgery)  Jean Warren MD as Consulting Physician (Neurosurgery)  Marcelino Condon MD as Consulting Physician (Urology)  Brian Tatum III, MD as Consulting Physician (Cardiology)  SHERON Chun MD as Consulting Physician (Family Medicine)  Francisco Javier Clay MD as Consulting Physician (Neurosurgery)  Royer Roldan MD as Consulting Physician (General Practice)  Emigdio Watt MD as Consulting Physician (Gastroenterology)  Shola Vila MD as Consulting Physician (Otolaryngology)  Hank Frankel MD as Consulting Physician (Cardiology)  Inez Regan APRN as Nurse Practitioner (Pain Medicine)  Efrain Batista MD as Consulting Physician (Pain Medicine)     No orders of the defined types were placed in this encounter.        Future Appointments   Date Time Provider Department Center   9/12/2022  9:30 AM [facility] NEW PROBLEMS  [facility]

## 2022-08-30 ENCOUNTER — LAB (OUTPATIENT)
Dept: LAB | Facility: HOSPITAL | Age: 77
End: 2022-08-30

## 2022-08-30 ENCOUNTER — OFFICE VISIT (OUTPATIENT)
Dept: PAIN MEDICINE | Facility: CLINIC | Age: 77
End: 2022-08-30

## 2022-08-30 VITALS
DIASTOLIC BLOOD PRESSURE: 81 MMHG | OXYGEN SATURATION: 98 % | WEIGHT: 186.4 LBS | SYSTOLIC BLOOD PRESSURE: 147 MMHG | HEIGHT: 69 IN | BODY MASS INDEX: 27.61 KG/M2 | HEART RATE: 60 BPM | TEMPERATURE: 95.9 F

## 2022-08-30 DIAGNOSIS — G03.9 ADHESIVE ARACHNOIDITIS: ICD-10-CM

## 2022-08-30 DIAGNOSIS — Z98.1 STATUS POST LUMBAR SPINAL FUSION: ICD-10-CM

## 2022-08-30 DIAGNOSIS — M54.16 CHRONIC LUMBAR RADICULOPATHY: ICD-10-CM

## 2022-08-30 DIAGNOSIS — Z51.81 ENCOUNTER FOR THERAPEUTIC DRUG MONITORING: ICD-10-CM

## 2022-08-30 DIAGNOSIS — M48.061 BILATERAL STENOSIS OF LATERAL RECESS OF LUMBAR SPINE: ICD-10-CM

## 2022-08-30 DIAGNOSIS — M96.1 LUMBAR POSTLAMINECTOMY SYNDROME: ICD-10-CM

## 2022-08-30 DIAGNOSIS — M54.16 CHRONIC LUMBAR RADICULOPATHY: Primary | ICD-10-CM

## 2022-08-30 DIAGNOSIS — Z51.81 THERAPEUTIC DRUG MONITORING: ICD-10-CM

## 2022-08-30 DIAGNOSIS — R53.81 PHYSICAL DECONDITIONING: ICD-10-CM

## 2022-08-30 DIAGNOSIS — G89.29 CHRONIC INTRACTABLE PAIN: ICD-10-CM

## 2022-08-30 LAB
AMPHET+METHAMPHET UR QL: NEGATIVE
AMPHETAMINES UR QL: NEGATIVE
BARBITURATES UR QL SCN: NEGATIVE
BENZODIAZ UR QL SCN: NEGATIVE
BUPRENORPHINE SERPL-MCNC: NEGATIVE NG/ML
CANNABINOIDS SERPL QL: NEGATIVE
COCAINE UR QL: NEGATIVE
METHADONE UR QL SCN: NEGATIVE
OPIATES UR QL: NEGATIVE
OXYCODONE UR QL SCN: NEGATIVE
PCP UR QL SCN: NEGATIVE
PROPOXYPH UR QL: NEGATIVE
TRICYCLICS UR QL SCN: NEGATIVE

## 2022-08-30 PROCEDURE — 80306 DRUG TEST PRSMV INSTRMNT: CPT

## 2022-08-30 PROCEDURE — G0480 DRUG TEST DEF 1-7 CLASSES: HCPCS

## 2022-08-30 PROCEDURE — 99215 OFFICE O/P EST HI 40 MIN: CPT | Performed by: ANESTHESIOLOGY

## 2022-08-30 RX ORDER — MULTIVITAMIN WITH IRON
100 TABLET ORAL DAILY
Qty: 30 TABLET | Refills: 0 | Status: SHIPPED | OUTPATIENT
Start: 2022-08-30 | End: 2023-03-07

## 2022-08-30 RX ORDER — CELECOXIB 200 MG/1
CAPSULE ORAL
COMMUNITY
Start: 2022-06-21 | End: 2023-03-07

## 2022-08-30 RX ORDER — ME-TETRAHYDROFOLATE/B12/HRB236 1-1-500 MG
1 CAPSULE ORAL DAILY
Qty: 90 CAPSULE | Refills: 1 | Status: SHIPPED | OUTPATIENT
Start: 2022-08-30 | End: 2023-03-07

## 2022-08-30 RX ORDER — TRAMADOL HYDROCHLORIDE 50 MG/1
TABLET ORAL
Qty: 60 TABLET | Refills: 1 | Status: SHIPPED | OUTPATIENT
Start: 2022-08-30 | End: 2022-09-13

## 2022-08-30 RX ORDER — ST. JOHN'S WORT 300 MG
400 CAPSULE ORAL 3 TIMES DAILY
Qty: 180 CAPSULE | Refills: 5 | Status: SHIPPED | OUTPATIENT
Start: 2022-08-30

## 2022-09-05 LAB — GABAPENTIN UR-MCNC: >800 UG/ML

## 2022-09-12 ENCOUNTER — HOSPITAL ENCOUNTER (OUTPATIENT)
Dept: MAMMOGRAPHY | Facility: HOSPITAL | Age: 77
Discharge: HOME | End: 2022-09-12

## 2022-09-12 ENCOUNTER — HOSPITAL ENCOUNTER (OUTPATIENT)
Dept: ULTRASOUND IMAGING | Facility: HOSPITAL | Age: 77
Discharge: HOME | End: 2022-09-12

## 2022-09-12 DIAGNOSIS — N63.20 PAINFUL LUMPY LEFT BREAST: ICD-10-CM

## 2022-09-12 DIAGNOSIS — N64.4 PAINFUL LUMPY LEFT BREAST: ICD-10-CM

## 2022-09-12 PROCEDURE — 76642 ULTRASOUND BREAST LIMITED: CPT

## 2022-09-12 PROCEDURE — 76642 ULTRASOUND BREAST LIMITED: CPT | Performed by: RADIOLOGY

## 2022-09-12 PROCEDURE — 77066 DX MAMMO INCL CAD BI: CPT

## 2022-09-12 PROCEDURE — 77066 DX MAMMO INCL CAD BI: CPT | Performed by: RADIOLOGY

## 2022-09-12 PROCEDURE — G0279 TOMOSYNTHESIS, MAMMO: HCPCS

## 2022-09-12 PROCEDURE — G0279 TOMOSYNTHESIS, MAMMO: HCPCS | Performed by: RADIOLOGY

## 2022-09-13 DIAGNOSIS — M54.16 CHRONIC LUMBAR RADICULOPATHY: ICD-10-CM

## 2022-09-13 RX ORDER — TRAMADOL HYDROCHLORIDE 50 MG/1
TABLET ORAL
Qty: 60 TABLET | Refills: 1 | Status: SHIPPED | OUTPATIENT
Start: 2022-09-13 | End: 2023-01-16 | Stop reason: SDUPTHER

## 2022-10-05 ENCOUNTER — HOSPITAL ENCOUNTER (OUTPATIENT)
Dept: MRI IMAGING | Facility: HOSPITAL | Age: 77
Discharge: HOME | End: 2022-10-05
Admitting: ANESTHESIOLOGY

## 2022-10-05 DIAGNOSIS — M54.16 CHRONIC LUMBAR RADICULOPATHY: ICD-10-CM

## 2022-10-05 DIAGNOSIS — G03.9 ADHESIVE ARACHNOIDITIS: ICD-10-CM

## 2022-10-05 PROCEDURE — 72146 MRI CHEST SPINE W/O DYE: CPT

## 2022-11-21 DIAGNOSIS — G03.9 ADHESIVE ARACHNOIDITIS: ICD-10-CM

## 2022-11-21 RX ORDER — GABAPENTIN 800 MG/1
TABLET ORAL
Qty: 120 TABLET | Refills: 4 | Status: SHIPPED | OUTPATIENT
Start: 2022-11-21 | End: 2023-01-18 | Stop reason: SDUPTHER

## 2022-12-12 NOTE — TELEPHONE ENCOUNTER
Please close/document note if this has been addressed. TY   Telemetry Bed?: No   Admitting Physician: INGRID PECK [[id number]]   Is this a telephone or verbal order?: This is a telephone order from the admitting physician   Transferring Patient to? Only adjust for transfers between [facility] ([facility] and [facility]): [facility] [[id number]]

## 2023-01-16 DIAGNOSIS — M54.16 CHRONIC LUMBAR RADICULOPATHY: ICD-10-CM

## 2023-01-16 RX ORDER — TRAMADOL HYDROCHLORIDE 50 MG/1
TABLET ORAL
Qty: 60 TABLET | Refills: 1 | Status: SHIPPED | OUTPATIENT
Start: 2023-01-16

## 2023-01-18 DIAGNOSIS — G03.9 ADHESIVE ARACHNOIDITIS: ICD-10-CM

## 2023-01-18 RX ORDER — GABAPENTIN 800 MG/1
800 TABLET ORAL 3 TIMES DAILY
Qty: 120 TABLET | Refills: 4 | Status: SHIPPED | OUTPATIENT
Start: 2023-01-18

## 2023-03-28 ENCOUNTER — TRANSCRIBE ORDERS (OUTPATIENT)
Dept: ADMINISTRATIVE | Facility: HOSPITAL | Age: 78
End: 2023-03-28
Payer: MEDICARE

## 2023-03-28 ENCOUNTER — HOSPITAL ENCOUNTER (OUTPATIENT)
Dept: GENERAL RADIOLOGY | Facility: HOSPITAL | Age: 78
Discharge: HOME | End: 2023-03-28
Payer: MEDICARE

## 2023-03-28 DIAGNOSIS — L81.8 IDIOPATHIC GUTTATE HYPOMELANOSIS: Primary | ICD-10-CM

## 2023-03-28 PROCEDURE — 73660 X-RAY EXAM OF TOE(S): CPT

## 2023-05-19 DIAGNOSIS — M54.16 CHRONIC LUMBAR RADICULOPATHY: ICD-10-CM

## 2023-05-19 RX ORDER — TRAMADOL HYDROCHLORIDE 50 MG/1
TABLET ORAL
Qty: 60 TABLET | Refills: 0 | Status: SHIPPED | OUTPATIENT
Start: 2023-05-19

## 2023-05-23 DIAGNOSIS — G03.9 ADHESIVE ARACHNOIDITIS: ICD-10-CM

## 2023-05-23 RX ORDER — GABAPENTIN 800 MG/1
TABLET ORAL
Qty: 120 TABLET | Refills: 2 | Status: SHIPPED | OUTPATIENT
Start: 2023-05-23

## 2023-06-02 ENCOUNTER — TRANSCRIBE ORDERS (OUTPATIENT)
Dept: ADMINISTRATIVE | Facility: HOSPITAL | Age: 78
End: 2023-06-02
Payer: MEDICARE

## 2023-06-02 ENCOUNTER — HOSPITAL ENCOUNTER (OUTPATIENT)
Dept: GENERAL RADIOLOGY | Facility: HOSPITAL | Age: 78
Discharge: HOME | End: 2023-06-02

## 2023-06-02 DIAGNOSIS — M54.9 BACK PAIN, UNSPECIFIED BACK LOCATION, UNSPECIFIED BACK PAIN LATERALITY, UNSPECIFIED CHRONICITY: Primary | ICD-10-CM

## 2023-06-02 PROCEDURE — 72110 X-RAY EXAM L-2 SPINE 4/>VWS: CPT

## 2023-06-08 ENCOUNTER — TELEPHONE (OUTPATIENT)
Dept: PAIN MEDICINE | Facility: CLINIC | Age: 78
End: 2023-06-08
Payer: MEDICARE

## 2023-06-08 NOTE — TELEPHONE ENCOUNTER
Patient LVM that he was called 3 weeks ago to set up an appointment. At that time he expressed that he wanted to see Dr. Batista. He was told that he would receive a call back. Patient calling today because he has not received a call back.   He reports that he has had many new tests and would like to discuss those with Dr. Batista.   He is open to coming in the early AM as well.     Patient would like call back for follow up appointment with Dr. Batista.

## 2023-06-09 DIAGNOSIS — M96.1 POSTLAMINECTOMY SYNDROME OF LUMBAR REGION: ICD-10-CM

## 2023-06-09 DIAGNOSIS — M54.16 CHRONIC LUMBAR RADICULOPATHY: Primary | ICD-10-CM

## 2023-06-09 DIAGNOSIS — M96.1 LUMBAR POSTLAMINECTOMY SYNDROME: ICD-10-CM

## 2023-06-09 NOTE — TELEPHONE ENCOUNTER
Spoke with pt who advised that he is having left flank pain, and that its going down his left leg. Also advised that he is having weakness in his back. Pt advised that he hasn't fell. Pt advised that he had an XR with his PCP, and then a thoracic MRI in October of 2022. Spoke with Dr. Batista, and advised that pt needs to be worked up by [facility]. Pt requested Dr. Renee and I spoke with Dr. Renee who approved for the pt to be put on his schedule. Pt notified of appointment.

## 2023-06-14 ENCOUNTER — OFFICE VISIT (OUTPATIENT)
Dept: NEUROSURGERY | Facility: CLINIC | Age: 78
End: 2023-06-14
Payer: MEDICARE

## 2023-06-14 VITALS — TEMPERATURE: 96.8 F | BODY MASS INDEX: 26.69 KG/M2 | HEIGHT: 69 IN | WEIGHT: 180.2 LBS

## 2023-06-14 DIAGNOSIS — M96.1 LUMBAR POSTLAMINECTOMY SYNDROME: Primary | ICD-10-CM

## 2023-06-14 DIAGNOSIS — M51.36 DDD (DEGENERATIVE DISC DISEASE), LUMBAR: ICD-10-CM

## 2023-06-14 PROCEDURE — 99214 OFFICE O/P EST MOD 30 MIN: CPT | Performed by: NEUROLOGICAL SURGERY

## 2023-06-14 PROCEDURE — 1159F MED LIST DOCD IN RCRD: CPT | Performed by: NEUROLOGICAL SURGERY

## 2023-06-14 PROCEDURE — 1160F RVW MEDS BY RX/DR IN RCRD: CPT | Performed by: NEUROLOGICAL SURGERY

## 2023-06-14 RX ORDER — COLCHICINE 0.6 MG/1
TABLET ORAL
COMMUNITY
Start: 2023-06-13

## 2023-06-14 NOTE — PROGRESS NOTES
Patient: Prashant Avila  : 1945    Primary Care Provider: Montse Chun MD    Requesting Provider: As above        History    Chief Complaint: Left buttock and leg pain.    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  He is now a 76-year-old gentleman who is well-known to my service. In the past the patient had undergone a left L4-5 foraminotomy at the [place] but this was not helpful.  Subsequently on 14 I performed an L4-S1 decompression and fusion.  He's continued to have pain in his back and left hip that extends down his leg.  The curious issue about his symptoms is that they are better with standing and walking and much worse with sitting or lying down.  He has no right lower extremity symptoms.  In the past he has had extensive pain management interventions.  At one point a number of years ago he was seen at the [facility] and Dr. Cox who recommended additional decompression at multiple levels including L3-4.  Prior to that I performed a CT myelogram which did not reveal evidence of nerve root compromise at L3-4 and I was not particularly enthusiastic about that.   On 2019 he underwent [place] epidural spinal cord stimulator placement.  Unfortunately despite very extensive programming this has not been effective in addressing his symptoms.  He has had a trial of intrathecal pain medicine which was not helpful.  We subsequently removed the spinal cord stimulator.  He has had the same ongoing symptoms.  Those symptoms have been worse over the last 6 weeks.    Review of Systems   Constitutional:  Negative for activity change, appetite change, chills, diaphoresis, fatigue, fever and unexpected weight change.   HENT:  Positive for tinnitus. Negative for congestion, dental problem, drooling, ear discharge, ear pain, facial swelling, hearing loss, mouth sores, nosebleeds, postnasal drip, rhinorrhea, sinus pressure, sinus pain, sneezing, sore throat, trouble  "swallowing and voice change.    Eyes:  Negative for photophobia, pain, discharge, redness, itching and visual disturbance.   Respiratory:  Negative for apnea, cough, choking, chest tightness, shortness of breath, wheezing and stridor.    Cardiovascular:  Negative for chest pain, palpitations and leg swelling.   Gastrointestinal:  Negative for abdominal distention, abdominal pain, anal bleeding, blood in stool, constipation, diarrhea, nausea, rectal pain and vomiting.   Endocrine: Negative for cold intolerance, heat intolerance, polydipsia, polyphagia and polyuria.   Genitourinary:  Positive for flank pain. Negative for decreased urine volume, difficulty urinating, dysuria, enuresis, frequency, genital sores, hematuria, penile discharge, penile pain, penile swelling, scrotal swelling, testicular pain and urgency.   Musculoskeletal:  Negative for arthralgias, back pain, gait problem, joint swelling, myalgias, neck pain and neck stiffness.   Skin:  Negative for color change, pallor, rash and wound.   Allergic/Immunologic: Negative for environmental allergies, food allergies and immunocompromised state.   Neurological:  Negative for dizziness, tremors, seizures, syncope, facial asymmetry, speech difficulty, weakness, light-headedness, numbness and headaches.   Hematological:  Negative for adenopathy. Does not bruise/bleed easily.   Psychiatric/Behavioral:  Negative for agitation, behavioral problems, confusion, decreased concentration, dysphoric mood, hallucinations, self-injury, sleep disturbance and suicidal ideas. The patient is not nervous/anxious and is not hyperactive.      The patient's past medical history, past surgical history, family history, and social history have been reviewed at length in the electronic medical record.      Physical Exam:   Temp 96.8 °F (36 °C) (Infrared)   Ht 175.3 cm (69\")   Wt 81.7 kg (180 lb 3.2 oz)   BMI 26.61 kg/m²   MUSCULOSKELETAL:  Straight leg raising is negative.  Kodak's " Sign is negative.  ROM in the low back is normal.  Tenderness in the back to palpation is not observed.  NEUROLOGICAL:  Strength is intact in the lower extremities to direct testing.  Muscle tone is normal throughout.  Station and gait are normal.  Sensation is intact to light touch testing throughout.      Medical Decision Making    Data Review:   (All imaging studies were personally reviewed unless stated otherwise)  Plain films of his lumbar spine demonstrate his hardware construct to be intact save for the fracture of the left S1 screw.  This has been noted previously.    Diagnosis:   Chronic unremitting left buttock and leg pain of uncertain etiology.    Treatment Options:   While the patient has a fracture of one of his screws his symptoms are exactly the same as they have been since his treatment first ensued many many years ago.  I do not think we can chalk up ongoing or worsening symptoms to the above-noted finding.  Presently, I do not think there is a role for further surgical intervention and treatment will need to remain symptomatic.       Diagnosis Plan   1. Lumbar postlaminectomy syndrome        2. DDD (degenerative disc disease), lumbar            Scribed for Fausto Renee MD by Shireen Shipley [facility] 6/14/2023 15:59 EDT      I, Dr. Renee, personally performed the services described in the documentation, as scribed in my presence, and it is both accurate and complete.

## 2023-07-25 ENCOUNTER — TELEPHONE (OUTPATIENT)
Dept: PAIN MEDICINE | Facility: CLINIC | Age: 78
End: 2023-07-25
Payer: MEDICARE

## 2023-07-25 NOTE — TELEPHONE ENCOUNTER
Called and left VM assuring the patient we will call him as soon as we receive the report from Dr. Navarro's office.

## 2023-07-25 NOTE — TELEPHONE ENCOUNTER
Provider: DR. BURKE LYNN  Caller: REBECA STONE  Relationship to Patient: SELF    Phone Number: [phone]  Reason for Call: PATIENT ASKED TO LEAVE A MESSAGE FOR CRYSTAL. PATIENT SAYS HE CALLED DR. ARRIAGA'S OFFICE ON FRIDAY AND ASKED ABOUT THE REPORT PENDING TO US SO PATIENT CAN BE SEEN HERE. THEY SAID THEY WERE WRAPPING IT UP AND WOULD SEND ASAP. NOT IS CHART YET. PATIENT WANTS TO KNOW IF CRYSTAL CAN DO ANYTHING TO EXPEDITE THIS? PATIENT SAYS HE'S IN A LOT OF PAIN.

## 2023-08-02 PROBLEM — M79.2: Status: ACTIVE | Noted: 2023-08-02

## 2023-08-02 PROBLEM — G89.4 CHRONIC PAIN SYNDROME: Status: ACTIVE | Noted: 2023-08-02

## 2023-08-03 ENCOUNTER — PREP FOR SURGERY (OUTPATIENT)
Dept: PERIOP | Facility: HOSPITAL | Age: 78
End: 2023-08-03
Payer: MEDICARE

## 2023-08-03 ENCOUNTER — OFFICE VISIT (OUTPATIENT)
Dept: PAIN MEDICINE | Facility: CLINIC | Age: 78
End: 2023-08-03
Payer: MEDICARE

## 2023-08-03 VITALS
BODY MASS INDEX: 27.37 KG/M2 | TEMPERATURE: 95.9 F | HEART RATE: 71 BPM | WEIGHT: 184.8 LBS | OXYGEN SATURATION: 99 % | HEIGHT: 69 IN

## 2023-08-03 DIAGNOSIS — G89.29 CHRONIC INTRACTABLE PAIN: ICD-10-CM

## 2023-08-03 DIAGNOSIS — M24.28 LIGAMENTUM FLAVUM HYPERTROPHY: ICD-10-CM

## 2023-08-03 DIAGNOSIS — M54.16 CHRONIC LUMBAR RADICULOPATHY: ICD-10-CM

## 2023-08-03 DIAGNOSIS — M48.062 LUMBAR STENOSIS WITH NEUROGENIC CLAUDICATION: Primary | ICD-10-CM

## 2023-08-03 DIAGNOSIS — G62.9 POLYNEUROPATHY: ICD-10-CM

## 2023-08-03 DIAGNOSIS — R53.81 PHYSICAL DECONDITIONING: ICD-10-CM

## 2023-08-03 DIAGNOSIS — M79.2 INTRACTABLE NEUROPATHIC PAIN OF LOWER EXTREMITY, LEFT: ICD-10-CM

## 2023-08-03 DIAGNOSIS — Z01.818 PRE-PROCEDURAL EXAMINATION: ICD-10-CM

## 2023-08-03 DIAGNOSIS — M96.1 LUMBAR POSTLAMINECTOMY SYNDROME: ICD-10-CM

## 2023-08-03 DIAGNOSIS — M48.062 LUMBAR STENOSIS WITH NEUROGENIC CLAUDICATION: ICD-10-CM

## 2023-08-03 DIAGNOSIS — R26.9 GAIT DISTURBANCE: ICD-10-CM

## 2023-08-03 DIAGNOSIS — Z51.81 ENCOUNTER FOR THERAPEUTIC DRUG MONITORING: ICD-10-CM

## 2023-08-03 DIAGNOSIS — Z01.812 PRE-PROCEDURAL LABORATORY EXAMINATION: ICD-10-CM

## 2023-08-03 DIAGNOSIS — G03.9 ADHESIVE ARACHNOIDITIS: ICD-10-CM

## 2023-08-03 DIAGNOSIS — Z98.1 STATUS POST LUMBAR SPINAL FUSION: ICD-10-CM

## 2023-08-03 RX ORDER — SODIUM CHLORIDE 9 MG/ML
40 INJECTION, SOLUTION INTRAVENOUS AS NEEDED
OUTPATIENT
Start: 2023-08-03

## 2023-08-03 RX ORDER — PRAVASTATIN SODIUM 10 MG
1 TABLET ORAL DAILY
COMMUNITY

## 2023-08-03 RX ORDER — CEFAZOLIN SODIUM 2 G/100ML
2000 INJECTION, SOLUTION INTRAVENOUS ONCE
OUTPATIENT
Start: 2023-08-03 | End: 2023-08-03

## 2023-08-03 RX ORDER — LISINOPRIL 10 MG/1
TABLET ORAL
COMMUNITY
Start: 2023-07-19

## 2023-08-03 RX ORDER — SODIUM CHLORIDE 0.9 % (FLUSH) 0.9 %
10 SYRINGE (ML) INJECTION AS NEEDED
OUTPATIENT
Start: 2023-08-03

## 2023-08-03 RX ORDER — CHLORHEXIDINE GLUCONATE 4 G/100ML
SOLUTION TOPICAL DAILY PRN
Qty: 236 ML | Refills: 0 | Status: SHIPPED | OUTPATIENT
Start: 2023-08-03

## 2023-08-03 RX ORDER — SODIUM CHLORIDE 0.9 % (FLUSH) 0.9 %
3 SYRINGE (ML) INJECTION EVERY 12 HOURS SCHEDULED
OUTPATIENT
Start: 2023-08-03

## 2023-08-03 RX ORDER — NALTREXONE 100 %
1 POWDER (GRAM) MISCELLANEOUS DAILY
Qty: 30 G | Refills: 1 | Status: SHIPPED | OUTPATIENT
Start: 2023-08-03

## 2023-08-04 ENCOUNTER — HOSPITAL ENCOUNTER (OUTPATIENT)
Dept: GENERAL RADIOLOGY | Facility: HOSPITAL | Age: 78
Discharge: HOME | End: 2023-08-04
Admitting: ANESTHESIOLOGY
Payer: MEDICARE

## 2023-08-04 DIAGNOSIS — M54.16 CHRONIC LUMBAR RADICULOPATHY: ICD-10-CM

## 2023-08-04 DIAGNOSIS — Z01.818 PRE-PROCEDURAL EXAMINATION: ICD-10-CM

## 2023-08-04 DIAGNOSIS — M48.062 LUMBAR STENOSIS WITH NEUROGENIC CLAUDICATION: ICD-10-CM

## 2023-08-04 PROCEDURE — 72120 X-RAY BEND ONLY L-S SPINE: CPT

## 2023-08-08 ENCOUNTER — PRE-ADMISSION TESTING (OUTPATIENT)
Dept: PREADMISSION TESTING | Facility: HOSPITAL | Age: 78
End: 2023-08-08
Payer: MEDICARE

## 2023-08-08 VITALS — BODY MASS INDEX: 27.48 KG/M2 | WEIGHT: 185.52 LBS | HEIGHT: 69 IN

## 2023-08-08 DIAGNOSIS — M24.28 LIGAMENTUM FLAVUM HYPERTROPHY: ICD-10-CM

## 2023-08-08 DIAGNOSIS — M48.062 LUMBAR STENOSIS WITH NEUROGENIC CLAUDICATION: ICD-10-CM

## 2023-08-08 LAB
ALBUMIN SERPL-MCNC: 4 G/DL (ref 3.5–5.2)
ALBUMIN/GLOB SERPL: 1.3 G/DL
ALP SERPL-CCNC: 68 U/L (ref 39–117)
ALT SERPL W P-5'-P-CCNC: 17 U/L (ref 1–41)
AMPHET+METHAMPHET UR QL: NEGATIVE
AMPHETAMINES UR QL: NEGATIVE
ANION GAP SERPL CALCULATED.3IONS-SCNC: 13 MMOL/L (ref 5–15)
APTT PPP: 32.6 SECONDS (ref 22–39)
AST SERPL-CCNC: 23 U/L (ref 1–40)
BARBITURATES UR QL SCN: NEGATIVE
BASOPHILS # BLD AUTO: 0.07 10*3/MM3 (ref 0–0.2)
BASOPHILS NFR BLD AUTO: 0.8 % (ref 0–1.5)
BENZODIAZ UR QL SCN: NEGATIVE
BILIRUB SERPL-MCNC: 0.4 MG/DL (ref 0–1.2)
BUN SERPL-MCNC: 16 MG/DL (ref 8–23)
BUN/CREAT SERPL: 14.5 (ref 7–25)
BUPRENORPHINE SERPL-MCNC: NEGATIVE NG/ML
CALCIUM SPEC-SCNC: 9.3 MG/DL (ref 8.6–10.5)
CANNABINOIDS SERPL QL: NEGATIVE
CHLORIDE SERPL-SCNC: 103 MMOL/L (ref 98–107)
CO2 SERPL-SCNC: 22 MMOL/L (ref 22–29)
COCAINE UR QL: NEGATIVE
CREAT SERPL-MCNC: 1.1 MG/DL (ref 0.76–1.27)
DEPRECATED RDW RBC AUTO: 43.8 FL (ref 37–54)
EGFRCR SERPLBLD CKD-EPI 2021: 67.9 ML/MIN/1.73
EOSINOPHIL # BLD AUTO: 0.27 10*3/MM3 (ref 0–0.4)
EOSINOPHIL NFR BLD AUTO: 3.1 % (ref 0.3–6.2)
ERYTHROCYTE [DISTWIDTH] IN BLOOD BY AUTOMATED COUNT: 12.7 % (ref 12.3–15.4)
FENTANYL UR-MCNC: NEGATIVE NG/ML
GLOBULIN UR ELPH-MCNC: 3.1 GM/DL
GLUCOSE SERPL-MCNC: 135 MG/DL (ref 65–99)
HCT VFR BLD AUTO: 41.8 % (ref 37.5–51)
HGB BLD-MCNC: 14.2 G/DL (ref 13–17.7)
IMM GRANULOCYTES # BLD AUTO: 0.02 10*3/MM3 (ref 0–0.05)
IMM GRANULOCYTES NFR BLD AUTO: 0.2 % (ref 0–0.5)
INR PPP: 1.06 (ref 0.89–1.12)
LYMPHOCYTES # BLD AUTO: 1.41 10*3/MM3 (ref 0.7–3.1)
LYMPHOCYTES NFR BLD AUTO: 16.4 % (ref 19.6–45.3)
MCH RBC QN AUTO: 31.8 PG (ref 26.6–33)
MCHC RBC AUTO-ENTMCNC: 34 G/DL (ref 31.5–35.7)
MCV RBC AUTO: 93.5 FL (ref 79–97)
METHADONE UR QL SCN: NEGATIVE
MONOCYTES # BLD AUTO: 0.85 10*3/MM3 (ref 0.1–0.9)
MONOCYTES NFR BLD AUTO: 9.9 % (ref 5–12)
NEUTROPHILS NFR BLD AUTO: 5.98 10*3/MM3 (ref 1.7–7)
NEUTROPHILS NFR BLD AUTO: 69.6 % (ref 42.7–76)
NRBC BLD AUTO-RTO: 0 /100 WBC (ref 0–0.2)
OPIATES UR QL: NEGATIVE
OXYCODONE UR QL SCN: NEGATIVE
PCP UR QL SCN: NEGATIVE
PLATELET # BLD AUTO: 275 10*3/MM3 (ref 140–450)
PMV BLD AUTO: 9.7 FL (ref 6–12)
POTASSIUM SERPL-SCNC: 4.2 MMOL/L (ref 3.5–5.2)
PROPOXYPH UR QL: NEGATIVE
PROT SERPL-MCNC: 7.1 G/DL (ref 6–8.5)
PROTHROMBIN TIME: 13.9 SECONDS (ref 12.2–14.5)
RBC # BLD AUTO: 4.47 10*6/MM3 (ref 4.14–5.8)
SODIUM SERPL-SCNC: 138 MMOL/L (ref 136–145)
TRICYCLICS UR QL SCN: NEGATIVE
WBC NRBC COR # BLD: 8.6 10*3/MM3 (ref 3.4–10.8)

## 2023-08-08 PROCEDURE — 80053 COMPREHEN METABOLIC PANEL: CPT

## 2023-08-08 PROCEDURE — 85025 COMPLETE CBC W/AUTO DIFF WBC: CPT

## 2023-08-08 PROCEDURE — 80307 DRUG TEST PRSMV CHEM ANLYZR: CPT | Performed by: ANESTHESIOLOGY

## 2023-08-08 PROCEDURE — 87081 CULTURE SCREEN ONLY: CPT

## 2023-08-08 PROCEDURE — 85730 THROMBOPLASTIN TIME PARTIAL: CPT

## 2023-08-08 PROCEDURE — 85610 PROTHROMBIN TIME: CPT

## 2023-08-08 PROCEDURE — 36415 COLL VENOUS BLD VENIPUNCTURE: CPT

## 2023-08-08 PROCEDURE — 93005 ELECTROCARDIOGRAM TRACING: CPT

## 2023-08-08 RX ORDER — DOCUSATE SODIUM 100 MG/1
100 CAPSULE, LIQUID FILLED ORAL DAILY
COMMUNITY

## 2023-08-08 RX ORDER — ACETAMINOPHEN,DIPHENHYDRAMINE HCL 500; 25 MG/1; MG/1
1 TABLET, FILM COATED ORAL NIGHTLY PRN
COMMUNITY

## 2023-08-08 NOTE — PAT
An arrival time for procedure was not provided during PAT visit. If patient had any questions or concerns about their arrival time, they were instructed to contact their surgeon/physician.  Additionally, if the patient referred to an arrival time that was acquired from their my chart account, patient was encouraged to verify that time with their surgeon/physician. Arrival times are NOT provided in Pre Admission Testing Department.    Patient viewed general PAT education video as instructed in their preoperative information received from their surgeon.  Patient stated the general PAT education video was viewed in its entirety and survey completed.  Copies of PAT general education handouts (Incentive Spirometry, Meds to Beds Program, Patient Belongings, Pre-op skin preparation instructions, Blood Glucose testing, Visitor policy, Surgery FAQ, Code H) distributed to patient if not printed. Education related to the PAT pass and skin preparation for surgery (if applicable) completed in PAT as a reinforcement to PAT education video. Patient instructed to return PAT pass provided today as well as completed skin preparation sheet (if applicable) on the day of procedure.     Additionally if patient had not viewed video yet but intended to view it at home or in our waiting area, then referred them to the handout with QR code/link provided during PAT visit.  Instructed patient to complete survey after viewing the video in its entirety.  Encouraged patient/family to read PAT general education handouts thoroughly and notify PAT staff with any questions or concerns. Patient verbalized understanding of all information and priority content..    Patient to apply Chlorhexadine wipes  to surgical area (as instructed) the night before procedure and the AM of procedure. Wipes provided.    Patient denies any current skin issues.     Bactroban (if prescribed) and Chlorhexidine Prescription prescribed by physician before PAT visit.   Verified with patient that medication(s) were picked up from their pharmacy.  Written instructions given to patient during PAT visit.  Patient/family also instructed to complete skin prep checklist and return the checklist on the day of surgery to preoperative staff.  Patient/family verbalized understanding.    Per Anesthesia Request, patient instructed not to take their ACE/ARB medications on the AM of surgery.    EKG shows LBBB, which patient has a history of, so not a new diagnosis, and has been worked up and cleared for previous surgery in 2021 by Dr. Frankel.

## 2023-08-09 LAB
QT INTERVAL: 482 MS
QTC INTERVAL: 469 MS

## 2023-08-10 ENCOUNTER — LAB (OUTPATIENT)
Dept: LAB | Facility: HOSPITAL | Age: 78
End: 2023-08-10
Payer: MEDICARE

## 2023-08-10 ENCOUNTER — TELEMEDICINE (OUTPATIENT)
Dept: PAIN MEDICINE | Facility: CLINIC | Age: 78
End: 2023-08-10
Payer: MEDICARE

## 2023-08-10 DIAGNOSIS — M48.062 LUMBAR STENOSIS WITH NEUROGENIC CLAUDICATION: ICD-10-CM

## 2023-08-10 DIAGNOSIS — M96.1 LUMBAR POSTLAMINECTOMY SYNDROME: ICD-10-CM

## 2023-08-10 DIAGNOSIS — Z98.1 STATUS POST LUMBAR SPINAL FUSION: ICD-10-CM

## 2023-08-10 DIAGNOSIS — E11.9 NON-INSULIN DEPENDENT TYPE 2 DIABETES MELLITUS: ICD-10-CM

## 2023-08-10 DIAGNOSIS — M24.28 LIGAMENTUM FLAVUM HYPERTROPHY: ICD-10-CM

## 2023-08-10 DIAGNOSIS — M54.16 CHRONIC LUMBAR RADICULOPATHY: ICD-10-CM

## 2023-08-10 DIAGNOSIS — R53.81 PHYSICAL DECONDITIONING: ICD-10-CM

## 2023-08-10 DIAGNOSIS — M79.2 INTRACTABLE NEUROPATHIC PAIN OF LOWER EXTREMITY, LEFT: ICD-10-CM

## 2023-08-10 DIAGNOSIS — G62.9 POLYNEUROPATHY: ICD-10-CM

## 2023-08-10 DIAGNOSIS — G03.9 ADHESIVE ARACHNOIDITIS: ICD-10-CM

## 2023-08-10 DIAGNOSIS — R26.9 GAIT DISTURBANCE: ICD-10-CM

## 2023-08-10 LAB — MRSA SPEC QL CULT: NORMAL

## 2023-08-10 PROCEDURE — 36415 COLL VENOUS BLD VENIPUNCTURE: CPT | Performed by: ANESTHESIOLOGY

## 2023-08-10 PROCEDURE — 83036 HEMOGLOBIN GLYCOSYLATED A1C: CPT | Performed by: ANESTHESIOLOGY

## 2023-08-10 NOTE — PROGRESS NOTES
"Type of Service: Consult via telemedicine  You have chosen to receive care through a telehealth visit.  Do you consent to use a video/audio connection for your medical care today? Yes    Mode of transmission: Audiovisual Digabithart 2-way interactive A/V telecommunication along with phone for the audio portion as patient's device microphone was not working   Telemedicine Provider: Efrain Batista MD   Location of Physician: Hospital office  Patient location: Home   Facilitator/intake: Светлана Rojas CMA   Any physical exam was performed by the patient using active maneuvers per instructions.    All communications with the patient were documented in the patient's medical record per documentation standards.      Chief Complaint: \"I wanted to include Zaira, my wife, in the conversations about the MILD procedure.\"      History of Present Illness:  Mr. Prashant Avila, 78 y.o. male originally referred by Dr. Fausto Renee in consultation for chronic intractable left gluteal and left lower extremity pain. As referenced on previous visits, Mr Avila presents with a complex chronic pain condition. On his last visit, we discussed prospects of a MILD procedure as he has been experiencing significant neurogenic claudication. He understood the risks and benefits but wanted to include Zaira, his wife, in the planning. Prashant Avila underwent L4-L5 hemilaminectomy and foraminotomy at [facility] with Dr. Mackay on 03/03/2014. As he continued to struggle with pain, he underwent L4-S1 lumbar decompression and fusion by Dr. Renee on 07/24/2014. Prashant Avila experienced significant improvement after his last surgery with Dr. Renee, in particular, he experienced complete resolution of his previously severe lower back pain. Unfortunately, he has been dealing with intractable left gluteal and left lower extremity pain that preceded his last surgery. He underwent surgical consultation with Dr. Cox at the [facility], who " recommended decompression of the nerve roots at L3-S1. He underwent follow-up neurosurgical consultation with Dr. Fausto Renee on 11/03/2017. Dr. Renee discussed the possibility of lumbar decompression at L3-L4: Left L3-L4 laminotomy and foraminotomy. Due to potential risk of developing instability that would necessitate extending his lumbar fusion to the L3 level. A CT myelogram was significant for stenosis above the level of his lumbar fusion, at L3-L4 without clinical correlation as he failed to obtain pain relief from diagnostic and therapeutic left L3-L4 transforaminal epidural steroid injection on April 3, 2018. Consequently, he elected to hold off on surgery. EMG/NCV, confirmed chronic left S1 radiculopathy. Prashant Avila presents symptoms consistent with chronic left S1 radiculopathy with underlying adhesive arachnoiditis contributing to the majority of his pain. Prashant Avila underwent a successful SCS trial followed by implantation of a spinal cord stimulator device on 11/21/2019 by Dr. Fausto Renee ([employer] Penta paddle lead with the top electrodes projecting at the level of the superior endplate of the T8 vertebral level. IPG: [employer] Proclaim 5 XR IPG Non-Rechargeable -Full Body MRI compatible). The device was implanted for treatment of chronic intractable left hip and left lower extremity pain. Unfortunately, he never seemed satisfied with the SCS device despite numerous attempts at SCS reprogramming. He was able to perceive paresthesia overlapping the areas of his pain but did not like the way he felt with tonic stimulation and reported no relief with Burst DR. As a result, on 3/1/2021, he underwent explantation of the entire SCS system with Dr. Fausto Renee. He also underwent intrathecal analgesic trials with fentanyl and Prialt without relief. Later on, on 9/8/2021, he underwent platelet rich leukocyte poor plasma therapy and A2M therapy delivered into the lumbar epidural space  "via caudal epidural catheter placement without significant long lasting relief.  On his last visit with me, he reported interest in pursuing an intrathecal trial with a higher dose of opioid or with alternative medications such as bupivacaine or clonidine. I explained that I will not use a higher dose of Prialt as he had psychological side effects with the lowest effective dose of Prialt for a trial. He also underwent neurology consultation with Dr. Kodak Alonso who concurred with patient's treatment measures. He underwent additional diagnostic studies. MRI of the thoracic spine without contrast on 10/5/2022 revealed multilevel spondylosis without significant canal or foraminal stenosis. X-rays of the lumbar spine on 06/02/2023 revealed PLIF L4-S1.  The left S1 pedicle screw is fractured as was present previously.  There is disc space narrowing at L3-4 above the fused level.  There is facet joint disease throughout the lumbar spine.  Prashant Avila underwent follow-up neurosurgical consultation with Dr. Fausto Renee on 06/14/2023 and was found not to be a surgical candidate. Per Dr. Renee;\" While the patient has a fracture of one of the screws, his symptoms are exactly the same as they had been since prior to his first surgery.\" Prashant Avila underwent follow-up psychological evaluation with Dr. Praveen Navarro in July 2023: \"From a psychological perspective, patient is an appropriate candidate for an intrathecal delivery system, a spinal cord stimulator device, a peripheral nerve stimulator device, a DRG device, and need any other procedures deemed appropriate for his condition.\"   Prashant Avila has failed to obtain pain relief for more than 9 years with A. conservative measures including oral analgesics (including opioids, naltrexone, most medications available for treatment of neuropathic pain except for those contraindicated due to underlying conditions, topical analgesics: failed analgesic trials " "due to side effects with most medications), ice, heat, physical therapy, physical therapist directed home exercise program HEP (ongoing), to name a few; B. Two surgical interventions (on 03/03/2014: L4-L5 hemilaminectomy and foraminotomy at [facility] with Dr. Mackay, on 07/24/2014: L4-S1 lumbar decompression and fusion by Dr. Renee); and several minimally invasive interventional pain management measures as referenced on previous notes including diagnostic selective nerve root injections, transforaminal epidural steroid injections, piriformis injections, spinal cord stimulator trial, spinal cord stimulator implant followed by explant, intrathecal analgesic trials, regenerative therapies. Prashant Avila denies new symptoms or any changes in his medical history. Pain has progressed in intensity over the past several months. He continues struggling with neuropathic pain and has been on gabapentin for several years without side effects. I started him on oral naltrexone and worked the dose up to 1 mg daily with discreet analgesic benefit but discontinued for unclear reasons.   Today he wants to address a few issus and for that he submitted some questions;  \"For the past 3 months, my pain dramatic increased. \"  He feels that his chronic pain is driving up his blood pressure. He started lisinopril 20 mg daily and still working on his BP. He is still using salt on his meals  What did you see on my EMG? Chronic left S1 radiculopathy (discussed before)  What did you see on my MRI? No stenosis in the thoracic spine (discussed before)  Can I have an Ablation? No, it will call weakness of your leg (discussed before)  You could have a pulsed radiofrequency (does not burn the nerve) of the dorsal root ganglion of S1 but this procedure is not covered by insurance  Last X-Ray showed broken screw, possible culpit? Extremely unlikely since your pain has been present since 2014 and the screw was already broken in 2019 (CT myelogram image " 126/axial)  Myelogram? Showed broken screw 2019   Can I try a pump? Yes, bupivacaine or clonidine as you have already failed Prialt and fentanyl  What else can be done?   You failed several analgesic trials. Naltrexone worked some but you stopped it  MILD: Decompress L3-L4 where there is evidence of stenosis and clumping of the nerve roots/arachnoiditis   New SCS technology: Nevro (high frequency 10K, DRG, PNS    Pain Description: Constant left gluteal and left lower extremity pain with intermittent exacerbation, described as burning, numbing, shooting, tingling, pins and needles, electrical sensation.   Radiation of Pain: The pain radiates from the left gluteal region down into the left lower extremity into the plantar aspect of his left foot. He denies significant lower back pain.   Pain pattern: Dermatomic   Pain intensity today: 8/10   Average pain intensity last week: 8/10  Pain intensity ranges from: 5/10 to 10/10  Aggravating factors: Pain increases with sitting for more than 5 minutes, lying down (takes gabapentin before going to bed), walking. Patient describes neurogenic claudication. Patient does not use a cane or a walker  Alleviating factors: Pain decreases with standing, os short walks  Associated Symptoms:   Patient reports pain, tingling, but denies numbness or weakness in the left lower extremity.  Patient denies symptoms in the opposite limb  Patient denies any new bladder or bowel problems.   Patient denies difficulties with his balance or recent falls.   Pain interferes with general activities (ability to walk, stand, transition from different positions), and affects patient's quality of life  Pain interferes with sleep causing sleep fragmentation     Review of previous therapies and additional medical records:  Prashant Avila has already failed the following measures, including:   Conservative Measures: Oral analgesics (including opioids, naltrexone, most medications available for treatment  of neuropathic pain except for those contraindicated due to underlying conditions, topical analgesics: failed analgesic trials due to side effects with most medications), ice, heat, physical therapy, physical therapist directed home exercise program HEP (ongoing), to name a few  Interventional Measures:   09/08/2021: Platelet rich leukocyte poor plasma therapy and A2 M therapy, via lumbar epidural administration with caudal epidural catheter placement approach  02/06/2019: Left S1 transforaminal epidural steroid injection/pain relief that was short-lived   02/20/2017: Diagnostic and therapeutic left sciatic nerve block with local anesthetics, steroids, hyaluronidase under ultrasound, PNS and fluoroscopic guidance- Patient did not receive sustained relief from this procedure.   02/06/2017: Diagnostic left S1 selective nerve root injection, relief  01/30/2017: Nevro SCS Trial; no relief  04/25/2016: Trial of lumbar epidural steroid injections with steroids and hyaluronidase via caudal catheter with 100% pain relief lasting 48 hours  12/16/2015: Diagnostic and therapeutic left S1 transforaminal epidural steroid injection with hyaluronidase with complete resolution of pain for several weeks  11/30/2015: Spinal cord stimulator trial [employer], which provided him with more than 50% pain relief and functional improvement. However, patient decided to delay implantation of a permanent spinal cord stimulator device until the arrival of new technology. Patient did not like the perception of tonic stimulation, even though stimulation overlapped successfully the areas of his chronic pain, he did not find it pleasant.   02/09/2015: Diagnostic left piriformis muscle injection, no relief  Surgical Measures:   03/01/2021: SCS explant by Dr. Fausto Renee.   11/21/2019: SCS implant: [employer] Penta paddle lead with the top electrodes projecting at the level of the superior endplate of the T8 vertebral level. IPG: [employer]  "Proclaim 5 XR IPG Non-Rechargeable by Dr. Fausto Renee   07/24/2014: L4-S1 decompression and fusion by Dr. Renee  03/03/2014: Left L4-L5 hemilaminectomy and foraminotomy at [facility] with Dr. Mackay   Prashant Avila underwent neurosurgical consultation with Dr. Renee on 06/24/2023, and was found not to be a surgical candidate.  Prashant Avila underwent psychological consultation at [facility] psychological evaluation with Dr. Praveen Navarro on 8/30/2021, per note: \"It is recommended the patient be seen for psychological treatment of his pain disorder including biofeedback.  From a psychological perspective, patient is an appropriate candidate for interventional pain procedures, such as a pain pump with opioids or Prialt.\" Follow-up psychological evaluation with Dr. Praveen Navarro in July 2023: \"From a psychological perspective, patient is an appropriate candidate for an intrathecal delivery system, a spinal cord stimulator device, a peripheral nerve stimulator device, a DRG device, and need any other procedures deemed appropriate for his condition.\"   Prashant Avila presents with significant comorbidities including history of prostate cancer, eczema, gout, hyperlipidemia, hypertension, history of kidney stones, left bundle branch block, osteoarthritis, rheumatoid arthritis, hearing loss, osteoporosis  In terms of current analgesics, Prashant Avila takes: Gabapentin 800 mg QAM, QPM, TT QHS. He stopped taking naltrexone 1 mg daily despite experiencing some relief.   I have reviewed Jon Report consistent with medication reconciliation.  SOAPP/ORT: Low Risk     PHQ-2 Depression Screening  Little interest or pleasure in doing things?     Feeling down, depressed, or hopeless?     PHQ-2 Total Score       Pain Self-Efficacy Questionnaire (PSEQ)  ITEM 08-03  2023        I can enjoy things despite the pain. 4        I can do most of the household chores (tidying up, washing dishes, etc), despite the pain. " 1        I can socialize with my friends or family members as often as I used to do, despite the pain. 1        I can cope with my pain in most situations. 4        I can do some form of work, despite the pain (includes housework, paid, and unpaid work). 1        I can still do many of the things I enjoy doing, such as hobbies or leisure activity despite pain. 0        I can cope with my pain without medications. 2        I can accomplish most of my goals in life despite the pain. 0        I can live in a normal lifestyle, despite the pain. 0        I can gradually become more active, despite the pain. 0        TOTAL SCORE 13/60          Global Pain Scale 04-19  2018 10-24  2018 11-08  2018 01-29  2019 03-06  2019 10-03  2019 10-17  2019 01-14  2020 01-21  2020 02-20  2020 04-14  2020 05-07  2020 09-24  2020 07-20  2021 10-19  2021 11-23  2021 01-13  2022 03-15  2022 06-07  2022 08-30  2022     Pain 19 17 17 18 17 20  10 10 10   10  10 8 20 22 19 17 22 21 19 21   Feelings 1 0 0 0 0   8    4   2  0  0    0 0 6 12 4 1 0 0 0  9   Clinical outcomes 11 8 13 11 6 11    7   6  7  9    9 9 11 12 9 11 10 10 10 15   Activities 17 10 16 18 6 15    7   6  4  15  15 12 7 10 8 21 7 15 12 15   GPS Total: 48 35 46 47 29 54  28  24  21  34  34 28 44 56 40 50 39 46 41  60     Global Pain Scale 08-03 2023          Pain 19          Feelings 8          Clinical outcomes 12          Activities 0          GPS Total: 39              The [place] Back Pain Disability Scale  DATE 08-30 2022 08-03 2023         Sleep through the night 3 5         Turn over in bed 0 0         Get out of bed 0 0         Make your bed 0 0         Put on socks (pantyhose) 0 0         Ride in a car 5 5         Sit in a chair for several hours 5 5         Stand up for 20-30 minutes 0 0         Climb one flight of stairs 2 0         Walk a few blocks (200-300 yards)  3 5         Walk several miles 5 5         Run one block (about 50 yards) 5 5         Take food out  of the refrigerator 0 0         Reach up to high shelves 0 4         Move a chair 0 2         Pull or push heavy doors 0 2         Bend over to clean the bathtub 0 4         Throw a ball 0 5         Carry two bags of groceries 2 5         Lift and carry a heavy suitcase 3 5         Total score 33 57           LANSS pain scale  LANSS item 08-30  2022   1. Does the pain produce unpleasant sensations such as tingling, pricking or pins and needles? 5   2. Is there a different skin aspect in the painful areas, i.e. skin redder than usual or appearing mottled? 0   3. Does stroking the skin in the painful area or wearing tight clothing items produce unpleasant sensations? 0   4. Do you experience any sensations like electric shocks, bursting or jumping corresponding to painful episodes, i.e. unexplained bursts of pain? 2   5. Do you experience burning sensations in the painful areas or a sudden temperature change? 1   6. Result to stroking the non painful area and the described painful area with cotton wool: 0   7. Result to touching (pinprick) both areas with a 23 gauge needle: 3   Total Score: 13 (likely neuropathic pain)    Review of New Diagnostic Studies:  I have independently reviewed and interpreted the images with the patient and used the images and a tridimensional spine model to explain findings. I have also reviewed the reports.  08/08/2023: UDS + gabapentin: appropriate  08/08/2023: CBC, PT, PTT wnl  08/03/2023: Flexion and extension Xrays of the lumbar spine: No instability    Review of Previous Diagnostic Studies:  I have independently reviewed and interpreted the images with the patient and used the images and a tridimensional spine model to explain findings. I have also reviewed the reports.  X-rays of the lumbar spine on 6/2/2023 revealed PLIF L4-S1.  The left S1 pedicle screw is fractured as was present previously.  There is disc space narrowing at L3-4 above the fused level.  There is facet joint disease  throughout the lumbar spine.    MRI of the thoracic spine without contrast on 10/5/2022 revealed multilevel spondylosis without significant canal or foraminal stenosis.  MRI THORACIC SPINE WO CONTRAST-10/31/2019: Minimal degenerative changes at multiple levels throughout the thoracic spine with no central spinal canal stenosis.   EMG/NCV of the bilateral lower extremities 12/28/2018: Chronic left S1 radiculopathy  CT LUMBAR SPINE WITH CONTRAST-01/21/2019: Excellent opacification of the thecal sac. Imaging covers from T12 through the S1 level caudally. The conus terminates at the L1/L2 level.  The distal cord and conus are normal in caliber. The nerve roots of the cauda equina are normal in caliber and distribution. L4-S1 fusion hardware is in place; the left S1 pedicle screw is fractured and there is lucency around the right S1 pedicle screw. Left L4 laminotomy. Vertebral body heights and alignment are normal. Vertebral body heights are normal. Degenerative changes are as follows:  T12-L1: Facet arthropathy without significant foraminal or spinal canal stenosis.  L1-L2: Facet arthropathy without significant foraminal or spinal canal stenosis.  L2-L3: Right greater than left facet arthropathy. Mild narrowing of the right subarticular zone. No significant central spinal canal or foraminal stenosis.  L3-L4: Disc bulge and facet arthropathy with ligamentum flavum thickening. There is severe spinal canal stenosis and at least moderate bilateral foraminal stenosis.   L4-L5: Improved right subarticular zone narrowing. No significant osseous spinal canal or foraminal stenosis.  L5-S1: Bilateral facet arthropathy. Shallow disc osteophyte. Unchanged mild osseous foraminal stenosis. No significant osseous spinal canal stenosis.  EKG April 19, 2018 revealed sinus bradycardia with first-degree AV block.  Left bundle branch block.    X-Ray lumbar spine 08/04/2017: Posterior lumbar fusion of L4, L5 and S1. Degenerative changes at  L1-L2 with anterior osteophyte formation. Normal lumbar alignment in the lateral projection with a stable alignment in the flexed and extended positions.      Duplex Venous Lower, 03/25/2017: Normal right lower extremity venous duplex scan. No evidence of DVT.  MRI Lumbar Spine, 10/28/2016: Since the previous MRI study, there has been L4-5-S1 posterior lumbar and interbody fusion. No new abnormality of alignment is seen. There is, however, significant worsening of posterior element hypertrophy at L3-4, above the fusion level, due to persistent L3-4 disc bulge, and what appears to be new spinal stenosis at this level, generally as a result of posterior element hypertrophic change. Canal appears stable elsewhere. No lumbar compression deformity or new abnormality of alignment is seen. No vertebral marrow edema is appreciated. Sagittal images suggest a small (18 mm) seroma or synovial cyst on the right at L5-S1. Tip of conus medullaris is again noted at L1.  Axial images   L1-L2: No significant canal or foraminal stenosis   L2-L3: Canal appears relatively narrowed, due to posterior element hypertrophy but still greater than 1 cm in diameter. Foramina appear  lower limits of normal diameter.   L3-L4: Moderate canal stenosis with lateral narrowing, AP diameter 6.5 mm, transverse diameter approximately 9 mm with crowding of the nerve roots. Foramina appear mildly to moderately narrowed bilaterally due to facet DJD.   L4-L5: Fused level, canal appears normal. Mild foraminal stenosis due to facet DJD  L5-S1: Canal appears normal. Left-sided neural foramen appears normal. Right-sided neural foramen appears mildly narrowed due to facet DJD. Postcontrast images show expected postoperative enhancement of the dorsal soft tissues and no pathologic postcontrast enhancement elsewhere.     The following portions of the patient's history were reviewed and updated as appropriate: problem list, past medical history, past surgery  history, social history, family history, medication reconciliation, and allergies    Review of Systems   Musculoskeletal:  Positive for back pain.   Neurological:  Positive for weakness and numbness.   All other systems reviewed and are negative.      Patient Active Problem List   Diagnosis    Chronic lumbar radiculopathy    Lumbar postlaminectomy syndrome    Status post L4-S1 lumbar spinal fusion    Physical deconditioning    Adhesive arachnoiditis    Bilateral stenosis of lateral recess of lumbar spine    Osteoarthritis    Chronic intractable pain    Encounter for therapeutic drug monitoring    Chronic pain syndrome    Intractable neuropathic pain of lower extremity, left    Gait disturbance    Polyneuropathy    Lumbar stenosis with neurogenic claudication    Ligamentum flavum hypertrophy    Non-insulin dependent type 2 diabetes mellitus       Past Medical History:   Diagnosis Date    Arthritis     Carcinoma of prostate     surgery     Chronic pain disorder     Eczema     Elevated cholesterol     Gout     History of chronic kidney disease     Hyperlipidemia     Hypertension     Kidney stone 1990    passed     Left bundle branch block     Low back pain     Lumbar stenosis with neurogenic claudication 08/03/2023    Lumbosacral disc disease     Neuropathic pain of flank, left     Non-insulin dependent type 2 diabetes mellitus 8/10/2023    Osteoarthritis     Polyneuropathy 08/03/2023    Thoracic disc disorder     Wears hearing aid in both ears          Past Surgical History:   Procedure Laterality Date    BACK SURGERY  2014    Lumbar Fusion, july 2014    BACK SURGERY  2014    Lumbar Laminectomy, march 2014    COLONOSCOPY  2016    EPIDURAL BLOCK  2016    LAMINECTOMY  2013    OTHER SURGICAL HISTORY      Enteroscopic Polypectomy    PAIN PUMP INSERTION/REVISION N/A 10/12/2020    Procedure: PAIN PUMP TRIAL;  Surgeon: Efrain Batista MD;  Location: [facility];  Service: Pain Management;  Laterality: N/A;    DC MYELOGRAPHY  VIA LUMBAR INJECT RS&I LUMBOSACRAL N/A 01/21/2019    Procedure: IR myelogram, lumbar;  Surgeon: Francisco Javier Clay MD;  Location:  [facility];  Service: Interventional Radiology    PROSTATE SURGERY      SPINAL CORD STIMULATOR IMPLANT N/A 11/21/2019    Procedure: SPINAL CORD STIMULATOR INSERTION;  Surgeon: Fausto Renee MD;  Location:  [facility];  Service: Neurosurgery    SPINAL CORD STIMULATOR IMPLANT N/A 03/01/2021    Procedure: Removal of entire system, SPINAL CORD STIMULAR REMOVAL;  Surgeon: Fausto Renee MD;  Location:  [facility];  Service: Neurosurgery;  Laterality: N/A;    TESTICLE UNDESCENDED REPAIR  1980    VOCAL CORD BIOPSY      polyps removed          Family History   Problem Relation Age of Onset    Congenital heart disease Mother     Hearing loss Mother     Multiple myeloma Father     Breast cancer Neg Hx     Ovarian cancer Neg Hx          Social History     Socioeconomic History    Marital status:    Tobacco Use    Smoking status: Never     Passive exposure: Never    Smokeless tobacco: Never   Vaping Use    Vaping Use: Never used   Substance and Sexual Activity    Alcohol use: No    Drug use: No    Sexual activity: Defer           Current Outpatient Medications:     chlorhexidine (HIBICLENS) 4 % external liquid, Apply  topically to the appropriate area as directed Daily As Needed for Wound Care., Disp: 236 mL, Rfl: 0    diphenhydrAMINE-acetaminophen (TYLENOL PM)  MG tablet per tablet, Take 1 tablet by mouth At Night As Needed for Sleep., Disp: , Rfl:     docusate sodium (COLACE) 100 MG capsule, Take 1 capsule by mouth Daily., Disp: , Rfl:     gabapentin (NEURONTIN) 800 MG tablet, TAKE ONE TABLET BY MOUTH EVERY MORNING, TAKE ONE TABLET BY MOUTH DAILY AT 12:00PM (NOON), AND TAKE TWO TABLETS BY MOUTH EVERY NIGHT AT BEDTIME, Disp: 120 tablet, Rfl: 2    lisinopril (PRINIVIL,ZESTRIL) 10 MG tablet, Take 2 tablets by mouth., Disp: , Rfl:     Naltrexone powder, 1 mg Daily.  naltrexone 1 mg once a day, #30, 1 refill, Disp: 30 g, Rfl: 1    pravastatin (PRAVACHOL) 10 MG tablet, Take 1 tablet by mouth Daily., Disp: , Rfl:       Allergies   Allergen Reactions    Adhesive Tape Rash     Rash with blisters      Doxycycline Rash         There were no vitals taken for this visit.      Physical Exam:  From last visit  Constitutional: Patient appears well-developed, well-nourished, well-hydrated, appears younger than stated age  HEENT: Head: Normocephalic and atraumatic  Eyes: Conjunctivae and lids are normal  Pupils: Equal, round, reactive to light  Neck: Trachea normal. Neck supple. No JVD present.   Lymphatic: No cervical adenopathy  Peripheral vascular exam: Femoral: right 2+, left 2+. Posterior tibialis: right 2+ and left 2+. Dorsalis pedis: right 2+ and left 2+. No edema.   Musculoskeletal   Gait and station: Gait evaluation demonstrated a normal gait with a slight limp on the left. Able to walk on heels (weak left foot), toes (weak left foot), tandem walking   Cervical spine: Passive and active range of motion are full and without pain. Extension, flexion, lateral flexion, rotation of the cervical spine did not increase or reproduce pain. Cervical facet joint loading maneuvers are negative.   Lumbar spine: Passive and active range of motion are appropriate for patient's age and condition.  Extension, flexion, lateral flexion, rotation of the lumbar spine did not increase or reproduce pain. Lumbar facet joint loading maneuvers are negative.   Kodak test, Gaenslen's test, thigh thrust test, SI compression test, Yeoman's test are negative   Piriformis maneuvers are negative   Hip joints: The range of motion of the hip joints is almost full and without pain   Palpation of the bilateral psoas tendons and iliopsoas bursas,  unrevealing   Palpation of the bilateral greater trochanter, unrevealing   Examination of the Iliotibial band: unrevealing   Neurological:   Patient is alert and oriented to  person, place, and time.   Speech: Normal.   Cortical function: Normal mental status.   Cranial nerves 2-12: intact.   Reflex Scores:  Right patellar: 0+  Left patellar: 0+  Right Achilles: 0+  Left Achilles: 0+  Motor strength: 5/5, except left foot 4/5 dorsiflexion and plantar flexion  Motor Tone: Normal  Involuntary movements: None.   Superficial/Primitive Reflexes: Primitive reflexes were absent.   Right Lamas: Absent  Left Lamas: Absent  Right ankle clonus: Absent  Left ankle clonus: Absent   Babinsky: Absent  Lhermitte sign: Negative. Long tract signs: Negative. Straight leg raising test: Negative on the right. Positive on the left at 30-40 degrees with positive Lasegue. Femoral stretch sign: Negative on the right, questionably positive on the left.   Sensory exam: Intact to light touch, intact pain and temperature sensation, intact vibration sensation and normal proprioception, except for decreased sensation to vibration from the knees down in a stocking distribution LT>RT.   Coordination: Normal finger to nose and heel to shin. Normal balance. Romberg's sign: Negative    Skin and subcutaneous tissue: Skin is warm and intact. No rash noted. No cyanosis.   Psychiatric: Judgment and insight: Normal. Recent and remote memory: Intact. Mood and affect: Normal.       ASSESSMENT:   1. Lumbar stenosis with neurogenic claudication    2. Ligamentum flavum hypertrophy    3. Adhesive arachnoiditis    4. Chronic lumbar radiculopathy    5. Intractable neuropathic pain of lower extremity, left    6. Lumbar postlaminectomy syndrome    7. Status post L4-S1 lumbar spinal fusion    8. Polyneuropathy    9. Non-insulin dependent type 2 diabetes mellitus    10. Gait disturbance    11. Physical deconditioning        PLAN/MEDICAL DECISION MAKING:  Mr. Prashant Avila, 78 y.o. male presents with a long standing history of chronic intractable left gluteal and left lower extremity pain refractory to surgical intervention,  conservative measures, and minimally invasive interventional pain management measures. Prashant Avila underwent L4-L5 hemilaminectomy and foraminotomy at [facility] with Dr. Mackay on 03/03/2014. As he continued to struggle with pain, he underwent L4-S1 lumbar decompression and fusion by Dr. Renee on 07/24/2014. Prashant Avila experienced significant improvement after his last surgery with Dr. Renee, in particular, he experienced complete resolution of his previously severe lower back pain. Unfortunately, he continue struggling with chronic intractable left gluteal and left lower extremity pain that preceded his last surgery. He underwent surgical consultation with Dr. Cox at the [facility], who recommended decompression of the nerve roots at L3-S1. He underwent follow-up neurosurgical consultation with Dr. Fausto Renee on 11/03/2017. Dr. Renee discussed the possibility of lumbar decompression at L3-L4: Left L3-L4 laminotomy and foraminotomy. Due to potential risk of developing instability that would necessitate extending his lumbar fusion to the L3 level. A CT myelogram was significant for stenosis above the level of his lumbar fusion, at L3-L4 without clinical correlation as he failed to obtain pain relief from diagnostic and therapeutic left L3-L4 transforaminal epidural steroid injection on April 3, 2018. Consequently, he elected to hold off on surgery. EMG/NCV, confirmed chronic left S1 radiculopathy. Prashant Avila presents symptoms consistent with chronic left S1 radiculopathy with underlying adhesive arachnoiditis contributing to the majority of his pain. Prashant Avila underwent a successful SCS trial followed by implantation of a spinal cord stimulator device on 11/21/2019 by Dr. Fausto Renee ([employer] [place] Penta paddle lead with the top electrodes projecting at the level of the superior endplate of the T8 vertebral level. IPG: [employer] Proclaim 5 XR IPG Non-Rechargeable -Full Body  "MRI compatible). Unfortunately, he never seemed satisfied with the SCS device despite numerous attempts at SCS reprogramming. He was able to perceive paresthesia overlapping the areas of his chronic pain but did not like the way he felt with tonic stimulation and reported no relief with Brent DR. As a result, on 3/1/2021, he underwent explantation of the entire SCS system. He also underwent intrathecal analgesic trials with fentanyl and Prialt without relief. Later on, on 9/8/2021, he underwent platelet rich leukocyte poor plasma therapy and A2M therapy delivered into the lumbar epidural space via caudal epidural catheter placement without significant long lasting relief.  On his last visit with me, he reported interest in pursuing an intrathecal trial with a higher dose of opioid or with alternative medications such as bupivacaine or clonidine. I explained that I will not use a higher dose of Prialt as he had psychological side effects with the lowest effective dose of Prialt for a trial. He also underwent neurology consultation with Dr. Kodak Alonso who concurred with patient's treatment measures. He underwent additional diagnostic studies. MRI of the thoracic spine without contrast on 10/5/2022 revealed multilevel spondylosis without significant canal or foraminal stenosis. X-rays of the lumbar spine on 06/02/2023 revealed PLIF L4-S1. The left S1 pedicle screw is fractured as was present previously.  There is disc space narrowing at L3-4 above the fused level.  There is facet joint disease throughout the lumbar spine.  Prashant Avila underwent follow-up neurosurgical consultation with Dr. Fausto Renee on 06/14/2023 and was found not to be a surgical candidate. Per Dr. Renee;\" While the patient has a fracture of one of the screws, his symptoms are exactly the same as they had been since prior to his first surgery.\" Prashant Avila underwent follow-up psychological evaluation with Dr. Praveen Navarro in " "July 2023: \"From a psychological perspective, patient is an appropriate candidate for an intrathecal delivery system, a spinal cord stimulator device, a peripheral nerve stimulator device, a DRG device, and need any other procedures deemed appropriate for his condition.\"  Prashant Avila has failed to obtain pain relief for more than 9 years with A. conservative measures including oral analgesics (including opioids, naltrexone, most medications available for treatment of neuropathic pain except for those contraindicated due to underlying conditions, topical analgesics: failed analgesic trials due to side effects with most medications), ice, heat, physical therapy, physical therapist directed home exercise program HEP (ongoing), to name a few; B. Two surgical interventions (on 03/03/2014: L4-L5 hemilaminectomy and foraminotomy at [facility] with Dr. Mackay, on 07/24/2014: L4-S1 lumbar decompression and fusion by Dr. Renee); and several minimally invasive interventional pain management measures as referenced on previous notes including diagnostic selective nerve root injections, transforaminal epidural steroid injections, piriformis injections, spinal cord stimulator trial, spinal cord stimulator implant followed by explant, intrathecal analgesic trials, regenerative therapies. Prashant Avila denies new symptoms or any changes in his medical history. Pain has progressed in intensity over the past several months. A comprehensive evaluation including history and physical exam along with pertinent physiologic and functional assessment was performed. Patient presents with intractable pain due to the diagnoses listed above. Patient has failed to respond to conservative modalities, previous minimally invasive interventional pain management measures, and previous surgical interventions, as referenced under HPI including the impact of patient's moderate-to-severe pain contributing to significant impairment in daily " activities, ADLs, and a negative impact on quality of life. Supporting diagnostic studies of patient's chronic pain condition have been reviewed. I have reviewed all available patient's medical records as well as previous therapies as referenced above. I had a lengthy conversation with . Prashant Avila regarding his chronic pain condition and potential therapeutic options including risks, benefits, alternative therapies, to name a few. We have discussed using a stepwise approach starting with the least intense level of treatment as determined by the extent required to diagnose and or treat a patient's condition. The treatments proposed are consistent with the patient's medical condition and are known to be as safe and effective by current guidelines and standard of care. There is no evidence of absolute contraindications for the proposed procedures under the current circumstances. These treatments are not considered experimental or investigational. The duration and frequency proposed are considered appropriate for the service in accordance with accepted standards of medical practice for the diagnosis and or treatment of the patient's condition and intended to improve the patient's level of function. These services will be furnished in a setting appropriate to the patient's medical needs and condition.  Patient has requested this visit so his wife could take part of the conversation regarding his upcoming mild procedure.  Therefore, I have proposed the following plan:    1. Interventional pain management measures: Prashant Avila does not take any blood thinners. We have discussed different therapeutic options at length during his previous visits as well as during today's visit. I have explained the complexity of his chronic pain issues as it involves numerous elements of intractable neuropathic pain components as a result of his lumbar arachnoiditis, peripheral polyneuropathy, chronic left S1 radiculopathy,  residual lumbar stenosis with claudication, etc. Prashant Avila reports neurogenic claudication. At L3-L4, the main component of stenosis is hypertrophy of the ligamentum flavum. Therefore, we have discussed prospects of percutaneous lumbar decompression of the hypertrophic ligamentum flavum at L3-L4 as it is the main component of his central canal stenosis and the area of clumping of the nerve roots/arachnoiditis. The procedure could be beneficial and at the same time it is minimally invasive. Flexion extension X-rays of the lumbar spine revealed stability of the lumbar spine. We have also discussed other therapeutic options such as an intrathecal trial via intrathecal catheter with nonopioid drugs such as bupivacaine or clonidine; new technology on neuromodulation techniques: Nevro spinal cord stimulation with 10K high-frequency, DRG stimulator trial at the left S1 dorsal root ganglion and possibly the left T12-L1; peripheral nerve stimulation (Sprint temporary PNS system versus an implantable peripheral nerve stimulator such as StimRouter, to name a few    2. Diagnostic studies: Patient completed labs @ [facility]. I have ordered a HbA1C due to hyperglycemia on his last tests. If he continues to struggle with pain; we could obtain an MRI of the lumbar spine and sacrum with and without contrast for planning of DRG and or SCS    3. Pharmacological measures: Reviewed and discussed; Prashant Avila has failed numerous analgesic trials. Unfortunately, he is not a candidate for a trial with mexiletine due to an abnormal EKG.    A. Continue gabapentin gabapentin 800 mg 1 tablet every morning, 1 tablet at noon, and 2 tablets at bedtime. He has previously failed a trial with Lyrica and other anticonvulsants  B. Resume naltrexone 1 mg once a day, #30, 1 refill.  We will assess analgesic response and titrate the dose accordingly, he would like to remain at this dose.  Screening and compliance with controlled substances:  Patient has completed a SOAPP and ORT questionnaires (revealing low risk).Jon reports have been reviewed and appropriate. Random urine drug screenings have been obtained and appropriate. Patient has signed a consent for treatment with controlled substances after reviewing the document and verbalizing full understanding of the risks, benefits, alternatives, and consequences of compliance and adherence with treatment and comprehensive plan of care. Patient received in hand a copy of this document.    4. Long-term rehabilitation efforts:  A. The patient does not have a history of falls. I did complete a risk assessment for falls.   B.  Patient will start a comprehensive physical therapy program at [facility] for Alter-G, core strengthening, gait and balance training, neurodynamics, ASTYM, E-STIM, myofascial release, cupping, dry needling, home exercise program after his MILD procedure  C. Start an exercise program such as yoga, Pilates, and water therapy  D. Contrast therapy: Apply ice-packs for 15-20 minutes, followed by heating pads for 15-20 minutes to affected area   E. Prashant Avila  reports that he has never smoked. He has never been exposed to tobacco smoke. He has never used smokeless tobacco.    5. The patient and his family have been instructed to contact my office with any questions or difficulties. The patient understands the plan and agrees to proceed accordingly.    The patient has a documented plan of care to address chronic pain. Prashant Avila reports a pain score of 8/10.  Given his pain assessment as noted, treatment options were discussed and the following options were decided upon as a follow-up plan to address the patient's pain: continuation of current treatment plan for pain, educational materials on pain management, home exercises and therapy, patient declined analgesics, referral to Physical Therapy, referral to specialist for assistance in pain treatment guidance,  steroid injections, use of non-medical modalities (ice, heat, stretching and/or behavior modifications), and interventional pain management measures including mild, neuromodulation techniques, to name a few. .              Pain Management Panel  More data exists         Latest Ref Rng & Units 8/8/2023 8/30/2022   Pain Management Panel   Amphetamine, Urine Qual Negative Negative  Negative    Barbiturates Screen, Urine Negative Negative  Negative    Benzodiazepine Screen, Urine Negative Negative  Negative    Buprenorphine, Screen, Urine Negative Negative  Negative    Cocaine Screen, Urine Negative Negative  Negative    Fentanyl, Urine Negative Negative  -   Methadone Screen , Urine Negative Negative  Negative    Methamphetamine, Ur Negative Negative  Negative         JOCELIN query complete. JOCELIN reviewed by Efrain Batista MD.     Pain Medications               gabapentin (NEURONTIN) 800 MG tablet TAKE ONE TABLET BY MOUTH EVERY MORNING, TAKE ONE TABLET BY MOUTH DAILY AT 12:00PM (NOON), AND TAKE TWO TABLETS BY MOUTH EVERY NIGHT AT BEDTIME             Orders Placed This Encounter   Procedures    Hemoglobin A1c      Total Time: 28 minutes    Patient verbalizes understanding of comfort measures, instructions for treatment, and follow-up.    Please note that portions of this note were completed with a voice recognition program.   Any copied data in any portion of my note has been reviewed by myself and accurate.     The 21st Century Cures Act makes medical notes like this available to patients in the interest of transparency. This is a medical document intended as peer to peer communication. It is written in medical language and may contain abbreviations or verbiage that are unfamiliar. It may appear blunt or direct. Medical documents are intended to carry relevant information, facts as evident, and the clinical opinion of the practitioner.     Efrain Batista MD    Patient Care Team:  Montse Chun MD as PCP -  General (Internal Medicine)  Fausto Renee MD as Consulting Physician (Neurosurgery)  Jean Warren MD as Consulting Physician (Neurosurgery)  Marcelino Condon MD as Consulting Physician (Urology)  Brian Tatum III, MD as Consulting Physician (Cardiology)  SHERON Chun MD as Consulting Physician (Family Medicine)  Francisco Javier Clay MD as Consulting Physician (Neurosurgery)  Royer Roldan MD as Consulting Physician (General Practice)  Emigdio Watt MD as Consulting Physician (Gastroenterology)  Shola Vila MD as Consulting Physician (Otolaryngology)  Hank Frankel MD as Consulting Physician (Cardiology)  Inez Regan APRN as Nurse Practitioner (Pain Medicine)  Efrain Batista MD as Consulting Physician (Pain Medicine)     No orders of the defined types were placed in this encounter.        No future appointments.

## 2023-08-10 NOTE — H&P (VIEW-ONLY)
"Type of Service: Consult via telemedicine  You have chosen to receive care through a telehealth visit.  Do you consent to use a video/audio connection for your medical care today? Yes    Mode of transmission: Audiovisual TOPSEChart 2-way interactive A/V telecommunication along with phone for the audio portion as patient's device microphone was not working   Telemedicine Provider: Efrain Batista MD   Location of Physician: Hospital office  Patient location: Home   Facilitator/intake: Светлана Rojas CMA   Any physical exam was performed by the patient using active maneuvers per instructions.    All communications with the patient were documented in the patient's medical record per documentation standards.      Chief Complaint: \"I wanted to include Zaira, my wife, in the conversations about the MILD procedure.\"      History of Present Illness:  Mr. Prashant Avila, 78 y.o. male originally referred by Dr. Fausto Renee in consultation for chronic intractable left gluteal and left lower extremity pain. As referenced on previous visits, Mr Avila presents with a complex chronic pain condition. On his last visit, we discussed prospects of a MILD procedure as he has been experiencing significant neurogenic claudication. He understood the risks and benefits but wanted to include Zaira, his wife, in the planning. Prashant Avila underwent L4-L5 hemilaminectomy and foraminotomy at [facility] with Dr. Mackay on 03/03/2014. As he continued to struggle with pain, he underwent L4-S1 lumbar decompression and fusion by Dr. Renee on 07/24/2014. Prashant Avila experienced significant improvement after his last surgery with Dr. Renee, in particular, he experienced complete resolution of his previously severe lower back pain. Unfortunately, he has been dealing with intractable left gluteal and left lower extremity pain that preceded his last surgery. He underwent surgical consultation with Dr. Cox at the [facility], who " recommended decompression of the nerve roots at L3-S1. He underwent follow-up neurosurgical consultation with Dr. Fausto Renee on 11/03/2017. Dr. Renee discussed the possibility of lumbar decompression at L3-L4: Left L3-L4 laminotomy and foraminotomy. Due to potential risk of developing instability that would necessitate extending his lumbar fusion to the L3 level. A CT myelogram was significant for stenosis above the level of his lumbar fusion, at L3-L4 without clinical correlation as he failed to obtain pain relief from diagnostic and therapeutic left L3-L4 transforaminal epidural steroid injection on April 3, 2018. Consequently, he elected to hold off on surgery. EMG/NCV, confirmed chronic left S1 radiculopathy. Prashant Avila presents symptoms consistent with chronic left S1 radiculopathy with underlying adhesive arachnoiditis contributing to the majority of his pain. Prashant Avila underwent a successful SCS trial followed by implantation of a spinal cord stimulator device on 11/21/2019 by Dr. Fausto Renee ([employer] Penta paddle lead with the top electrodes projecting at the level of the superior endplate of the T8 vertebral level. IPG: [employer] Proclaim 5 XR IPG Non-Rechargeable -Full Body MRI compatible). The device was implanted for treatment of chronic intractable left hip and left lower extremity pain. Unfortunately, he never seemed satisfied with the SCS device despite numerous attempts at SCS reprogramming. He was able to perceive paresthesia overlapping the areas of his pain but did not like the way he felt with tonic stimulation and reported no relief with Burst DR. As a result, on 3/1/2021, he underwent explantation of the entire SCS system with Dr. Fausto Renee. He also underwent intrathecal analgesic trials with fentanyl and Prialt without relief. Later on, on 9/8/2021, he underwent platelet rich leukocyte poor plasma therapy and A2M therapy delivered into the lumbar epidural space  "via caudal epidural catheter placement without significant long lasting relief.  On his last visit with me, he reported interest in pursuing an intrathecal trial with a higher dose of opioid or with alternative medications such as bupivacaine or clonidine. I explained that I will not use a higher dose of Prialt as he had psychological side effects with the lowest effective dose of Prialt for a trial. He also underwent neurology consultation with Dr. Kodak Alonso who concurred with patient's treatment measures. He underwent additional diagnostic studies. MRI of the thoracic spine without contrast on 10/5/2022 revealed multilevel spondylosis without significant canal or foraminal stenosis. X-rays of the lumbar spine on 06/02/2023 revealed PLIF L4-S1.  The left S1 pedicle screw is fractured as was present previously.  There is disc space narrowing at L3-4 above the fused level.  There is facet joint disease throughout the lumbar spine.  Prashant Avila underwent follow-up neurosurgical consultation with Dr. Fausto Renee on 06/14/2023 and was found not to be a surgical candidate. Per Dr. Renee;\" While the patient has a fracture of one of the screws, his symptoms are exactly the same as they had been since prior to his first surgery.\" Prashant Avila underwent follow-up psychological evaluation with Dr. Praveen Navarro in July 2023: \"From a psychological perspective, patient is an appropriate candidate for an intrathecal delivery system, a spinal cord stimulator device, a peripheral nerve stimulator device, a DRG device, and need any other procedures deemed appropriate for his condition.\"   Prashant Avila has failed to obtain pain relief for more than 9 years with A. conservative measures including oral analgesics (including opioids, naltrexone, most medications available for treatment of neuropathic pain except for those contraindicated due to underlying conditions, topical analgesics: failed analgesic trials " "due to side effects with most medications), ice, heat, physical therapy, physical therapist directed home exercise program HEP (ongoing), to name a few; B. Two surgical interventions (on 03/03/2014: L4-L5 hemilaminectomy and foraminotomy at [facility] with Dr. Mackay, on 07/24/2014: L4-S1 lumbar decompression and fusion by Dr. Renee); and several minimally invasive interventional pain management measures as referenced on previous notes including diagnostic selective nerve root injections, transforaminal epidural steroid injections, piriformis injections, spinal cord stimulator trial, spinal cord stimulator implant followed by explant, intrathecal analgesic trials, regenerative therapies. Prashant Avila denies new symptoms or any changes in his medical history. Pain has progressed in intensity over the past several months. He continues struggling with neuropathic pain and has been on gabapentin for several years without side effects. I started him on oral naltrexone and worked the dose up to 1 mg daily with discreet analgesic benefit but discontinued for unclear reasons.   Today he wants to address a few issus and for that he submitted some questions;  \"For the past 3 months, my pain dramatic increased. \"  He feels that his chronic pain is driving up his blood pressure. He started lisinopril 20 mg daily and still working on his BP. He is still using salt on his meals  What did you see on my EMG? Chronic left S1 radiculopathy (discussed before)  What did you see on my MRI? No stenosis in the thoracic spine (discussed before)  Can I have an Ablation? No, it will call weakness of your leg (discussed before)  You could have a pulsed radiofrequency (does not burn the nerve) of the dorsal root ganglion of S1 but this procedure is not covered by insurance  Last X-Ray showed broken screw, possible culpit? Extremely unlikely since your pain has been present since 2014 and the screw was already broken in 2019 (CT myelogram image " 126/axial)  Myelogram? Showed broken screw 2019   Can I try a pump? Yes, bupivacaine or clonidine as you have already failed Prialt and fentanyl  What else can be done?   You failed several analgesic trials. Naltrexone worked some but you stopped it  MILD: Decompress L3-L4 where there is evidence of stenosis and clumping of the nerve roots/arachnoiditis   New SCS technology: Nevro (high frequency 10K, DRG, PNS    Pain Description: Constant left gluteal and left lower extremity pain with intermittent exacerbation, described as burning, numbing, shooting, tingling, pins and needles, electrical sensation.   Radiation of Pain: The pain radiates from the left gluteal region down into the left lower extremity into the plantar aspect of his left foot. He denies significant lower back pain.   Pain pattern: Dermatomic   Pain intensity today: 8/10   Average pain intensity last week: 8/10  Pain intensity ranges from: 5/10 to 10/10  Aggravating factors: Pain increases with sitting for more than 5 minutes, lying down (takes gabapentin before going to bed), walking. Patient describes neurogenic claudication. Patient does not use a cane or a walker  Alleviating factors: Pain decreases with standing, os short walks  Associated Symptoms:   Patient reports pain, tingling, but denies numbness or weakness in the left lower extremity.  Patient denies symptoms in the opposite limb  Patient denies any new bladder or bowel problems.   Patient denies difficulties with his balance or recent falls.   Pain interferes with general activities (ability to walk, stand, transition from different positions), and affects patient's quality of life  Pain interferes with sleep causing sleep fragmentation     Review of previous therapies and additional medical records:  Prashant Avila has already failed the following measures, including:   Conservative Measures: Oral analgesics (including opioids, naltrexone, most medications available for treatment  of neuropathic pain except for those contraindicated due to underlying conditions, topical analgesics: failed analgesic trials due to side effects with most medications), ice, heat, physical therapy, physical therapist directed home exercise program HEP (ongoing), to name a few  Interventional Measures:   09/08/2021: Platelet rich leukocyte poor plasma therapy and A2 M therapy, via lumbar epidural administration with caudal epidural catheter placement approach  02/06/2019: Left S1 transforaminal epidural steroid injection/pain relief that was short-lived   02/20/2017: Diagnostic and therapeutic left sciatic nerve block with local anesthetics, steroids, hyaluronidase under ultrasound, PNS and fluoroscopic guidance- Patient did not receive sustained relief from this procedure.   02/06/2017: Diagnostic left S1 selective nerve root injection, relief  01/30/2017: Nevro SCS Trial; no relief  04/25/2016: Trial of lumbar epidural steroid injections with steroids and hyaluronidase via caudal catheter with 100% pain relief lasting 48 hours  12/16/2015: Diagnostic and therapeutic left S1 transforaminal epidural steroid injection with hyaluronidase with complete resolution of pain for several weeks  11/30/2015: Spinal cord stimulator trial [employer], which provided him with more than 50% pain relief and functional improvement. However, patient decided to delay implantation of a permanent spinal cord stimulator device until the arrival of new technology. Patient did not like the perception of tonic stimulation, even though stimulation overlapped successfully the areas of his chronic pain, he did not find it pleasant.   02/09/2015: Diagnostic left piriformis muscle injection, no relief  Surgical Measures:   03/01/2021: SCS explant by Dr. Fausto Renee.   11/21/2019: SCS implant: [employer] Penta paddle lead with the top electrodes projecting at the level of the superior endplate of the T8 vertebral level. IPG: [employer]  "Proclaim 5 XR IPG Non-Rechargeable by Dr. Fausto Renee   07/24/2014: L4-S1 decompression and fusion by Dr. Renee  03/03/2014: Left L4-L5 hemilaminectomy and foraminotomy at [facility] with Dr. Mackay   Prashant Avila underwent neurosurgical consultation with Dr. Renee on 06/24/2023, and was found not to be a surgical candidate.  Prashant Avila underwent psychological consultation at [facility] psychological evaluation with Dr. Praveen Navarro on 8/30/2021, per note: \"It is recommended the patient be seen for psychological treatment of his pain disorder including biofeedback.  From a psychological perspective, patient is an appropriate candidate for interventional pain procedures, such as a pain pump with opioids or Prialt.\" Follow-up psychological evaluation with Dr. Praveen Navraro in July 2023: \"From a psychological perspective, patient is an appropriate candidate for an intrathecal delivery system, a spinal cord stimulator device, a peripheral nerve stimulator device, a DRG device, and need any other procedures deemed appropriate for his condition.\"   Prashant Avila presents with significant comorbidities including history of prostate cancer, eczema, gout, hyperlipidemia, hypertension, history of kidney stones, left bundle branch block, osteoarthritis, rheumatoid arthritis, hearing loss, osteoporosis  In terms of current analgesics, Prashant Avila takes: Gabapentin 800 mg QAM, QPM, TT QHS. He stopped taking naltrexone 1 mg daily despite experiencing some relief.   I have reviewed Jon Report consistent with medication reconciliation.  SOAPP/ORT: Low Risk     PHQ-2 Depression Screening  Little interest or pleasure in doing things?     Feeling down, depressed, or hopeless?     PHQ-2 Total Score       Pain Self-Efficacy Questionnaire (PSEQ)  ITEM 08-03  2023        I can enjoy things despite the pain. 4        I can do most of the household chores (tidying up, washing dishes, etc), despite the pain. " 1        I can socialize with my friends or family members as often as I used to do, despite the pain. 1        I can cope with my pain in most situations. 4        I can do some form of work, despite the pain (includes housework, paid, and unpaid work). 1        I can still do many of the things I enjoy doing, such as hobbies or leisure activity despite pain. 0        I can cope with my pain without medications. 2        I can accomplish most of my goals in life despite the pain. 0        I can live in a normal lifestyle, despite the pain. 0        I can gradually become more active, despite the pain. 0        TOTAL SCORE 13/60          Global Pain Scale 04-19  2018 10-24  2018 11-08  2018 01-29  2019 03-06  2019 10-03  2019 10-17  2019 01-14  2020 01-21  2020 02-20  2020 04-14  2020 05-07  2020 09-24  2020 07-20  2021 10-19  2021 11-23  2021 01-13  2022 03-15  2022 06-07  2022 08-30  2022     Pain 19 17 17 18 17 20  10 10 10   10  10 8 20 22 19 17 22 21 19 21   Feelings 1 0 0 0 0   8    4   2  0  0    0 0 6 12 4 1 0 0 0  9   Clinical outcomes 11 8 13 11 6 11    7   6  7  9    9 9 11 12 9 11 10 10 10 15   Activities 17 10 16 18 6 15    7   6  4  15  15 12 7 10 8 21 7 15 12 15   GPS Total: 48 35 46 47 29 54  28  24  21  34  34 28 44 56 40 50 39 46 41  60     Global Pain Scale 08-03 2023          Pain 19          Feelings 8          Clinical outcomes 12          Activities 0          GPS Total: 39              The [place] Back Pain Disability Scale  DATE 08-30 2022 08-03 2023         Sleep through the night 3 5         Turn over in bed 0 0         Get out of bed 0 0         Make your bed 0 0         Put on socks (pantyhose) 0 0         Ride in a car 5 5         Sit in a chair for several hours 5 5         Stand up for 20-30 minutes 0 0         Climb one flight of stairs 2 0         Walk a few blocks (200-300 yards)  3 5         Walk several miles 5 5         Run one block (about 50 yards) 5 5         Take food out  of the refrigerator 0 0         Reach up to high shelves 0 4         Move a chair 0 2         Pull or push heavy doors 0 2         Bend over to clean the bathtub 0 4         Throw a ball 0 5         Carry two bags of groceries 2 5         Lift and carry a heavy suitcase 3 5         Total score 33 57           LANSS pain scale  LANSS item 08-30  2022   1. Does the pain produce unpleasant sensations such as tingling, pricking or pins and needles? 5   2. Is there a different skin aspect in the painful areas, i.e. skin redder than usual or appearing mottled? 0   3. Does stroking the skin in the painful area or wearing tight clothing items produce unpleasant sensations? 0   4. Do you experience any sensations like electric shocks, bursting or jumping corresponding to painful episodes, i.e. unexplained bursts of pain? 2   5. Do you experience burning sensations in the painful areas or a sudden temperature change? 1   6. Result to stroking the non painful area and the described painful area with cotton wool: 0   7. Result to touching (pinprick) both areas with a 23 gauge needle: 3   Total Score: 13 (likely neuropathic pain)    Review of New Diagnostic Studies:  I have independently reviewed and interpreted the images with the patient and used the images and a tridimensional spine model to explain findings. I have also reviewed the reports.  08/08/2023: UDS + gabapentin: appropriate  08/08/2023: CBC, PT, PTT wnl  08/03/2023: Flexion and extension Xrays of the lumbar spine: No instability    Review of Previous Diagnostic Studies:  I have independently reviewed and interpreted the images with the patient and used the images and a tridimensional spine model to explain findings. I have also reviewed the reports.  X-rays of the lumbar spine on 6/2/2023 revealed PLIF L4-S1.  The left S1 pedicle screw is fractured as was present previously.  There is disc space narrowing at L3-4 above the fused level.  There is facet joint disease  throughout the lumbar spine.    MRI of the thoracic spine without contrast on 10/5/2022 revealed multilevel spondylosis without significant canal or foraminal stenosis.  MRI THORACIC SPINE WO CONTRAST-10/31/2019: Minimal degenerative changes at multiple levels throughout the thoracic spine with no central spinal canal stenosis.   EMG/NCV of the bilateral lower extremities 12/28/2018: Chronic left S1 radiculopathy  CT LUMBAR SPINE WITH CONTRAST-01/21/2019: Excellent opacification of the thecal sac. Imaging covers from T12 through the S1 level caudally. The conus terminates at the L1/L2 level.  The distal cord and conus are normal in caliber. The nerve roots of the cauda equina are normal in caliber and distribution. L4-S1 fusion hardware is in place; the left S1 pedicle screw is fractured and there is lucency around the right S1 pedicle screw. Left L4 laminotomy. Vertebral body heights and alignment are normal. Vertebral body heights are normal. Degenerative changes are as follows:  T12-L1: Facet arthropathy without significant foraminal or spinal canal stenosis.  L1-L2: Facet arthropathy without significant foraminal or spinal canal stenosis.  L2-L3: Right greater than left facet arthropathy. Mild narrowing of the right subarticular zone. No significant central spinal canal or foraminal stenosis.  L3-L4: Disc bulge and facet arthropathy with ligamentum flavum thickening. There is severe spinal canal stenosis and at least moderate bilateral foraminal stenosis.   L4-L5: Improved right subarticular zone narrowing. No significant osseous spinal canal or foraminal stenosis.  L5-S1: Bilateral facet arthropathy. Shallow disc osteophyte. Unchanged mild osseous foraminal stenosis. No significant osseous spinal canal stenosis.  EKG April 19, 2018 revealed sinus bradycardia with first-degree AV block.  Left bundle branch block.    X-Ray lumbar spine 08/04/2017: Posterior lumbar fusion of L4, L5 and S1. Degenerative changes at  L1-L2 with anterior osteophyte formation. Normal lumbar alignment in the lateral projection with a stable alignment in the flexed and extended positions.      Duplex Venous Lower, 03/25/2017: Normal right lower extremity venous duplex scan. No evidence of DVT.  MRI Lumbar Spine, 10/28/2016: Since the previous MRI study, there has been L4-5-S1 posterior lumbar and interbody fusion. No new abnormality of alignment is seen. There is, however, significant worsening of posterior element hypertrophy at L3-4, above the fusion level, due to persistent L3-4 disc bulge, and what appears to be new spinal stenosis at this level, generally as a result of posterior element hypertrophic change. Canal appears stable elsewhere. No lumbar compression deformity or new abnormality of alignment is seen. No vertebral marrow edema is appreciated. Sagittal images suggest a small (18 mm) seroma or synovial cyst on the right at L5-S1. Tip of conus medullaris is again noted at L1.  Axial images   L1-L2: No significant canal or foraminal stenosis   L2-L3: Canal appears relatively narrowed, due to posterior element hypertrophy but still greater than 1 cm in diameter. Foramina appear  lower limits of normal diameter.   L3-L4: Moderate canal stenosis with lateral narrowing, AP diameter 6.5 mm, transverse diameter approximately 9 mm with crowding of the nerve roots. Foramina appear mildly to moderately narrowed bilaterally due to facet DJD.   L4-L5: Fused level, canal appears normal. Mild foraminal stenosis due to facet DJD  L5-S1: Canal appears normal. Left-sided neural foramen appears normal. Right-sided neural foramen appears mildly narrowed due to facet DJD. Postcontrast images show expected postoperative enhancement of the dorsal soft tissues and no pathologic postcontrast enhancement elsewhere.     The following portions of the patient's history were reviewed and updated as appropriate: problem list, past medical history, past surgery  history, social history, family history, medication reconciliation, and allergies    Review of Systems   Musculoskeletal:  Positive for back pain.   Neurological:  Positive for weakness and numbness.   All other systems reviewed and are negative.      Patient Active Problem List   Diagnosis    Chronic lumbar radiculopathy    Lumbar postlaminectomy syndrome    Status post L4-S1 lumbar spinal fusion    Physical deconditioning    Adhesive arachnoiditis    Bilateral stenosis of lateral recess of lumbar spine    Osteoarthritis    Chronic intractable pain    Encounter for therapeutic drug monitoring    Chronic pain syndrome    Intractable neuropathic pain of lower extremity, left    Gait disturbance    Polyneuropathy    Lumbar stenosis with neurogenic claudication    Ligamentum flavum hypertrophy    Non-insulin dependent type 2 diabetes mellitus       Past Medical History:   Diagnosis Date    Arthritis     Carcinoma of prostate     surgery     Chronic pain disorder     Eczema     Elevated cholesterol     Gout     History of chronic kidney disease     Hyperlipidemia     Hypertension     Kidney stone 1990    passed     Left bundle branch block     Low back pain     Lumbar stenosis with neurogenic claudication 08/03/2023    Lumbosacral disc disease     Neuropathic pain of flank, left     Non-insulin dependent type 2 diabetes mellitus 8/10/2023    Osteoarthritis     Polyneuropathy 08/03/2023    Thoracic disc disorder     Wears hearing aid in both ears          Past Surgical History:   Procedure Laterality Date    BACK SURGERY  2014    Lumbar Fusion, july 2014    BACK SURGERY  2014    Lumbar Laminectomy, march 2014    COLONOSCOPY  2016    EPIDURAL BLOCK  2016    LAMINECTOMY  2013    OTHER SURGICAL HISTORY      Enteroscopic Polypectomy    PAIN PUMP INSERTION/REVISION N/A 10/12/2020    Procedure: PAIN PUMP TRIAL;  Surgeon: Efrain Batista MD;  Location: [facility], [facility];  Service: Pain Management;  Laterality: N/A;    UT MYELOGRAPHY  VIA LUMBAR INJECT RS&I LUMBOSACRAL N/A 01/21/2019    Procedure: IR myelogram, lumbar;  Surgeon: Francisco Javier Clay MD;  Location:  [facility];  Service: Interventional Radiology    PROSTATE SURGERY      SPINAL CORD STIMULATOR IMPLANT N/A 11/21/2019    Procedure: SPINAL CORD STIMULATOR INSERTION;  Surgeon: Fausto Renee MD;  Location:  [facility];  Service: Neurosurgery    SPINAL CORD STIMULATOR IMPLANT N/A 03/01/2021    Procedure: Removal of entire system, SPINAL CORD STIMULAR REMOVAL;  Surgeon: Fausto Renee MD;  Location:  [facility];  Service: Neurosurgery;  Laterality: N/A;    TESTICLE UNDESCENDED REPAIR  1980    VOCAL CORD BIOPSY      polyps removed          Family History   Problem Relation Age of Onset    Congenital heart disease Mother     Hearing loss Mother     Multiple myeloma Father     Breast cancer Neg Hx     Ovarian cancer Neg Hx          Social History     Socioeconomic History    Marital status:    Tobacco Use    Smoking status: Never     Passive exposure: Never    Smokeless tobacco: Never   Vaping Use    Vaping Use: Never used   Substance and Sexual Activity    Alcohol use: No    Drug use: No    Sexual activity: Defer           Current Outpatient Medications:     chlorhexidine (HIBICLENS) 4 % external liquid, Apply  topically to the appropriate area as directed Daily As Needed for Wound Care., Disp: 236 mL, Rfl: 0    diphenhydrAMINE-acetaminophen (TYLENOL PM)  MG tablet per tablet, Take 1 tablet by mouth At Night As Needed for Sleep., Disp: , Rfl:     docusate sodium (COLACE) 100 MG capsule, Take 1 capsule by mouth Daily., Disp: , Rfl:     gabapentin (NEURONTIN) 800 MG tablet, TAKE ONE TABLET BY MOUTH EVERY MORNING, TAKE ONE TABLET BY MOUTH DAILY AT 12:00PM (NOON), AND TAKE TWO TABLETS BY MOUTH EVERY NIGHT AT BEDTIME, Disp: 120 tablet, Rfl: 2    lisinopril (PRINIVIL,ZESTRIL) 10 MG tablet, Take 2 tablets by mouth., Disp: , Rfl:     Naltrexone powder, 1 mg Daily.  naltrexone 1 mg once a day, #30, 1 refill, Disp: 30 g, Rfl: 1    pravastatin (PRAVACHOL) 10 MG tablet, Take 1 tablet by mouth Daily., Disp: , Rfl:       Allergies   Allergen Reactions    Adhesive Tape Rash     Rash with blisters      Doxycycline Rash         There were no vitals taken for this visit.      Physical Exam:  From last visit  Constitutional: Patient appears well-developed, well-nourished, well-hydrated, appears younger than stated age  HEENT: Head: Normocephalic and atraumatic  Eyes: Conjunctivae and lids are normal  Pupils: Equal, round, reactive to light  Neck: Trachea normal. Neck supple. No JVD present.   Lymphatic: No cervical adenopathy  Peripheral vascular exam: Femoral: right 2+, left 2+. Posterior tibialis: right 2+ and left 2+. Dorsalis pedis: right 2+ and left 2+. No edema.   Musculoskeletal   Gait and station: Gait evaluation demonstrated a normal gait with a slight limp on the left. Able to walk on heels (weak left foot), toes (weak left foot), tandem walking   Cervical spine: Passive and active range of motion are full and without pain. Extension, flexion, lateral flexion, rotation of the cervical spine did not increase or reproduce pain. Cervical facet joint loading maneuvers are negative.   Lumbar spine: Passive and active range of motion are appropriate for patient's age and condition.  Extension, flexion, lateral flexion, rotation of the lumbar spine did not increase or reproduce pain. Lumbar facet joint loading maneuvers are negative.   Kodak test, Gaenslen's test, thigh thrust test, SI compression test, Yeoman's test are negative   Piriformis maneuvers are negative   Hip joints: The range of motion of the hip joints is almost full and without pain   Palpation of the bilateral psoas tendons and iliopsoas bursas,  unrevealing   Palpation of the bilateral greater trochanter, unrevealing   Examination of the Iliotibial band: unrevealing   Neurological:   Patient is alert and oriented to  person, place, and time.   Speech: Normal.   Cortical function: Normal mental status.   Cranial nerves 2-12: intact.   Reflex Scores:  Right patellar: 0+  Left patellar: 0+  Right Achilles: 0+  Left Achilles: 0+  Motor strength: 5/5, except left foot 4/5 dorsiflexion and plantar flexion  Motor Tone: Normal  Involuntary movements: None.   Superficial/Primitive Reflexes: Primitive reflexes were absent.   Right Lamas: Absent  Left Lamas: Absent  Right ankle clonus: Absent  Left ankle clonus: Absent   Babinsky: Absent  Lhermitte sign: Negative. Long tract signs: Negative. Straight leg raising test: Negative on the right. Positive on the left at 30-40 degrees with positive Lasegue. Femoral stretch sign: Negative on the right, questionably positive on the left.   Sensory exam: Intact to light touch, intact pain and temperature sensation, intact vibration sensation and normal proprioception, except for decreased sensation to vibration from the knees down in a stocking distribution LT>RT.   Coordination: Normal finger to nose and heel to shin. Normal balance. Romberg's sign: Negative    Skin and subcutaneous tissue: Skin is warm and intact. No rash noted. No cyanosis.   Psychiatric: Judgment and insight: Normal. Recent and remote memory: Intact. Mood and affect: Normal.       ASSESSMENT:   1. Lumbar stenosis with neurogenic claudication    2. Ligamentum flavum hypertrophy    3. Adhesive arachnoiditis    4. Chronic lumbar radiculopathy    5. Intractable neuropathic pain of lower extremity, left    6. Lumbar postlaminectomy syndrome    7. Status post L4-S1 lumbar spinal fusion    8. Polyneuropathy    9. Non-insulin dependent type 2 diabetes mellitus    10. Gait disturbance    11. Physical deconditioning        PLAN/MEDICAL DECISION MAKING:  Mr. Prashant Avila, 78 y.o. male presents with a long standing history of chronic intractable left gluteal and left lower extremity pain refractory to surgical intervention,  conservative measures, and minimally invasive interventional pain management measures. Prashant Avila underwent L4-L5 hemilaminectomy and foraminotomy at [facility] with Dr. Mackay on 03/03/2014. As he continued to struggle with pain, he underwent L4-S1 lumbar decompression and fusion by Dr. Renee on 07/24/2014. Prashant Avila experienced significant improvement after his last surgery with Dr. Renee, in particular, he experienced complete resolution of his previously severe lower back pain. Unfortunately, he continue struggling with chronic intractable left gluteal and left lower extremity pain that preceded his last surgery. He underwent surgical consultation with Dr. Cox at the [facility], who recommended decompression of the nerve roots at L3-S1. He underwent follow-up neurosurgical consultation with Dr. Fausto Renee on 11/03/2017. Dr. Renee discussed the possibility of lumbar decompression at L3-L4: Left L3-L4 laminotomy and foraminotomy. Due to potential risk of developing instability that would necessitate extending his lumbar fusion to the L3 level. A CT myelogram was significant for stenosis above the level of his lumbar fusion, at L3-L4 without clinical correlation as he failed to obtain pain relief from diagnostic and therapeutic left L3-L4 transforaminal epidural steroid injection on April 3, 2018. Consequently, he elected to hold off on surgery. EMG/NCV, confirmed chronic left S1 radiculopathy. Prashant Avila presents symptoms consistent with chronic left S1 radiculopathy with underlying adhesive arachnoiditis contributing to the majority of his pain. Prashant Avila underwent a successful SCS trial followed by implantation of a spinal cord stimulator device on 11/21/2019 by Dr. Fausto Renee ([employer] [place] Penta paddle lead with the top electrodes projecting at the level of the superior endplate of the T8 vertebral level. IPG: [employer] Proclaim 5 XR IPG Non-Rechargeable -Full Body  "MRI compatible). Unfortunately, he never seemed satisfied with the SCS device despite numerous attempts at SCS reprogramming. He was able to perceive paresthesia overlapping the areas of his chronic pain but did not like the way he felt with tonic stimulation and reported no relief with Brent DR. As a result, on 3/1/2021, he underwent explantation of the entire SCS system. He also underwent intrathecal analgesic trials with fentanyl and Prialt without relief. Later on, on 9/8/2021, he underwent platelet rich leukocyte poor plasma therapy and A2M therapy delivered into the lumbar epidural space via caudal epidural catheter placement without significant long lasting relief.  On his last visit with me, he reported interest in pursuing an intrathecal trial with a higher dose of opioid or with alternative medications such as bupivacaine or clonidine. I explained that I will not use a higher dose of Prialt as he had psychological side effects with the lowest effective dose of Prialt for a trial. He also underwent neurology consultation with Dr. Kodak Alonso who concurred with patient's treatment measures. He underwent additional diagnostic studies. MRI of the thoracic spine without contrast on 10/5/2022 revealed multilevel spondylosis without significant canal or foraminal stenosis. X-rays of the lumbar spine on 06/02/2023 revealed PLIF L4-S1. The left S1 pedicle screw is fractured as was present previously.  There is disc space narrowing at L3-4 above the fused level.  There is facet joint disease throughout the lumbar spine.  Prashant Avila underwent follow-up neurosurgical consultation with Dr. Fausto Renee on 06/14/2023 and was found not to be a surgical candidate. Per Dr. Renee;\" While the patient has a fracture of one of the screws, his symptoms are exactly the same as they had been since prior to his first surgery.\" Prashant Avila underwent follow-up psychological evaluation with Dr. Praveen Navarro in " "July 2023: \"From a psychological perspective, patient is an appropriate candidate for an intrathecal delivery system, a spinal cord stimulator device, a peripheral nerve stimulator device, a DRG device, and need any other procedures deemed appropriate for his condition.\"  Prashant Avila has failed to obtain pain relief for more than 9 years with A. conservative measures including oral analgesics (including opioids, naltrexone, most medications available for treatment of neuropathic pain except for those contraindicated due to underlying conditions, topical analgesics: failed analgesic trials due to side effects with most medications), ice, heat, physical therapy, physical therapist directed home exercise program HEP (ongoing), to name a few; B. Two surgical interventions (on 03/03/2014: L4-L5 hemilaminectomy and foraminotomy at [facility] with Dr. Mackay, on 07/24/2014: L4-S1 lumbar decompression and fusion by Dr. Renee); and several minimally invasive interventional pain management measures as referenced on previous notes including diagnostic selective nerve root injections, transforaminal epidural steroid injections, piriformis injections, spinal cord stimulator trial, spinal cord stimulator implant followed by explant, intrathecal analgesic trials, regenerative therapies. Prashant Avila denies new symptoms or any changes in his medical history. Pain has progressed in intensity over the past several months. A comprehensive evaluation including history and physical exam along with pertinent physiologic and functional assessment was performed. Patient presents with intractable pain due to the diagnoses listed above. Patient has failed to respond to conservative modalities, previous minimally invasive interventional pain management measures, and previous surgical interventions, as referenced under HPI including the impact of patient's moderate-to-severe pain contributing to significant impairment in daily " activities, ADLs, and a negative impact on quality of life. Supporting diagnostic studies of patient's chronic pain condition have been reviewed. I have reviewed all available patient's medical records as well as previous therapies as referenced above. I had a lengthy conversation with . Prashant Avila regarding his chronic pain condition and potential therapeutic options including risks, benefits, alternative therapies, to name a few. We have discussed using a stepwise approach starting with the least intense level of treatment as determined by the extent required to diagnose and or treat a patient's condition. The treatments proposed are consistent with the patient's medical condition and are known to be as safe and effective by current guidelines and standard of care. There is no evidence of absolute contraindications for the proposed procedures under the current circumstances. These treatments are not considered experimental or investigational. The duration and frequency proposed are considered appropriate for the service in accordance with accepted standards of medical practice for the diagnosis and or treatment of the patient's condition and intended to improve the patient's level of function. These services will be furnished in a setting appropriate to the patient's medical needs and condition.  Patient has requested this visit so his wife could take part of the conversation regarding his upcoming mild procedure.  Therefore, I have proposed the following plan:    1. Interventional pain management measures: Prashant Avila does not take any blood thinners. We have discussed different therapeutic options at length during his previous visits as well as during today's visit. I have explained the complexity of his chronic pain issues as it involves numerous elements of intractable neuropathic pain components as a result of his lumbar arachnoiditis, peripheral polyneuropathy, chronic left S1 radiculopathy,  residual lumbar stenosis with claudication, etc. Prashant Avila reports neurogenic claudication. At L3-L4, the main component of stenosis is hypertrophy of the ligamentum flavum. Therefore, we have discussed prospects of percutaneous lumbar decompression of the hypertrophic ligamentum flavum at L3-L4 as it is the main component of his central canal stenosis and the area of clumping of the nerve roots/arachnoiditis. The procedure could be beneficial and at the same time it is minimally invasive. Flexion extension X-rays of the lumbar spine revealed stability of the lumbar spine. We have also discussed other therapeutic options such as an intrathecal trial via intrathecal catheter with nonopioid drugs such as bupivacaine or clonidine; new technology on neuromodulation techniques: Nevro spinal cord stimulation with 10K high-frequency, DRG stimulator trial at the left S1 dorsal root ganglion and possibly the left T12-L1; peripheral nerve stimulation (Sprint temporary PNS system versus an implantable peripheral nerve stimulator such as StimRouter, to name a few    2. Diagnostic studies: Patient completed labs @ [facility]. I have ordered a HbA1C due to hyperglycemia on his last tests. If he continues to struggle with pain; we could obtain an MRI of the lumbar spine and sacrum with and without contrast for planning of DRG and or SCS    3. Pharmacological measures: Reviewed and discussed; Prashant Avila has failed numerous analgesic trials. Unfortunately, he is not a candidate for a trial with mexiletine due to an abnormal EKG.    A. Continue gabapentin gabapentin 800 mg 1 tablet every morning, 1 tablet at noon, and 2 tablets at bedtime. He has previously failed a trial with Lyrica and other anticonvulsants  B. Resume naltrexone 1 mg once a day, #30, 1 refill.  We will assess analgesic response and titrate the dose accordingly, he would like to remain at this dose.  Screening and compliance with controlled substances:  Patient has completed a SOAPP and ORT questionnaires (revealing low risk).Jon reports have been reviewed and appropriate. Random urine drug screenings have been obtained and appropriate. Patient has signed a consent for treatment with controlled substances after reviewing the document and verbalizing full understanding of the risks, benefits, alternatives, and consequences of compliance and adherence with treatment and comprehensive plan of care. Patient received in hand a copy of this document.    4. Long-term rehabilitation efforts:  A. The patient does not have a history of falls. I did complete a risk assessment for falls.   B.  Patient will start a comprehensive physical therapy program at [facility] for Alter-G, core strengthening, gait and balance training, neurodynamics, ASTYM, E-STIM, myofascial release, cupping, dry needling, home exercise program after his MILD procedure  C. Start an exercise program such as yoga, Pilates, and water therapy  D. Contrast therapy: Apply ice-packs for 15-20 minutes, followed by heating pads for 15-20 minutes to affected area   E. Prashant Avila  reports that he has never smoked. He has never been exposed to tobacco smoke. He has never used smokeless tobacco.    5. The patient and his family have been instructed to contact my office with any questions or difficulties. The patient understands the plan and agrees to proceed accordingly.    The patient has a documented plan of care to address chronic pain. Prashant Avila reports a pain score of 8/10.  Given his pain assessment as noted, treatment options were discussed and the following options were decided upon as a follow-up plan to address the patient's pain: continuation of current treatment plan for pain, educational materials on pain management, home exercises and therapy, patient declined analgesics, referral to Physical Therapy, referral to specialist for assistance in pain treatment guidance,  steroid injections, use of non-medical modalities (ice, heat, stretching and/or behavior modifications), and interventional pain management measures including mild, neuromodulation techniques, to name a few. .              Pain Management Panel  More data exists         Latest Ref Rng & Units 8/8/2023 8/30/2022   Pain Management Panel   Amphetamine, Urine Qual Negative Negative  Negative    Barbiturates Screen, Urine Negative Negative  Negative    Benzodiazepine Screen, Urine Negative Negative  Negative    Buprenorphine, Screen, Urine Negative Negative  Negative    Cocaine Screen, Urine Negative Negative  Negative    Fentanyl, Urine Negative Negative  -   Methadone Screen , Urine Negative Negative  Negative    Methamphetamine, Ur Negative Negative  Negative         JOCELIN query complete. JOCELIN reviewed by Efrain Batista MD.     Pain Medications               gabapentin (NEURONTIN) 800 MG tablet TAKE ONE TABLET BY MOUTH EVERY MORNING, TAKE ONE TABLET BY MOUTH DAILY AT 12:00PM (NOON), AND TAKE TWO TABLETS BY MOUTH EVERY NIGHT AT BEDTIME             Orders Placed This Encounter   Procedures    Hemoglobin A1c      Total Time: 28 minutes    Patient verbalizes understanding of comfort measures, instructions for treatment, and follow-up.    Please note that portions of this note were completed with a voice recognition program.   Any copied data in any portion of my note has been reviewed by myself and accurate.     The 21st Century Cures Act makes medical notes like this available to patients in the interest of transparency. This is a medical document intended as peer to peer communication. It is written in medical language and may contain abbreviations or verbiage that are unfamiliar. It may appear blunt or direct. Medical documents are intended to carry relevant information, facts as evident, and the clinical opinion of the practitioner.     Efrain Batista MD    Patient Care Team:  Montse Chun MD as PCP -  General (Internal Medicine)  Fausto Renee MD as Consulting Physician (Neurosurgery)  Jean Warren MD as Consulting Physician (Neurosurgery)  Marcelino Condon MD as Consulting Physician (Urology)  Brian Tatum III, MD as Consulting Physician (Cardiology)  SHERON Chun MD as Consulting Physician (Family Medicine)  Francisco Javier Clay MD as Consulting Physician (Neurosurgery)  Royer Roldan MD as Consulting Physician (General Practice)  Emigdio Watt MD as Consulting Physician (Gastroenterology)  Shola Vila MD as Consulting Physician (Otolaryngology)  Hank Frankel MD as Consulting Physician (Cardiology)  Inez Regan APRN as Nurse Practitioner (Pain Medicine)  Efrain Batista MD as Consulting Physician (Pain Medicine)     No orders of the defined types were placed in this encounter.        No future appointments.

## 2023-08-11 LAB — HBA1C MFR BLD: 5.5 % (ref 4.8–5.6)

## 2023-08-14 ENCOUNTER — ANESTHESIA (OUTPATIENT)
Dept: PERIOP | Facility: HOSPITAL | Age: 78
End: 2023-08-14
Payer: MEDICARE

## 2023-08-14 ENCOUNTER — HOSPITAL ENCOUNTER (OUTPATIENT)
Facility: HOSPITAL | Age: 78
Setting detail: HOSPITAL OUTPATIENT SURGERY
Discharge: HOME | End: 2023-08-14
Attending: ANESTHESIOLOGY | Admitting: ANESTHESIOLOGY
Payer: MEDICARE

## 2023-08-14 ENCOUNTER — APPOINTMENT (OUTPATIENT)
Dept: GENERAL RADIOLOGY | Facility: HOSPITAL | Age: 78
End: 2023-08-14
Payer: MEDICARE

## 2023-08-14 ENCOUNTER — ANESTHESIA EVENT (OUTPATIENT)
Dept: PERIOP | Facility: HOSPITAL | Age: 78
End: 2023-08-14
Payer: MEDICARE

## 2023-08-14 VITALS
SYSTOLIC BLOOD PRESSURE: 150 MMHG | HEART RATE: 57 BPM | OXYGEN SATURATION: 93 % | DIASTOLIC BLOOD PRESSURE: 79 MMHG | RESPIRATION RATE: 18 BRPM | TEMPERATURE: 97 F

## 2023-08-14 DIAGNOSIS — M48.062 LUMBAR STENOSIS WITH NEUROGENIC CLAUDICATION: ICD-10-CM

## 2023-08-14 DIAGNOSIS — M24.28 LIGAMENTUM FLAVUM HYPERTROPHY: ICD-10-CM

## 2023-08-14 DIAGNOSIS — G89.18 POSTOPERATIVE PAIN: Primary | ICD-10-CM

## 2023-08-14 LAB — GLUCOSE BLDC GLUCOMTR-MCNC: 97 MG/DL (ref 70–130)

## 2023-08-14 PROCEDURE — 25010000002 ONDANSETRON PER 1 MG: Performed by: ANESTHESIOLOGY

## 2023-08-14 PROCEDURE — 0275T PR PERC LAMINO-/LAMINECTOMY INDIR IMAG GUIDE LUMBAR: CPT | Performed by: ANESTHESIOLOGY

## 2023-08-14 PROCEDURE — 25510000001 IOPAMIDOL 41 % SOLUTION: Performed by: ANESTHESIOLOGY

## 2023-08-14 PROCEDURE — 76000 FLUOROSCOPY <1 HR PHYS/QHP: CPT

## 2023-08-14 PROCEDURE — 25010000002 CEFAZOLIN IN DEXTROSE 2000 MG/ 100 ML SOLUTION: Performed by: ANESTHESIOLOGY

## 2023-08-14 PROCEDURE — 82948 REAGENT STRIP/BLOOD GLUCOSE: CPT

## 2023-08-14 PROCEDURE — 25010000002 DEXAMETHASONE PER 1 MG: Performed by: ANESTHESIOLOGY

## 2023-08-14 PROCEDURE — 25010000002 FENTANYL CITRATE (PF) 100 MCG/2ML SOLUTION: Performed by: ANESTHESIOLOGY

## 2023-08-14 PROCEDURE — 25010000002 PROPOFOL 10 MG/ML EMULSION: Performed by: ANESTHESIOLOGY

## 2023-08-14 PROCEDURE — C1889 IMPLANT/INSERT DEVICE, NOC: HCPCS | Performed by: ANESTHESIOLOGY

## 2023-08-14 PROCEDURE — 25010000002 HYDRALAZINE PER 20 MG

## 2023-08-14 PROCEDURE — 25010000002 METHYLPREDNISOLONE PER 40 MG: Performed by: ANESTHESIOLOGY

## 2023-08-14 RX ORDER — PROPOFOL 10 MG/ML
VIAL (ML) INTRAVENOUS AS NEEDED
Status: DISCONTINUED | OUTPATIENT
Start: 2023-08-14 | End: 2023-08-14 | Stop reason: SURG

## 2023-08-14 RX ORDER — CEFAZOLIN SODIUM 2 G/100ML
2000 INJECTION, SOLUTION INTRAVENOUS ONCE
Status: COMPLETED | OUTPATIENT
Start: 2023-08-14 | End: 2023-08-14

## 2023-08-14 RX ORDER — SODIUM CHLORIDE, SODIUM LACTATE, POTASSIUM CHLORIDE, CALCIUM CHLORIDE 600; 310; 30; 20 MG/100ML; MG/100ML; MG/100ML; MG/100ML
9 INJECTION, SOLUTION INTRAVENOUS CONTINUOUS PRN
Status: DISCONTINUED | OUTPATIENT
Start: 2023-08-14 | End: 2023-08-14 | Stop reason: HOSPADM

## 2023-08-14 RX ORDER — MIDAZOLAM HYDROCHLORIDE 1 MG/ML
0.5 INJECTION INTRAMUSCULAR; INTRAVENOUS
Status: DISCONTINUED | OUTPATIENT
Start: 2023-08-14 | End: 2023-08-14 | Stop reason: HOSPADM

## 2023-08-14 RX ORDER — SODIUM CHLORIDE 0.9 % (FLUSH) 0.9 %
3-10 SYRINGE (ML) INJECTION AS NEEDED
Status: DISCONTINUED | OUTPATIENT
Start: 2023-08-14 | End: 2023-08-14 | Stop reason: HOSPADM

## 2023-08-14 RX ORDER — METHYLPREDNISOLONE ACETATE 40 MG/ML
INJECTION, SUSPENSION INTRA-ARTICULAR; INTRALESIONAL; INTRAMUSCULAR; SOFT TISSUE AS NEEDED
Status: DISCONTINUED | OUTPATIENT
Start: 2023-08-14 | End: 2023-08-14 | Stop reason: HOSPADM

## 2023-08-14 RX ORDER — SODIUM CHLORIDE 0.9 % (FLUSH) 0.9 %
3 SYRINGE (ML) INJECTION EVERY 12 HOURS SCHEDULED
Status: DISCONTINUED | OUTPATIENT
Start: 2023-08-14 | End: 2023-08-14 | Stop reason: HOSPADM

## 2023-08-14 RX ORDER — NALOXONE HCL 0.4 MG/ML
0.4 VIAL (ML) INJECTION AS NEEDED
Status: DISCONTINUED | OUTPATIENT
Start: 2023-08-14 | End: 2023-08-14 | Stop reason: HOSPADM

## 2023-08-14 RX ORDER — IPRATROPIUM BROMIDE AND ALBUTEROL SULFATE 2.5; .5 MG/3ML; MG/3ML
3 SOLUTION RESPIRATORY (INHALATION) ONCE AS NEEDED
Status: DISCONTINUED | OUTPATIENT
Start: 2023-08-14 | End: 2023-08-14 | Stop reason: HOSPADM

## 2023-08-14 RX ORDER — ONDANSETRON 2 MG/ML
4 INJECTION INTRAMUSCULAR; INTRAVENOUS ONCE AS NEEDED
Status: DISCONTINUED | OUTPATIENT
Start: 2023-08-14 | End: 2023-08-14 | Stop reason: HOSPADM

## 2023-08-14 RX ORDER — SODIUM CHLORIDE 0.9 % (FLUSH) 0.9 %
10 SYRINGE (ML) INJECTION EVERY 12 HOURS SCHEDULED
Status: DISCONTINUED | OUTPATIENT
Start: 2023-08-14 | End: 2023-08-14 | Stop reason: HOSPADM

## 2023-08-14 RX ORDER — SODIUM CHLORIDE 9 MG/ML
INJECTION, SOLUTION INTRAVENOUS AS NEEDED
Status: DISCONTINUED | OUTPATIENT
Start: 2023-08-14 | End: 2023-08-14 | Stop reason: HOSPADM

## 2023-08-14 RX ORDER — HYDROCODONE BITARTRATE AND ACETAMINOPHEN 5; 325 MG/1; MG/1
TABLET ORAL
Status: COMPLETED
Start: 2023-08-14 | End: 2023-08-14

## 2023-08-14 RX ORDER — HYDROCODONE BITARTRATE AND ACETAMINOPHEN 5; 325 MG/1; MG/1
1 TABLET ORAL ONCE AS NEEDED
Status: COMPLETED | OUTPATIENT
Start: 2023-08-14 | End: 2023-08-14

## 2023-08-14 RX ORDER — HYDROCODONE BITARTRATE AND ACETAMINOPHEN 5; 325 MG/1; MG/1
1 TABLET ORAL ONCE AS NEEDED
Status: DISCONTINUED | OUTPATIENT
Start: 2023-08-14 | End: 2023-08-14 | Stop reason: HOSPADM

## 2023-08-14 RX ORDER — PROMETHAZINE HYDROCHLORIDE 25 MG/1
25 TABLET ORAL ONCE AS NEEDED
Status: DISCONTINUED | OUTPATIENT
Start: 2023-08-14 | End: 2023-08-14 | Stop reason: HOSPADM

## 2023-08-14 RX ORDER — LABETALOL HYDROCHLORIDE 5 MG/ML
INJECTION, SOLUTION INTRAVENOUS AS NEEDED
Status: DISCONTINUED | OUTPATIENT
Start: 2023-08-14 | End: 2023-08-14 | Stop reason: SURG

## 2023-08-14 RX ORDER — FENTANYL CITRATE 50 UG/ML
25 INJECTION, SOLUTION INTRAMUSCULAR; INTRAVENOUS
Status: DISCONTINUED | OUTPATIENT
Start: 2023-08-14 | End: 2023-08-14 | Stop reason: HOSPADM

## 2023-08-14 RX ORDER — HYDRALAZINE HYDROCHLORIDE 20 MG/ML
5 INJECTION INTRAMUSCULAR; INTRAVENOUS
Status: DISCONTINUED | OUTPATIENT
Start: 2023-08-14 | End: 2023-08-14 | Stop reason: HOSPADM

## 2023-08-14 RX ORDER — FAMOTIDINE 20 MG/1
20 TABLET, FILM COATED ORAL
Status: COMPLETED | OUTPATIENT
Start: 2023-08-14 | End: 2023-08-14

## 2023-08-14 RX ORDER — DEXAMETHASONE SODIUM PHOSPHATE 4 MG/ML
INJECTION, SOLUTION INTRA-ARTICULAR; INTRALESIONAL; INTRAMUSCULAR; INTRAVENOUS; SOFT TISSUE AS NEEDED
Status: DISCONTINUED | OUTPATIENT
Start: 2023-08-14 | End: 2023-08-14 | Stop reason: SURG

## 2023-08-14 RX ORDER — PROMETHAZINE HYDROCHLORIDE 25 MG/1
25 SUPPOSITORY RECTAL ONCE AS NEEDED
Status: DISCONTINUED | OUTPATIENT
Start: 2023-08-14 | End: 2023-08-14 | Stop reason: HOSPADM

## 2023-08-14 RX ORDER — SODIUM CHLORIDE 0.9 % (FLUSH) 0.9 %
10 SYRINGE (ML) INJECTION AS NEEDED
Status: DISCONTINUED | OUTPATIENT
Start: 2023-08-14 | End: 2023-08-14 | Stop reason: HOSPADM

## 2023-08-14 RX ORDER — SODIUM CHLORIDE 9 MG/ML
40 INJECTION, SOLUTION INTRAVENOUS AS NEEDED
Status: DISCONTINUED | OUTPATIENT
Start: 2023-08-14 | End: 2023-08-14 | Stop reason: HOSPADM

## 2023-08-14 RX ORDER — LABETALOL HYDROCHLORIDE 5 MG/ML
5 INJECTION, SOLUTION INTRAVENOUS
Status: DISCONTINUED | OUTPATIENT
Start: 2023-08-14 | End: 2023-08-14 | Stop reason: HOSPADM

## 2023-08-14 RX ORDER — LOSARTAN POTASSIUM 50 MG/1
50 TABLET ORAL DAILY
COMMUNITY

## 2023-08-14 RX ORDER — HYDRALAZINE HYDROCHLORIDE 20 MG/ML
INJECTION INTRAMUSCULAR; INTRAVENOUS
Status: COMPLETED
Start: 2023-08-14 | End: 2023-08-14

## 2023-08-14 RX ORDER — BUPIVACAINE HYDROCHLORIDE AND EPINEPHRINE 2.5; 5 MG/ML; UG/ML
INJECTION, SOLUTION EPIDURAL; INFILTRATION; INTRACAUDAL; PERINEURAL AS NEEDED
Status: DISCONTINUED | OUTPATIENT
Start: 2023-08-14 | End: 2023-08-14 | Stop reason: HOSPADM

## 2023-08-14 RX ORDER — LIDOCAINE HYDROCHLORIDE 10 MG/ML
INJECTION, SOLUTION EPIDURAL; INFILTRATION; INTRACAUDAL; PERINEURAL AS NEEDED
Status: DISCONTINUED | OUTPATIENT
Start: 2023-08-14 | End: 2023-08-14 | Stop reason: SURG

## 2023-08-14 RX ORDER — FENTANYL CITRATE 50 UG/ML
INJECTION, SOLUTION INTRAMUSCULAR; INTRAVENOUS AS NEEDED
Status: DISCONTINUED | OUTPATIENT
Start: 2023-08-14 | End: 2023-08-14 | Stop reason: SURG

## 2023-08-14 RX ORDER — HYDROCODONE BITARTRATE AND ACETAMINOPHEN 5; 325 MG/1; MG/1
TABLET ORAL
Qty: 4 TABLET | Refills: 0 | Status: SHIPPED | OUTPATIENT
Start: 2023-08-14

## 2023-08-14 RX ORDER — HYDROMORPHONE HYDROCHLORIDE 1 MG/ML
0.25 INJECTION, SOLUTION INTRAMUSCULAR; INTRAVENOUS; SUBCUTANEOUS
Status: DISCONTINUED | OUTPATIENT
Start: 2023-08-14 | End: 2023-08-14 | Stop reason: HOSPADM

## 2023-08-14 RX ORDER — LIDOCAINE HYDROCHLORIDE 10 MG/ML
0.5 INJECTION, SOLUTION EPIDURAL; INFILTRATION; INTRACAUDAL; PERINEURAL ONCE AS NEEDED
Status: COMPLETED | OUTPATIENT
Start: 2023-08-14 | End: 2023-08-14

## 2023-08-14 RX ORDER — ONDANSETRON 2 MG/ML
INJECTION INTRAMUSCULAR; INTRAVENOUS AS NEEDED
Status: DISCONTINUED | OUTPATIENT
Start: 2023-08-14 | End: 2023-08-14 | Stop reason: SURG

## 2023-08-14 RX ADMIN — PROPOFOL 30 MG: 10 INJECTION, EMULSION INTRAVENOUS at 12:54

## 2023-08-14 RX ADMIN — PROPOFOL 75 MCG/KG/MIN: 10 INJECTION, EMULSION INTRAVENOUS at 12:54

## 2023-08-14 RX ADMIN — FAMOTIDINE 20 MG: 20 TABLET ORAL at 11:13

## 2023-08-14 RX ADMIN — HYDROCODONE BITARTRATE AND ACETAMINOPHEN 1 TABLET: 5; 325 TABLET ORAL at 14:33

## 2023-08-14 RX ADMIN — LIDOCAINE HYDROCHLORIDE 0.25 ML: 10 INJECTION, SOLUTION EPIDURAL; INFILTRATION; INTRACAUDAL; PERINEURAL at 11:05

## 2023-08-14 RX ADMIN — DEXAMETHASONE SODIUM PHOSPHATE 4 MG: 4 INJECTION, SOLUTION INTRAMUSCULAR; INTRAVENOUS at 13:05

## 2023-08-14 RX ADMIN — HYDRALAZINE HYDROCHLORIDE 5 MG: 20 INJECTION INTRAMUSCULAR; INTRAVENOUS at 14:17

## 2023-08-14 RX ADMIN — FENTANYL CITRATE 25 MCG: 50 INJECTION, SOLUTION INTRAMUSCULAR; INTRAVENOUS at 12:54

## 2023-08-14 RX ADMIN — ONDANSETRON 4 MG: 2 INJECTION INTRAMUSCULAR; INTRAVENOUS at 13:10

## 2023-08-14 RX ADMIN — LIDOCAINE HYDROCHLORIDE 50 MG: 10 INJECTION, SOLUTION EPIDURAL; INFILTRATION; INTRACAUDAL; PERINEURAL at 12:54

## 2023-08-14 RX ADMIN — CEFAZOLIN SODIUM 2000 MG: 2 INJECTION, SOLUTION INTRAVENOUS at 13:00

## 2023-08-14 RX ADMIN — FENTANYL CITRATE 25 MCG: 50 INJECTION, SOLUTION INTRAMUSCULAR; INTRAVENOUS at 12:59

## 2023-08-14 RX ADMIN — FENTANYL CITRATE 25 MCG: 50 INJECTION, SOLUTION INTRAMUSCULAR; INTRAVENOUS at 13:05

## 2023-08-14 RX ADMIN — SODIUM CHLORIDE, POTASSIUM CHLORIDE, SODIUM LACTATE AND CALCIUM CHLORIDE 9 ML/HR: 600; 310; 30; 20 INJECTION, SOLUTION INTRAVENOUS at 11:05

## 2023-08-14 RX ADMIN — Medication 5 MG: at 12:58

## 2023-08-14 RX ADMIN — SODIUM CHLORIDE, POTASSIUM CHLORIDE, SODIUM LACTATE AND CALCIUM CHLORIDE: 600; 310; 30; 20 INJECTION, SOLUTION INTRAVENOUS at 12:49

## 2023-08-14 RX ADMIN — FENTANYL CITRATE 25 MCG: 50 INJECTION, SOLUTION INTRAMUSCULAR; INTRAVENOUS at 13:25

## 2023-08-14 NOTE — ANESTHESIA POSTPROCEDURE EVALUATION
Patient: Prashant Avila    Procedure Summary       Date: 08/14/23 Room / Location:  [facility]    Anesthesia Start: 1249 Anesthesia Stop: 1348    Procedure: LUMBAR DECOMPRESSION MINIMALLY INVASIVE (Back) Diagnosis:       Ligamentum flavum hypertrophy      Lumbar stenosis with neurogenic claudication      (Ligamentum flavum hypertrophy [M24.28])      (Lumbar stenosis with neurogenic claudication [M48.062])    Surgeons: Efrain Batista MD Provider: Sae Ward MD    Anesthesia Type: MAC ASA Status: 3            Anesthesia Type: MAC    Vitals  Vitals Value Taken Time   /79 08/14/23 1515   Temp 97 øF (36.1 øC) 08/14/23 1400   Pulse 58 08/14/23 1521   Resp 18 08/14/23 1515   SpO2 94 % 08/14/23 1521   Vitals shown include unvalidated device data.          Post Anesthesia Care and Evaluation    Patient location during evaluation: PACU  Patient participation: complete - patient participated  Level of consciousness: awake and alert  Pain management: adequate    Airway patency: patent  Anesthetic complications: No anesthetic complications  PONV Status: none  Cardiovascular status: hemodynamically stable and acceptable  Respiratory status: nonlabored ventilation, acceptable and nasal cannula  Hydration status: acceptable

## 2023-08-14 NOTE — OP NOTE
PREOPERATIVE DIAGNOSES:   1. Lumbar stenosis with neurogenic claudication   2. Ligamentum flavum hypertrophy   3. Adhesive arachnoiditis   4. Lumbar postlaminectomy syndrome   5. Polyneuropathy   6. Chronic intractable pain   7. Physical deconditioning   8. Gait disturbance     POSTOPERATIVE DIAGNOSES:   1. Lumbar stenosis with neurogenic claudication   2. Ligamentum flavum hypertrophy   3. Adhesive arachnoiditis   4. Lumbar postlaminectomy syndrome   5. Polyneuropathy   6. Chronic intractable pain   7. Physical deconditioning   8. Gait disturbance   Encounter for examination for normal comparison and control in clinical research (Z00.6)   National clinical trial number [id number]     PROCEDURE PERFORMED:   Bilateral L3-L4 percutaneous lumbar decompression (0275T)   MILD KIT Ref. [id number] Lot [id number] Exp. 12/01/2026   Fluoroscopic guidance   Epidurogram at the target area that is being treated and debulked     ANESTHESIA: MAC + Local anesthesia (Twenty ml of 0.25% bupivacaine with epineprhine 1:200.000 were injected through the skin and subcutaneous tissues by way of a spinal needle into the lamina of the areas of concern throughout the procedure)     ANTIBIOTICS: Ancef 2 g intravenously 30 minutes prior to the procedure as prophylaxis.     BLOOD LOSS: < 10 ml     COMPLICATIONS: NONE     INDICATIONS FOR PROCEDURE: Lumbar spinal stenosis with neurogenic claudication with a main component of ligamentum flavum hypertrophy refractory to conservative measures. Cerdaboone Avila reports a longstanding history of intractable lower back and lower extremity pain associated with neurogenic claudication. MRI of the lumbar spine and CT myelogram revealed severe LSS largely due to ligamentum flavum hypertrophy at L3-L4 (adjacent level to lumbar fusion). A comprehensive evaluation including history and physical exam and pertinent physiologic and functional assessment was performed. Patient presents with intractable pain due to  the diagnoses listed above. Patient has failed to respond to conservative modalities, previous minimally invasive interventional pain management measures, and previous surgical interventions, as referenced under HPI including the impact of patient's moderate-to-severe pain contributing to significant impairment in daily activities, ADLs, and a negative impact on quality of life. Supporting diagnostic studies of patient's chronic pain condition have been reviewed. I had a lengthy conversation with the patient regarding patient's chronic pain condition and potential therapeutic options including risks, benefits, alternative therapies, to name a few. The patient recalls previous office conversations regarding indications, risks, and potential benefits of a MILD procedure- percutaneous lumbar decompression-. Informed consent was obtained, and the patient understood and agreed with the proposed treatment and opted for a MILD procedure. According to the treatment algorithm for lumbar spinal stenosis, the patient is a candidate for a lumbar decompression procedure.     PROCEDURE DESCRIPTION: The patient's ID was confirmed and a time-out was performed. Time, site, location, and procedure agreed upon and consented for were reviewed. The patient was placed in the prone position in the fluoroscopy suite with a pillow (bolster) under the hips to assist with reversing the lordosis of the lumbar spine. MAC was provided by anesthesia. Following IV antibiotic administration, the patient was prepped and draped in the usual standard fashion. The anatomical landmarks were identified and local anesthetics were infiltrated to anesthetize the skin and deep tissues accordingly. A Tuohy needle was advanced at L2-L3 using the loss of resistance technique under C-arm fluoroscopic guidance. An epidurogram was performed by injecting minimal amounts of myelographically compatible contrast revealing decreased contrast flow past the levels of  concern. An oblique contralateral view of the epidurogram was obtained, which indicated lumbar spinal stenosis as shown by the decreased contrast flow. A 2 mm skin incision was made with an 11 scalpel blade, and a trocar with portal was inserted percutaneously under fluoroscopic guidance to contact the lamina at the indicated levels. A bone-sculpting rongeur was inserted to remove adequate amounts of lamina (laminotomy) from the superior surface of the inferior operative lamina site and from the inferior aspect of the lamina above it. The laminotomy created a passage for the tissue sculpting instrument used in debulking the ligamentum flavum. The ligamentum flavum was debulked in such a way that epidural contrast, upon repeating the epidurogram, showed improved contrast flow. A similar procedure was performed on the other side of treatment. There were no complications or difficulties noted with these procedures. The epidurogram was refreshed throughout the procedure demonstrating improvement in the original narrowed canal. Fluoroscopy time: 154 seconds. Upon completion of the procedure, instruments were removed. Hemostasis was achieved by direct pressure, and closure of the wounds performed with Nylon 3:0. Covaderm dressings were applied. A successful fluoroscopically guided percutaneous lumbar decompression was undertaken at the indicated levels above. The patient was neurologically intact without complications, freely able to move and respond to commands during her procedure, and able to move the lower extremities. Movement of toes, feet, knees, and hips were reviewed as well as there was no loss of sensation in the lower extremities prior to being discharged from the procedure room and later on as reassessed in the recovery room. The patient tolerated the procedure well. Upon transferring the patient to the recovery room, the patient's vital signs remained stable and without any evidence of neurologic deficits as  a brief neurological exam showed adequate strength and no loss of sensation in the lower extremities.     PLAN:   The patient will be discharged with pertinent instructions to take it easy for rest of the day due to the effect of sedatives, apply ice-packs to the surgical sites, and progressive resume mobility and physical therapy over the following days. The patient was given a follow-up appointment along with instructions and contact information for any questions or concerns. The patient understands the plan and agrees to proceed accordingly.    Efrain Batista MD      Please note that portions of this note were completed with a voice recognition program.

## 2023-08-14 NOTE — ANESTHESIA PREPROCEDURE EVALUATION
Anesthesia Evaluation     Patient summary reviewed and Nursing notes reviewed   no history of anesthetic complications:   NPO Solid Status: > 8 hours  NPO Liquid Status: > 2 hours           Airway   Mallampati: II  TM distance: >3 FB  Neck ROM: full  No difficulty expected  Dental - normal exam     Pulmonary     breath sounds clear to auscultation  Cardiovascular     ECG reviewed  Rhythm: regular  Rate: normal    (+) hypertension well controlled less than 2 medicationshyperlipidemia  (-) dysrhythmias      Neuro/Psych  (+) numbness  GI/Hepatic/Renal/Endo    (+) renal disease stones, diabetes mellitus type 2 well controlled    Musculoskeletal     Abdominal   (+) obese   Substance History      OB/GYN          Other   arthritis,   history of cancer                  Anesthesia Plan    ASA 3     MAC     intravenous induction     Anesthetic plan, risks, benefits, and alternatives have been provided, discussed and informed consent has been obtained with: patient.    Plan discussed with CRNA.    CODE STATUS:

## 2023-08-15 ENCOUNTER — TELEPHONE (OUTPATIENT)
Dept: PAIN MEDICINE | Facility: CLINIC | Age: 78
End: 2023-08-15
Payer: MEDICARE

## 2023-08-15 NOTE — TELEPHONE ENCOUNTER
Spoke with pt regarding how he's feeling after his MILD procedure with Dr. Batista yesterday. Pt advised that he's doing well. Pt advised he had pain last night, but that it's subsided today. Pt advised that his BP was better today. Pt advised he's been using ice, and that seems to help. Pt inquired that if he was to come back in 2 days, and I advised that he is to come back the 29th for his appointment. Pt understood and no further needs expressed.

## 2023-08-21 DIAGNOSIS — G03.9 ADHESIVE ARACHNOIDITIS: ICD-10-CM

## 2023-08-21 RX ORDER — GABAPENTIN 800 MG/1
TABLET ORAL
Qty: 120 TABLET | Refills: 5 | Status: SHIPPED | OUTPATIENT
Start: 2023-08-21

## 2023-08-25 NOTE — PROGRESS NOTES
"Chief Complaint: \"I'm here to get the stitches out. I'm not better.\"      CHRONIC PAIN HISTORY: Prashant Avila was originally referred by Dr. Fausto Renee in consultation for chronic intractable left gluteal and left lower extremity pain. As referenced numerous times on previous visits, Mr Avila presents with a complex chronic pain condition. Prashant Avila underwent L4-L5 hemilaminectomy and foraminotomy at [facility] with Dr. Mackay on 03/03/2014. As he continued to struggle with pain, he underwent L4-S1 lumbar decompression and fusion by Dr. Renee on 07/24/2014. Prashant Avila experienced significant improvement after his last surgery with Dr. Renee, in particular, he experienced complete resolution of his previously severe lower back pain. Unfortunately, he has been dealing with intractable left gluteal and left lower extremity pain that was present prior to his last surgery with Dr Renee. He underwent surgical consultation with Dr. Cox at the [facility], who recommended decompression of the nerve roots from L3-S1. He also underwent follow-up neurosurgical consultation with Dr. Fausto Renee on 11/03/2017. Dr. Renee discussed the possibility of lumbar decompression at L3-L4: Left L3-L4 laminotomy and foraminotomy. Due to potential risk of developing instability that would necessitate extending his lumbar fusion to the L3 level. A CT myelogram was significant for stenosis above the level of his lumbar fusion, at L3-L4 without clinical correlation as he failed to obtain pain relief from diagnostic and therapeutic left L3-L4 transforaminal epidural steroid injection on April 3, 2018. In addition, he was not experiencing claudication during that period (pain was worse sitting or lying down, and better by standing or walking). Consequently, he declined surgery. EMG/NCV, confirmed chronic left S1 radiculopathy that resembled the dermatomic distribution of his symptoms. Prashant Avila presents " with symptoms consistent with chronic left S1 radiculopathy along with superimposed features from underlying adhesive lumbar arachnoiditis, which I believe contributes to the majority of his chronic pain. Prashant Avila underwent a successful SCS trial followed by implantation of a spinal cord stimulator device on 11/21/2019 by Dr. Fausto Renee ([employer] Penta paddle lead with the top electrodes projecting at the level of the superior endplate of the T8 vertebral level. IPG: [employer] Proclaim 5 XR IPG Non-Rechargeable -Full Body MRI compatible). The device was implanted for treatment of chronic intractable left hip and left lower extremity pain. Unfortunately, he never seemed completely satisfied with the use of his SCS device despite numerous attempts at SCS reprogramming. He perceived paresthesia overlapping the areas of his pain but did not appreciate the way tonic stimulation felt. He reported no relief from Burst DR. On 03/01/2021, he underwent explantation of the entire SCS system with Dr. Fausto Renee. As he continued to struggle with pain, he underwent intrathecal analgesic trials with fentanyl and Prialt without relief. On 09/08/2021, he requested to proceed with regenerative therapies, and as such he had platelet rich leukocyte poor plasma therapy and A2M therapy delivered into the lumbar epidural space via a caudal epidural catheter placement. He denied long lasting relief although the plan was to perform this procedure 3 weeks apart at least x 3 as recommended by current protocols. On his last visit with me, he reported an interest in pursuing an intrathecal trial with a higher dose of opioids or Prialt or with alternative medications such as bupivacaine or clonidine. I explained that I will not use a higher dose of Prialt as he had psychological side effects with the lowest effective dose of Prialt during his trial. He also underwent neurology consultation with Dr. Kodak Alonso who concurred  "with patient's treatment measures. He underwent additional diagnostic studies. MRI of the thoracic spine without contrast on 10/05/2022 revealed multilevel spondylosis without significant canal or foraminal stenosis. X-rays of the lumbar spine on 06/02/2023 revealed PLIF L4-S1.  The left S1 pedicle screw is fractured as was present on previous imaging studies. There is disc space narrowing at L3-L4 above the fused level. Facet joint arthropathy throughout the lumbar spine.  Prashant Avila underwent follow-up neurosurgical consultation with Dr. Fausto Renee on 06/14/2023 and was found not to be a surgical candidate. Per Dr. Renee;\" While the patient has a fracture of one of the screws, his symptoms are exactly the same as they had been since prior to his first surgery.\" I concurred with Dr. Renee. Prashant Avila underwent follow-up psychological evaluation with Dr. Praveen Navarro in July 2023: \"From a psychological perspective, patient is an appropriate candidate for an intrathecal delivery system, a spinal cord stimulator device, a peripheral nerve stimulator device, a DRG device, and need any other procedures deemed appropriate for his condition.\"   Prashant Avila failed to obtain pain relief for more than 9 years including but not limited to;  A. Conservative measures including oral analgesics (including opioids, naltrexone, most medications available for treatment of neuropathic pain except for those contraindicated due to underlying conditions, topical analgesics: failed analgesic trials due to side effects with most medications), ice, heat, physical therapy, physical therapist directed home exercise program HEP (ongoing), to name a few;   B. Two surgical interventions (on 03/03/2014: L4-L5 hemilaminectomy and foraminotomy at [facility] with Dr. Mackay, on 07/24/2014: L4-S1 lumbar decompression and fusion by Dr. Renee);   C. Several efforts of minimally invasive interventional pain management measures, " as referenced on previous notes, including diagnostic selective nerve root injections, transforaminal epidural steroid injections, piriformis injections, spinal cord stimulator trial, spinal cord stimulator implant followed by explant, intrathecal analgesic trials, regenerative therapies.     Current History: Prashant Avila 78 y.o. male presented with increased pain over the past several months associated with recent development of neurogenic claudication that has affected his QOL as before he was able to get relief by standing or walking, and now, he is forced to sit down (position that caused the most pain) because of his claudication. We discussed that the broken screw seen on previous X-rays should not cause him pain as there is no evidence of instability with flexion and or extension (solid fusion) at that level or the screw potentially impinging on a nerve root. In fact, he has had his exact chronic neuropathic pain since 2014 and the screw was already broken in 2019 (CT myelogram image 126/axial). Consequently, we discussed MILD: minimally invasive lumbar decompression at L3-L4 as the ligamentum flavum was the main component of his lumbar canal stenosis and at that level there is evidence of clumping of the nerve roots/arachnoiditis. Prashant Avila was last seen on 08/14/2023 when he underwent an uneventful MILD procedure bilaterally at L3-L4 (above level of previous lumbar fusion), from which he has experienced no significant improvement. The outcome is good looking at the fact that he could have had improvement and therefore a more invasive decompression and fusion one could speculate could have been effective. Or he could have had relief itself from the MILD. . Prashant Avila denies any new symptoms or changes in his medical history.   He continues on gabapentin without side effects. He has resumed oral naltrexone 1 mg daily with discreet analgesic benefit.   We discussed again potential  treatments such as pulsed radiofrequency of the dorsal root ganglion of S1 (procedure is not covered by insurance)  He asked me again about prospects of an intrathecal trial and we discussed potential trials with bupivacaine or clonidine (failed Prialt and fentanyl).  We discussed the availability of new SCS technology: Nevro (high frequency 10K, [employer]; DRG, PNS    Pain Description: Constant left gluteal and left lower extremity pain with intermittent exacerbation, described as burning, numbing, shooting, tingling, pins and needles, electrical sensation.   Radiation of Pain: The pain radiates from the left gluteal region down into the left lower extremity into the plantar aspect of his left foot. He denies significant lower back pain.   Pain pattern: Dermatomic   Pain intensity today: 8/10   Average pain intensity last week: 8/10  Pain intensity ranges from: 5/10 to 8/10  Aggravating factors: Pain increases with sitting for more than 5 minutes, lying down (takes gabapentin before going to bed), standing walking. Patient describes neurogenic claudication. Patient does not use a cane or a walker  Alleviating factors: Pain decreases with standing, os short walks  Associated Symptoms:   Patient reports pain, tingling, but denies numbness or weakness in the left lower extremity.  Patient denies symptoms in the opposite limb  Patient denies any new bladder or bowel problems.   Patient denies difficulties with his balance or recent falls.   Pain interferes with general activities (ability to walk, stand, transition from different positions), and affects patient's quality of life  Pain interferes with sleep causing sleep fragmentation     Review of previous therapies and additional medical records:  Prashant Avila has already failed the following measures, including:   Conservative Measures: Oral analgesics (including opioids, naltrexone, most medications available for treatment of neuropathic pain except  for those contraindicated due to underlying conditions, topical analgesics: failed analgesic trials due to side effects with most medications), ice, heat, physical therapy, physical therapist directed home exercise program HEP (ongoing), to name a few  Interventional Measures:   09/08/2021: Platelet rich leukocyte poor plasma therapy and A2 M therapy, via lumbar epidural administration with caudal epidural catheter placement approach  02/06/2019: Left S1 transforaminal epidural steroid injection/pain relief that was short-lived   02/20/2017: Diagnostic and therapeutic left sciatic nerve block with local anesthetics, steroids, hyaluronidase under ultrasound, PNS and fluoroscopic guidance- Patient did not receive sustained relief from this procedure.   02/06/2017: Diagnostic left S1 selective nerve root injection, relief  01/30/2017: Nevro SCS Trial; no relief  04/25/2016: Trial of lumbar epidural steroid injections with steroids and hyaluronidase via caudal catheter with 100% pain relief lasting 48 hours  12/16/2015: Diagnostic and therapeutic left S1 transforaminal epidural steroid injection with hyaluronidase with complete resolution of pain for several weeks  11/30/2015: Spinal cord stimulator trial [employer], which provided him with more than 50% pain relief and functional improvement. However, patient decided to delay implantation of a permanent spinal cord stimulator device until the arrival of new technology. Patient did not like the perception of tonic stimulation, even though stimulation overlapped successfully the areas of his chronic pain, he did not find it pleasant.   02/09/2015: Diagnostic left piriformis muscle injection, no relief  Surgical Measures:   MILD: Bilateral L3-L4 08/14/2023 by Dr. Batista  03/01/2021: SCS explant by Dr. Fausto Renee.   11/21/2019: SCS implant: [employer] Penta paddle lead with the top electrodes projecting at the level of the superior endplate of the T8 vertebral level.  "IPG: [employer] Proclaim 5 XR IPG Non-Rechargeable by Dr. Fausto Renee   07/24/2014: L4-S1 decompression and fusion by Dr. Renee  03/03/2014: Left L4-L5 hemilaminectomy and foraminotomy at [facility] with Dr. Mackay   Prashant Avila underwent neurosurgical consultation with Dr. Renee on 06/24/2023, and was found not to be a surgical candidate.  Prashant Avila underwent psychological consultation at [facility] psychological evaluation with Dr. Praveen Navarro on 8/30/2021, per note: \"It is recommended the patient be seen for psychological treatment of his pain disorder including biofeedback.  From a psychological perspective, patient is an appropriate candidate for interventional pain procedures, such as a pain pump with opioids or Prialt.\" Follow-up psychological evaluation with Dr. Praveen Navarro in July 2023: \"From a psychological perspective, patient is an appropriate candidate for an intrathecal delivery system, a spinal cord stimulator device, a peripheral nerve stimulator device, a DRG device, and need any other procedures deemed appropriate for his condition.\"   Prashant Avila presents with significant comorbidities including history of prostate cancer, eczema, gout, hyperlipidemia, hypertension, history of kidney stones, left bundle branch block, osteoarthritis, rheumatoid arthritis, hearing loss, osteoporosis  In terms of current analgesics, Prashant Avila takes: Gabapentin 800 mg QAM, QPM, TT QHS. He stopped taking naltrexone 1 mg daily despite experiencing some relief.   I have reviewed Jon Report consistent with medication reconciliation.  SOAPP/ORT: Low Risk     PHQ-2 Depression Screening  Little interest or pleasure in doing things? 0-->not at all   Feeling down, depressed, or hopeless? 0-->not at all   PHQ-2 Total Score 0     Pain Self-Efficacy Questionnaire (PSEQ)  ITEM 08-03 2023 08-29 2023       I can enjoy things despite the pain. 4 3       I can do most of the household chores " (tidying up, washing dishes, etc), despite the pain. 1 3       I can socialize with my friends or family members as often as I used to do, despite the pain. 1 3       I can cope with my pain in most situations. 4 3       I can do some form of work, despite the pain (includes housework, paid, and unpaid work). 1 4       I can still do many of the things I enjoy doing, such as hobbies or leisure activity despite pain. 0 0       I can cope with my pain without medications. 2 3       I can accomplish most of my goals in life despite the pain. 0 6       I can live in a normal lifestyle, despite the pain. 0 3       I can gradually become more active, despite the pain. 0 0       TOTAL SCORE 13/60 28/60         Global Pain Scale 04-19  2018 10-24  2018 11-08  2018 01-29  2019 03-06  2019 10-03  2019 10-17  2019 01-14  2020 01-21  2020 02-20  2020 04-14  2020 05-07  2020 09-24  2020 07-20  2021 10-19  2021 11-23  2021 01-13  2022 03-15  2022 06-07  2022 08-30  2022     Pain 19 17 17 18 17 20  10 10 10   10  10 8 20 22 19 17 22 21 19 21   Feelings 1 0 0 0 0   8    4   2  0  0    0 0 6 12 4 1 0 0 0  9   Clinical outcomes 11 8 13 11 6 11    7   6  7  9    9 9 11 12 9 11 10 10 10 15   Activities 17 10 16 18 6 15    7   6  4  15  15 12 7 10 8 21 7 15 12 15   GPS Total: 48 35 46 47 29 54  28  24  21  34  34 28 44 56 40 50 39 46 41  60     Global Pain Scale 08-03 2023 08-29 2023         Pain 19 19         Feelings 8 9         Clinical outcomes 12 13         Activities 20 12         GPS Total: 59 53             The [place] Back Pain Disability Scale  DATE 08-30 2022 08-03 2023 08-29 2023        Sleep through the night 3 5 4        Turn over in bed 0 0 0        Get out of bed 0 0 0        Make your bed 0 0 0        Put on socks (pantyhose) 0 0 0        Ride in a car 5 5 4        Sit in a chair for several hours 5 5 4        Stand up for 20-30 minutes 0 0 3        Climb one flight of stairs 2 0 1        Walk a few blocks (200-300  yards)  3 5 3        Walk several miles 5 5 5        Run one block (about 50 yards) 5 5 5        Take food out of the refrigerator 0 0 0        Reach up to high shelves 0 4 1        Move a chair 0 2 1        Pull or push heavy doors 0 2 1        Bend over to clean the bathtub 0 4 3        Throw a ball 0 5 0        Carry two bags of groceries 2 5 3        Lift and carry a heavy suitcase 3 5 4        Total score 33 57 42          LANSS pain scale  LANSS item 08-30 2022 08-29 2023   1. Does the pain produce unpleasant sensations such as tingling, pricking or pins and needles? 5 3   2. Is there a different skin aspect in the painful areas, i.e. skin redder than usual or appearing mottled? 0 0   3. Does stroking the skin in the painful area or wearing tight clothing items produce unpleasant sensations? 0 0   4. Do you experience any sensations like electric shocks, bursting or jumping corresponding to painful episodes, i.e. unexplained bursts of pain? 2 2   5. Do you experience burning sensations in the painful areas or a sudden temperature change? 1 0   6. Result to stroking the non painful area and the described painful area with cotton wool: 0 0   7. Result to touching (pinprick) both areas with a 23 gauge needle: 3 0   Total Score: 08/30/2022: 13 (likely neuropathic pain)  Total Score: 08/29/2023: 5     Diagnostic Studies:  I have independently reviewed and interpreted the images with the patient and used the images and a tridimensional spine model to explain findings. I have also reviewed the reports.  08/08/2023: UDS + gabapentin: appropriate  08/08/2023: CBC, PT, PTT wnl  08/03/2023: Flexion and extension Xrays of the lumbar spine: No instability  X-rays of the lumbar spine on 6/2/2023 revealed PLIF L4-S1.  The left S1 pedicle screw is fractured as was present previously.  There is disc space narrowing at L3-4 above the fused level.  There is facet joint disease throughout the lumbar spine.    MRI of the  thoracic spine without contrast on 10/5/2022 revealed multilevel spondylosis without significant canal or foraminal stenosis.  MRI THORACIC SPINE WO CONTRAST-10/31/2019: Minimal degenerative changes at multiple levels throughout the thoracic spine with no central spinal canal stenosis.   EMG/NCV of the bilateral lower extremities 12/28/2018: Chronic left S1 radiculopathy  CT LUMBAR SPINE WITH CONTRAST-01/21/2019: Excellent opacification of the thecal sac. Imaging covers from T12 through the S1 level caudally. The conus terminates at the L1/L2 level.  The distal cord and conus are normal in caliber. The nerve roots of the cauda equina are normal in caliber and distribution. L4-S1 fusion hardware is in place; the left S1 pedicle screw is fractured and there is lucency around the right S1 pedicle screw. Left L4 laminotomy. Vertebral body heights and alignment are normal. Vertebral body heights are normal. Degenerative changes are as follows:  T12-L1: Facet arthropathy without significant foraminal or spinal canal stenosis.  L1-L2: Facet arthropathy without significant foraminal or spinal canal stenosis.  L2-L3: Right greater than left facet arthropathy. Mild narrowing of the right subarticular zone. No significant central spinal canal or foraminal stenosis.  L3-L4: Disc bulge and facet arthropathy with ligamentum flavum thickening. There is severe spinal canal stenosis and at least moderate bilateral foraminal stenosis.   L4-L5: Improved right subarticular zone narrowing. No significant osseous spinal canal or foraminal stenosis.  L5-S1: Bilateral facet arthropathy. Shallow disc osteophyte. Unchanged mild osseous foraminal stenosis. No significant osseous spinal canal stenosis.  EKG April 19, 2018 revealed sinus bradycardia with first-degree AV block.  Left bundle branch block.    X-Ray lumbar spine 08/04/2017: Posterior lumbar fusion of L4, L5 and S1. Degenerative changes at L1-L2 with anterior osteophyte formation.  Normal lumbar alignment in the lateral projection with a stable alignment in the flexed and extended positions.      Duplex Venous Lower, 03/25/2017: Normal right lower extremity venous duplex scan. No evidence of DVT.  MRI Lumbar Spine, 10/28/2016: Since the previous MRI study, there has been L4-5-S1 posterior lumbar and interbody fusion. No new abnormality of alignment is seen. There is, however, significant worsening of posterior element hypertrophy at L3-4, above the fusion level, due to persistent L3-4 disc bulge, and what appears to be new spinal stenosis at this level, generally as a result of posterior element hypertrophic change. Canal appears stable elsewhere. No lumbar compression deformity or new abnormality of alignment is seen. No vertebral marrow edema is appreciated. Sagittal images suggest a small (18 mm) seroma or synovial cyst on the right at L5-S1. Tip of conus medullaris is again noted at L1.  Axial images   L1-L2: No significant canal or foraminal stenosis   L2-L3: Canal appears relatively narrowed, due to posterior element hypertrophy but still greater than 1 cm in diameter. Foramina appear  lower limits of normal diameter.   L3-L4: Moderate canal stenosis with lateral narrowing, AP diameter 6.5 mm, transverse diameter approximately 9 mm with crowding of the nerve roots. Foramina appear mildly to moderately narrowed bilaterally due to facet DJD.   L4-L5: Fused level, canal appears normal. Mild foraminal stenosis due to facet DJD  L5-S1: Canal appears normal. Left-sided neural foramen appears normal. Right-sided neural foramen appears mildly narrowed due to facet DJD. Postcontrast images show expected postoperative enhancement of the dorsal soft tissues and no pathologic postcontrast enhancement elsewhere.     The following portions of the patient's history were reviewed and updated as appropriate: problem list, past medical history, past surgery history, social history, family history,  medication reconciliation, and allergies    Review of Systems   Musculoskeletal:  Positive for back pain.   All other systems reviewed and are negative.      Patient Active Problem List   Diagnosis    Chronic lumbar radiculopathy    Lumbar postlaminectomy syndrome    Status post L4-S1 lumbar spinal fusion    Physical deconditioning    Adhesive arachnoiditis    Bilateral stenosis of lateral recess of lumbar spine    Osteoarthritis    Chronic intractable pain    Encounter for therapeutic drug monitoring    Chronic pain syndrome    Intractable neuropathic pain of lower extremity, left    Gait disturbance    Polyneuropathy    Lumbar stenosis with neurogenic claudication    Ligamentum flavum hypertrophy    Non-insulin dependent type 2 diabetes mellitus       Past Medical History:   Diagnosis Date    Arthritis     Carcinoma of prostate     surgery     Chronic pain disorder     Eczema     Elevated cholesterol     Gout     History of chronic kidney disease     Hyperlipidemia     Hypertension     Kidney stone 1990    passed     Left bundle branch block     Low back pain     Lumbar stenosis with neurogenic claudication 08/03/2023    Lumbosacral disc disease     Neuropathic pain of flank, left     Osteoarthritis     Polyneuropathy 08/03/2023    Thoracic disc disorder     Wears hearing aid in both ears          Past Surgical History:   Procedure Laterality Date    BACK SURGERY  2014    Lumbar Fusion, july 2014    BACK SURGERY  2014    Lumbar Laminectomy, march 2014    COLONOSCOPY  2016    EPIDURAL BLOCK  2016    LAMINECTOMY  2013    LUMBAR DECOMPRESSION N/A 8/14/2023    Procedure: LUMBAR DECOMPRESSION MINIMALLY INVASIVE;  Surgeon: Efrain Batista MD;  Location:  [facility];  Service: Pain Management;  Laterality: N/A;    OTHER SURGICAL HISTORY      Enteroscopic Polypectomy    PAIN PUMP INSERTION/REVISION N/A 10/12/2020    Procedure: PAIN PUMP TRIAL;  Surgeon: Efrain Batista MD;  Location:  [facility];  Service: Pain  Management;  Laterality: N/A;    UT MYELOGRAPHY VIA LUMBAR INJECT RS&I LUMBOSACRAL N/A 01/21/2019    Procedure: IR myelogram, lumbar;  Surgeon: Francisco Javier Clay MD;  Location:  [facility];  Service: Interventional Radiology    PROSTATE SURGERY      SPINAL CORD STIMULATOR IMPLANT N/A 11/21/2019    Procedure: SPINAL CORD STIMULATOR INSERTION;  Surgeon: Fausto Renee MD;  Location:  [facility];  Service: Neurosurgery    SPINAL CORD STIMULATOR IMPLANT N/A 03/01/2021    Procedure: Removal of entire system, SPINAL CORD STIMULAR REMOVAL;  Surgeon: Fausto Renee MD;  Location:  [facility];  Service: Neurosurgery;  Laterality: N/A;    TESTICLE UNDESCENDED REPAIR  1980    VOCAL CORD BIOPSY      polyps removed          Family History   Problem Relation Age of Onset    Congenital heart disease Mother     Hearing loss Mother     Multiple myeloma Father     Breast cancer Neg Hx     Ovarian cancer Neg Hx          Social History     Socioeconomic History    Marital status:    Tobacco Use    Smoking status: Never     Passive exposure: Never    Smokeless tobacco: Never   Vaping Use    Vaping Use: Never used   Substance and Sexual Activity    Alcohol use: No    Drug use: No    Sexual activity: Defer           Current Outpatient Medications:     bisoprolol (ZEBeta) 10 MG tablet, , Disp: , Rfl:     diphenhydrAMINE-acetaminophen (TYLENOL PM)  MG tablet per tablet, Take 1 tablet by mouth At Night As Needed for Sleep., Disp: , Rfl:     docusate sodium (COLACE) 100 MG capsule, Take 1 capsule by mouth Daily., Disp: , Rfl:     gabapentin (NEURONTIN) 800 MG tablet, TAKE ONE TABLET BY MOUTH EVERY MORNING AND TAKE ONE TABLET BY MOUTH AT NOON AND TAKE TWO TABLETS BY MOUTH EVERY NIGHT AT BEDTIME, Disp: 120 tablet, Rfl: 5    HYDROcodone-acetaminophen (NORCO) 5-325 MG per tablet, 1/2 to 1 tablet QID PRN postprocedure pain, Disp: 4 tablet, Rfl: 0    lisinopril (PRINIVIL,ZESTRIL) 10 MG tablet, , Disp: , Rfl:      "losartan (COZAAR) 50 MG tablet, Take 1 tablet by mouth Daily., Disp: , Rfl:     Naltrexone powder, 1 mg Daily. naltrexone 1 mg once a day, #30, 1 refill, Disp: 30 g, Rfl: 1    pravastatin (PRAVACHOL) 10 MG tablet, Take 1 tablet by mouth Daily., Disp: , Rfl:       Allergies   Allergen Reactions    Adhesive Tape Rash     Rash with blisters  Only after wearing excessive tape for SCS phase 1 for several days.    Doxycycline Rash         /80   Pulse 50   Temp 96.2 øF (35.7 øC)   Ht 175.3 cm (69\")   Wt 85.4 kg (188 lb 3.2 oz)   SpO2 98%   BMI 27.79 kg/mý       Physical Exam:  Confirmed findings from today's visit;  Constitutional: Patient appears well-developed, well-nourished, well-hydrated, appears younger than stated age  HEENT: Head: Normocephalic and atraumatic  Eyes: Conjunctivae and lids are normal  Pupils: Equal, round, reactive to light  Neck: Trachea normal. Neck supple. No JVD present.   Lymphatic: No cervical adenopathy  Peripheral vascular exam: Femoral: right 2+, left 2+. Posterior tibialis: right 2+ and left 2+. Dorsalis pedis: right 2+ and left 2+. No edema.   Musculoskeletal   Gait and station: Gait evaluation demonstrated a normal gait. Able to walk on heels (weak left foot), toes (weak left foot), tandem walking   Cervical spine: Passive and active range of motion are full and without pain. Extension, flexion, lateral flexion, rotation of the cervical spine did not increase or reproduce pain. Cervical facet joint loading maneuvers are negative.   Lumbar spine: Passive and active range of motion are appropriate for patient's age and condition.  Extension, flexion, lateral flexion, rotation of the lumbar spine did not increase or reproduce pain. Lumbar facet joint loading maneuvers are negative.   Kodak test, Gaenslen's test, thigh thrust test, SI compression test, Yeoman's test are negative   Piriformis maneuvers are negative   Hip joints: The range of motion of the hip joints is almost " full and without pain   Palpation of the bilateral psoas tendons and iliopsoas bursas,  unrevealing   Palpation of the bilateral greater trochanter, unrevealing   Examination of the Iliotibial band: unrevealing   Neurological:   Patient is alert and oriented to person, place, and time.   Speech: Normal.   Cortical function: Normal mental status.   Cranial nerves 2-12: intact.   Reflex Scores:  Right patellar: 0+  Left patellar: 0+  Right Achilles: 0+  Left Achilles: 0+  Motor strength: 5/5, except left foot 4/5 dorsiflexion and plantar flexion  Motor Tone: Normal  Involuntary movements: None.   Superficial/Primitive Reflexes: Primitive reflexes were absent.   Right Lamas: Absent  Left Lamas: Absent  Right ankle clonus: Absent  Left ankle clonus: Absent   Babinsky: Absent  Lhermitte sign: Negative. Long tract signs: Negative. Straight leg raising test: Negative on the right.  Positive on the left at 30-40 degrees with positive Lasegue. Femoral stretch sign: Negative on the right, questionably positive on the left.   Sensory exam: Intact to light touch, intact pain and temperature sensation, intact vibration sensation and normal proprioception, except for decreased sensation to vibration from the knees down in a stocking distribution LT>RT.   Coordination: Normal finger to nose and heel to shin. Normal balance. Romberg's sign: Negative    Skin and subcutaneous tissue: Skin is warm and intact. No rash noted. No cyanosis.   Psychiatric: Judgment and insight: Normal. Recent and remote memory: Intact. Mood and affect: Normal.     Removal of Stitches: The surgical wounds are well healed without erythema, drainage or fluid accumulation. Stitches were removed. There is complete epithelization.     ASSESSMENT:   1. Adhesive arachnoiditis    2. Chronic intractable pain    3. Lumbar postlaminectomy syndrome    4. Chronic lumbar radiculopathy    5. Status post L4-S1 lumbar spinal fusion    6. Polyneuropathy    7. Non-insulin  dependent type 2 diabetes mellitus    8. Gait disturbance    9. Physical deconditioning        PLAN/MEDICAL DECISION MAKING: Prashant Avila was originally referred by Dr. Fausto Renee in consultation for chronic intractable left gluteal and left lower extremity pain. As referenced numerous times on previous visits, Mr Avila presents with a complex chronic pain condition. Prashant Avila underwent L4-L5 hemilaminectomy and foraminotomy at [facility] with Dr. Mackay on 03/03/2014. As he continued to struggle with pain, he underwent L4-S1 lumbar decompression and fusion by Dr. Renee on 07/24/2014. Prashant Avila experienced significant improvement after his last surgery with Dr. Renee, in particular, he experienced complete resolution of his previously severe lower back pain. Unfortunately, he has been dealing with intractable left gluteal and left lower extremity pain that was present prior to his last surgery with Dr Renee. He underwent surgical consultation with Dr. Cox at the [facility], who recommended decompression of the nerve roots from L3-S1. He also underwent follow-up neurosurgical consultation with Dr. Fausto Renee on 11/03/2017. Dr. Renee discussed the possibility of lumbar decompression at L3-L4: Left L3-L4 laminotomy and foraminotomy. Due to potential risk of developing instability that would necessitate extending his lumbar fusion to the L3 level. A CT myelogram was significant for stenosis above the level of his lumbar fusion, at L3-L4 without clinical correlation as he failed to obtain pain relief from diagnostic and therapeutic left L3-L4 transforaminal epidural steroid injection on April 3, 2018. In addition, he was not experiencing claudication during that period (pain was worse sitting or lying down, and better by standing or walking). Consequently, he declined surgery. EMG/NCV, confirmed chronic left S1 radiculopathy that resembled the dermatomic distribution of his symptoms.  Prashant Avila presents with symptoms consistent with chronic left S1 radiculopathy along with superimposed features from underlying adhesive lumbar arachnoiditis, which I believe contributes to the majority of his chronic pain. Prashant Avila underwent a successful SCS trial followed by implantation of a spinal cord stimulator device on 11/21/2019 by Dr. Fausto Renee ([employer] Penta paddle lead with the top electrodes projecting at the level of the superior endplate of the T8 vertebral level. IPG: [employer] Proclaim 5 XR IPG Non-Rechargeable -Full Body MRI compatible). The device was implanted for treatment of chronic intractable left hip and left lower extremity pain. Unfortunately, he never seemed completely satisfied with the use of his SCS device despite numerous attempts at SCS reprogramming. He perceived paresthesia overlapping the areas of his pain but did not appreciate the way tonic stimulation felt. He reported no relief from Burst DR. On 03/01/2021, he underwent explantation of the entire SCS system with Dr. Fausto Renee. As he continued to struggle with pain, he underwent intrathecal analgesic trials with fentanyl and Prialt without relief. On 09/08/2021, he requested to proceed with regenerative therapies, and as such he had platelet rich leukocyte poor plasma therapy and A2M therapy delivered into the lumbar epidural space via a caudal epidural catheter placement. He denied long lasting relief although the plan was to perform this procedure 3 weeks apart at least x 3 as recommended by current protocols. On his last visit with me, he reported an interest in pursuing an intrathecal trial with a higher dose of opioids or Prialt or with alternative medications such as bupivacaine or clonidine. I explained that I will not use a higher dose of Prialt as he had psychological side effects with the lowest effective dose of Prialt during his trial. He also underwent neurology consultation with   "Kodak Alonso who concurred with patient's treatment measures. He underwent additional diagnostic studies. MRI of the thoracic spine without contrast on 10/05/2022 revealed multilevel spondylosis without significant canal or foraminal stenosis. X-rays of the lumbar spine on 06/02/2023 revealed PLIF L4-S1.  The left S1 pedicle screw is fractured as was present on previous imaging studies. There is disc space narrowing at L3-L4 above the fused level. Facet joint arthropathy throughout the lumbar spine.  Prashant Avila underwent follow-up neurosurgical consultation with Dr. Fausto Renee on 06/14/2023 and was found not to be a surgical candidate. Per Dr. Renee;\" While the patient has a fracture of one of the screws, his symptoms are exactly the same as they had been since prior to his first surgery.\" I concurred with Dr. Renee. Prashant Avila underwent follow-up psychological evaluation with Dr. Praveen Navarro in July 2023: \"From a psychological perspective, patient is an appropriate candidate for an intrathecal delivery system, a spinal cord stimulator device, a peripheral nerve stimulator device, a DRG device, and need any other procedures deemed appropriate for his condition.\"  Prashant Avila failed to obtain pain relief for more than 9 years including but not limited to; Conservative measures including oral analgesics (including opioids, naltrexone, most medications available for treatment of neuropathic pain except for those contraindicated due to underlying conditions, topical analgesics: failed analgesic trials due to side effects with most medications), ice, heat, physical therapy, physical therapist directed home exercise program HEP (ongoing), to name a few;  Two surgical interventions (on 03/03/2014: L4-L5 hemilaminectomy and foraminotomy at [facility] with Dr. Mackay, on 07/24/2014: L4-S1 lumbar decompression and fusion by Dr. Renee); Several efforts of minimally invasive interventional pain " management measures, as referenced on previous notes, including diagnostic selective nerve root injections, transforaminal epidural steroid injections, piriformis injections, spinal cord stimulator trial, spinal cord stimulator implant followed by explant, intrathecal analgesic trials, and a limited course of regenerative therapies.  Prashant Avila presented with increased pain over the past several months associated with recent development of neurogenic claudication that affected his QOL. In the past, he was able to get relief by standing or walking, and now, he is forced to sit down (position that caused the most pain) because of his claudicatory symptoms. Consequently, we discussed MILD: minimally invasive lumbar decompression at L3-L4 as the ligamentum flavum was the main component of his lumbar canal stenosis and at that level there is evidence of clumping of the nerve roots/arachnoiditis. Prashant Avila underwent an uneventful MILD procedure bilaterally at L3-L4 (above level of previous lumbar fusion) on 08/14/2023. Timur reports that he has experienced minimal pain relief or functional improvement.  Prashant Avila denies any new symptoms or changes in his medical history.  He continues on gabapentin without side effects. He has resumed oral naltrexone 1 mg daily with discreet analgesic benefit. A comprehensive evaluation including history and physical exam along with pertinent physiologic and functional assessment was performed. Patient presents with intractable pain due to the diagnoses listed above. Patient has failed to respond to conservative modalities, previous minimally invasive interventional pain management measures, and previous surgical interventions, as referenced under HPI including the impact of patient's moderate-to-severe pain contributing to significant impairment in daily activities, ADLs, and a negative impact on quality of life. Since his MILD procedure his pain levels have  decreased from 5/10 to 10/10 to 5/10 to 8/10 (down from 10/10). His GPS score has decreased from 59/100 to 53/100; The [place] Back Pain Disability Scale scores decreased from 57/100 to 42/100. LANSS went down from 13 to 5. His PSEQ increased from 13/60 to 28/60. Overall affective and somatic components have improved to some extent. Timur's chronic pain experience essentially remains unchanged to himself. He does have intractable pain. His PHQ did not reveal depression, and his psychological evaluation (had several) revealed no secondary gains, depression or other complicating issues. I have reviewed all available supporting diagnostic studies of patient's chronic pain condition as well as pertinent patient's medical records and previous therapies, as referenced above. I had a lengthy conversation with Mr. Prashant Avila regarding his chronic pain condition and potential therapeutic options including risks, benefits, alternative therapies, to name a few. We discussed continuation of a stepwise approach applying the least intense course of treatment as determined by the extent required to treat a patient's condition. The duration and frequency proposed of treatments are considered appropriate for the service in accordance with accepted standards of medical practice for the treatment of the patient's condition and intended to improve the patient's level of function. These services will be furnished in a setting appropriate to the patient's medical needs and condition. Therefore, I have proposed the following plan:      1. Interventional pain management measures:  Prashant Avila does not take any blood thinners. We have discussed different therapeutic options at length. At this point, I have advised to try different interventions in a stepwise approach before moving to the next. He agreed. He has also agreed to start PT and exercising. He does have poor tolerance to most analgesics and failed numerous trials. I  have explained the complexity of his chronic pain issues as it involves numerous elements of chronic intractable neuropathic pain components as a result of lumbar postlaminectomy syndrome with lumbar arachnoiditis, chronic left S1 radiculopathy, peripheral polyneuropathy, and residual lumbar stenosis with claudication (claudication improved since MILD, etc.). We discussed again potential treatments such as pulsed radiofrequency of the dorsal root ganglion of S1 (procedure is not covered by insurance); prospects of an intrathecal trial with bupivacaine or clonidine (failed Prialt and fentanyl); we discussed again the availability of new SCS technology: Nevro (high frequency 10K, "[employer]"; DRG, PNS. Patient opted for a new SCS trial with new technology; [employer] and the availability of using different waveforms concurrently. The other device that we discussed was Nevro. Patient will be scheduled for an SCS trial with "[employer]". Contingency plans: A. DRG left S1 dorsal root ganglion and possibly the left T12-L1 with Will; b. Peripheral nerve stimulation (Sprint temporary PNS system versus an implantable peripheral nerve stimulator such as StimRouter, etc; C. Intrathecal trials with different drugs (nonopioids such as bupivacaine or clonidine). , to name a few    2. Diagnostic studies:   A. CBC, PT, PTT prior to SCS trial  B. If he continues to struggle with pain; we could obtain an MRI of the lumbar spine and sacrum with and without contrast for planning DRG     3. Pharmacological measures: Reviewed and discussed; Prashant Avila has failed numerous analgesic trials. Unfortunately, he is not a candidate for a trial with mexiletine due to an abnormal EKG.    A. Continue gabapentin gabapentin 800 mg 1 tablet every morning, 1 tablet at noon, and 2 tablets at bedtime. He has previously failed a trial with Lyrica and other anticonvulsants  B. Continue naltrexone 1 mg once a day, #30, 1 refill.   We will assess analgesic response and titrate the dose accordingly, he would like to remain at this dose.  Screening and compliance with controlled substances: Patient has completed a SOAPP and ORT questionnaires (revealing low risk).Jon reports have been reviewed and appropriate. Random urine drug screenings have been obtained and appropriate. Patient has signed a consent for treatment with controlled substances after reviewing the document and verbalizing full understanding of the risks, benefits, alternatives, and consequences of compliance and adherence with treatment and comprehensive plan of care. Patient received in hand a copy of this document.    4. Long-term rehabilitation efforts:  A. The patient does not have a history of falls. I did complete a risk assessment for falls.   B.  Patient will start a comprehensive physical therapy program at [facility] for Alter-G, core strengthening, gait and balance training, neurodynamics, ASTYM, E-STIM, myofascial release, cupping, dry needling, home exercise program   C. Start an exercise program such as yoga, Pilates, and water therapy  D. Contrast therapy: Apply ice-packs for 15-20 minutes, followed by heating pads for 15-20 minutes to affected area   E. Prashant Avila  reports that he has never smoked. He has never been exposed to tobacco smoke. He has never used smokeless tobacco.    5. The patient and his family have been instructed to contact my office with any questions or difficulties. The patient understands the plan and agrees to proceed accordingly.    The patient has a documented plan of care to address chronic pain. Prashant Avila reports a pain score of 8/10.  Given his pain assessment as noted, treatment options were discussed and the following options were decided upon as a follow-up plan to address the patient's pain: continuation of current treatment plan for pain, educational materials on pain management, home exercises  and therapy, patient declined analgesics, referral to Physical Therapy, referral to specialist for assistance in pain treatment guidance, steroid injections, use of non-medical modalities (ice, heat, stretching and/or behavior modifications), and interventional pain management measures including mild, neuromodulation techniques, to name a few. .              Pain Management Panel  More data exists         Latest Ref Rng & Units 2023   Pain Management Panel   Amphetamine, Urine Qual Negative Negative  Negative    Barbiturates Screen, Urine Negative Negative  Negative    Benzodiazepine Screen, Urine Negative Negative  Negative    Buprenorphine, Screen, Urine Negative Negative  Negative    Cocaine Screen, Urine Negative Negative  Negative    Fentanyl, Urine Negative Negative  -   Methadone Screen , Urine Negative Negative  Negative    Methamphetamine, Ur Negative Negative  Negative         JOCELIN query complete. JOCELIN reviewed by Efrain Batista MD.     Pain Medications               gabapentin (NEURONTIN) 800 MG tablet TAKE ONE TABLET BY MOUTH EVERY MORNING AND TAKE ONE TABLET BY MOUTH AT NOON AND TAKE TWO TABLETS BY MOUTH EVERY NIGHT AT BEDTIME    HYDROcodone-acetaminophen (NORCO) 5-325 MG per tablet 1/2 to 1 tablet QID PRN postprocedure pain             No orders of the defined types were placed in this encounter.     Time Pre-chartin minutes  Time face to face: 40 minutes   Time writing the note and ordering PT, etc: 7 minutes  Total time: 52 minutes    Please note that portions of this note were completed with a voice recognition program.   Any copied data in any portion of my note has been reviewed by myself and accurate.     The  [employer] Cures Act makes medical notes like this available to patients in the interest of transparency. This is a medical document intended as peer to peer communication. It is written in medical language and may contain abbreviations or verbiage that are  unfamiliar. It may appear blunt or direct. Medical documents are intended to carry relevant information, facts as evident, and the clinical opinion of the practitioner.     Efrain Batista MD    Patient Care Team:  Montse Chun MD as PCP - General (Internal Medicine)  Fausto Renee MD as Consulting Physician (Neurosurgery)  Jean Warren MD as Consulting Physician (Neurosurgery)  Marcelino Condon MD as Consulting Physician (Urology)  Brian Tatum III, MD as Consulting Physician (Cardiology)  SHERON Chun MD as Consulting Physician (Family Medicine)  Francisco Javier Clay MD as Consulting Physician (Neurosurgery)  Royer Roldan MD as Consulting Physician (General Practice)  Emigdio Watt MD as Consulting Physician (Gastroenterology)  Shola Vila MD as Consulting Physician (Otolaryngology)  Hank Frankel MD as Consulting Physician (Cardiology)  Inez Regan APRN as Nurse Practitioner (Pain Medicine)  Efrain Batista MD as Consulting Physician (Pain Medicine)     No orders of the defined types were placed in this encounter.        No future appointments.

## 2023-08-29 ENCOUNTER — OFFICE VISIT (OUTPATIENT)
Dept: PAIN MEDICINE | Facility: CLINIC | Age: 78
End: 2023-08-29
Payer: MEDICARE

## 2023-08-29 VITALS
TEMPERATURE: 96.2 F | HEIGHT: 69 IN | WEIGHT: 188.2 LBS | HEART RATE: 50 BPM | BODY MASS INDEX: 27.88 KG/M2 | SYSTOLIC BLOOD PRESSURE: 150 MMHG | DIASTOLIC BLOOD PRESSURE: 80 MMHG | OXYGEN SATURATION: 98 %

## 2023-08-29 DIAGNOSIS — G03.9 ADHESIVE ARACHNOIDITIS: Primary | ICD-10-CM

## 2023-08-29 DIAGNOSIS — M96.1 LUMBAR POSTLAMINECTOMY SYNDROME: ICD-10-CM

## 2023-08-29 DIAGNOSIS — Z98.1 STATUS POST LUMBAR SPINAL FUSION: ICD-10-CM

## 2023-08-29 DIAGNOSIS — G89.29 CHRONIC INTRACTABLE PAIN: ICD-10-CM

## 2023-08-29 DIAGNOSIS — M54.16 CHRONIC LUMBAR RADICULOPATHY: ICD-10-CM

## 2023-08-29 DIAGNOSIS — G62.9 POLYNEUROPATHY: ICD-10-CM

## 2023-08-29 DIAGNOSIS — E11.9 NON-INSULIN DEPENDENT TYPE 2 DIABETES MELLITUS: ICD-10-CM

## 2023-08-29 DIAGNOSIS — Z01.812 PRE-PROCEDURAL LABORATORY EXAMINATION: ICD-10-CM

## 2023-08-29 DIAGNOSIS — R53.81 PHYSICAL DECONDITIONING: ICD-10-CM

## 2023-08-29 DIAGNOSIS — G03.9 ADHESIVE ARACHNOIDITIS: ICD-10-CM

## 2023-08-29 DIAGNOSIS — R26.9 GAIT DISTURBANCE: ICD-10-CM

## 2023-08-29 RX ORDER — LISINOPRIL 10 MG/1
TABLET ORAL
COMMUNITY
Start: 2023-08-14

## 2023-08-29 RX ORDER — BISOPROLOL FUMARATE 10 MG/1
TABLET, FILM COATED ORAL
COMMUNITY
Start: 2023-08-19

## 2023-09-18 ENCOUNTER — TRANSCRIBE ORDERS (OUTPATIENT)
Dept: ADMINISTRATIVE | Facility: HOSPITAL | Age: 78
End: 2023-09-18
Payer: MEDICARE

## 2023-09-18 ENCOUNTER — HOSPITAL ENCOUNTER (OUTPATIENT)
Dept: CT IMAGING | Facility: HOSPITAL | Age: 78
Discharge: HOME | End: 2023-09-18
Admitting: INTERNAL MEDICINE
Payer: MEDICARE

## 2023-09-18 DIAGNOSIS — R10.32 ABDOMINAL PAIN, LEFT LOWER QUADRANT: Primary | ICD-10-CM

## 2023-09-18 DIAGNOSIS — R10.32 ABDOMINAL PAIN, LEFT LOWER QUADRANT: ICD-10-CM

## 2023-09-18 PROCEDURE — 25510000001 IOPAMIDOL 61 % SOLUTION: Performed by: INTERNAL MEDICINE

## 2023-09-18 PROCEDURE — 74177 CT ABD & PELVIS W/CONTRAST: CPT

## 2023-09-18 RX ADMIN — IOPAMIDOL 100 ML: 612 INJECTION, SOLUTION INTRAVENOUS at 15:30

## 2023-09-19 ENCOUNTER — TELEPHONE (OUTPATIENT)
Dept: PAIN MEDICINE | Facility: CLINIC | Age: 78
End: 2023-09-19
Payer: MEDICARE

## 2023-09-20 ENCOUNTER — LAB (OUTPATIENT)
Dept: LAB | Facility: HOSPITAL | Age: 78
End: 2023-09-20
Payer: MEDICARE

## 2023-09-20 ENCOUNTER — TRANSCRIBE ORDERS (OUTPATIENT)
Dept: LAB | Facility: HOSPITAL | Age: 78
End: 2023-09-20
Payer: MEDICARE

## 2023-09-20 DIAGNOSIS — R10.9 ABDOMINAL PAIN, UNSPECIFIED ABDOMINAL LOCATION: ICD-10-CM

## 2023-09-20 DIAGNOSIS — K57.92 DIVERTICULITIS: ICD-10-CM

## 2023-09-20 DIAGNOSIS — R19.7 DIARRHEA, UNSPECIFIED TYPE: Primary | ICD-10-CM

## 2023-09-20 LAB — C DIFF TOX GENS STL QL NAA+PROBE: NOT DETECTED

## 2023-09-20 PROCEDURE — 87046 STOOL CULTR AEROBIC BACT EA: CPT | Performed by: INTERNAL MEDICINE

## 2023-09-20 PROCEDURE — 87324 CLOSTRIDIUM AG IA: CPT | Performed by: INTERNAL MEDICINE

## 2023-09-20 PROCEDURE — 87427 SHIGA-LIKE TOXIN AG IA: CPT | Performed by: INTERNAL MEDICINE

## 2023-09-20 PROCEDURE — 87493 C DIFF AMPLIFIED PROBE: CPT | Performed by: INTERNAL MEDICINE

## 2023-09-20 PROCEDURE — 87045 FECES CULTURE AEROBIC BACT: CPT | Performed by: INTERNAL MEDICINE

## 2023-09-21 LAB — C DIFF TOX A+B STL QL IA: NEGATIVE

## 2023-09-24 ENCOUNTER — TRANSCRIBE ORDERS (OUTPATIENT)
Dept: LAB | Facility: HOSPITAL | Age: 78
End: 2023-09-24
Payer: MEDICARE

## 2023-09-24 ENCOUNTER — LAB (OUTPATIENT)
Dept: LAB | Facility: HOSPITAL | Age: 78
End: 2023-09-24
Payer: MEDICARE

## 2023-09-24 DIAGNOSIS — R10.9 STOMACH ACHE: ICD-10-CM

## 2023-09-24 DIAGNOSIS — R19.7 DIARRHEA OF PRESUMED INFECTIOUS ORIGIN: Primary | ICD-10-CM

## 2023-09-24 DIAGNOSIS — K57.92 DIVERTICULITIS: ICD-10-CM

## 2023-09-24 DIAGNOSIS — R19.7 DIARRHEA OF PRESUMED INFECTIOUS ORIGIN: ICD-10-CM

## 2023-09-24 LAB
ALBUMIN SERPL-MCNC: 3.9 G/DL (ref 3.5–5.2)
ALBUMIN/GLOB SERPL: 1.3 G/DL
ALP SERPL-CCNC: 80 U/L (ref 39–117)
ALT SERPL W P-5'-P-CCNC: 17 U/L (ref 1–41)
ANION GAP SERPL CALCULATED.3IONS-SCNC: 12 MMOL/L (ref 5–15)
AST SERPL-CCNC: 22 U/L (ref 1–40)
BACTERIA SPEC CULT: NORMAL
BACTERIA SPEC CULT: NORMAL
BASOPHILS # BLD AUTO: 0.11 10*3/MM3 (ref 0–0.2)
BASOPHILS NFR BLD AUTO: 1.1 % (ref 0–1.5)
BILIRUB SERPL-MCNC: 1.1 MG/DL (ref 0–1.2)
BUN SERPL-MCNC: 20 MG/DL (ref 8–23)
BUN/CREAT SERPL: 21.7 (ref 7–25)
CALCIUM SPEC-SCNC: 8.8 MG/DL (ref 8.6–10.5)
CAMPYLOBACTER STL CULT: NORMAL
CHLORIDE SERPL-SCNC: 97 MMOL/L (ref 98–107)
CO2 SERPL-SCNC: 24 MMOL/L (ref 22–29)
CREAT SERPL-MCNC: 0.92 MG/DL (ref 0.76–1.27)
CRP SERPL-MCNC: 0.46 MG/DL (ref 0–0.5)
DEPRECATED RDW RBC AUTO: 40.4 FL (ref 37–54)
E COLI SXT STL QL IA: NEGATIVE
EGFRCR SERPLBLD CKD-EPI 2021: 84.1 ML/MIN/1.73
EOSINOPHIL # BLD AUTO: 0.18 10*3/MM3 (ref 0–0.4)
EOSINOPHIL NFR BLD AUTO: 1.8 % (ref 0.3–6.2)
ERYTHROCYTE [DISTWIDTH] IN BLOOD BY AUTOMATED COUNT: 11.9 % (ref 12.3–15.4)
ERYTHROCYTE [SEDIMENTATION RATE] IN BLOOD: 8 MM/HR (ref 0–20)
GLOBULIN UR ELPH-MCNC: 2.9 GM/DL
GLUCOSE SERPL-MCNC: 104 MG/DL (ref 65–99)
HCT VFR BLD AUTO: 41.6 % (ref 37.5–51)
HGB BLD-MCNC: 14 G/DL (ref 13–17.7)
IMM GRANULOCYTES # BLD AUTO: 0.04 10*3/MM3 (ref 0–0.05)
IMM GRANULOCYTES NFR BLD AUTO: 0.4 % (ref 0–0.5)
LYMPHOCYTES # BLD AUTO: 1.89 10*3/MM3 (ref 0.7–3.1)
LYMPHOCYTES NFR BLD AUTO: 18.6 % (ref 19.6–45.3)
MCH RBC QN AUTO: 31.3 PG (ref 26.6–33)
MCHC RBC AUTO-ENTMCNC: 33.7 G/DL (ref 31.5–35.7)
MCV RBC AUTO: 92.9 FL (ref 79–97)
MONOCYTES # BLD AUTO: 1.15 10*3/MM3 (ref 0.1–0.9)
MONOCYTES NFR BLD AUTO: 11.3 % (ref 5–12)
NEUTROPHILS NFR BLD AUTO: 6.77 10*3/MM3 (ref 1.7–7)
NEUTROPHILS NFR BLD AUTO: 66.8 % (ref 42.7–76)
NRBC BLD AUTO-RTO: 0 /100 WBC (ref 0–0.2)
PLATELET # BLD AUTO: 316 10*3/MM3 (ref 140–450)
PMV BLD AUTO: 9.6 FL (ref 6–12)
POTASSIUM SERPL-SCNC: 4.5 MMOL/L (ref 3.5–5.2)
PROT SERPL-MCNC: 6.8 G/DL (ref 6–8.5)
RBC # BLD AUTO: 4.48 10*6/MM3 (ref 4.14–5.8)
SALM + SHIG STL CULT: NORMAL
SODIUM SERPL-SCNC: 133 MMOL/L (ref 136–145)
WBC NRBC COR # BLD: 10.14 10*3/MM3 (ref 3.4–10.8)

## 2023-09-24 PROCEDURE — 85652 RBC SED RATE AUTOMATED: CPT

## 2023-09-24 PROCEDURE — 36415 COLL VENOUS BLD VENIPUNCTURE: CPT

## 2023-09-24 PROCEDURE — 80053 COMPREHEN METABOLIC PANEL: CPT

## 2023-09-24 PROCEDURE — 85025 COMPLETE CBC W/AUTO DIFF WBC: CPT

## 2023-09-24 PROCEDURE — 86140 C-REACTIVE PROTEIN: CPT

## 2023-09-26 ENCOUNTER — TRANSCRIBE ORDERS (OUTPATIENT)
Dept: ADMINISTRATIVE | Facility: HOSPITAL | Age: 78
End: 2023-09-26
Payer: MEDICARE

## 2023-09-26 DIAGNOSIS — K57.92 DIVERTICULITIS: Primary | ICD-10-CM

## 2023-09-27 ENCOUNTER — TELEPHONE (OUTPATIENT)
Dept: PAIN MEDICINE | Facility: CLINIC | Age: 78
End: 2023-09-27
Payer: MEDICARE

## 2023-09-27 NOTE — TELEPHONE ENCOUNTER
"Spoke with pt who advised that he is still being treated by [facility] with infusions, and he will have received 15 thus far. He has a CT scan on Monday to see how he's doing, and he has opted to \"hold off anything with Dr. Batista\" until he is doing better. I advised that I would let Dr. Batista know. Pt was thankful for the call.   "

## 2023-10-02 ENCOUNTER — HOSPITAL ENCOUNTER (OUTPATIENT)
Dept: CT IMAGING | Facility: HOSPITAL | Age: 78
Discharge: HOME | End: 2023-10-02
Admitting: INTERNAL MEDICINE
Payer: MEDICARE

## 2023-10-02 DIAGNOSIS — K57.92 DIVERTICULITIS: ICD-10-CM

## 2023-10-02 PROCEDURE — 25510000001 IOPAMIDOL 61 % SOLUTION: Performed by: INTERNAL MEDICINE

## 2023-10-02 PROCEDURE — 0 DIATRIZOATE MEGLUMINE & SODIUM PER 1 ML: Performed by: INTERNAL MEDICINE

## 2023-10-02 PROCEDURE — 74177 CT ABD & PELVIS W/CONTRAST: CPT

## 2023-10-02 RX ADMIN — IOPAMIDOL 85 ML: 612 INJECTION, SOLUTION INTRAVENOUS at 10:19

## 2023-10-02 RX ADMIN — DIATRIZOATE MEGLUMINE AND DIATRIZOATE SODIUM 15 ML: 660; 100 LIQUID ORAL; RECTAL at 10:19

## 2024-01-09 ENCOUNTER — TELEPHONE (OUTPATIENT)
Dept: PAIN MEDICINE | Facility: CLINIC | Age: 79
End: 2024-01-09
Payer: MEDICARE

## 2024-01-09 DIAGNOSIS — G03.9 ADHESIVE ARACHNOIDITIS: Primary | ICD-10-CM

## 2024-01-09 NOTE — TELEPHONE ENCOUNTER
Provider: SHANE    Caller: SAMMY    Relationship to Patient: SELF    Pharmacy:     Phone Number: [phone]    Reason for Call: PT CALLING TO SPEAK TO KAREN ABOUT STIMULATOR TRIAL

## 2024-01-25 DIAGNOSIS — G89.29 CHRONIC INTRACTABLE PAIN: Primary | ICD-10-CM

## 2024-01-29 ENCOUNTER — OUTSIDE FACILITY SERVICE (OUTPATIENT)
Dept: PAIN MEDICINE | Facility: CLINIC | Age: 79
End: 2024-01-29
Payer: MEDICARE

## 2024-01-30 ENCOUNTER — TELEPHONE (OUTPATIENT)
Dept: PAIN MEDICINE | Facility: CLINIC | Age: 79
End: 2024-01-30
Payer: MEDICARE

## 2024-01-30 NOTE — TELEPHONE ENCOUNTER
Spoke with pt regarding how they're doing after their SCS Trial procedure 1/29/2024. Pt advised they are having no complications, or side effects.     Pt had 7-8/10 prior procedure, 0/10 post procedure. Pt rates pain 8-9/10 today.    Advised pt of follow up Thursday with NABOR Briggs at 1030. Pt advised that he has been talking to Gay the [facility], and he is keeping in touch with them to reprogram.     Pt verbalized understanding, no further needs expressed.

## 2024-02-01 ENCOUNTER — OFFICE VISIT (OUTPATIENT)
Dept: PAIN MEDICINE | Facility: CLINIC | Age: 79
End: 2024-02-01
Payer: MEDICARE

## 2024-02-01 ENCOUNTER — HOSPITAL ENCOUNTER (OUTPATIENT)
Dept: GENERAL RADIOLOGY | Facility: HOSPITAL | Age: 79
Discharge: HOME | End: 2024-02-01
Admitting: NURSE PRACTITIONER
Payer: MEDICARE

## 2024-02-01 VITALS
HEIGHT: 69 IN | TEMPERATURE: 97.1 F | HEART RATE: 52 BPM | DIASTOLIC BLOOD PRESSURE: 86 MMHG | BODY MASS INDEX: 28.17 KG/M2 | WEIGHT: 190.2 LBS | OXYGEN SATURATION: 97 % | SYSTOLIC BLOOD PRESSURE: 138 MMHG

## 2024-02-01 DIAGNOSIS — M54.16 CHRONIC LUMBAR RADICULOPATHY: ICD-10-CM

## 2024-02-01 DIAGNOSIS — G89.29 CHRONIC INTRACTABLE PAIN: ICD-10-CM

## 2024-02-01 DIAGNOSIS — Z96.82 PRESENCE OF NEUROSTIMULATOR: ICD-10-CM

## 2024-02-01 DIAGNOSIS — M96.1 LUMBAR POSTLAMINECTOMY SYNDROME: ICD-10-CM

## 2024-02-01 DIAGNOSIS — G62.9 POLYNEUROPATHY: ICD-10-CM

## 2024-02-01 DIAGNOSIS — Z46.2 ENCOUNTER FOR FITTING AND ADJUSTMENT OF NEUROPACEMAKER OF SPINAL CORD: ICD-10-CM

## 2024-02-01 DIAGNOSIS — Z98.1 STATUS POST LUMBAR SPINAL FUSION: ICD-10-CM

## 2024-02-01 DIAGNOSIS — G03.9 ADHESIVE ARACHNOIDITIS: ICD-10-CM

## 2024-02-01 PROCEDURE — 72100 X-RAY EXAM L-S SPINE 2/3 VWS: CPT

## 2024-02-01 PROCEDURE — 1159F MED LIST DOCD IN RCRD: CPT | Performed by: NURSE PRACTITIONER

## 2024-02-01 PROCEDURE — 1160F RVW MEDS BY RX/DR IN RCRD: CPT | Performed by: NURSE PRACTITIONER

## 2024-02-01 PROCEDURE — 95972 ALYS CPLX SP/PN NPGT W/PRGRM: CPT | Performed by: NURSE PRACTITIONER

## 2024-02-01 PROCEDURE — 72070 X-RAY EXAM THORAC SPINE 2VWS: CPT

## 2024-02-01 PROCEDURE — 1125F AMNT PAIN NOTED PAIN PRSNT: CPT | Performed by: NURSE PRACTITIONER

## 2024-02-01 PROCEDURE — 99214 OFFICE O/P EST MOD 30 MIN: CPT | Performed by: NURSE PRACTITIONER

## 2024-02-01 RX ORDER — AMLODIPINE BESYLATE 5 MG/1
5 TABLET ORAL DAILY
COMMUNITY

## 2024-02-01 RX ORDER — LOSARTAN POTASSIUM 100 MG/1
100 TABLET ORAL DAILY
COMMUNITY
Start: 2024-01-02

## 2024-02-01 RX ORDER — BISOPROLOL FUMARATE 5 MG/1
5 TABLET, FILM COATED ORAL DAILY
COMMUNITY
Start: 2024-01-06

## 2024-02-01 RX ORDER — NYSTATIN 100000 U/G
1 OINTMENT TOPICAL AS NEEDED
COMMUNITY
Start: 2023-10-13

## 2024-02-01 RX ORDER — ACETAMINOPHEN 500 MG
500 TABLET ORAL AS NEEDED
COMMUNITY

## 2024-02-01 NOTE — PROGRESS NOTES
"Chief Complaint: \"I need more coverage of my left leg\"      Brief History: Mr. Prashant Avila is a 79 y.o. male, who returns to the clinic for possible spinal cord stimulator reprogramming and removal of spinal cord stimulator trial leads placed on 01/29/2024. Mr. Prashant Avila reports it is hard to quanitfy relief of his chronic left gluteal and left lower extremity pain.  He reports on Wednesday, his pain increased, prior to that his relief was marginal.  Prior to appointment today, I had obtained thoracic spine x-rays which demonstrate lead migration from original placement of the superior endplate of T7, and have migrated to the inferior endplate of T7/severe endplate of T8.  While in office, we reprogrammed his device to provide more coverage in the area of his pain.  He has been able to sit and lie for approximately 20 minutes, which typically he is unable to do.    Prashant vAila required of SCS reprogramming during the trial. Patient was able to operate his stimulator device without difficulties.  Patient did not take additional analgesics throughout his spinal cord stimulator trial. He has remained afebrile throughout the trial. Dressings are dry and intact. Patient denies any complications related to the procedure. implantation of a spinal cord stimulator device.   Pain level is rated as 7/10 with the stimulator \"turned on.”   Patient level ranges from 4/10 to 7/10 with the use of the spinal cord stimulator.    Patient reports pain and numbness in the left lower extremity.  Patient reports any new bladder or bowel problems.     Pain History:  Prashant Avila was originally referred by Dr. Fausto Renee in consultation for chronic intractable left gluteal and left lower extremity pain. As referenced numerous times on previous visits, Mr Avila presents with a complex chronic pain condition. Prashant Avila underwent L4-L5 hemilaminectomy and foraminotomy at [facility] with Dr. Mackay on " 03/03/2014. As he continued to struggle with pain, he underwent L4-S1 lumbar decompression and fusion by Dr. Renee on 07/24/2014. Prashant Avila experienced significant improvement after his last surgery with Dr. Renee,with complete resolution of his previously severe lower back pain. Unfortunately, he has been dealing with intractable left gluteal and left lower extremity pain that was present prior to his last surgery with Dr Renee. He underwent surgical consultation with Dr. Cox at the [facility], who recommended decompression of the nerve roots from L3-S1. He also underwent follow-up neurosurgical consultation with Dr. Fausto Renee on 11/03/2017. Dr. Renee discussed the possibility of lumbar decompression at L3-L4: Left L3-L4 laminotomy and foraminotomy. There is potential risk of developing instability that would necessitate extending his lumbar fusion to the L3 level. A CT myelogram was significant for stenosis above the level of his lumbar fusion, at L3-L4 without clinical correlation as he failed to obtain pain relief from diagnostic and therapeutic left L3-L4 transforaminal epidural steroid injection on April 3, 2018. In addition, he was not experiencing claudication during that period (pain was worse sitting or lying down, and better by standing or walking). Consequently, he declined surgery. EMG/NCV, confirmed chronic left S1 radiculopathy that resembled the dermatomic distribution of his symptoms. 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 He underwent a successful SCS trial followed by implantation of a spinal cord stimulator device on 11/21/2019 by Dr. Fausto Renee ([employer] Penta paddle lead with the top electrodes projecting at the level of the superior endplate of the T8 vertebral level. IPG: [employer] Proclaim 5 XR IPG Non-Rechargeable -Full Body MRI  compatible). The device was implanted for treatment of chronic intractable left hip and left lower extremity pain. Unfortunately, he never seemed completely satisfied with the use of his SCS device despite numerous attempts at SCS reprogramming. He perceived paresthesia overlapping the areas of his pain but did not appreciate the way tonic stimulation felt. He reported no relief from Burst DR. On 03/01/2021, he underwent explantation of the entire SCS system with Dr. Fausto Renee. As he continued to struggle with pain, he underwent intrathecal analgesic trials with fentanyl and Prialt without relief. On 09/08/2021, he requested to proceed with regenerative therapies, and as such he had platelet rich leukocyte poor plasma therapy and A2M therapy delivered into the lumbar epidural space via a caudal epidural catheter placement. He denied long lasting relief although the plan was to perform this procedure 3 weeks apart at least x 3 as recommended by current protocols. He has peaked an interest in pursuing an intrathecal trial with a higher dose of opioids or Prialt or with alternative medications such as bupivacaine or clonidine. Although use of a higher dose of Prialt, as he had psychological side effects with the lowest effective dose of Prialt during his trial, potentially higher risk of side effects. He also underwent neurology consultation with Dr. Kodak Alonso who concurred with patient's treatment measures. He underwent additional diagnostic studies. MRI of the thoracic spine without contrast on 10/05/2022 revealed multilevel spondylosis without significant canal or foraminal stenosis. X-rays of the lumbar spine on 06/02/2023 revealed PLIF L4-S1.  The left S1 pedicle screw is fractured as was present on previous imaging studies. There is disc space narrowing at L3-L4 above the fused level. Facet joint arthropathy throughout the lumbar spine.  Prashant Avila underwent follow-up neurosurgical consultation  "with Dr. Fausto Renee on 06/14/2023 and was found not to be a surgical candidate. Per Dr. Renee;\" While the patient has a fracture of one of the screws, his symptoms are exactly the same as they had been since prior to his first surgery.\" He underwent follow-up psychological evaluation with Dr. Praveen Navarro in July 2023: \"From a psychological perspective, patient is an appropriate candidate for an intrathecal delivery system, a spinal cord stimulator device, a peripheral nerve stimulator device, a DRG device, and need any other procedures deemed appropriate for his condition.\"  He has failed to obtain pain relief for more than 9 years including but not limited to, conservative measures including oral analgesics (including opioids, naltrexone, most medications available for treatment of neuropathic pain except for those contraindicated due to underlying conditions, topical analgesics: failed analgesic trials due to side effects with most medications), ice, heat, physical therapy, physical therapist directed home exercise program HEP (ongoing), to name a few; Two surgical interventions (on 03/03/2014: L4-L5 hemilaminectomy and foraminotomy at [facility] with Dr. Mackay, on 07/24/2014: L4-S1 lumbar decompression and fusion by Dr. Renee); Several efforts of minimally invasive interventional pain management measures, as referenced on previous notes, including diagnostic selective nerve root injections, transforaminal epidural steroid injections, piriformis injections, spinal cord stimulator trial, spinal cord stimulator implant followed by explant, intrathecal analgesic trials, regenerative therapies, MILD.  He continues on gabapentin without side effects. He has resumed oral naltrexone 1 mg daily with discreet analgesic benefit.   We discussed again potential treatments such as pulsed radiofrequency of the dorsal root ganglion of S1 (procedure is not covered by insurance)  Pain Description: Constant left gluteal and left " lower extremity pain with intermittent exacerbation, described as burning, numbing, shooting, tingling, pins and needles, electrical sensation.   Radiation of Pain: The pain radiates from the left gluteal region down into the left lower extremity into the plantar aspect of his left foot. He denies significant lower back pain.   Pain pattern: Dermatomic   Pain intensity today: 8/10   Average pain intensity last week: 8/10  Pain intensity ranges from: 5/10 to 8/10  Aggravating factors: Pain increases with sitting for more than 5 minutes, lying down (takes gabapentin before going to bed), standing walking. Patient describes neurogenic claudication. Patient does not use a cane or a walker  Alleviating factors: Pain decreases with standing, os short walks  Associated Symptoms:   Patient reports pain, tingling, but denies numbness or weakness in the left lower extremity.  Patient denies symptoms in the opposite limb  Patient denies any new bladder or bowel problems.   Patient denies difficulties with his balance or recent falls.   Pain interferes with general activities (ability to walk, stand, transition from different positions), and affects patient's quality of life  Pain interferes with sleep causing sleep fragmentation     Review of previous therapies and additional medical records:   Prashant Avila has already failed the following measures, including:   Conservative Measures: Oral analgesics (including opioids, naltrexone, most medications available for treatment of neuropathic pain except for those contraindicated due to underlying conditions, topical analgesics: failed analgesic trials due to side effects with most medications), ice, heat, physical therapy, physical therapist directed home exercise program HEP (ongoing), to name a few  Interventional Measures:   09/08/2021: Platelet rich leukocyte poor plasma therapy and A2 M therapy, via lumbar epidural administration with caudal epidural catheter placement  approach  02/06/2019: Left S1 transforaminal epidural steroid injection/pain relief that was short-lived   02/20/2017: Diagnostic and therapeutic left sciatic nerve block with local anesthetics, steroids, hyaluronidase under ultrasound, PNS and fluoroscopic guidance- Patient did not receive sustained relief from this procedure.   02/06/2017: Diagnostic left S1 selective nerve root injection, relief  01/30/2017: Nevro SCS Trial; no relief  04/25/2016: Trial of lumbar epidural steroid injections with steroids and hyaluronidase via caudal catheter with 100% pain relief lasting 48 hours  12/16/2015: Diagnostic and therapeutic left S1 transforaminal epidural steroid injection with hyaluronidase with complete resolution of pain for several weeks  11/30/2015: Spinal cord stimulator trial [employer], which provided him with more than 50% pain relief and functional improvement. However, patient decided to delay implantation of a permanent spinal cord stimulator device until the arrival of new technology. Patient did not like the perception of tonic stimulation, even though stimulation overlapped successfully the areas of his chronic pain, he did not find it pleasant.   02/09/2015: Diagnostic left piriformis muscle injection, no relief  Surgical Measures:   MILD: Bilateral L3-L4 08/14/2023 by Dr. Batista  03/01/2021: SCS explant by Dr. Fausto Renee.   11/21/2019: SCS implant: [employer] Penta paddle lead with the top electrodes projecting at the level of the superior endplate of the T8 vertebral level. IPG: [employer] Proclaim 5 XR IPG Non-Rechargeable by Dr. Fausto Renee   07/24/2014: L4-S1 decompression and fusion by Dr. Renee  03/03/2014: Left L4-L5 hemilaminectomy and foraminotomy at [facility] with Dr. Codie Avila underwent neurosurgical consultation with Dr. Renee on 06/24/2023, and was found not to be a surgical candidate.  Prashant Avila underwent psychological consultation at [facility]  "psychological evaluation with Dr. Praveen Navarro on 8/30/2021, per note: \"It is recommended the patient be seen for psychological treatment of his pain disorder including biofeedback.  From a psychological perspective, patient is an appropriate candidate for interventional pain procedures, such as a pain pump with opioids or Prialt.\" Follow-up psychological evaluation with Dr. Praveen Navarro in July 2023: \"From a psychological perspective, patient is an appropriate candidate for an intrathecal delivery system, a spinal cord stimulator device, a peripheral nerve stimulator device, a DRG device, and need any other procedures deemed appropriate for his condition.\"   Prashant Avila presents with significant comorbidities including history of prostate cancer, eczema, gout, hyperlipidemia, hypertension, history of kidney stones, left bundle branch block, osteoarthritis, rheumatoid arthritis, hearing loss, osteoporosis  In terms of current analgesics, Prashant Avila takes: Gabapentin   I have reviewed Jon Report consistent with medication reconciliation.  SOAPP/ORT: Low Risk       Global Pain Scale           Pain           Feelings           Clinical outcomes           Activities           GPS Total:               Previous Diagnostic Studies:   Flexion and extension Xrays of the lumbar spine 8/3/2023: No instability  X-rays of the lumbar spine 6/2/2023: PLIF L4-S1.  The left S1 pedicle screw is fractured as was present previously.  There is disc space narrowing at L3-4 above the fused level.  There is facet joint disease throughout the lumbar spine.    MRI of the thoracic spine without contrast 10/5/2022: multilevel spondylosis without significant canal or foraminal stenosis.  EMG/NCV of the bilateral lower extremities 12/28/2018: Chronic left S1 radiculopathy  CT LUMBAR SPINE WITH CONTRAST-01/21/2019: Excellent opacification of the thecal sac. Imaging covers from T12 through the S1 level caudally. The conus " terminates at the L1/L2 level.  The distal cord and conus are normal in caliber. The nerve roots of the cauda equina are normal in caliber and distribution. L4-S1 fusion hardware is in place; the left S1 pedicle screw is fractured and there is lucency around the right S1 pedicle screw. Left L4 laminotomy. Vertebral body heights and alignment are normal. Vertebral body heights are normal. Degenerative changes are as follows:  T12-L1: Facet arthropathy without significant foraminal or spinal canal stenosis.  L1-L2: Facet arthropathy without significant foraminal or spinal canal stenosis.  L2-L3: Right greater than left facet arthropathy. Mild narrowing of the right subarticular zone. No significant central spinal canal or foraminal stenosis.  L3-L4: Disc bulge and facet arthropathy with ligamentum flavum thickening. There is severe spinal canal stenosis and at least moderate bilateral foraminal stenosis.   L4-L5: Improved right subarticular zone narrowing. No significant osseous spinal canal or foraminal stenosis.  L5-S1: Bilateral facet arthropathy. Shallow disc osteophyte. Unchanged mild osseous foraminal stenosis. No significant osseous spinal canal stenosis.  EKG April 19, 2018 revealed sinus bradycardia with first-degree AV block.  Left bundle branch block.    X-Ray lumbar spine 08/04/2017: Posterior lumbar fusion of L4, L5 and S1. Degenerative changes at L1-L2 with anterior osteophyte formation. Normal lumbar alignment in the lateral projection with a stable alignment in the flexed and extended positions.      Duplex Venous Lower, 03/25/2017: Normal right lower extremity venous duplex scan. No evidence of DVT.  MRI Lumbar Spine, 10/28/2016: Since the previous MRI study, there has been L4-5-S1 posterior lumbar and interbody fusion. No new abnormality of alignment is seen. There is, however, significant worsening of posterior element hypertrophy at L3-4, above the fusion level, due to persistent L3-4 disc bulge,  and what appears to be new spinal stenosis at this level, generally as a result of posterior element hypertrophic change. Canal appears stable elsewhere. No lumbar compression deformity or new abnormality of alignment is seen. No vertebral marrow edema is appreciated. Sagittal images suggest a small (18 mm) seroma or synovial cyst on the right at L5-S1. Tip of conus medullaris is again noted at L1.  Axial images   L1-L2: No significant canal or foraminal stenosis   L2-L3: Canal appears relatively narrowed, due to posterior element hypertrophy but still greater than 1 cm in diameter. Foramina appear  lower limits of normal diameter.   L3-L4: Moderate canal stenosis with lateral narrowing, AP diameter 6.5 mm, transverse diameter approximately 9 mm with crowding of the nerve roots. Foramina appear mildly to moderately narrowed bilaterally due to facet DJD.   L4-L5: Fused level, canal appears normal. Mild foraminal stenosis due to facet DJD  L5-S1: Canal appears normal. Left-sided neural foramen appears normal. Right-sided neural foramen appears mildly narrowed due to facet DJD. Postcontrast images show expected postoperative enhancement of the dorsal soft tissues and no pathologic postcontrast enhancement elsewhere.    The following portions of the patient's history were reviewed and updated as appropriate: problem list, past medical history, past surgery history, social history, family history, medications, and allergies    Review of Systems      Patient Active Problem List   Diagnosis    Chronic lumbar radiculopathy    Lumbar postlaminectomy syndrome    Status post L4-S1 lumbar spinal fusion    Physical deconditioning    Adhesive arachnoiditis    Bilateral stenosis of lateral recess of lumbar spine    Osteoarthritis    Chronic intractable pain    Encounter for therapeutic drug monitoring    Chronic pain syndrome    Intractable neuropathic pain of lower extremity, left    Gait disturbance    Polyneuropathy    Lumbar  stenosis with neurogenic claudication    Ligamentum flavum hypertrophy    Non-insulin dependent type 2 diabetes mellitus       Past Medical History:   Diagnosis Date    Arthritis     Carcinoma of prostate     surgery     Chronic pain disorder     Chronic pain syndrome 08/02/2023    Eczema     Elevated cholesterol     Gout     History of chronic kidney disease     Hyperlipidemia     Hypertension     Kidney stone 1990    passed     Left bundle branch block     Low back pain     Lumbar stenosis with neurogenic claudication 08/03/2023    Lumbosacral disc disease     Neuropathic pain of flank, left     Non-insulin dependent type 2 diabetes mellitus 08/10/2023    Osteoarthritis     Polyneuropathy 08/03/2023    Thoracic disc disorder     Wears hearing aid in both ears          Past Surgical History:   Procedure Laterality Date    BACK SURGERY  2014    Lumbar Fusion, july 2014    BACK SURGERY  2014    Lumbar Laminectomy, march 2014    COLONOSCOPY  2016    EPIDURAL BLOCK  2016    LAMINECTOMY  2013    LUMBAR DECOMPRESSION N/A 8/14/2023    Procedure: LUMBAR DECOMPRESSION MINIMALLY INVASIVE;  Surgeon: Efrain Batista MD;  Location:  [facility];  Service: Pain Management;  Laterality: N/A;    OTHER SURGICAL HISTORY      Enteroscopic Polypectomy    PAIN PUMP INSERTION/REVISION N/A 10/12/2020    Procedure: PAIN PUMP TRIAL;  Surgeon: Efrain Batista MD;  Location:  [facility];  Service: Pain Management;  Laterality: N/A;    NJ MYELOGRAPHY VIA LUMBAR INJECT RS&I LUMBOSACRAL N/A 01/21/2019    Procedure: IR myelogram, lumbar;  Surgeon: Francisco Javier Clay MD;  Location:  [facility];  Service: Interventional Radiology    PROSTATE SURGERY      SPINAL CORD STIMULATOR IMPLANT N/A 11/21/2019    Procedure: SPINAL CORD STIMULATOR INSERTION;  Surgeon: Fausto Renee MD;  Location:  [facility];  Service: Neurosurgery    SPINAL CORD STIMULATOR IMPLANT N/A 03/01/2021    Procedure: Removal of entire system, SPINAL CORD STIMULAR  REMOVAL;  Surgeon: Fausto Renee MD;  Location: [facility];  Service: Neurosurgery;  Laterality: N/A;    TESTICLE UNDESCENDED REPAIR  1980    VOCAL CORD BIOPSY      polyps removed          Family History   Problem Relation Age of Onset    Congenital heart disease Mother     Hearing loss Mother     Multiple myeloma Father     Breast cancer Neg Hx     Ovarian cancer Neg Hx          Social History     Socioeconomic History    Marital status:    Tobacco Use    Smoking status: Never     Passive exposure: Never    Smokeless tobacco: Never   Vaping Use    Vaping Use: Never used   Substance and Sexual Activity    Alcohol use: No    Drug use: No    Sexual activity: Defer           Current Outpatient Medications:     acetaminophen (TYLENOL) 500 MG tablet, Take 1 tablet by mouth As Needed., Disp: , Rfl:     amLODIPine (NORVASC) 5 MG tablet, Take 1 tablet by mouth Daily., Disp: , Rfl:     bisoprolol (ZEBeta) 10 MG tablet, , Disp: , Rfl:     gabapentin (NEURONTIN) 800 MG tablet, TAKE ONE TABLET BY MOUTH EVERY MORNING AND TAKE ONE TABLET BY MOUTH AT NOON AND TAKE TWO TABLETS BY MOUTH EVERY NIGHT AT BEDTIME, Disp: 120 tablet, Rfl: 5    losartan (COZAAR) 100 MG tablet, Take 1 tablet by mouth Daily., Disp: , Rfl:     nystatin (MYCOSTATIN) 988607 UNIT/GM ointment, Apply 1 Application topically to the appropriate area as directed As Needed., Disp: , Rfl:     pravastatin (PRAVACHOL) 10 MG tablet, Take 1 tablet by mouth Daily., Disp: , Rfl:     bisoprolol (ZEBeta) 5 MG tablet, Take 1 tablet by mouth Daily. (Patient not taking: Reported on 2/1/2024), Disp: , Rfl:     lisinopril (PRINIVIL,ZESTRIL) 10 MG tablet, , Disp: , Rfl:     losartan (COZAAR) 50 MG tablet, Take 1 tablet by mouth Daily. (Patient not taking: Reported on 2/1/2024), Disp: , Rfl:       Allergies   Allergen Reactions    Adhesive Tape Rash     Rash with blisters  Only after wearing excessive tape for SCS phase 1 for several days.    Doxycycline Rash         BP  "138/86 (BP Location: Right arm, Patient Position: Sitting, Cuff Size: Adult)   Pulse 52   Temp 97.1 °F (36.2 °C) (Infrared)   Ht 175.3 cm (69\")   Wt 86.3 kg (190 lb 3.2 oz)   SpO2 97%   BMI 28.09 kg/m²       Physical Exam   Constitutional: Patient appears well-developed, well-nourished, well-hydrated  HEENT: Head: Normocephalic and atraumatic  Eyes: Conjunctivae and lids are normal  Pupils: Equal, round, reactive to light  Musculoskeletal   Gait and station: Gait evaluation demonstrated a normal gait   Neurological:   Patient is alert and oriented to person, place, and time.   Speech: Normal.   Cortical function: Normal mental status.   Motor strength: 5/5  Motor Tone: Normal .   Involuntary movements: None.   Skin and subcutaneous tissue: Skin is warm and intact. No rash noted. No cyanosis. The surrounding stimulator placement site looks unremarkable without erythema, drainage or fluid accumulation  Psychiatric: Judgment and insight: Normal. Recent and remote memory: Intact. Mood and affect: Normal.     PROCEDURES:   Procedure #1: Analysis of the spinal cord stimulator device with complex spinal cord stimulator reprogramming   Patient used the Ischemix spinal cord stimulator device 100% of the time since initiation of the trial phase. The spinal cord stimulator device was reprogrammed under my direct supervision and one new program was created by adjusting electrode polarities, amplitude, pulse width, pulse rate, in the following fashion;    [place]  Program 8 (Preferred Program):    Electrode polarities:   CONTOUR: 33+, 15+, 5-, 33-, 10+, 32+, 10+, 23-, 28-  FAST: 31-, 11-, 23+, 20+, 52-, 6-, 47+, 10+  Amplitude: 0.9 mA     Pulse width: 240 mcs  Rate: 90 Hz  IntraBurst rate: 500 Hz    NEVRO  Electrode polarities: 6-, 7+  Amplitude: 1.5 mA     Pulse width: 30 mcs  Rate: 10.000 Hz    Time spent reprogramming: 15 minutes  A copy of the telemetry report will be scanned in the patient's " chart.        Procedure #2: SCS was switched to Nevro.     NEVRO  Electrode polarities: 6-, 7+  Amplitude: 1.5 mA     Pulse width: 30 mcs  Rate: 10.000 Hz    Time spent reprogramming: 15 minutes  A copy of the telemetry report will be scanned in the patient's chart.    ASSESSMENT:   1. Adhesive arachnoiditis    2. Presence of neurostimulator    3. Lumbar postlaminectomy syndrome    4. Chronic intractable pain    5. Chronic lumbar radiculopathy    6. Status post L4-S1 lumbar spinal fusion    7. Polyneuropathy    8. Encounter for fitting and adjustment of neuropacemaker of spinal cord        PLAN/MEDICAL DECISION MAKING:  Mr. Prashant Avila, 79 y.o. male  was originally referred by Dr. Fausto Renee in consultation for chronic intractable left gluteal and left lower extremity pain. As referenced numerous times on previous visits, Mr Avila presents with a complex chronic pain condition. Prashant Avila underwent L4-L5 hemilaminectomy and foraminotomy at [facility] with Dr. Mackay on 03/03/2014. As he continued to struggle with pain, he underwent L4-S1 lumbar decompression and fusion by Dr. Renee on 07/24/2014. Prashant Avila experienced significant improvement after his last surgery with Dr. Renee,with complete resolution of his previously severe lower back pain. Unfortunately, he has been dealing with intractable left gluteal and left lower extremity pain that was present prior to his last surgery with Dr Renee. He underwent surgical consultation with Dr. Cox at [facility], who recommended decompression of the nerve roots from L3-S1. He also underwent follow-up neurosurgical consultation with Dr. Fausto Renee on 11/03/2017. Dr. Renee discussed the possibility of lumbar decompression at L3-L4: Left L3-L4 laminotomy and foraminotomy. There is potential risk of developing instability that would necessitate extending his lumbar fusion to the L3 level. A CT myelogram was significant for stenosis  above the level of his lumbar fusion, at L3-L4 without clinical correlation as he failed to obtain pain relief from diagnostic and therapeutic left L3-L4 transforaminal epidural steroid injection on April 3, 2018. In addition, he was not experiencing claudication during that period (pain was worse sitting or lying down, and better by standing or walking). Consequently, he declined surgery. EMG/NCV, confirmed chronic left S1 radiculopathy that resembled the dermatomic distribution of his symptoms. 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 He underwent a successful SCS trial followed by implantation of a spinal cord stimulator device on 11/21/2019 by Dr. Fausto Renee ([place] Penta paddle lead with the top electrodes projecting at the level of the superior endplate of the T8 vertebral level. IPG: [place] Proclaim 5 XR IPG Non-Rechargeable -Full Body MRI compatible). The device was implanted for treatment of chronic intractable left hip and left lower extremity pain. Unfortunately, he never seemed completely satisfied with the use of his SCS device despite numerous attempts at SCS reprogramming. He perceived paresthesia overlapping the areas of his pain but did not appreciate the way tonic stimulation felt. He reported no relief from Burst DR. On 03/01/2021, he underwent explantation of the entire SCS system with Dr. Fausto Renee. As he continued to struggle with pain, he underwent intrathecal analgesic trials with fentanyl and Prialt without relief. On 09/08/2021, he requested to proceed with regenerative therapies, and as such he had platelet rich leukocyte poor plasma therapy and A2M therapy delivered into the lumbar epidural space via a caudal epidural catheter placement. He denied long lasting relief although the plan was to perform this procedure 3 weeks apart at least x 3 as  "recommended by current protocols. He has peaked an interest in pursuing an intrathecal trial with a higher dose of opioids or Prialt or with alternative medications such as bupivacaine or clonidine. Although use of a higher dose of Prialt, as he had psychological side effects with the lowest effective dose of Prialt during his trial, potentially higher risk of side effects. He also underwent neurology consultation with Dr. Kodak Alonso who concurred with patient's treatment measures. He underwent additional diagnostic studies. MRI of the thoracic spine without contrast on 10/05/2022 revealed multilevel spondylosis without significant canal or foraminal stenosis. X-rays of the lumbar spine on 06/02/2023 revealed PLIF L4-S1.  The left S1 pedicle screw is fractured as was present on previous imaging studies. There is disc space narrowing at L3-L4 above the fused level. Facet joint arthropathy throughout the lumbar spine.  Prashant Avila underwent follow-up neurosurgical consultation with Dr. Fausto Renee on 06/14/2023 and was found not to be a surgical candidate. Per Dr. Renee;\" While the patient has a fracture of one of the screws, his symptoms are exactly the same as they had been since prior to his first surgery.\" He underwent follow-up psychological evaluation with Dr. Praveen Navarro in July 2023: \"From a psychological perspective, patient is an appropriate candidate for an intrathecal delivery system, a spinal cord stimulator device, a peripheral nerve stimulator device, a DRG device, and need any other procedures deemed appropriate for his condition.\"  He has failed to obtain pain relief for more than 9 years including but not limited to, conservative measures including oral analgesics (including opioids, naltrexone, most medications available for treatment of neuropathic pain except for those contraindicated due to underlying conditions, topical analgesics: failed analgesic trials due to side effects with " most medications), ice, heat, physical therapy, physical therapist directed home exercise program HEP (ongoing), to name a few; Two surgical interventions (on 03/03/2014: L4-L5 hemilaminectomy and foraminotomy at [facility] with Dr. Mackay, on 07/24/2014: L4-S1 lumbar decompression and fusion by Dr. Renee); Several efforts of minimally invasive interventional pain management measures, as referenced on previous notes, including diagnostic selective nerve root injections, transforaminal epidural steroid injections, piriformis injections, spinal cord stimulator trial, spinal cord stimulator implant followed by explant, intrathecal analgesic trials, regenerative therapies, MILD.   Mr. Prashant Avila reports it is hard to quanitfy relief of his chronic left gluteal and left lower extremity pain with use of the [place] spinal cord stimulator device.  He reports on Wednesday, his pain increased, prior to that his relief was marginal.  Prior to appointment today, I had obtained thoracic spine x-rays which demonstrate lead migration from original placement of the superior endplate of T7, and have migrated to the inferior endplate of T7/severe endplate of T8.  While in office, we reprogrammed his device to provide more coverage in the area of his pain.  He has been able to sit and lie for approximately 20 minutes, which typically he is unable to do.  Throughout discussion, we have opted to switch trial to [place], he will return on Monday for evaluation and trial lead removal.  Return on Monday for trial lead removal  2. The patient and his wife have been instructed to contact my office with any questions or difficulties. The patient understands the plan and agrees to proceed accordingly.        Pain Medications               acetaminophen (TYLENOL) 500 MG tablet Take 1 tablet by mouth As Needed.    gabapentin (NEURONTIN) 800 MG tablet TAKE ONE TABLET BY MOUTH EVERY MORNING AND TAKE ONE TABLET BY MOUTH AT NOON AND TAKE  TWO TABLETS BY MOUTH EVERY NIGHT AT BEDTIME             Orders Placed This Encounter   Procedures    XR Spine Lumbar AP & Lateral     Standing Status:   Future     Number of Occurrences:   1     Standing Expiration Date:   1/31/2025     Order Specific Question:   Reason for Exam:     Answer:   to assess scs lead placement     Order Specific Question:   Release to patient     Answer:   Routine Release [[id number]]    XR Spine Thoracic 2 View     Standing Status:   Future     Number of Occurrences:   1     Standing Expiration Date:   2/1/2025     Order Specific Question:   Reason for Exam:     Answer:   to assess scs lead placement     Order Specific Question:   Release to patient     Answer:   Routine Release [[id number]]        No orders of the defined types were placed in this encounter.        Future Appointments   Date Time Provider Department Center   2/5/2024 11:00 AM Inez Regan APRN [facility]       Please note that portions of this note were completed with a voice recognition program.     NABOR Solomon    Patient Care Team:  Montse Chun MD as PCP - General (Internal Medicine)  Fausto Renee MD as Consulting Physician (Neurosurgery)  Jean Warren MD as Consulting Physician (Neurosurgery)  Marcelino Condon MD as Consulting Physician (Urology)  Brian Tatum III, MD as Consulting Physician (Cardiology)  SHERON Chun MD as Consulting Physician (Family Medicine)  Francisco Javier Clay MD as Consulting Physician (Neurosurgery)  Royer Roldan MD as Consulting Physician (General Practice)  Emigdio Watt MD as Consulting Physician (Gastroenterology)  Shola Vila MD as Consulting Physician (Otolaryngology)  Hank Frankel MD as Consulting Physician (Cardiology)  Inez Regan APRN as Nurse Practitioner (Pain Medicine)  Efrain Batista MD as Consulting Physician (Pain Medicine)

## 2024-02-05 ENCOUNTER — OFFICE VISIT (OUTPATIENT)
Dept: PAIN MEDICINE | Facility: CLINIC | Age: 79
End: 2024-02-05
Payer: MEDICARE

## 2024-02-05 VITALS
WEIGHT: 189.8 LBS | HEIGHT: 69 IN | OXYGEN SATURATION: 96 % | DIASTOLIC BLOOD PRESSURE: 78 MMHG | SYSTOLIC BLOOD PRESSURE: 144 MMHG | BODY MASS INDEX: 28.11 KG/M2 | HEART RATE: 47 BPM

## 2024-02-05 DIAGNOSIS — Z46.2 ENCOUNTER FOR FITTING AND ADJUSTMENT OF NEUROPACEMAKER OF SPINAL CORD: ICD-10-CM

## 2024-02-05 DIAGNOSIS — G03.9 ADHESIVE ARACHNOIDITIS: ICD-10-CM

## 2024-02-05 DIAGNOSIS — G89.29 CHRONIC INTRACTABLE PAIN: ICD-10-CM

## 2024-02-05 DIAGNOSIS — G62.9 POLYNEUROPATHY: ICD-10-CM

## 2024-02-05 DIAGNOSIS — M96.1 LUMBAR POSTLAMINECTOMY SYNDROME: ICD-10-CM

## 2024-02-05 DIAGNOSIS — M54.16 CHRONIC LUMBAR RADICULOPATHY: ICD-10-CM

## 2024-02-05 DIAGNOSIS — Z98.1 STATUS POST LUMBAR SPINAL FUSION: ICD-10-CM

## 2024-02-05 PROCEDURE — 95970 ALYS NPGT W/O PRGRMG: CPT | Performed by: NURSE PRACTITIONER

## 2024-02-05 PROCEDURE — 1160F RVW MEDS BY RX/DR IN RCRD: CPT | Performed by: NURSE PRACTITIONER

## 2024-02-05 PROCEDURE — 99214 OFFICE O/P EST MOD 30 MIN: CPT | Performed by: NURSE PRACTITIONER

## 2024-02-05 PROCEDURE — 1159F MED LIST DOCD IN RCRD: CPT | Performed by: NURSE PRACTITIONER

## 2024-02-05 PROCEDURE — 1125F AMNT PAIN NOTED PAIN PRSNT: CPT | Performed by: NURSE PRACTITIONER

## 2024-02-05 NOTE — PROGRESS NOTES
"Chief Complaint: \"SCS lead pull\"      Brief History: Mr. Prashant Avila is a 79 y.o. male, who returns to the clinic for possible spinal cord stimulator reprogramming and removal of spinal cord stimulator trial leads placed on 01/29/2024. Mr. Prashant Avila reports overall about 30% relief of his chronic left gluteal and left lower extremity pain.  At his follow-up visit on 2/1/2024, we opted to switch his trial over to Nevro from [employer] and extend, which provides a higher frequency level of stimulation.  Unfortunately, it appears as though his relief was somewhat marginal. Thoracic spine x-rays which demonstrate lead migration from original placement of the superior endplate of T7, and have migrated to the inferior endplate of T7/severe endplate of T8.     Prashant Avila required SCS reprogramming during the trial. Patient was able to operate his stimulator device without difficulties.  Patient did not take additional analgesics throughout his spinal cord stimulator trial. He has remained afebrile throughout the trial. Dressings are dry and intact. Patient denies any complications related to the procedure. implantation of a spinal cord stimulator device.   Pain level is rated as 6/10 with the stimulator \"turned on.”   Patient level ranges from 4/10 to 7/10 with the use of the spinal cord stimulator.    Patient reports pain and numbness in the left lower extremity.  Patient reports any new bladder or bowel problems.     Pain History:  Prashant Avila was originally referred by Dr. Fausto Renee in consultation for chronic intractable left gluteal and left lower extremity pain. As referenced numerous times on previous visits, Mr Avila presents with a complex chronic pain condition. Prashant Avila underwent L4-L5 hemilaminectomy and foraminotomy at [facility] with Dr. Mackay on 03/03/2014. As he continued to struggle with pain, he underwent L4-S1 lumbar decompression and fusion by Dr. Renee on " 07/24/2014. Prashant Avila experienced significant improvement after his last surgery with Dr. Renee,with complete resolution of his previously severe lower back pain. Unfortunately, he has been dealing with intractable left gluteal and left lower extremity pain that was present prior to his last surgery with Dr Renee. He underwent surgical consultation with Dr. Cox at the [facility], who recommended decompression of the nerve roots from L3-S1. He also underwent follow-up neurosurgical consultation with Dr. Fausto Renee on 11/03/2017. Dr. Renee discussed the possibility of lumbar decompression at L3-L4: Left L3-L4 laminotomy and foraminotomy. There is potential risk of developing instability that would necessitate extending his lumbar fusion to the L3 level. A CT myelogram was significant for stenosis above the level of his lumbar fusion, at L3-L4 without clinical correlation as he failed to obtain pain relief from diagnostic and therapeutic left L3-L4 transforaminal epidural steroid injection on April 3, 2018. In addition, he was not experiencing claudication during that period (pain was worse sitting or lying down, and better by standing or walking). Consequently, he declined surgery. EMG/NCV, confirmed chronic left S1 radiculopathy that resembled the dermatomic distribution of his symptoms. 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 He underwent a successful SCS trial followed by implantation of a spinal cord stimulator device on 11/21/2019 by Dr. Fausto Renee ([employer] Penta paddle lead with the top electrodes projecting at the level of the superior endplate of the T8 vertebral level. IPG: [employer] Proclaim 5 XR IPG Non-Rechargeable -Full Body MRI compatible). The device was implanted for treatment of chronic intractable left hip and left lower extremity pain.  "Unfortunately, he never seemed completely satisfied with the use of his SCS device despite numerous attempts at SCS reprogramming. He perceived paresthesia overlapping the areas of his pain but did not appreciate the way tonic stimulation felt. He reported no relief from Burst DR. On 03/01/2021, he underwent explantation of the entire SCS system with Dr. Fausto Renee. As he continued to struggle with pain, he underwent intrathecal analgesic trials with fentanyl and Prialt without relief. On 09/08/2021, he requested to proceed with regenerative therapies, and as such he had platelet rich leukocyte poor plasma therapy and A2M therapy delivered into the lumbar epidural space via a caudal epidural catheter placement. He denied long lasting relief although the plan was to perform this procedure 3 weeks apart at least x 3 as recommended by current protocols. He has peaked an interest in pursuing an intrathecal trial with a higher dose of opioids or Prialt or with alternative medications such as bupivacaine or clonidine. Although use of a higher dose of Prialt, as he had psychological side effects with the lowest effective dose of Prialt during his trial, potentially higher risk of side effects. He also underwent neurology consultation with Dr. Kodak Alonso who concurred with patient's treatment measures. He underwent additional diagnostic studies. MRI of the thoracic spine without contrast on 10/05/2022 revealed multilevel spondylosis without significant canal or foraminal stenosis. X-rays of the lumbar spine on 06/02/2023 revealed PLIF L4-S1.  The left S1 pedicle screw is fractured as was present on previous imaging studies. There is disc space narrowing at L3-L4 above the fused level. Facet joint arthropathy throughout the lumbar spine.  Prashant Avila underwent follow-up neurosurgical consultation with Dr. Fausto Renee on 06/14/2023 and was found not to be a surgical candidate. Per Dr. Renee;\" While the patient " "has a fracture of one of the screws, his symptoms are exactly the same as they had been since prior to his first surgery.\" He underwent follow-up psychological evaluation with Dr. Praveen Navarro in July 2023: \"From a psychological perspective, patient is an appropriate candidate for an intrathecal delivery system, a spinal cord stimulator device, a peripheral nerve stimulator device, a DRG device, and need any other procedures deemed appropriate for his condition.\"  He has failed to obtain pain relief for more than 9 years including but not limited to, conservative measures including oral analgesics (including opioids, naltrexone, most medications available for treatment of neuropathic pain except for those contraindicated due to underlying conditions, topical analgesics: failed analgesic trials due to side effects with most medications), ice, heat, physical therapy, physical therapist directed home exercise program HEP (ongoing), to name a few; Two surgical interventions (on 03/03/2014: L4-L5 hemilaminectomy and foraminotomy at [facility] with Dr. Mackay, on 07/24/2014: L4-S1 lumbar decompression and fusion by Dr. Renee); Several efforts of minimally invasive interventional pain management measures, as referenced on previous notes, including diagnostic selective nerve root injections, transforaminal epidural steroid injections, piriformis injections, spinal cord stimulator trial, spinal cord stimulator implant followed by explant, intrathecal analgesic trials, regenerative therapies, MILD.  He continues on gabapentin without side effects. He has resumed oral naltrexone 1 mg daily with discreet analgesic benefit.   We discussed again potential treatments such as pulsed radiofrequency of the dorsal root ganglion of S1 (procedure is not covered by insurance)  Pain Description: Constant left gluteal and left lower extremity pain with intermittent exacerbation, described as burning, numbing, shooting, tingling, pins and " needles, electrical sensation.   Radiation of Pain: The pain radiates from the left gluteal region down into the left lower extremity into the plantar aspect of his left foot. He denies significant lower back pain.   Pain pattern: Dermatomic   Pain intensity today: 8/10   Average pain intensity last week: 8/10  Pain intensity ranges from: 5/10 to 8/10  Aggravating factors: Pain increases with sitting for more than 5 minutes, lying down (takes gabapentin before going to bed), standing walking. Patient describes neurogenic claudication. Patient does not use a cane or a walker  Alleviating factors: Pain decreases with standing, os short walks  Associated Symptoms:   Patient reports pain, tingling, but denies numbness or weakness in the left lower extremity.  Patient denies symptoms in the opposite limb  Patient denies any new bladder or bowel problems.   Patient denies difficulties with his balance or recent falls.   Pain interferes with general activities (ability to walk, stand, transition from different positions), and affects patient's quality of life  Pain interferes with sleep causing sleep fragmentation     Review of previous therapies and additional medical records:   Prashant Avila has already failed the following measures, including:   Conservative Measures: Oral analgesics (including opioids, naltrexone, most medications available for treatment of neuropathic pain except for those contraindicated due to underlying conditions, topical analgesics: failed analgesic trials due to side effects with most medications), ice, heat, physical therapy, physical therapist directed home exercise program HEP (ongoing), to name a few  Interventional Measures:   09/08/2021: Platelet rich leukocyte poor plasma therapy and A2 M therapy, via lumbar epidural administration with caudal epidural catheter placement approach  02/06/2019: Left S1 transforaminal epidural steroid injection/pain relief that was short-lived  "  02/20/2017: Diagnostic and therapeutic left sciatic nerve block with local anesthetics, steroids, hyaluronidase under ultrasound, PNS and fluoroscopic guidance- Patient did not receive sustained relief from this procedure.   02/06/2017: Diagnostic left S1 selective nerve root injection, relief  01/30/2017: Nevro SCS Trial; no relief  04/25/2016: Trial of lumbar epidural steroid injections with steroids and hyaluronidase via caudal catheter with 100% pain relief lasting 48 hours  12/16/2015: Diagnostic and therapeutic left S1 transforaminal epidural steroid injection with hyaluronidase with complete resolution of pain for several weeks  11/30/2015: Spinal cord stimulator trial [employer], which provided him with more than 50% pain relief and functional improvement. However, patient decided to delay implantation of a permanent spinal cord stimulator device until the arrival of new technology. Patient did not like the perception of tonic stimulation, even though stimulation overlapped successfully the areas of his chronic pain, he did not find it pleasant.   02/09/2015: Diagnostic left piriformis muscle injection, no relief  Surgical Measures:   MILD: Bilateral L3-L4 08/14/2023 by Dr. Batista  03/01/2021: SCS explant by Dr. Fausto Renee.   11/21/2019: SCS implant: [employer] Penta paddle lead with the top electrodes projecting at the level of the superior endplate of the T8 vertebral level. IPG: [employer] Proclaim 5 XR IPG Non-Rechargeable by Dr. Fausto Renee   07/24/2014: L4-S1 decompression and fusion by Dr. Renee  03/03/2014: Left L4-L5 hemilaminectomy and foraminotomy at [facility] with Dr. Codie Avila underwent neurosurgical consultation with Dr. Renee on 06/24/2023, and was found not to be a surgical candidate.  Prashant Avila underwent psychological consultation at [facility] psychological evaluation with Dr. Praveen Navarro on 8/30/2021, per note: \"It is recommended the patient be seen " "for psychological treatment of his pain disorder including biofeedback.  From a psychological perspective, patient is an appropriate candidate for interventional pain procedures, such as a pain pump with opioids or Prialt.\" Follow-up psychological evaluation with Dr. Praveen Navarro in July 2023: \"From a psychological perspective, patient is an appropriate candidate for an intrathecal delivery system, a spinal cord stimulator device, a peripheral nerve stimulator device, a DRG device, and need any other procedures deemed appropriate for his condition.\"   Prashant Avila presents with significant comorbidities including history of prostate cancer, eczema, gout, hyperlipidemia, hypertension, history of kidney stones, left bundle branch block, osteoarthritis, rheumatoid arthritis, hearing loss, osteoporosis  In terms of current analgesics, Prashant Avila takes: Gabapentin   I have reviewed Jon Report consistent with medication reconciliation.  SOAPP/ORT: Low Risk       Global Pain Scale           Pain           Feelings           Clinical outcomes           Activities           GPS Total:               Previous Diagnostic Studies:   Flexion and extension Xrays of the lumbar spine 8/3/2023: No instability  X-rays of the lumbar spine 6/2/2023: PLIF L4-S1.  The left S1 pedicle screw is fractured as was present previously.  There is disc space narrowing at L3-4 above the fused level.  There is facet joint disease throughout the lumbar spine.    MRI of the thoracic spine without contrast 10/5/2022: multilevel spondylosis without significant canal or foraminal stenosis.  EMG/NCV of the bilateral lower extremities 12/28/2018: Chronic left S1 radiculopathy  CT LUMBAR SPINE WITH CONTRAST-01/21/2019: Excellent opacification of the thecal sac. Imaging covers from T12 through the S1 level caudally. The conus terminates at the L1/L2 level.  The distal cord and conus are normal in caliber. The nerve roots of the cauda equina " are normal in caliber and distribution. L4-S1 fusion hardware is in place; the left S1 pedicle screw is fractured and there is lucency around the right S1 pedicle screw. Left L4 laminotomy. Vertebral body heights and alignment are normal. Vertebral body heights are normal. Degenerative changes are as follows:  T12-L1: Facet arthropathy without significant foraminal or spinal canal stenosis.  L1-L2: Facet arthropathy without significant foraminal or spinal canal stenosis.  L2-L3: Right greater than left facet arthropathy. Mild narrowing of the right subarticular zone. No significant central spinal canal or foraminal stenosis.  L3-L4: Disc bulge and facet arthropathy with ligamentum flavum thickening. There is severe spinal canal stenosis and at least moderate bilateral foraminal stenosis.   L4-L5: Improved right subarticular zone narrowing. No significant osseous spinal canal or foraminal stenosis.  L5-S1: Bilateral facet arthropathy. Shallow disc osteophyte. Unchanged mild osseous foraminal stenosis. No significant osseous spinal canal stenosis.  EKG April 19, 2018 revealed sinus bradycardia with first-degree AV block.  Left bundle branch block.    X-Ray lumbar spine 08/04/2017: Posterior lumbar fusion of L4, L5 and S1. Degenerative changes at L1-L2 with anterior osteophyte formation. Normal lumbar alignment in the lateral projection with a stable alignment in the flexed and extended positions.      Duplex Venous Lower, 03/25/2017: Normal right lower extremity venous duplex scan. No evidence of DVT.  MRI Lumbar Spine, 10/28/2016: Since the previous MRI study, there has been L4-5-S1 posterior lumbar and interbody fusion. No new abnormality of alignment is seen. There is, however, significant worsening of posterior element hypertrophy at L3-4, above the fusion level, due to persistent L3-4 disc bulge, and what appears to be new spinal stenosis at this level, generally as a result of posterior element hypertrophic  change. Canal appears stable elsewhere. No lumbar compression deformity or new abnormality of alignment is seen. No vertebral marrow edema is appreciated. Sagittal images suggest a small (18 mm) seroma or synovial cyst on the right at L5-S1. Tip of conus medullaris is again noted at L1.  Axial images   L1-L2: No significant canal or foraminal stenosis   L2-L3: Canal appears relatively narrowed, due to posterior element hypertrophy but still greater than 1 cm in diameter. Foramina appear  lower limits of normal diameter.   L3-L4: Moderate canal stenosis with lateral narrowing, AP diameter 6.5 mm, transverse diameter approximately 9 mm with crowding of the nerve roots. Foramina appear mildly to moderately narrowed bilaterally due to facet DJD.   L4-L5: Fused level, canal appears normal. Mild foraminal stenosis due to facet DJD  L5-S1: Canal appears normal. Left-sided neural foramen appears normal. Right-sided neural foramen appears mildly narrowed due to facet DJD. Postcontrast images show expected postoperative enhancement of the dorsal soft tissues and no pathologic postcontrast enhancement elsewhere.    The following portions of the patient's history were reviewed and updated as appropriate: problem list, past medical history, past surgery history, social history, family history, medications, and allergies    Review of Systems      Patient Active Problem List   Diagnosis    Chronic lumbar radiculopathy    Lumbar postlaminectomy syndrome    Status post L4-S1 lumbar spinal fusion    Physical deconditioning    Adhesive arachnoiditis    Bilateral stenosis of lateral recess of lumbar spine    Osteoarthritis    Chronic intractable pain    Encounter for therapeutic drug monitoring    Chronic pain syndrome    Intractable neuropathic pain of lower extremity, left    Gait disturbance    Polyneuropathy    Lumbar stenosis with neurogenic claudication    Ligamentum flavum hypertrophy    Non-insulin dependent type 2 diabetes  mellitus       Past Medical History:   Diagnosis Date    Arthritis     Carcinoma of prostate     surgery     Chronic pain disorder     Chronic pain syndrome 08/02/2023    Eczema     Elevated cholesterol     Gout     History of chronic kidney disease     Hyperlipidemia     Hypertension     Kidney stone 1990    passed     Left bundle branch block     Low back pain     Lumbar stenosis with neurogenic claudication 08/03/2023    Lumbosacral disc disease     Neuropathic pain of flank, left     Non-insulin dependent type 2 diabetes mellitus 08/10/2023    Osteoarthritis     Polyneuropathy 08/03/2023    Thoracic disc disorder     Wears hearing aid in both ears          Past Surgical History:   Procedure Laterality Date    BACK SURGERY  2014    Lumbar Fusion, july 2014    BACK SURGERY  2014    Lumbar Laminectomy, march 2014    COLONOSCOPY  2016    EPIDURAL BLOCK  2016    LAMINECTOMY  2013    LUMBAR DECOMPRESSION N/A 8/14/2023    Procedure: LUMBAR DECOMPRESSION MINIMALLY INVASIVE;  Surgeon: Efrain Batista MD;  Location:  [facility];  Service: Pain Management;  Laterality: N/A;    OTHER SURGICAL HISTORY      Enteroscopic Polypectomy    PAIN PUMP INSERTION/REVISION N/A 10/12/2020    Procedure: PAIN PUMP TRIAL;  Surgeon: Efrain Batista MD;  Location:  [facility];  Service: Pain Management;  Laterality: N/A;    RI MYELOGRAPHY VIA LUMBAR INJECT RS&I LUMBOSACRAL N/A 01/21/2019    Procedure: IR myelogram, lumbar;  Surgeon: Francisco Javier Clay MD;  Location:  [facility];  Service: Interventional Radiology    PROSTATE SURGERY      SPINAL CORD STIMULATOR IMPLANT N/A 11/21/2019    Procedure: SPINAL CORD STIMULATOR INSERTION;  Surgeon: Fausto Renee MD;  Location:  [facility];  Service: Neurosurgery    SPINAL CORD STIMULATOR IMPLANT N/A 03/01/2021    Procedure: Removal of entire system, SPINAL CORD STIMULAR REMOVAL;  Surgeon: Fausto Renee MD;  Location:  [facility];  Service: Neurosurgery;  Laterality: N/A;     "TESTICLE UNDESCENDED REPAIR  1980    VOCAL CORD BIOPSY      polyps removed          Family History   Problem Relation Age of Onset    Congenital heart disease Mother     Hearing loss Mother     Multiple myeloma Father     Breast cancer Neg Hx     Ovarian cancer Neg Hx          Social History     Socioeconomic History    Marital status:    Tobacco Use    Smoking status: Never     Passive exposure: Never    Smokeless tobacco: Never   Vaping Use    Vaping Use: Never used   Substance and Sexual Activity    Alcohol use: No    Drug use: No    Sexual activity: Defer           Current Outpatient Medications:     acetaminophen (TYLENOL) 500 MG tablet, Take 1 tablet by mouth As Needed., Disp: , Rfl:     amLODIPine (NORVASC) 5 MG tablet, Take 1 tablet by mouth Daily., Disp: , Rfl:     bisoprolol (ZEBeta) 5 MG tablet, Take 1 tablet by mouth Daily., Disp: , Rfl:     gabapentin (NEURONTIN) 800 MG tablet, TAKE ONE TABLET BY MOUTH EVERY MORNING AND TAKE ONE TABLET BY MOUTH AT NOON AND TAKE TWO TABLETS BY MOUTH EVERY NIGHT AT BEDTIME, Disp: 120 tablet, Rfl: 5    losartan (COZAAR) 100 MG tablet, Take 1 tablet by mouth Daily., Disp: , Rfl:     nystatin (MYCOSTATIN) 251280 UNIT/GM ointment, Apply 1 Application topically to the appropriate area as directed As Needed., Disp: , Rfl:     pravastatin (PRAVACHOL) 10 MG tablet, Take 1 tablet by mouth Daily., Disp: , Rfl:     bisoprolol (ZEBeta) 10 MG tablet, , Disp: , Rfl:     lisinopril (PRINIVIL,ZESTRIL) 10 MG tablet, , Disp: , Rfl:     losartan (COZAAR) 50 MG tablet, Take 1 tablet by mouth Daily. (Patient not taking: Reported on 2/1/2024), Disp: , Rfl:       Allergies   Allergen Reactions    Adhesive Tape Rash     Rash with blisters  Only after wearing excessive tape for SCS phase 1 for several days.    Doxycycline Rash         /78   Pulse (!) 47   Ht 175.3 cm (69\")   Wt 86.1 kg (189 lb 12.8 oz)   SpO2 96%   BMI 28.03 kg/m²       Physical Exam   Constitutional: Patient " appears well-developed, well-nourished, well-hydrated  HEENT: Head: Normocephalic and atraumatic  Eyes: Conjunctivae and lids are normal  Pupils: Equal, round, reactive to light  Musculoskeletal   Gait and station: Gait evaluation demonstrated a normal gait   Neurological:   Patient is alert and oriented to person, place, and time.   Speech: Normal.   Cortical function: Normal mental status.   Motor strength: 5/5  Motor Tone: Normal .   Involuntary movements: None.   Skin and subcutaneous tissue: Skin is warm and intact. No rash noted. No cyanosis. The surrounding stimulator placement site looks unremarkable without erythema, drainage or fluid accumulation  Psychiatric: Judgment and insight: Normal. Recent and remote memory: Intact. Mood and affect: Normal.     PROCEDURES:   Procedure #1: Analysis of the spinal cord stimulator device without spinal cord stimulator reprogramming   Patient used the Nevro spinal cord stimulator device 100% of the time since 2/1/2024 of the trial phase;    Flowboard  Program 8 (Preferred Program):    Electrode polarities:   CONTOUR: 33+, 15+, 5-, 33-, 10+, 32+, 10+, 23-, 28-  FAST: 31-, 11-, 23+, 20+, 52-, 6-, 47+, 10+  Amplitude: 0.9 mA     Pulse width: 240 mcs  Rate: 90 Hz  IntraBurst rate: 500 Hz    NEVRO  Electrode polarities: 6-, 7+  Amplitude: 1.5 mA     Pulse width: 30 mcs  Rate: 10.000 Hz        Procedure #2: Removal of spinal cord stimulator trial leads.   Two leads were removed with tips intact. There is no erythema, drainage, or fluid accumulation at the site. The area was cleansed with chlorhexidine.  A small Covaderm was applied.       ASSESSMENT:   1. Lumbar postlaminectomy syndrome    2. Adhesive arachnoiditis    3. Chronic intractable pain    4. Chronic lumbar radiculopathy    5. Status post L4-S1 lumbar spinal fusion    6. Polyneuropathy    7. Encounter for fitting and adjustment of neuropacemaker of spinal cord          PLAN/MEDICAL DECISION MAKING:  Mr. Bustosace Adan  Margarita, 79 y.o. male  was originally referred by Dr. Fausto Renee in consultation for chronic intractable left gluteal and left lower extremity pain. As referenced numerous times on previous visits, Mr Avila presents with a complex chronic pain condition. Prashant Avila underwent L4-L5 hemilaminectomy and foraminotomy at [facility] with Dr. Mackay on 03/03/2014. As he continued to struggle with pain, he underwent L4-S1 lumbar decompression and fusion by Dr. Renee on 07/24/2014. Prashant Avila experienced significant improvement after his last surgery with Dr. Renee,with complete resolution of his previously severe lower back pain. Unfortunately, he has been dealing with intractable left gluteal and left lower extremity pain that was present prior to his last surgery with Dr Renee. He underwent surgical consultation with Dr. Cox at the [facility], who recommended decompression of the nerve roots from L3-S1. He also underwent follow-up neurosurgical consultation with Dr. Fausto Renee on 11/03/2017. Dr. Renee discussed the possibility of lumbar decompression at L3-L4: Left L3-L4 laminotomy and foraminotomy. There is potential risk of developing instability that would necessitate extending his lumbar fusion to the L3 level. A CT myelogram was significant for stenosis above the level of his lumbar fusion, at L3-L4 without clinical correlation as he failed to obtain pain relief from diagnostic and therapeutic left L3-L4 transforaminal epidural steroid injection on April 3, 2018. In addition, he was not experiencing claudication during that period (pain was worse sitting or lying down, and better by standing or walking). Consequently, he declined surgery. EMG/NCV, confirmed chronic left S1 radiculopathy that resembled the dermatomic distribution of his symptoms. 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 He underwent a successful SCS trial followed by implantation of a spinal cord stimulator device on 11/21/2019 by Dr. Fausto Renee ([employer] Penta paddle lead with the top electrodes projecting at the level of the superior endplate of the T8 vertebral level. IPG: [employer] Proclaim 5 XR IPG Non-Rechargeable -Full Body MRI compatible). The device was implanted for treatment of chronic intractable left hip and left lower extremity pain. Unfortunately, he never seemed completely satisfied with the use of his SCS device despite numerous attempts at SCS reprogramming. He perceived paresthesia overlapping the areas of his pain but did not appreciate the way tonic stimulation felt. He reported no relief from Burst DR. On 03/01/2021, he underwent explantation of the entire SCS system with Dr. Fausto Renee. As he continued to struggle with pain, he underwent intrathecal analgesic trials with fentanyl and Prialt without relief. On 09/08/2021, he requested to proceed with regenerative therapies, and as such he had platelet rich leukocyte poor plasma therapy and A2M therapy delivered into the lumbar epidural space via a caudal epidural catheter placement. He denied long lasting relief although the plan was to perform this procedure 3 weeks apart at least x 3 as recommended by current protocols. He has peaked an interest in pursuing an intrathecal trial with a higher dose of opioids or Prialt or with alternative medications such as bupivacaine or clonidine. Although use of a higher dose of Prialt, as he had psychological side effects with the lowest effective dose of Prialt during his trial, potentially higher risk of side effects. He also underwent neurology consultation with Dr. Kodak Alonso who concurred with patient's treatment measures. He underwent additional diagnostic studies. MRI of the thoracic spine without contrast on 10/05/2022 revealed multilevel spondylosis without significant canal or  "foraminal stenosis. X-rays of the lumbar spine on 06/02/2023 revealed PLIF L4-S1.  The left S1 pedicle screw is fractured as was present on previous imaging studies. There is disc space narrowing at L3-L4 above the fused level. Facet joint arthropathy throughout the lumbar spine.  Prashant Avila underwent follow-up neurosurgical consultation with Dr. Fausto Renee on 06/14/2023 and was found not to be a surgical candidate. Per Dr. Renee;\" While the patient has a fracture of one of the screws, his symptoms are exactly the same as they had been since prior to his first surgery.\" He underwent follow-up psychological evaluation with Dr. Praveen Navarro in July 2023: \"From a psychological perspective, patient is an appropriate candidate for an intrathecal delivery system, a spinal cord stimulator device, a peripheral nerve stimulator device, a DRG device, and need any other procedures deemed appropriate for his condition.\"  He has failed to obtain pain relief for more than 9 years including but not limited to, conservative measures including oral analgesics (including opioids, naltrexone, most medications available for treatment of neuropathic pain except for those contraindicated due to underlying conditions, topical analgesics: failed analgesic trials due to side effects with most medications), ice, heat, physical therapy, physical therapist directed home exercise program HEP (ongoing), to name a few; Two surgical interventions (on 03/03/2014: L4-L5 hemilaminectomy and foraminotomy at [facility] with Dr. Mackay, on 07/24/2014: L4-S1 lumbar decompression and fusion by Dr. Renee); Several efforts of minimally invasive interventional pain management measures, as referenced on previous notes, including diagnostic selective nerve root injections, transforaminal epidural steroid injections, piriformis injections, spinal cord stimulator trial, spinal cord stimulator implant followed by explant, intrathecal analgesic trials, " regenerative therapies, MILD.   Mr. Prashant Avila reports about 30% relief of his chronic left gluteal and left lower extremity pain with use of the Nevro spinal cord stimulator device.  Prior to that while in office, we reprogrammed his device he was still connected to a [employer] SCS to provide more coverage in the area of his pain on 2/1/2024.  He was been able to sit and lie for approximately 20 minutes, which typically he is unable to do.  We opted to extend trial with Nevro, he returns today for lead removal.  We have discussed removing leads, he will evaluate his response without stimulation over the next several days, and call the office for an update. The patient and his wife have been instructed to contact my office with any questions or difficulties. The patient understands the plan and agrees to proceed accordingly.        Pain Medications               acetaminophen (TYLENOL) 500 MG tablet Take 1 tablet by mouth As Needed.    gabapentin (NEURONTIN) 800 MG tablet TAKE ONE TABLET BY MOUTH EVERY MORNING AND TAKE ONE TABLET BY MOUTH AT NOON AND TAKE TWO TABLETS BY MOUTH EVERY NIGHT AT BEDTIME             No orders of the defined types were placed in this encounter.       No orders of the defined types were placed in this encounter.        No future appointments.      Please note that portions of this note were completed with a voice recognition program.     NABOR Solomon    Patient Care Team:  Montse Chun MD as PCP - General (Internal Medicine)  Fausto Renee MD as Consulting Physician (Neurosurgery)  Jean Warren MD as Consulting Physician (Neurosurgery)  Marcelino Condon MD as Consulting Physician (Urology)  Brian Tatum III, MD as Consulting Physician (Cardiology)  SHERON Chun MD as Consulting Physician (Family Medicine)  Francisco Javier Clay MD as Consulting Physician (Neurosurgery)  Royer Roldan MD as Consulting Physician (General Practice)  Emigdio Watt,  MD as Consulting Physician (Gastroenterology)  Shola Vila MD as Consulting Physician (Otolaryngology)  Hank Frankel MD as Consulting Physician (Cardiology)  Inez Regan APRN as Nurse Practitioner (Pain Medicine)  Efrain Batista MD as Consulting Physician (Pain Medicine)

## 2024-02-27 DIAGNOSIS — M96.1 LUMBAR POSTLAMINECTOMY SYNDROME: Primary | ICD-10-CM

## 2024-02-28 ENCOUNTER — OUTSIDE FACILITY SERVICE (OUTPATIENT)
Dept: PAIN MEDICINE | Facility: CLINIC | Age: 79
End: 2024-02-28
Payer: MEDICARE

## 2024-02-28 PROCEDURE — 99152 MOD SED SAME PHYS/QHP 5/>YRS: CPT | Performed by: ANESTHESIOLOGY

## 2024-02-28 PROCEDURE — 64484 NJX AA&/STRD TFRM EPI L/S EA: CPT | Performed by: ANESTHESIOLOGY

## 2024-02-28 PROCEDURE — 64483 NJX AA&/STRD TFRM EPI L/S 1: CPT | Performed by: ANESTHESIOLOGY

## 2024-03-05 ENCOUNTER — TELEPHONE (OUTPATIENT)
Dept: PAIN MEDICINE | Facility: CLINIC | Age: 79
End: 2024-03-05

## 2024-03-05 NOTE — TELEPHONE ENCOUNTER
Provider:  NINFA TOMLIN    Caller: REBECA STONE    Relationship to Patient: SELF      Phone Number: [phone]    Reason for Call:  PATIENT CALLED STATING HE HAD A EPIDURAL ON 2/28/24 AND WAS SUPPOSED TO HEAR BACK FROM US ABOUT ADDITIONAL TESTING AND NEXT STEP.     When was the patient last seen: 2/28/24

## 2024-03-11 DIAGNOSIS — G03.9 ADHESIVE ARACHNOIDITIS: ICD-10-CM

## 2024-03-11 RX ORDER — GABAPENTIN 800 MG/1
TABLET ORAL
Qty: 120 TABLET | Refills: 5 | Status: SHIPPED | OUTPATIENT
Start: 2024-03-11

## 2024-03-11 NOTE — TELEPHONE ENCOUNTER
"Caller: Prashant Avila \"Timur\"    Relationship: Self    Best call back number: [phone] (home)     Requested Prescriptions:   Requested Prescriptions     Pending Prescriptions Disp Refills    gabapentin (NEURONTIN) 800 MG tablet 120 tablet 5        Pharmacy where request should be sent: [facility] [id number] [address] - [phone] [facility] [phone] FX     Last office visit with prescribing clinician: 8/29/2023   Last telemedicine visit with prescribing clinician: Visit date not found   Next office visit with prescribing clinician: Visit date not found     Additional details provided by patient: RUNNING OUT OF MEDS TODAY, ASKING FOR A REFILL     PATIENT WANTS A CALL FROM THE OFFICE IN REGARDS TO THIS     Does the patient have less than a 3 day supply:  [x] Yes  [] No    Gerry Jacobs Rep   03/11/24 11:44 EDT     "

## 2024-06-27 ENCOUNTER — TELEPHONE (OUTPATIENT)
Dept: PAIN MEDICINE | Facility: CLINIC | Age: 79
End: 2024-06-27
Payer: MEDICARE

## 2024-06-27 DIAGNOSIS — G89.29 CHRONIC INTRACTABLE PAIN: Primary | ICD-10-CM

## 2024-07-17 DIAGNOSIS — G89.29 CHRONIC INTRACTABLE PAIN: Primary | ICD-10-CM

## 2024-07-17 DIAGNOSIS — M96.1 LUMBAR POSTLAMINECTOMY SYNDROME: ICD-10-CM

## 2024-07-17 DIAGNOSIS — G03.9 ADHESIVE ARACHNOIDITIS: ICD-10-CM

## 2024-08-20 ENCOUNTER — HOSPITAL ENCOUNTER (OUTPATIENT)
Dept: GENERAL RADIOLOGY | Facility: HOSPITAL | Age: 79
Discharge: HOME | End: 2024-08-20
Admitting: INTERNAL MEDICINE
Payer: MEDICARE

## 2024-08-20 ENCOUNTER — TRANSCRIBE ORDERS (OUTPATIENT)
Dept: ADMINISTRATIVE | Facility: HOSPITAL | Age: 79
End: 2024-08-20
Payer: MEDICARE

## 2024-08-20 DIAGNOSIS — J20.9 ACUTE BRONCHITIS, UNSPECIFIED ORGANISM: Primary | ICD-10-CM

## 2024-08-20 DIAGNOSIS — J20.9 ACUTE BRONCHITIS, UNSPECIFIED ORGANISM: ICD-10-CM

## 2024-08-20 PROCEDURE — 71046 X-RAY EXAM CHEST 2 VIEWS: CPT

## 2024-08-21 ENCOUNTER — TELEPHONE (OUTPATIENT)
Dept: PAIN MEDICINE | Facility: CLINIC | Age: 79
End: 2024-08-21
Payer: MEDICARE

## 2024-08-21 NOTE — TELEPHONE ENCOUNTER
Returned call to Shireen with [facility].   Previous message was that [facility] wanted to speak with our office regarding patient.     Advised today that they had discussed with someone in the office already today.

## 2024-09-18 DIAGNOSIS — G03.9 ADHESIVE ARACHNOIDITIS: ICD-10-CM

## 2024-09-23 RX ORDER — GABAPENTIN 800 MG/1
TABLET ORAL
Qty: 120 TABLET | Refills: 0 | Status: SHIPPED | OUTPATIENT
Start: 2024-09-23 | End: 2024-09-23 | Stop reason: SDUPTHER

## 2024-09-23 RX ORDER — GABAPENTIN 800 MG/1
TABLET ORAL
Qty: 120 TABLET | Refills: 0 | Status: SHIPPED | OUTPATIENT
Start: 2024-09-23

## 2024-10-29 DIAGNOSIS — G03.9 ADHESIVE ARACHNOIDITIS: ICD-10-CM

## 2024-11-05 ENCOUNTER — TELEPHONE (OUTPATIENT)
Dept: PAIN MEDICINE | Facility: CLINIC | Age: 79
End: 2024-11-05
Payer: MEDICARE

## 2024-11-05 RX ORDER — GABAPENTIN 800 MG/1
TABLET ORAL
Qty: 120 TABLET | Refills: 3 | Status: SHIPPED | OUTPATIENT
Start: 2024-11-05

## 2024-11-05 NOTE — TELEPHONE ENCOUNTER
"Caller: Prashant Avila \"Timur\"    Relationship: Self    Best call back number: [phone]    Requested Prescriptions:   GABAPENTIN     Pharmacy where request should be sent: [facility] [id number] - [address] [facility] [phone]  - [phone] FX     Last office visit with prescribing clinician: 8/29/2023   Last telemedicine visit with prescribing clinician: Visit date not found   Next office visit with prescribing clinician: 11/21/2024     Additional details provided by patient: PT HAS BEEN SCHEDULED FOLLOW UP ON 11/21/24.     Does the patient have less than a 3 day supply:  [x] Yes  [] No    Would you like a call back once the refill request has been completed: [x] Yes [] No    If the office needs to give you a call back, can they leave a voicemail: [x] Yes [] No    Anirudh Cartwright   11/05/24 10:17 EST         "

## 2024-11-20 NOTE — PROGRESS NOTES
"Chief Complaint: \"Medication refill. Left posterior hip pain. Left leg pain. What else can be done for my pain?\"      History of Present Illness: Mr. Prashant Avila, 79 y.o. male originally referred by Dr. Fausto Renee in consultation for chronic intractable left gluteal pain and LLE pain. Prashant Avila was last seen on 02/28/2024, when he underwent diagnostic and therapeutic left L5-S1 and left S1 TFESI, from which he experienced 100% pain relief that lasted for 1-2 days. Prior to that, Mr. Avila underwent an SCS trial with [employer] and on 02/01/2024, we opted to switch his trial to Nevro from [employer] due to lead migration and loss of effective stimulation. Leads migrated from the superior endplate of T7 to the superior endplate of T8. Instead of repeating the trial, I advised to switch to Nevro (their protocol is with leads placed at T8 and T9). On 02/05/2024, he saw Inez for lead pull. He reported additional pain relief but was not quite convinced about proceeding with implantation. At that point, he requested treatment for his severe neuropathic LLE pain and had his TESI. We discussed potential therapies and additional work up. He is here today to discuss options. Also, he needs refills of his medications.     Pain History: Prashant Avila was originally referred by Dr. Fausto Renee in consultation for chronic intractable left gluteal and left lower extremity pain. As referenced on previous visits, Mr Avila presents with a very complex chronic pain condition. Prashant Avila underwent L4-L5 hemilaminectomy and foraminotomy at [facility] with Dr. Mackay on 03/03/2014. As he continued to struggle with pain, he underwent L4-S1 lumbar decompression and fusion by Dr. Renee on 07/24/2014. Prashant Avila experienced significant improvement after his last surgery with Dr. Renee,with complete resolution of his previously severe lower back pain. Unfortunately, he continued experiencing " intractable left gluteal and left lower extremity pain that was present prior to his surgery with Dr Renee. He underwent surgical consultation with Dr. Cox at the [facility], who recommended decompression of the nerve roots from L3-S1. He also underwent follow-up neurosurgical consultation with Dr. Fausto Renee on 11/03/2017. Dr. Renee discussed the possibility of lumbar decompression at L3-L4: Left L3-L4 laminotomy and foraminotomy. There is potential risk of developing instability that would necessitate extending his lumbar fusion to the L3 level. A CT myelogram was significant for stenosis above the level of his lumbar fusion, at L3-L4 without clinical correlation. He also failed to obtain relief from diagnostic and therapeutic left L3-L4 transforaminal epidural steroid injection on April 3, 2018. During those years, he was not experiencing neurogenic claudication (pain was worse sitting or lying down, and better by standing or walking without restrictions in the distance of his walking). Consequently, he declined surgery.   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 EMG/NCV, confirmed chronic left S1 radiculopathy that resembled the dermatomic distribution of his symptoms.   On 11/21/2019, he underwent a successful SCS trial followed by implantation of a spinal cord stimulator device by Dr. Fausto Renee ([employer] Penta paddle lead. IPG: [employer] Proclaim 5 XR). Unfortunately, he never seemed completely satisfied with the use of his SCS device despite numerous attempts at SCS reprogramming. He perceived paresthesia overlapping the areas of his pain but did not like the way tonic stimulation felt. He reported no relief from Burst DR.   On 03/01/2021, he underwent explantation of the entire SCS system with Dr. Fausto Renee.   As he continued to struggle, he underwent intrathecal analgesic  "trials with fentanyl and Prialt without relief. On 09/08/2021, he requested to proceed with regenerative therapies, and as such he had platelet rich leukocyte poor plasma therapy and A2M therapy delivered into the lumbar epidural space via a caudal epidural catheter placement. He denied long lasting relief although the plan was to repeat this procedure at least every 3 weeks for at least 3 treatments as recommended by protocols.   Later, he regained interest in pursuing an intrathecal trial with a higher dose of opioids or Prialt or with alternative medications such as bupivacaine or clonidine. As he experienced psychological side effects with the lowest effective dose of Prialt during his original trial, I discouraged him with a trial with a higher dose due to a potential higher risk of worse side effects.   He also underwent neurology consultation with Dr. Kodak Alonso who concurred with patient's treatment measures. He underwent additional diagnostic studies.   MRI of the thoracic spine without contrast on 10/05/2022 revealed multilevel spondylosis without significant canal or foraminal stenosis.   X-rays of the lumbar spine on 06/02/2023 revealed PLIF L4-S1.  The left S1 pedicle screw is fractured as was present on previous imaging studies. There is disc space narrowing at L3-L4 above the fused level. Facet joint arthropathy throughout the lumbar spine.    Prashant Avila underwent follow-up neurosurgical consultation with Dr. Fausto Renee on 06/14/2023 and was found not to be a surgical candidate. Per Dr. Renee;\" While the patient has a fracture of one of the screws, his symptoms are exactly the same as they had been since prior to his first surgery.\"   He underwent follow-up psychological evaluation with Dr. Navarro in July 2023: \"From a psychological perspective, patient is an appropriate candidate for an intrathecal delivery system, a spinal cord stimulator device, a peripheral nerve stimulator device, a " "DRG device, and need any other procedures deemed appropriate for his condition.\"    Prashant Avila has failed to obtain pain relief for more than 10 years including conservative measures oral analgesics (opioids, naltrexone, almost every medication available for treatment of neuropathic pain except for those contraindicated due to his underlying conditions), topical analgesics, etc. He failed most of the analgesic trials due to side effects with most medications. Prashant Avila failed to obtain relief with non-pharmacological measures (ice, heat, physical therapy, physical therapist directed home exercise program HEP, to name a few). He failed two surgical interventions (03/03/2014: L4-L5 hemilaminectomy and foraminotomy at [facility] with Dr. Mackay, on 07/24/2014: L4-S1 lumbar decompression and fusion by Dr. Renee). He failed several efforts of minimally invasive interventional pain management measures, as referenced on previous notes, including diagnostic selective nerve root injections, transforaminal epidural steroid injections, piriformis injections, spinal cord stimulator trial, spinal cord stimulator implant followed by explant, intrathecal analgesic trials, regenerative therapies, MILD.  Pain has progressed in intensity over the past years.  Prashant Avila continues on gabapentin without side effects.   He was on oral naltrexone 1 mg daily with discreet analgesic benefit. He ran out of this medication. He thinks that it helped. We discussed again potential treatments such as pulsed radiofrequency of the dorsal root ganglion of S1 (procedure is not covered by insurance), DRG, PNS, etc  Pain Description: Constant left gluteal with intermittent exacerbation, described as burning, pins and needles, and electrical sensation.   Radiation of Pain: The pain radiates intermittently from the left gluteal region down into the left lower extremity into the his left foot. He reports low grade lower back pain. "   Pain intensity today: 7/10   Average pain intensity last week: 8/10  Pain intensity ranges from: 5/10 to 10/10  Aggravating factors: Pain increases with sitting for more than 30 minutes, lying down (takes gabapentin before going to bed), standing 10', walking 100 yards. Patient describes neurogenic claudication.  Patient does not use a cane or walker   Alleviating factors: Pain decreases with changing positions, standing, short walks  Associated Symptoms:   Patient reports pain, tingling, and weakness in the left lower extremity. Patient denies symptoms in the opposite limb  Patient denies any new bladder or bowel problems.   Patient reports difficulties with his balance but denies recent falls.   Pain interferes with general activities and affects patient's quality of life  Pain interferes with sleep causing sleep fragmentation (decreased since taking gabapentin)    Review of previous therapies and additional medical records:  Prashant Avila has already failed the following measures, including:   Conservative Measures: Oral analgesics (including opioids, naltrexone, most medications available for treatment of neuropathic pain except for those contraindicated due to underlying conditions, topical analgesics: failed analgesic trials due to side effects with most medications), ice, heat, physical therapy, physical therapist directed home exercise program HEP (ongoing), to name a few  Interventional Pain Management Measures:   02/28/2024: Dx Tx left L5-S1 and left S1 TFESI  02/01/2024: SCS trial complicated by lead migration (from top T7 to top T8). Patient opted to switch over from [employer] to [employer] and extend the SCS trial. He was hesitant about outcome.   09/08/2021: Platelet rich leukocyte poor plasma therapy and A2 M therapy, via lumbar epidural administration with caudal epidural catheter placement approach  02/06/2019: Left S1 transforaminal epidural steroid injection/pain relief that was  "short-lived   02/20/2017: Diagnostic and therapeutic left sciatic nerve block with local anesthetics, steroids, hyaluronidase under ultrasound, PNS and fluoroscopic guidance- Patient did not receive sustained relief from this procedure.   02/06/2017: Diagnostic left S1 selective nerve root injection, relief  01/30/2017: Nevro SCS Trial; no relief  04/25/2016: Trial of lumbar epidural steroid injections with steroids and hyaluronidase via caudal catheter with 100% pain relief lasting 48 hours  12/16/2015: Diagnostic and therapeutic left S1 transforaminal epidural steroid injection with hyaluronidase with complete resolution of pain for several weeks  11/30/2015: Spinal cord stimulator trial [employer], which provided him with more than 50% pain relief and functional improvement. However, patient decided to delay implantation of a permanent spinal cord stimulator device until the arrival of new technology. Patient did not like the perception of tonic stimulation, even though stimulation overlapped successfully the areas of his chronic pain, he did not find it pleasant.   02/09/2015: Diagnostic left piriformis muscle injection, no relief  Surgical Measures:   08/14/2023: MILD: Bilateral L3-L4 by Dr. Batista  03/01/2021: SCS explant by Dr. Fausto Renee.   11/21/2019: SCS implant: [employer] Penta paddle lead with the top electrodes projecting at the level of the superior endplate of the T8 vertebral level. IPG: [employer] Proclaim 5 XR IPG Non-Rechargeable by Dr. Fausto Renee   07/24/2014: L4-S1 decompression and fusion by Dr. Renee  03/03/2014: Left L4-L5 hemilaminectomy and foraminotomy at [facility] with Dr. Codie Avila underwent neurosurgical consultation with Dr. Renee on 06/24/2023, and was found not to be a surgical candidate.  Prashant Avila underwent psychological consultation at [facility] psychology with Dr. Navarro on 08/30/2021, per note: \"It is recommended the patient be seen for " "psychological treatment of his pain disorder including biofeedback.  From a psychological perspective, patient is an appropriate candidate for interventional pain procedures, such as a pain pump with opioids or Prialt.\" Follow-up psychological evaluation with Dr. Praveen Navarro in July 2023: \"From a psychological perspective, patient is an appropriate candidate for an intrathecal delivery system, a spinal cord stimulator device, a peripheral nerve stimulator device, a DRG device, and need any other procedures deemed appropriate for his condition.\"   Prashant Avila presents with significant comorbidities including history of prostate cancer, eczema, gout, hyperlipidemia, hypertension, history of kidney stones, left bundle branch block, osteoarthritis, rheumatoid arthritis, hearing loss, osteoporosis  In terms of current analgesics, Prashant Avila takes: acetaminophen, gabapentin, diclofenac   I have reviewed Jon Report consistent with medication reconciliation.  SOAPP/ORT: Low Risk     PHQ-2 Depression Screening  The PHQ has not been completed during this encounter.     Little interest or pleasure in doing things?     Feeling down, depressed, or hopeless?     PHQ-2 Total Score       PHQ-9 Depression Screening  Little interest or pleasure in doing things?     Feeling down, depressed, or hopeless?     PHQ-2 Total Score     Trouble falling or staying asleep, or sleeping too much?     Feeling tired or having little energy?     Poor appetite or overeating?     Feeling bad about yourself - or that you are a failure or have let yourself or your family down?     Trouble concentrating on things, such as reading the newspaper or watching television?     Moving or speaking so slowly that other people could have noticed? Or the opposite - being so fidgety or restless that you have been moving around a lot more than usual?     Thoughts that you would be better off dead, or of hurting yourself in some way?     PHQ-9 " Total Score     If you checked off any problems, how difficult have these problems made it for you to do your work, take care of things at home, or get along with other people?       Pain Self-Efficacy Questionnaire (PSEQ)  ITEM 08-03 2023 08-29 2023           I can enjoy things despite the pain. 4 3           I can do most of the household chores (tidying up, washing dishes, etc), despite the pain. 1 3           I can socialize with my friends or family members as often as I used to do, despite the pain. 1 3           I can cope with my pain in most situations. 4 3           I can do some form of work, despite the pain (includes housework, paid, and unpaid work). 1 4           I can still do many of the things I enjoy doing, such as hobbies or leisure activity despite pain. 0 0           I can cope with my pain without medications. 2 3           I can accomplish most of my goals in life despite the pain. 0 6           I can live in a normal lifestyle, despite the pain. 0 3           I can gradually become more active, despite the pain. 0 0           TOTAL SCORE 13/60 28/60              Global Pain Scale 08-03 2023 08-29 2023               Pain 19 19               Feelings 8 9               Clinical outcomes 12 13               Activities 20 12               GPS Total: 59 53                  The [place] Back Pain Disability Scale  DATE 08-30 2022 08-03 2023 08-29 2023             Sleep through the night 3 5 4             Turn over in bed 0 0 0             Get out of bed 0 0 0             Make your bed 0 0 0             Put on socks (pantyhose) 0 0 0             Ride in a car 5 5 4             Sit in a chair for several hours 5 5 4             Stand up for 20-30 minutes 0 0 3             Climb one flight of stairs 2 0 1             Walk a few blocks (200-300 yards)  3 5 3             Walk several miles 5 5 5             Run one block (about 50 yards) 5 5 5             Take food out of the refrigerator 0 0 0              Reach up to high shelves 0 4 1             Move a chair 0 2 1             Pull or push heavy doors 0 2 1             Bend over to clean the bathtub 0 4 3             Throw a ball 0 5 0             Carry two bags of groceries 2 5 3             Lift and carry a heavy suitcase 3 5 4             Total score 33 57 42                LANSS pain scale  LANSS item 08-30 2022 08-29 2023   1. Does the pain produce unpleasant sensations such as tingling, pricking or pins and needles? 5 3   2. Is there a different skin aspect in the painful areas, i.e. skin redder than usual or appearing mottled? 0 0   3. Does stroking the skin in the painful area or wearing tight clothing items produce unpleasant sensations? 0 0   4. Do you experience any sensations like electric shocks, bursting or jumping corresponding to painful episodes, i.e. unexplained bursts of pain? 2 2   5. Do you experience burning sensations in the painful areas or a sudden temperature change? 1 0   6. Result to stroking the non painful area and the described painful area with cotton wool: 0 0   7. Result to touching (pinprick) both areas with a 23 gauge needle: 3 0   Total Score: 08/30/2022: 13 (likely neuropathic pain)  Total Score: 08/29/2023: 5      [place] Claudication Score  All questions are in reference to an average for the past month           PAIN FREQUENCY:   How often have you experienced pain in your back or buttock or pain that goes down your back, buttock, and/or legs?  Not at all  Less than once a week  At least once a week  Every day for at least a few minutes  Every day for most of the day  Every minute of the day           PAIN SEVERITY:   How would you describe the worst pain you have had in your back, buttock, and/or legs?  0. None  1. Mild  2. Moderate  3. Severe  4. Very severe  5. Intolerable           BACK PAIN SEVERITY:   How would you describe the pain or discomfort in your back and/or buttocks?  0. None  1. Mild  2. Moderate  3.  Severe  4. Very severe  5. Intolerable           LEG PAIN SEVERITY:   How would you describe the pain or discomfort in your legs or feet?  None  Mild  Moderate  Severe  Very severe  Intolerable           NERVE SYMPTOM SEVERITY:   How would you describe the numbness or tingling in your legs or feet?  None  Mild  Moderate  Severe  Very severe  Intolerable           LEG WEAKNESS:   How would you describe the strength in your legs, ankles, or feet?  None  Mild  Moderate  Severe  Very severe  Intolerable           BALANCE:   Which statement best describes your steadiness when standing or walking?  0. I have had no problems with my balance.  1. I sometimes feel off-balance but I am able to walk without any aid.  2. I often feel off-balance but I am able to walk with an aid.  3. I am unable to walk without an aid.  4. I have difficulty walking despite using an aid.  5. I cannot stand up.           WALKING DISTANCE:   When you went for a walk, how far were you able to walk before your back or leg started giving you trouble?  0. No limits or more than 2 miles   1. More than 1/4 mile but less than 2 miles  2. More than 100 yards but less than 1/4 mile  3. More than 50 feet but less than 100 yards  4. Less than 50 feet  5. Not at all           WALKING ABILITY:   Which statement best describes your walking ability?  0. There is no limit to my walking ability.  1. I can walk far enough to do everything I want to do.  2. I am able to walk comfortably from my home to the shops or my transport.  3. I am able to walk comfortably around the house.  4. I am able to walk only from the bedroom to the bathroom or kitchen.  5. I am not able to walk at all.           WALKING SPEED:   Which statement best describes your walking?  0. I am able to walk at a normal speed.  1. I walk slowly but standing upright.  2. I walk slowly and bent forward.  3. I have to stop and stand still when I walk.  4. I have to stop and sit down when I walk.  5. I  cannot walk at all.           Total Score: Total Score _____% = (___) x 100                                                              50              Review of Diagnostic Studies:  I have independently reviewed and interpreted the images with the patient and used the images and a tridimensional spine model to explain findings. I have also reviewed the reports.  Lumbar spine X-rays, thoracic spine X-rays on 02/01/2024   12 rib-bearing thoracic-type vertebral bodies. 5 nonrib-bearing lumbar-type vertebral bodies.  Spinal stimulator device noted grossly intact appearance of the catheters. Both leads terminate at the level of T7. Posterior spinal fusion and discectomy changes spanning L4-S1 without new hardware fracture. Stable left S1 pedicle screw fracture. Degree of overall mild to moderate multilevel degenerative disc disease and facet arthropathy without significant change. Minimal grade 1 retrolisthesis L3 on L4 similar to prior. Degenerative related changes throughout the lumbar spine. No visualized displaced fracture or significant height loss. Mild degenerative osteoarthritis of the hip joints and SI joints.   Flexion and extension Xrays of the lumbar spine 8/3/2023: No instability  X-rays of the lumbar spine 6/2/2023: PLIF L4-S1.  The left S1 pedicle screw is fractured as was present previously.  There is disc space narrowing at L3-4 above the fused level.  There is facet joint disease throughout the lumbar spine.    MRI of the thoracic spine without contrast 10/5/2022: multilevel spondylosis without significant canal or foraminal stenosis.  EMG/NCV of the bilateral lower extremities 12/28/2018: Chronic left S1 radiculopathy  CT LUMBAR SPINE WITH CONTRAST-01/21/2019: Excellent opacification of the thecal sac. Imaging covers from T12 through the S1 level caudally. The conus terminates at the L1/L2 level.  The distal cord and conus are normal in caliber. The nerve roots of the cauda equina are normal in  caliber and distribution. L4-S1 fusion hardware is in place; the left S1 pedicle screw is fractured and there is lucency around the right S1 pedicle screw. Left L4 laminotomy. Vertebral body heights and alignment are normal. Vertebral body heights are normal. Degenerative changes are as follows:  T12-L1: Facet arthropathy without significant foraminal or spinal canal stenosis.  L1-L2: Facet arthropathy without significant foraminal or spinal canal stenosis.  L2-L3: Right greater than left facet arthropathy. Mild narrowing of the right subarticular zone. No significant central spinal canal or foraminal stenosis.  L3-L4: Disc bulge and facet arthropathy with ligamentum flavum thickening. There is severe spinal canal stenosis and at least moderate bilateral foraminal stenosis.   L4-L5: Improved right subarticular zone narrowing. No significant osseous spinal canal or foraminal stenosis.  L5-S1: Bilateral facet arthropathy. Shallow disc osteophyte. Unchanged mild osseous foraminal stenosis. No significant osseous spinal canal stenosis.  EKG April 19, 2018 revealed sinus bradycardia with first-degree AV block.  Left bundle branch block.    X-Ray lumbar spine 08/04/2017: Posterior lumbar fusion of L4, L5 and S1. Degenerative changes at L1-L2 with anterior osteophyte formation. Normal lumbar alignment in the lateral projection with a stable alignment in the flexed and extended positions.      Duplex Venous Lower, 03/25/2017: Normal right lower extremity venous duplex scan. No evidence of DVT.  MRI Lumbar Spine, 10/28/2016: Since the previous MRI study, there has been L4-5-S1 posterior lumbar and interbody fusion. No new abnormality of alignment is seen. There is, however, significant worsening of posterior element hypertrophy at L3-4, above the fusion level, due to persistent L3-4 disc bulge, and what appears to be new spinal stenosis at this level, generally as a result of posterior element hypertrophic change. Canal  appears stable elsewhere. No lumbar compression deformity or new abnormality of alignment is seen. No vertebral marrow edema is appreciated. Sagittal images suggest a small (18 mm) seroma or synovial cyst on the right at L5-S1. Tip of conus medullaris is again noted at L1.  Axial images   L1-L2: No significant canal or foraminal stenosis   L2-L3: Canal appears relatively narrowed, due to posterior element hypertrophy but still greater than 1 cm in diameter. Foramina appear  lower limits of normal diameter.   L3-L4: Moderate canal stenosis with lateral narrowing, AP diameter 6.5 mm, transverse diameter approximately 9 mm with crowding of the nerve roots. Foramina appear mildly to moderately narrowed bilaterally due to facet DJD.   L4-L5: Fused level, canal appears normal. Mild foraminal stenosis due to facet DJD  L5-S1: Canal appears normal. Left-sided neural foramen appears normal. Right-sided neural foramen appears mildly narrowed due to facet DJD. Postcontrast images show expected postoperative enhancement of the dorsal soft tissues and no pathologic postcontrast enhancement elsewhere.    The following portions of the patient's history were reviewed and updated as appropriate: problem list, past medical history, past surgery history, social history, family history, medication reconciliation, and allergies    Review of Systems      Patient Active Problem List   Diagnosis    Chronic lumbar radiculopathy    Lumbar postlaminectomy syndrome    Status post L4-S1 lumbar spinal fusion    Physical deconditioning    Adhesive arachnoiditis    Bilateral stenosis of lateral recess of lumbar spine    Osteoarthritis    Chronic intractable pain    Encounter for therapeutic drug monitoring    Chronic pain syndrome    Intractable neuropathic pain of lower extremity, left    Gait disturbance    Polyneuropathy    Lumbar stenosis with neurogenic claudication    Ligamentum flavum hypertrophy    Non-insulin dependent type 2 diabetes  mellitus    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    Neuralgia and neuritis       Past Medical History:   Diagnosis Date    Arthritis     Carcinoma of prostate     surgery     Chronic pain disorder     Chronic pain syndrome 08/02/2023    Eczema     Elevated cholesterol     Gout     History of chronic kidney disease     Hyperlipidemia     Hypertension     Kidney stone 1990    passed     Left bundle branch block     Low back pain     Lumbar stenosis with neurogenic claudication 08/03/2023    Lumbosacral disc disease     Neuropathic pain of flank, left     Non-insulin dependent type 2 diabetes mellitus 08/10/2023    Osteoarthritis     Polyneuropathy 08/03/2023    Thoracic disc disorder     Wears hearing aid in both ears          Past Surgical History:   Procedure Laterality Date    BACK SURGERY  2014    Lumbar Fusion, july 2014    BACK SURGERY  2014    Lumbar Laminectomy, march 2014    COLONOSCOPY  2016    EPIDURAL BLOCK  2016    LAMINECTOMY  2013    LUMBAR DECOMPRESSION N/A 8/14/2023    Procedure: LUMBAR DECOMPRESSION MINIMALLY INVASIVE;  Surgeon: Efrain Batista MD;  Location: [facility] OR;  Service: Pain Management;  Laterality: N/A;    OTHER SURGICAL HISTORY      Enteroscopic Polypectomy    PAIN PUMP INSERTION/REVISION N/A 10/12/2020    Procedure: PAIN PUMP TRIAL;  Surgeon: Efrian Batista MD;  Location:  [facility];  Service: Pain Management;  Laterality: N/A;    IA MYELOGRAPHY VIA LUMBAR INJECT RS&I LUMBOSACRAL N/A 01/21/2019    Procedure: IR myelogram, lumbar;  Surgeon: Francisco Javier Clay MD;  Location: [facility] CATH INVASIVE LOCATION;  Service: Interventional Radiology    PROSTATE SURGERY      SPINAL CORD STIMULATOR IMPLANT N/A 11/21/2019    Procedure: SPINAL CORD STIMULATOR INSERTION;  Surgeon: Fausto Renee MD;  Location:  [facility];  Service: Neurosurgery    SPINAL CORD STIMULATOR IMPLANT N/A 03/01/2021    Procedure: Removal of entire system, SPINAL CORD STIMULAR  REMOVAL;  Surgeon: Fausto Renee MD;  Location: [facility];  Service: Neurosurgery;  Laterality: N/A;    TESTICLE UNDESCENDED REPAIR  1980    VOCAL CORD BIOPSY      polyps removed          Family History   Problem Relation Age of Onset    Congenital heart disease Mother     Hearing loss Mother     Multiple myeloma Father     Breast cancer Neg Hx     Ovarian cancer Neg Hx          Social History     Socioeconomic History    Marital status:    Tobacco Use    Smoking status: Never     Passive exposure: Never    Smokeless tobacco: Never   Vaping Use    Vaping status: Never Used   Substance and Sexual Activity    Alcohol use: No    Drug use: No    Sexual activity: Defer           Current Outpatient Medications:     acetaminophen (TYLENOL) 500 MG tablet, Take 1 tablet by mouth As Needed., Disp: , Rfl:     amLODIPine (NORVASC) 5 MG tablet, Take 1 tablet by mouth Daily., Disp: , Rfl:     bisoprolol (ZEBeta) 10 MG tablet, , Disp: , Rfl:     bisoprolol (ZEBeta) 5 MG tablet, Take 1 tablet by mouth Daily., Disp: , Rfl:     diclofenac (VOLTAREN) 75 MG EC tablet, Take 1 tablet by mouth., Disp: , Rfl:     gabapentin (NEURONTIN) 800 MG tablet, TAKE 1 TABLET BY MOUTH EVERY MORNING, THEN TAKE ONE TABLET BY MOUTH EVERY AFTERNOON, THEN TAKE TWO TABLETS BY MOUTH EVERY EVENING, Disp: 120 tablet, Rfl: 3    lisinopril (PRINIVIL,ZESTRIL) 10 MG tablet, , Disp: , Rfl:     losartan (COZAAR) 100 MG tablet, Take 1 tablet by mouth Daily., Disp: , Rfl:     losartan (COZAAR) 50 MG tablet, Take 1 tablet by mouth Daily., Disp: , Rfl:     methylPREDNISolone (MEDROL) 4 MG dose pack, Take as directed on package instructions., Disp: 21 tablet, Rfl: 0    nystatin (MYCOSTATIN) 596530 UNIT/GM ointment, Apply 1 Application topically to the appropriate area as directed As Needed., Disp: , Rfl:     pravastatin (PRAVACHOL) 10 MG tablet, Take 1 tablet by mouth Daily., Disp: , Rfl:       Allergies   Allergen Reactions    Adhesive Tape Rash     Rash with  "blisters  Only after wearing excessive tape for SCS phase 1 for several days.    Doxycycline Rash         Ht 175.3 cm (69\")   Wt 84.8 kg (187 lb)   BMI 27.62 kg/m²       Physical Exam:  Constitutional: Patient appears well-developed, well-nourished, well-hydrated, appears younger than stated age  HEENT: Head: Normocephalic and atraumatic  Eyes: Conjunctivae and lids are normal  Pupils: Equal, round, reactive to light    Peripheral vascular exam: Femoral: right 2+, left 2+. Posterior tibialis: right 2+ and left 2+. Dorsalis pedis: right 2+ and left 2+. No edema.   Musculoskeletal   Gait and station: Gait evaluation demonstrated a normal gait. Able to walk on heels, toes, tandem walking   Lumbar Spine: Passive and active range of motion are almost full and without pain. Forward flexion increased his left-sided lower back/gluteal pain. Lumbar facet joint loading maneuvers are negative.   Sacroiliac Joints Provocative Maneuvers: Negative   Piriformis maneuvers: Negative   Right Hip Joint: The range of motion of the hip joint is almost full and without pain   Left Hip Joint: The range of motion of the hip joint is almost full and without pain   Palpation of the bilateral ischial tuberosities: Unrevealing   Palpation of the bilateral psoas tendons and iliopsoas bursas: Unrevealing   Palpation of the bilateral greater trochanters: Unrevealing   Examination of the Iliotibial band: Unrevealing   Neurological:   Patient is alert and oriented to person, place, and time.   Speech: Normal.   Cortical function: Normal mental status.   Reflex Scores:  Right patellar: 0+  Left patellar: 0+  Right Achilles: 0+  Left Achilles: 0+  Motor strength: 5/5  Motor Tone: Normal  Involuntary movements: None.   Superficial/Primitive Reflexes: Primitive reflexes were absent.   Right Lamas: Absent  Left Lamas: Absent  Right ankle clonus: Absent  Left ankle clonus: Absent   Babinsky: Absent  Long tract signs: Negative. Straight leg raising " test: Questionably positive on the left at 30-40 degrees with positive Lasegue. Femoral stretch sign: Negative on the right, positive on the left.   Tinel's sign: Left superior iliac wing+  Sensory exam: Intact to light touch, intact pain and temperature sensation, intact vibration sensation and normal proprioception  Coordination: Finger to nose: Normal. Balance: Normal Romberg's sign: Negative   Skin and subcutaneous tissue: Skin is warm and intact. No rash noted. No cyanosis.   Psychiatric: Judgment and insight: Normal. Recent and remote memory: Intact. Mood and affect: Normal.       ASSESSMENT:   1. 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 Neuralgia and neuritis    3. Chronic intractable pain    4. Adhesive arachnoiditis    5. Status post L4-S1 lumbar spinal fusion    6. Lumbar postlaminectomy syndrome    7. Diabetic polyneuropathy associated with type 2 diabetes mellitus    8. Non-insulin dependent type 2 diabetes mellitus    9. Physical deconditioning        PLAN/MEDICAL DECISION MAKING:  Mr. Prashant Avila, 79 y.o. male originally referred by Dr. Fausto Renee in consultation for chronic intractable left gluteal and left lower extremity pain. As referenced on previous visits, Mr Avila presents with a very complex chronic pain condition. Prashant Avila underwent L4-L5 hemilaminectomy and foraminotomy at [facility] with Dr. Mackay on 03/03/2014. As he continued to struggle with pain, he underwent L4-S1 lumbar decompression and fusion by Dr. Renee on 07/24/2014. Prashant Avila experienced significant improvement after his last surgery with Dr. Renee,with complete resolution of his previously severe lower back pain. Unfortunately, he has continued experiencing intractable left gluteal and left lower extremity pain that was present prior to his last surgery with Dr Renee. He underwent neurosurgical consultation with Dr. Cox at the [facility], who  recommended decompression of the nerve roots from L3-S1. He also underwent follow-up neurosurgical consultation with Dr. Fausto Renee on 11/03/2017. Dr. Renee discussed the possibility of decompression at L3-L4: Left L3-L4 laminotomy and foraminotomy and discussed the potential risk of developing instability that would necessitate extending his lumbar fusion to the L3 level. A CT myelogram was significant for stenosis above the level of his lumbar fusion, at L3-L4 without clinical correlation as he failed to obtain pain relief from diagnostic and therapeutic left L3-L4 transforaminal epidural steroid injection on April 3, 2018. In addition, he was not experiencing neurogenic claudication during that period (pain was worse sitting or lying down, and better by standing or walking). Consequently, he declined surgery. Prashant Avila has presented with symptoms consistent with chronic left S1 radiculopathy and superimposed symptoms from adhesive lumbar arachnoiditis. EMG/NCV, confirmed chronic left S1 radiculopathy. He underwent neurology consultation with Dr. Kodak Alonso who concurred with patient's treatment measures. Prashant Avila has failed to obtain pain relief for more than 10 years with conservative measures (oral and topical analgesics; opioids, naltrexone, most medications available for treatment of neuropathic pain except for those contraindicated due to underlying conditions, failed analgesic trials due to side effects), physical therapy, physical therapist directed home exercise program HEP, to name a few; he also failed surgical measures (03/03/2014: L4-L5 hemilaminectomy and foraminotomy at [facility] with Dr. Mackay; 07/24/2014: L4-S1 lumbar decompression and fusion by Dr. Renee); He failed several minimally invasive interventional pain management measures including diagnostic selective nerve root injections, transforaminal epidural steroid injections, piriformis injections, spinal cord stimulator  "trial, spinal cord stimulator implant followed by explant, intrathecal analgesic trial, regenerative therapies, MILD. His last SCS trial was complicated by lead migration as revealed on X-rays. Prashant Avila underwent follow-up neurosurgical consultation with Dr. Fausto Renee on 06/14/2023 and was found not to be a surgical candidate. Per Dr. Renee;\" While the patient has a fracture of one of the screws, his symptoms are exactly the same as they had been since prior to his first surgery.\" He underwent follow-up psychological evaluation with Dr. Praveen Navarro in July 2023: \"From a psychological perspective, patient is an appropriate candidate for an intrathecal delivery system, a spinal cord stimulator device, a peripheral nerve stimulator device, a DRG device, and need any other procedures deemed appropriate for his condition.\"  A comprehensive evaluation including history and physical exam along with pertinent physiologic and functional assessment was performed. Patient presents with intractable pain due to the diagnoses listed above. Patient has failed to respond to conservative modalities, previous minimally invasive interventional pain management measures, previous surgical interventions, as referenced under HPI. I have documented the impact of patient's moderate-to-severe pain contributing to significant impairment in daily activities, ADLs, and a negative impact on the patient's quality of life, as reflected on Global Pain Scale ----/100; The [place] Back Pain Disability Scale ___/100; --- [place] Claudication Score ---/50; The [place] [employer] Osteoarthritis Index (WOMAC) to assess pain, stiffness, and physical function in patients with hip osteoarthritis 0/96; Tinetti Gait & Balance Assessment Tool  (low risk for falls). I have reviewed pertinent supporting diagnostic studies of patient's chronic pain condition as well as all available pertinent medical records to patient's " chronic pain condition including previous therapies, as referenced above. --- PHQ-2 Depression Screening ---; PHQ-9 Depression Screening ---; Pain Self-Efficacy Questionnaire (PSEQ) ---\\\/60. I had a lengthy conversation with Mr. Prashant Avila regarding his chronic pain condition and potential therapeutic options including risks, benefits, alternative therapies, to name a few. We have discussed using a stepwise approach starting with the least intense level of care as determined by the extent required to diagnose and or treat a patient's condition. The proposed treatments are consistent with the patient's medical condition and known to be safe and effective by current guidelines and the standard of care. The duration and frequency proposed are considered appropriate for the service in accordance with accepted standards of medical practice for the diagnosis and treatment of the patient's condition and intended to improve the patient's level of function. These services will be furnished in a setting appropriate to the patient's medical needs and condition. Therefore, I have proposed the following plan:    1. Interventional pain management measures: Patient does not take blood thinners   A. Chronic intractable left gluteal pain: Patient will be scheduled for diagnostic superior cluneal nerve blocks and diagnostic medial cluneal nerve blocks by hydrodissection technique under ultrasound guidance, C arm fluoroscopic guidance, and PNS guidance. if relief, then Sprint Extensa Dual Lead System Vs Nalu Vs steroids Vs DRG Vs a spinal cord stimulator trial with [employer]onic Inceptiv.   B. LSS with claudication: Will update imaging. I suspect symptoms from L3-L4 as PE revealed + FSS. We could consider spacers (Minuteman L3-L4), Vs new IT trial with bupivacaine,     2. Diagnostic studies:   A. Lumbar Spine X-rays, full views including flexion and extension to assess lumbar stability  B. MRI lumbar spine w/wo contrast,  creatinine  C. MRI of the pelvis without contrast  D. MRI of the thoracic spine without contrast to assess for potential stenosis and capacity and patency of the spinal canal and epidural space prior to spinal cord stimulator trial and implant  E. We may consider repeating lumbar myelogram followed by CT post-myelogram and new EMG/NCV of the bilateral lower extremities   F. CMP, HbA1C, CBC, ESR, CRP, RF  G. UDS + Gabapentin    3. Pharmacological measures: Reviewed and discussed; The patient has failed numerous pharmacological trials and has declined pharmacological measures at this time  A. Patient takes acetaminophen, gabapentin, diclofenac. Please send refills for gabapentin   B. Please send Rx for naltrexone 1 mg once a day, #30, 1 refill ([facility])    Screening and compliance with controlled substances:  Patient has completed a SOAPP and ORT questionnaires (revealing low risk). Jon report has been reviewed and appropriate. Random urine drug screenings have been obtained and appropriate. Patient has signed a consent for treatment with controlled substances after reviewing the document and verbalizing full understanding of the risks, benefits, alternatives, and consequences of compliance and adherence with treatment and comprehensive plan of care. Patient received in hand a copy of this document.    4. Long-term rehabilitation efforts:  A. The patient does not have a history of falls. I performed a risk assessment for falls using the Tinetti gait & balance assessment tool (scored low risk for falls).   B. Patient will start a comprehensive physical therapy program at [facility], gait and balance training, neurodynamics, core strengthening, gluteal and abductor strengthening, ultrasound, ASTYM, E-STIM, myofascial release, cupping, dry needling, home exercise program, 2-3 x per week for 8 weeks   C. Contrast therapy: Apply ice-packs for 15-20 minutes, followed by  heating pads for 15-20 minutes to affected area   D. Start a low impact exercise program such as water therapy, swimming, chair yoga, Pilates  E. Prashant Avila  reports that he has never smoked. He has never been exposed to tobacco smoke. He has never used smokeless tobacco.     5. The patient has been instructed to contact my office with any questions or difficulties. The patient understands the plan and agrees to proceed accordingly.    The patient has a documented plan of care to address chronic pain. Prashant Avila reports a pain score of 7/10.  Given his pain assessment as noted, treatment options were discussed and the following options were decided upon as a follow-up plan to address the patient's pain: continuation of current treatment plan for pain, educational materials on pain management, home exercises and therapy, prescription for non-opiod analgesics, referral to Physical Therapy, referral to specialist for assistance in pain treatment guidance, steroid injections, use of non-medical modalities (ice, heat, stretching and/or behavior modifications), and interventional pain management measures .               Pain Management Panel  More data exists         Latest Ref Rng & Units 8/8/2023 8/30/2022   Pain Management Panel   Amphetamine, Urine Qual Negative Negative  Negative    Barbiturates Screen, Urine Negative Negative  Negative    Benzodiazepine Screen, Urine Negative Negative  Negative    Buprenorphine, Screen, Urine Negative Negative  Negative    Cocaine Screen, Urine Negative Negative  Negative    Fentanyl, Urine Negative Negative  -   Methadone Screen , Urine Negative Negative  Negative    Methamphetamine, Ur Negative Negative  Negative       Details                    JOCELIN query complete. JOCELIN reviewed by Efrain Batista MD.     Pain Medications               acetaminophen (TYLENOL) 500 MG tablet Take 1 tablet by mouth As Needed.    diclofenac (VOLTAREN) 75 MG EC tablet Take 1  tablet by mouth.    gabapentin (NEURONTIN) 800 MG tablet TAKE 1 TABLET BY MOUTH EVERY MORNING, THEN TAKE ONE TABLET BY MOUTH EVERY AFTERNOON, THEN TAKE TWO TABLETS BY MOUTH EVERY EVENING    methylPREDNISolone (MEDROL) 4 MG dose pack Take as directed on package instructions.             No orders of the defined types were placed in this encounter.       Total Time Spent: 63 minutes    Please note that portions of this note were completed with a voice recognition program.   Any copied data in any portion of my note from previous notes included in the HPI, PE, MDM and/or assessment and plan has been reviewed by myself and accurate as of this date.   The 21st Century Cures Act makes medical notes like this available to patients in the interest of transparency. This is a medical document intended as peer to peer communication. It is written in medical language and may contain abbreviations or verbiage that are unfamiliar. It may appear blunt or direct. Medical documents are intended to carry relevant information, facts as evident, and the clinical opinion of the practitioner.     Efrain Batista MD    Patient Care Team:  Montse Chun MD as PCP - General (Internal Medicine)  Fausto Renee MD as Consulting Physician (Neurosurgery)  Jean Warren MD (Inactive) as Consulting Physician (Neurosurgery)  Marcelino Condon MD as Consulting Physician (Urology)  Brian Tatum III, MD as Consulting Physician (Cardiology)  SHERON Chun MD as Consulting Physician (Family Medicine)  Francisco Javier Clay MD as Consulting Physician (Neurosurgery)  Royer Roldan MD as Consulting Physician (General Practice)  Emigdio Watt MD as Consulting Physician (Gastroenterology)  Shola Vila MD as Consulting Physician (Otolaryngology)  Hank Frankel MD as Consulting Physician (Cardiology)  Inez Regan APRN as Nurse Practitioner (Pain Medicine)  Efrain Batista MD as Consulting Physician (Pain Medicine)      No orders of the defined types were placed in this encounter.        Future Appointments   Date Time Provider Department Center   11/21/2024 11:00 AM Efrain Batista MD [facility]

## 2024-11-21 ENCOUNTER — OFFICE VISIT (OUTPATIENT)
Dept: PAIN MEDICINE | Facility: CLINIC | Age: 79
End: 2024-11-21
Payer: MEDICARE

## 2024-11-21 VITALS — HEIGHT: 69 IN | BODY MASS INDEX: 27.7 KG/M2 | WEIGHT: 187 LBS

## 2024-11-21 DIAGNOSIS — G03.9 ADHESIVE ARACHNOIDITIS: ICD-10-CM

## 2024-11-21 DIAGNOSIS — Z98.1 STATUS POST LUMBAR SPINAL FUSION: ICD-10-CM

## 2024-11-21 DIAGNOSIS — Z51.81 THERAPEUTIC DRUG MONITORING: ICD-10-CM

## 2024-11-21 DIAGNOSIS — G89.29 CHRONIC INTRACTABLE PAIN: ICD-10-CM

## 2024-11-21 DIAGNOSIS — M79.2 NEURALGIA AND NEURITIS: ICD-10-CM

## 2024-11-21 DIAGNOSIS — Z01.812 PRE-PROCEDURE LAB EXAM: ICD-10-CM

## 2024-11-21 DIAGNOSIS — E11.9 NON-INSULIN DEPENDENT TYPE 2 DIABETES MELLITUS: ICD-10-CM

## 2024-11-21 DIAGNOSIS — M96.1 LUMBAR POSTLAMINECTOMY SYNDROME: ICD-10-CM

## 2024-11-21 DIAGNOSIS — G62.9 POLYNEUROPATHY: ICD-10-CM

## 2024-11-21 DIAGNOSIS — Z01.818 PRE-PROCEDURAL EXAMINATION: ICD-10-CM

## 2024-11-21 DIAGNOSIS — R53.81 PHYSICAL DECONDITIONING: ICD-10-CM

## 2024-11-21 DIAGNOSIS — G58.9 ENTRAPMENT NEUROPATHY: ICD-10-CM

## 2024-11-21 RX ORDER — DICLOFENAC SODIUM 75 MG/1
75 TABLET, DELAYED RELEASE ORAL
COMMUNITY

## 2024-11-22 ENCOUNTER — HOSPITAL ENCOUNTER (OUTPATIENT)
Dept: GENERAL RADIOLOGY | Facility: HOSPITAL | Age: 79
Discharge: HOME | End: 2024-11-22
Payer: MEDICARE

## 2024-11-22 ENCOUNTER — TELEPHONE (OUTPATIENT)
Dept: PAIN MEDICINE | Facility: CLINIC | Age: 79
End: 2024-11-22
Payer: MEDICARE

## 2024-11-22 ENCOUNTER — LAB (OUTPATIENT)
Dept: LAB | Facility: HOSPITAL | Age: 79
End: 2024-11-22
Payer: MEDICARE

## 2024-11-22 DIAGNOSIS — Z51.81 THERAPEUTIC DRUG MONITORING: ICD-10-CM

## 2024-11-22 DIAGNOSIS — M96.1 LUMBAR POSTLAMINECTOMY SYNDROME: ICD-10-CM

## 2024-11-22 DIAGNOSIS — Z01.812 PRE-PROCEDURE LAB EXAM: ICD-10-CM

## 2024-11-22 DIAGNOSIS — E11.9 NON-INSULIN DEPENDENT TYPE 2 DIABETES MELLITUS: ICD-10-CM

## 2024-11-22 LAB
ALBUMIN SERPL-MCNC: 4.1 G/DL (ref 3.5–5.2)
ALBUMIN/GLOB SERPL: 1.4 G/DL
ALP SERPL-CCNC: 77 U/L (ref 39–117)
ALT SERPL W P-5'-P-CCNC: 14 U/L (ref 1–41)
AMPHET+METHAMPHET UR QL: NEGATIVE
AMPHETAMINES UR QL: NEGATIVE
ANION GAP SERPL CALCULATED.3IONS-SCNC: 14 MMOL/L (ref 5–15)
AST SERPL-CCNC: 20 U/L (ref 1–40)
BARBITURATES UR QL SCN: NEGATIVE
BASOPHILS # BLD AUTO: 0.1 10*3/MM3 (ref 0–0.2)
BASOPHILS NFR BLD AUTO: 1.2 % (ref 0–1.5)
BENZODIAZ UR QL SCN: NEGATIVE
BILIRUB SERPL-MCNC: 0.3 MG/DL (ref 0–1.2)
BUN SERPL-MCNC: 20 MG/DL (ref 8–23)
BUN/CREAT SERPL: 16.7 (ref 7–25)
BUPRENORPHINE SERPL-MCNC: NEGATIVE NG/ML
CALCIUM SPEC-SCNC: 9.2 MG/DL (ref 8.6–10.5)
CANNABINOIDS SERPL QL: NEGATIVE
CHLORIDE SERPL-SCNC: 100 MMOL/L (ref 98–107)
CHROMATIN AB SERPL-ACNC: <10 IU/ML (ref 0–14)
CO2 SERPL-SCNC: 23 MMOL/L (ref 22–29)
COCAINE UR QL: NEGATIVE
CREAT SERPL-MCNC: 1.2 MG/DL (ref 0.76–1.27)
CRP SERPL-MCNC: 0.96 MG/DL (ref 0–0.5)
DEPRECATED RDW RBC AUTO: 40 FL (ref 37–54)
EGFRCR SERPLBLD CKD-EPI 2021: 60.8 ML/MIN/1.73
EOSINOPHIL # BLD AUTO: 0.37 10*3/MM3 (ref 0–0.4)
EOSINOPHIL NFR BLD AUTO: 4.3 % (ref 0.3–6.2)
ERYTHROCYTE [DISTWIDTH] IN BLOOD BY AUTOMATED COUNT: 12.1 % (ref 12.3–15.4)
ERYTHROCYTE [SEDIMENTATION RATE] IN BLOOD: 21 MM/HR (ref 0–20)
FENTANYL UR-MCNC: NEGATIVE NG/ML
GLOBULIN UR ELPH-MCNC: 2.9 GM/DL
GLUCOSE SERPL-MCNC: 82 MG/DL (ref 65–99)
HBA1C MFR BLD: 5.6 % (ref 4.8–5.6)
HCT VFR BLD AUTO: 37.1 % (ref 37.5–51)
HGB BLD-MCNC: 12.7 G/DL (ref 13–17.7)
IMM GRANULOCYTES # BLD AUTO: 0.03 10*3/MM3 (ref 0–0.05)
IMM GRANULOCYTES NFR BLD AUTO: 0.3 % (ref 0–0.5)
LYMPHOCYTES # BLD AUTO: 1.58 10*3/MM3 (ref 0.7–3.1)
LYMPHOCYTES NFR BLD AUTO: 18.4 % (ref 19.6–45.3)
MCH RBC QN AUTO: 31.3 PG (ref 26.6–33)
MCHC RBC AUTO-ENTMCNC: 34.2 G/DL (ref 31.5–35.7)
MCV RBC AUTO: 91.4 FL (ref 79–97)
METHADONE UR QL SCN: NEGATIVE
MONOCYTES # BLD AUTO: 1.04 10*3/MM3 (ref 0.1–0.9)
MONOCYTES NFR BLD AUTO: 12.1 % (ref 5–12)
NEUTROPHILS NFR BLD AUTO: 5.47 10*3/MM3 (ref 1.7–7)
NEUTROPHILS NFR BLD AUTO: 63.7 % (ref 42.7–76)
NRBC BLD AUTO-RTO: 0 /100 WBC (ref 0–0.2)
OPIATES UR QL: NEGATIVE
OXYCODONE UR QL SCN: NEGATIVE
PCP UR QL SCN: NEGATIVE
PLATELET # BLD AUTO: 283 10*3/MM3 (ref 140–450)
PMV BLD AUTO: 10.7 FL (ref 6–12)
POTASSIUM SERPL-SCNC: 4.3 MMOL/L (ref 3.5–5.2)
PROT SERPL-MCNC: 7 G/DL (ref 6–8.5)
RBC # BLD AUTO: 4.06 10*6/MM3 (ref 4.14–5.8)
SODIUM SERPL-SCNC: 137 MMOL/L (ref 136–145)
TRICYCLICS UR QL SCN: NEGATIVE
WBC NRBC COR # BLD AUTO: 8.59 10*3/MM3 (ref 3.4–10.8)

## 2024-11-22 PROCEDURE — 80053 COMPREHEN METABOLIC PANEL: CPT

## 2024-11-22 PROCEDURE — 83036 HEMOGLOBIN GLYCOSYLATED A1C: CPT

## 2024-11-22 PROCEDURE — 86140 C-REACTIVE PROTEIN: CPT | Performed by: ANESTHESIOLOGY

## 2024-11-22 PROCEDURE — 86431 RHEUMATOID FACTOR QUANT: CPT

## 2024-11-22 PROCEDURE — 80307 DRUG TEST PRSMV CHEM ANLYZR: CPT

## 2024-11-22 PROCEDURE — 85652 RBC SED RATE AUTOMATED: CPT | Performed by: ANESTHESIOLOGY

## 2024-11-22 PROCEDURE — 85025 COMPLETE CBC W/AUTO DIFF WBC: CPT

## 2024-11-22 PROCEDURE — 72114 X-RAY EXAM L-S SPINE BENDING: CPT

## 2024-11-22 PROCEDURE — 36415 COLL VENOUS BLD VENIPUNCTURE: CPT | Performed by: ANESTHESIOLOGY

## 2024-11-22 RX ORDER — NALTREXONE 100 %
1 POWDER (GRAM) MISCELLANEOUS DAILY
Qty: 30 G | Refills: 1 | Status: SHIPPED | OUTPATIENT
Start: 2024-11-22

## 2024-11-22 NOTE — TELEPHONE ENCOUNTER
Spoke with pt and advised him that his procedure for Monday was approved. Advised of location being in [place] as well.   Also advised pt of getting his UDS, lab work, and diagnostic imaging. Advised that he can [place] [facility] and that Central Scheduling will give him a call regarding his MRI's. Also provided central scheduling phone number as well. Advised pt that Naltrexone was also restarted and sent to the compounding pharmacy also, and they will call him once completed. Pt expressed understanding, no further needs expressed.

## 2024-11-25 ENCOUNTER — TELEPHONE (OUTPATIENT)
Dept: PAIN MEDICINE | Facility: CLINIC | Age: 79
End: 2024-11-25

## 2024-11-25 ENCOUNTER — OUTSIDE FACILITY SERVICE (OUTPATIENT)
Dept: PAIN MEDICINE | Facility: CLINIC | Age: 79
End: 2024-11-25
Payer: MEDICARE

## 2024-11-25 DIAGNOSIS — G58.9 ENTRAPMENT NEUROPATHY: Primary | ICD-10-CM

## 2024-11-25 NOTE — TELEPHONE ENCOUNTER
FOLLOW-UP CALL AFTER PROCEDURE  I spoke with Prashant Avila regarding how they're feeling after their procedure with Dr. Batista. Patient reports that he is doing well.   Pt underwent a diagnostic procedure (see procedure note for details) on 11/25/2024 . Patient reported 100% pain relief at the time of after procedure exam with Dr. Batista. In addition, patient experienced significant functional improvement (performing activities without experiencing pain in comparison with examination prior to the procedure that reproduced pain with those activities).   Pain level before procedure: 8/10   Pain level after procedure: 0/10  Patient reports that the pain relief lasted for 3-4 hours. Today, Prashant Avila reports 60% ongoing pain relief pain (pain level 4/10) OR  pain has returned to baseline after 3-4 hours   Patient denies side effects or complications  Patient does not have any questions or concerns at this time    Disposition:   I provided information regarding date and time of next procedure: 11/27/2024 .   Reminded that the procedure is at the [facility] by [address]; [address] [facility], [address]   I advised that patient must have a , and that the  must remain in the premises until after the procedure is completed and the patient is ready to be discharged.   Reminded NPO status: Nothing by mouth (eat or drink) for at least 8 hours prior to the procedure. Patient can take medications with a a sip of water.     Understanding of above information verbalized.

## 2024-11-25 NOTE — TELEPHONE ENCOUNTER
Provider: DR. LYNN    Caller: Arianna Avila    Relationship to Patient: Emergency Contact      Phone Number: [phone]    Reason for Call: ARIANNA CALLED STATING DR. LYNN STATES HE WANTED THE PATIENT BACK IN WEDNESDAY FOR A CORTISONE INJECTION OF THE HIP. PLEASE ADVISE.

## 2024-11-27 ENCOUNTER — OUTSIDE FACILITY SERVICE (OUTPATIENT)
Dept: PAIN MEDICINE | Facility: CLINIC | Age: 79
End: 2024-11-27
Payer: MEDICARE

## 2024-11-27 PROCEDURE — 64450 NJX AA&/STRD OTHER PN/BRANCH: CPT | Performed by: ANESTHESIOLOGY

## 2024-11-27 PROCEDURE — 99152 MOD SED SAME PHYS/QHP 5/>YRS: CPT | Performed by: ANESTHESIOLOGY

## 2024-11-27 PROCEDURE — 77002 NEEDLE LOCALIZATION BY XRAY: CPT | Performed by: ANESTHESIOLOGY

## 2024-12-04 ENCOUNTER — TELEPHONE (OUTPATIENT)
Dept: PAIN MEDICINE | Facility: CLINIC | Age: 79
End: 2024-12-04
Payer: MEDICARE

## 2024-12-04 NOTE — TELEPHONE ENCOUNTER
Caller: SAMMY   Relationship to Patient: SELF  Phone Number:  [phone]  Reason for Call: PATIENT CALLING ASKING FOR A CALL BACK FROM [facility] OR [facility]

## 2024-12-05 NOTE — TELEPHONE ENCOUNTER
Spoke with pt, and he inquired if I can get his MRI's scheduled, and if he has to wait until February 2025 for a follow up, as he's requested to see Dr. Batista after his MRI's are completed.     Will speak with Wendy in MRI later to schedule and call pt back.

## 2024-12-06 ENCOUNTER — TELEPHONE (OUTPATIENT)
Dept: PAIN MEDICINE | Facility: CLINIC | Age: 79
End: 2024-12-06
Payer: MEDICARE

## 2024-12-14 ENCOUNTER — HOSPITAL ENCOUNTER (OUTPATIENT)
Facility: HOSPITAL | Age: 79
Discharge: HOME | End: 2024-12-14
Payer: MEDICARE

## 2024-12-14 DIAGNOSIS — Z01.818 PRE-PROCEDURAL EXAMINATION: ICD-10-CM

## 2024-12-14 DIAGNOSIS — G58.9 ENTRAPMENT NEUROPATHY: ICD-10-CM

## 2024-12-14 DIAGNOSIS — M96.1 LUMBAR POSTLAMINECTOMY SYNDROME: ICD-10-CM

## 2024-12-14 PROCEDURE — 72146 MRI CHEST SPINE W/O DYE: CPT

## 2024-12-14 PROCEDURE — 25510000002 GADOBENATE DIMEGLUMINE 529 MG/ML SOLUTION: Performed by: ANESTHESIOLOGY

## 2024-12-14 PROCEDURE — 72158 MRI LUMBAR SPINE W/O & W/DYE: CPT

## 2024-12-14 PROCEDURE — 72195 MRI PELVIS W/O DYE: CPT

## 2024-12-14 PROCEDURE — A9577 INJ MULTIHANCE: HCPCS | Performed by: ANESTHESIOLOGY

## 2024-12-14 RX ADMIN — GADOBENATE DIMEGLUMINE 15 ML: 529 INJECTION, SOLUTION INTRAVENOUS at 12:09

## 2024-12-18 ENCOUNTER — TELEPHONE (OUTPATIENT)
Dept: PAIN MEDICINE | Facility: CLINIC | Age: 79
End: 2024-12-18

## 2024-12-18 NOTE — TELEPHONE ENCOUNTER
"        Hub staff attempted to follow warm transfer process and was unsuccessful     Caller: Prashant Avila \"Timur\"    Relationship to patient: Self    Best call back number: [phone] (home)       Patient is needing: PATIENT WOULD LIKE TO TALK W/ DR LYNN IN REGARDS TO HIS EXPERIENCE AT [place] WHEN HE WENT TO GET HIS MRI ON 12/14/2024 10.00 AM    HE SAID HIS EXPERIENCE WAS VERY DISHEARTENING     "

## 2024-12-19 ENCOUNTER — OFFICE VISIT (OUTPATIENT)
Dept: PAIN MEDICINE | Facility: CLINIC | Age: 79
End: 2024-12-19
Payer: MEDICARE

## 2024-12-19 VITALS — WEIGHT: 186.5 LBS | HEART RATE: 80 BPM | HEIGHT: 69 IN | BODY MASS INDEX: 27.62 KG/M2 | OXYGEN SATURATION: 98 %

## 2024-12-19 DIAGNOSIS — G03.9 ADHESIVE ARACHNOIDITIS: ICD-10-CM

## 2024-12-19 DIAGNOSIS — G89.29 CHRONIC INTRACTABLE PAIN: ICD-10-CM

## 2024-12-19 DIAGNOSIS — M96.1 LUMBAR POSTLAMINECTOMY SYNDROME: ICD-10-CM

## 2024-12-19 DIAGNOSIS — M48.062 LUMBAR STENOSIS WITH NEUROGENIC CLAUDICATION: ICD-10-CM

## 2024-12-19 DIAGNOSIS — E11.42 DIABETIC POLYNEUROPATHY ASSOCIATED WITH TYPE 2 DIABETES MELLITUS: ICD-10-CM

## 2024-12-19 DIAGNOSIS — G58.9 ENTRAPMENT NEUROPATHY: ICD-10-CM

## 2024-12-19 DIAGNOSIS — M79.2 NEURALGIA AND NEURITIS: ICD-10-CM

## 2024-12-19 DIAGNOSIS — Z98.1 STATUS POST LUMBAR SPINAL FUSION: ICD-10-CM

## 2024-12-19 DIAGNOSIS — R53.81 PHYSICAL DECONDITIONING: ICD-10-CM

## 2024-12-19 DIAGNOSIS — M24.28 LIGAMENTUM FLAVUM HYPERTROPHY: ICD-10-CM

## 2024-12-19 DIAGNOSIS — M79.2 INTRACTABLE NEUROPATHIC PAIN OF LOWER EXTREMITY, LEFT: ICD-10-CM

## 2024-12-19 DIAGNOSIS — E11.9 NON-INSULIN DEPENDENT TYPE 2 DIABETES MELLITUS: ICD-10-CM

## 2024-12-19 RX ORDER — METRONIDAZOLE 500 MG/1
500 TABLET ORAL 2 TIMES DAILY
COMMUNITY
Start: 2024-12-13

## 2024-12-19 RX ORDER — MAGNESIUM OXIDE 400 MG/1
TABLET ORAL
Qty: 30 TABLET | Refills: 1 | Status: SHIPPED | OUTPATIENT
Start: 2024-12-19

## 2024-12-19 RX ORDER — ROPINIROLE 0.25 MG/1
TABLET, FILM COATED ORAL
Qty: 60 TABLET | Refills: 1 | Status: SHIPPED | OUTPATIENT
Start: 2024-12-19

## 2024-12-19 RX ORDER — CIPROFLOXACIN 500 MG/1
500 TABLET, FILM COATED ORAL 2 TIMES DAILY
COMMUNITY
Start: 2024-12-13

## 2024-12-19 NOTE — PROGRESS NOTES
"Chief Complaint: \"The block gave me relief for a day or 2. Pain in my left buttocks like bee stings, pain in the back of my leg. What can we do next for my pain?\"      History of Present Illness: Mr. Prashant Avial, 79 y.o. male originally referred by Dr. Fausto Renee in consultation for chronic intractable left gluteal pain and left lower extremity pain with intolerance to sitting and without significant neurogenic claudication. Prashant Avila was last seen on 11/27/2024, when he underwent therapeutic superior cluneal nerve block and therapeutic medial cluneal nerve block by hydrodissection technique under ultrasound guidance, C arm fluoroscopic guidance, and PNS guidance. Pain level decreased from 9/10 to 0/10 lasting for about 24-48 hours with progressive recurrence. Prior to that, he underwent on 11/25/2024, diagnostic superior cluneal nerve block and diagnostic medial cluneal nerve block by hydrodissection technique under ultrasound guidance, C arm fluoroscopic guidance, and PNS guidance. Pain decreased from 8/10 to 0/10 lasting for 6-10 hours. We previously discussed that if benefit from the blocks, then, we could proceed with either implantation of a Sprint Extensa Dual Lead System Vs Nalu PNS trial Vs DRG Vs a spinal cord stimulator trial with [employer] Inceptiv. In addition, as I suspected that he was developing neurogenic claudication, I ordered new imaging. I suspected that some symptoms were emanating from the L3-L4 level, adjacent level disease. Consequently, we briefly discuss the possibility of spacers ([place]eman L3-L4 depending on anatomy at L4) Vs a new IT trial with bupivacaine. He is here to discuss all these issues. He denies changes in his medical condition or new symptoms. He wanted to share his me his negative experience at [facility] where he went for his MRIs on a weekend. I advised him to share his experience with the administration as I have no involvement in that setting. He " would like to talk to the president [facility] about his experience.  We have discussed the findings of his new diagnostic studies, as follows:  MRI of the lumbar spine w/wo contrast on 12/14/2024 revealed expected postoperative changes from PLIF L4-S1. Mild retrolisthesis of L3 on L4. There is some clumping of the cauda equina nerve roots. No abnormal enhancement. Multilevel spondylosis. At L3-L4: Prominent ligamentum flavum hypertrophy, disc bulge, facet arthropathy contributing to severe spinal canal stenosis. Severe right and moderate left neuroforaminal stenosis. L4-L5, L5-S1: Operative levels/well decompressed.  MRI of the thoracic spine w/o contrast on 12/14/2024. Postoperative changes from posterior decompressive laminectomy at T10-T11. The thoracic spinal canal is patent throughout. No significant spinal canal or neuroforaminal stenosis.    MRI of the pelvis wo contrast on 12/14/2024. The bone marrow signal intensity is normal. The hip joints appear congruent without evidence of high-grade cartilage loss. Unremarkable appearance of the pubic symphysis and sacroiliac joints.  Unremarkable appearance of the sacral plexus. Unremarkable and symmetric appearance of the muscles. The imaged portions of the sciatic nerves appear normal and symmetric.  Lumbar spine X-rays with flexion and extension on 11/22/2024. PLIF L4-S1 fusion with bilateral pedicle screws. There is a fracture of the left S1 pedicle screw that was noted before. Hardware otherwise appears intact. No change on flexion or extension.   LABS11/22/2024  UDS: Normal  Hemoglobin A1c 5.6%  Rheumatoid factor less than 10, negative  Sedimentation rate: 21 mm/h  C-reactive protein 0.96 mg/dL slightly elevated  CBC with differential: White blood cell count 8.5, H&H 12.7/37.1%, platelets 283,000, differential slight lymphopenia and monocytosis  CMP: Within normal limits     Chronic Pain History: Prashant Avila was originally referred by Dr. Fausto Renee  in consultation for chronic intractable left gluteal and left lower extremity pain. As referenced on previous visits, Mr Avila presents with a very complex chronic pain condition. Prashant Avila underwent L4-L5 hemilaminectomy and foraminotomy at [facility] with Dr. Mackay on 03/03/2014. As he continued to struggle with pain, he underwent L4-S1 lumbar decompression and fusion by Dr. Renee on 07/24/2014. Prashant Avila experienced significant improvement after his last surgery with Dr. Renee,with complete resolution of his previously severe lower back pain. Unfortunately, he continued experiencing intractable left gluteal and left lower extremity pain that was present prior to his surgery with Dr Renee. He underwent surgical consultation with Dr. Cox at the [facility], who recommended decompression of the nerve roots from L3-S1. He also underwent follow-up neurosurgical consultation with Dr. Fausto Renee on 11/03/2017. Dr. Renee discussed the possibility of lumbar decompression at L3-L4: Left L3-L4 laminotomy and foraminotomy. There is potential risk of developing instability that would necessitate extending his lumbar fusion to the L3 level. A CT myelogram was significant for stenosis above the level of his lumbar fusion, at L3-L4 without clinical correlation. He also failed to obtain relief from diagnostic and therapeutic left L3-L4 transforaminal epidural steroid injection on April 3, 2018. During those years, he was not experiencing neurogenic claudication (pain was worse sitting or lying down, and better by standing or walking without restrictions in the distance of his walking). Consequently, he declined surgery.   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 EMG/NCV, confirmed chronic left S1 radiculopathy that resembled the dermatomic distribution of his symptoms.   On 11/21/2019, he  underwent a successful SCS trial followed by implantation of a spinal cord stimulator device by Dr. Fausto Renee ([employer] Penta paddle lead. IPG: [employer] Proclaim 5 XR). Unfortunately, he never seemed completely satisfied with the use of his SCS device despite numerous attempts at SCS reprogramming. He perceived paresthesia overlapping the areas of his pain but did not like the way tonic stimulation felt. He reported no relief from Burst DR.   On 03/01/2021, he underwent explantation of the entire SCS system with Dr. Fausto Renee.   As he continued to struggle, he underwent intrathecal analgesic trials with fentanyl and Prialt without relief. On 09/08/2021, he requested to proceed with regenerative therapies, and as such he had platelet rich leukocyte poor plasma therapy and A2M therapy delivered into the lumbar epidural space via a caudal epidural catheter placement. He denied long lasting relief although the plan was to repeat this procedure at least every 3 weeks for at least 3 treatments as recommended by protocols.   Later, he regained interest in pursuing an intrathecal trial with a higher dose of opioids or Prialt or with alternative medications such as bupivacaine or clonidine. As he experienced psychological side effects with the lowest effective dose of Prialt during his original trial, I discouraged him with a trial with a higher dose due to a potential higher risk of worse side effects.   He also underwent neurology consultation with Dr. Kodak Alonso who concurred with patient's treatment measures. He underwent additional diagnostic studies.   MRI of the thoracic spine without contrast on 10/05/2022 revealed multilevel spondylosis without significant canal or foraminal stenosis.   X-rays of the lumbar spine on 06/02/2023 revealed PLIF L4-S1.  The left S1 pedicle screw is fractured as was present on previous imaging studies. There is disc space narrowing at L3-L4 above the fused level. Facet  "joint arthropathy throughout the lumbar spine.    Prashant Avila underwent follow-up neurosurgical consultation with Dr. Fausto Renee on 06/14/2023 and was found not to be a surgical candidate. Per Dr. Renee;\" While the patient has a fracture of one of the screws, his symptoms are exactly the same as they had been since prior to his first surgery.\"   He underwent follow-up psychological evaluation with Dr. Navarro in July 2023: \"From a psychological perspective, patient is an appropriate candidate for an intrathecal delivery system, a spinal cord stimulator device, a peripheral nerve stimulator device, a DRG device, and need any other procedures deemed appropriate for his condition.\"    Prashant Avila has failed to obtain pain relief for more than 10 years including conservative measures oral analgesics (opioids, naltrexone, almost every medication available for treatment of neuropathic pain except for those contraindicated due to his underlying conditions), topical analgesics, etc. He failed most of the analgesic trials due to side effects with most medications. Prashant Avila failed to obtain relief with non-pharmacological measures (ice, heat, physical therapy, physical therapist directed home exercise program HEP, to name a few). He failed two surgical interventions (03/03/2014: L4-L5 hemilaminectomy and foraminotomy at [facility] with Dr. Mackay, on 07/24/2014: L4-S1 lumbar decompression and fusion by Dr. Renee). He failed several efforts of minimally invasive interventional pain management measures, as referenced on previous notes, including diagnostic selective nerve root injections, transforaminal epidural steroid injections, piriformis injections, spinal cord stimulator trial, spinal cord stimulator implant followed by explant, intrathecal analgesic trials, regenerative therapies, MILD.  Pain has progressed in intensity over the past years.  Prashant Avila continues on gabapentin without side " effects.   He was on oral naltrexone 1 mg daily with discreet analgesic benefit. He ran out of this medication. He thinks that it helped. We discussed again potential treatments such as pulsed radiofrequency of the dorsal root ganglion of S1 (procedure is not covered by insurance), DRG, PNS, etc    Pain Description: Constant left gluteal with intermittent exacerbation, described as burning, pins and needles, and electrical sensation.   Radiation of Pain: The pain radiates intermittently from the left gluteal region down into the left lower extremity into the his left foot. He reports low grade lower back pain.   Pain intensity today: 9/10   Average pain intensity last week: 8/10  Pain intensity ranges from: 6/10 to 10/10  Aggravating factors: Pain increases with sitting for more than 20 minutes, lying down/during the night, standing 20', walking more than 100 yards. Patient describes some neurogenic claudication. Patient does not use a cane or walker. His major issue is pain in sitting position.   Alleviating factors: Pain decreases with changing positions: Standing, short walks  Associated Symptoms:   Patient reports pain, numbness, tingling, and weakness in the left lower extremity. Patient denies symptoms in the opposite limb  Patient denies any new bladder or bowel problems.   Patient reports difficulties with his balance but denies recent falls.   Pain interferes with general activities and affects patient's quality of life  Pain interferes with sleep causing sleep fragmentation (decreased since taking gabapentin)    Review of previous therapies and additional medical records:  Prashant Avila has already failed the following measures, including:   Conservative Measures: Oral analgesics (including opioids, naltrexone, most medications available for treatment of neuropathic pain except for those contraindicated due to underlying conditions, topical analgesics: failed analgesic trials due to side effects with  most medications), ice, heat, physical therapy, physical therapist directed home exercise program HEP (ongoing), to name a few  Interventional Measures:   11/27/2024: Therapeutic superior cluneal nerve block and Therapeutic medial cluneal nerve block by hydrodissection technique under ultrasound guidance, C arm fluoroscopic guidance, and PNS guidance. Pain 9/10 to 0/10 for 24-48 hours with progressive recurrence  11/25/2024: Diagnostic superior cluneal nerve block and diagnostic medial cluneal nerve block by hydrodissection technique under ultrasound guidance, C arm fluoroscopic guidance, and PNS guidance: Pain 8/10 to 0/10 for 6-10 hours   02/28/2024: Dx Tx left L5-S1 and left S1 TFESI  02/01/2024: SCS trial complicated by lead migration (from top T7 to top T8). Patient opted to switch over from [employer] to [employer] / [employer] and extend the SCS trial. He was hesitant about outcome.   09/08/2021: Platelet rich leukocyte poor plasma therapy and A2 M therapy, via lumbar epidural administration with caudal epidural catheter placement approach  02/06/2019: Left S1 transforaminal epidural steroid injection/pain relief that was short-lived   02/20/2017: Diagnostic and therapeutic left sciatic nerve block with local anesthetics, steroids, hyaluronidase under ultrasound, PNS and fluoroscopic guidance- Patient did not receive sustained relief from this procedure.   02/06/2017: Diagnostic left S1 selective nerve root injection, relief  01/30/2017: Nevro SCS Trial; no relief  04/25/2016: Trial of lumbar epidural steroid injections with steroids and hyaluronidase via caudal catheter with 100% pain relief lasting 48 hours  12/16/2015: Diagnostic and therapeutic left S1 transforaminal epidural steroid injection with hyaluronidase with complete resolution of pain for several weeks  11/30/2015: Spinal cord stimulator trial [employer]arsha, which provided him with more than 50% pain relief and functional improvement. However, patient decided  "to delay implantation of a permanent spinal cord stimulator device until the arrival of new technology. Patient did not like the perception of tonic stimulation, even though stimulation overlapped successfully the areas of his chronic pain, he did not find it pleasant.   02/09/2015: Diagnostic left piriformis muscle injection, no relief  Surgical Measures:   08/14/2023: MILD: Bilateral L3-L4 by Dr. Batista  03/01/2021: SCS explant by Dr. Fausto Renee.   11/21/2019: SCS implant: [employer]arsha Penta paddle lead with the top electrodes projecting at the level of the superior endplate of the T8 vertebral level. IPG: [employer]ha Proclaim 5 XR IPG Non-Rechargeable by Dr. Fausto Renee   07/24/2014: L4-S1 decompression and fusion by Dr. Renee  03/03/2014: Left L4-L5 hemilaminectomy and foraminotomy at [facility] with Dr. Mackay   Prashant Avila underwent neurosurgical consultation with Dr. Fausto Renee on 06/24/2023, and was found not to be a surgical candidate.  Prashant Avila underwent psychological consultation at [facility] psychology with Dr. Navarro on 08/30/2021, per note: \"It is recommended the patient be seen for psychological treatment of his pain disorder including biofeedback.  From a psychological perspective, patient is an appropriate candidate for interventional pain procedures, such as a pain pump with opioids or Prialt.\" Follow-up psychological evaluation with Dr. Praveen Navarro in July 2023: \"From a psychological perspective, patient is an appropriate candidate for an intrathecal delivery system, a spinal cord stimulator device, a peripheral nerve stimulator device, a DRG device, and need any other procedures deemed appropriate for his condition.\"   Prashant Avila presents with significant comorbidities including a history of prostate cancer, eczema, gout, hyperlipidemia, hypertension, history of kidney stones, left bundle branch block, osteoarthritis, rheumatoid arthritis, hearing loss, " osteoporosis  In terms of current analgesics, Prashant Avila takes: acetaminophen, gabapentin 800 mg QID, Naltrexone powder, Take 1 mg by mouth Daily  I have reviewed Jon Report consistent with medication reconciliation.  SOAPP/ORT: Low Risk     PHQ-2 Depression Screening  Little interest or pleasure in doing things? Not at all   Feeling down, depressed, or hopeless? Not at all   PHQ-2 Total Score 0      Pain Self-Efficacy Questionnaire (PSEQ)  ITEM 08-03 2023 08-29 2023 12-19 2024          I can enjoy things despite the pain. 4 3 3          I can do most of the household chores (tidying up, washing dishes, etc), despite the pain. 1 3 3          I can socialize with my friends or family members as often as I used to do, despite the pain. 1 3 3          I can cope with my pain in most situations. 4 3 3          I can do some form of work, despite the pain (includes housework, paid, and unpaid work). 1 4 3          I can still do many of the things I enjoy doing, such as hobbies or leisure activity despite pain. 0 0 0          I can cope with my pain without medications. 2 3 2          I can accomplish most of my goals in life despite the pain. 0 6 5          I can live in a normal lifestyle, despite the pain. 0 3 2          I can gradually become more active, despite the pain. 0 0 0          TOTAL SCORE 13/60 28/60 24/60             Global Pain Scale 08-03 2023 08-29 2023 12-19 2024              Pain 19 19 21              Feelings 8 9 11              Clinical outcomes 12 13 14              Activities 20 12 14              GPS Total: 59 53 60                 The [place] Back Pain Disability Scale  DATE 08-30 2022 08-03 2023 08-29 2023 12-19 2024           Sleep through the night 3 5 4 5           Turn over in bed 0 0 0  1           Get out of bed 0 0 0  1           Make your bed 0 0 0  1           Put on socks (pantyhose) 0 0 0  0           Ride in a car 5 5 4  4           Sit in a chair for several  hours 5 5 4  5           Stand up for 20-30 minutes 0 0 3  1           Climb one flight of stairs 2 0 1  1           Walk a few blocks (200-300 yards)  3 5 3  2           Walk several miles 5 5 5  5           Run one block (about 50 yards) 5 5 5  5           Take food out of the refrigerator 0 0 0  0           Reach up to high shelves 0 4 1  1           Move a chair 0 2 1  1           Pull or push heavy doors 0 2 1  3           Bend over to clean the bathtub 0 4 3  3           Throw a ball 0 5 0  1           Carry two bags of groceries 2 5 3  1           Lift and carry a heavy suitcase 3 5 4  3                                                                                                                                                                                                                                                                                      Total score 33 57 42  44             LANSS pain scale  LANSS item 08-30 2022 08-29 2023 12-19 2024   1. Does the pain produce unpleasant sensations such as tingling, pricking or pins and needles? 5 3 5   2. Is there a different skin aspect in the painful areas, i.e. skin redder than usual or appearing mottled? 0 0 0   3. Does stroking the skin in the painful area or wearing tight clothing items produce unpleasant sensations? 0 0 0   4. Do you experience any sensations like electric shocks, bursting or jumping corresponding to painful episodes, i.e. unexplained bursts of pain? 2 2 2   5. Do you experience burning sensations in the painful areas or a sudden temperature change? 1 0 1   6. Result to stroking the non painful area and the described painful area with cotton wool: 0 0 0   7. Result to touching (pinprick) both areas with a 23 gauge needle: 3 0 0   Total Score: 08/30/2022: 13 (likely neuropathic pain)  Total Score: 08/29/2023: 5   Total Score: 12/19/2024: 8      [place] Claudication Score  All questions are in reference to an average for the  past month 12-19 2024                 PAIN FREQUENCY:   How often have you experienced pain in your back or buttock or pain that goes down your back, buttock, and/or legs?  Not at all  Less than once a week  At least once a week  Every day for at least a few minutes  Every day for most of the day  Every minute of the day  4                 PAIN SEVERITY:   How would you describe the worst pain you have had in your back, buttock, and/or legs?  0. None  1. Mild  2. Moderate  3. Severe  4. Very severe  5. Intolerable  5                 BACK PAIN SEVERITY:   How would you describe the pain or discomfort in your back and/or buttocks?  0. None  1. Mild  2. Moderate  3. Severe  4. Very severe  5. Intolerable  1                 LEG PAIN SEVERITY:   How would you describe the pain or discomfort in your legs or feet?  None  Mild  Moderate  Severe  Very severe  Intolerable  5                 NERVE SYMPTOM SEVERITY:   How would you describe the numbness or tingling in your legs or feet?  None  Mild  Moderate  Severe  Very severe  Intolerable  4                 LEG WEAKNESS:   How would you describe the strength in your legs, ankles, or feet?  None  Mild  Moderate  Severe  Very severe  Intolerable  1                 BALANCE:   Which statement best describes your steadiness when standing or walking?  0. I have had no problems with my balance.  1. I sometimes feel off-balance but I am able to walk without any aid.  2. I often feel off-balance but I am able to walk with an aid.  3. I am unable to walk without an aid.  4. I have difficulty walking despite using an aid.  5. I cannot stand up.  0                 WALKING DISTANCE:   When you went for a walk, how far were you able to walk before your back or leg started giving you trouble?  0. No limits or more than 2 miles   1. More than 1/4 mile but less than 2 miles  2. More than 100 yards but less than 1/4 mile  3. More than 50 feet but less than 100 yards  4. Less than 50 feet  5.  Not at all  2                 WALKING ABILITY:   Which statement best describes your walking ability?  0. There is no limit to my walking ability.  1. I can walk far enough to do everything I want to do.  2. I am able to walk comfortably from my home to the shops or my transport.  3. I am able to walk comfortably around the house.  4. I am able to walk only from the bedroom to the bathroom or kitchen.  5. I am not able to walk at all.  1                 WALKING SPEED:   Which statement best describes your walking?  0. I am able to walk at a normal speed.  1. I walk slowly but standing upright.  2. I walk slowly and bent forward.  3. I have to stop and stand still when I walk.  4. I have to stop and sit down when I walk.  5. I cannot walk at all.  1                 Total Score: Total Score _____% = (___) x 100                                                              50   24                    Review of New Diagnostic Studies:  I have independently reviewed and interpreted the images with the patient and used the images and a tridimensional spine model to explain findings. I have also reviewed the reports.  MRI of the lumbar spine w/wo contrast on 12/14/2024 revealed expected postoperative changes from PLIF L4-S1. Mild retrolisthesis of L3 on L4. There is some clumping of the cauda equina nerve roots. No abnormal enhancement. Multilevel spondylosis. At L3-L4: Prominent ligamentum flavum hypertrophy, disc bulge, facet arthropathy contributing to severe spinal canal stenosis. Severe right and moderate left neuroforaminal stenosis.  L4-L5, L5-S1: Operative levels/well decompressed.  MRI of the thoracic spine w/o contrast on 12/14/2024. Postoperative changes from posterior decompressive laminectomy at T10-T11. The thoracic spinal canal is patent throughout. No significant spinal canal or neuroforaminal stenosis.    MRI of the pelvis wo contrast on 12/14/2024. The bone marrow signal intensity is normal. The hip joints  appear congruent without evidence of high-grade cartilage loss. Unremarkable appearance of the pubic symphysis and sacroiliac joints.  Unremarkable appearance of the sacral plexus. Unremarkable and symmetric appearance of the muscles. The imaged portions of the sciatic nerves appear normal and symmetric.  Lumbar spine X-rays with flexion and extension on 11/22/2024. PLIF L4-S1 fusion with bilateral pedicle screws. There is a fracture of the left S1 pedicle screw that was noted before. Hardware otherwise appears intact. No change on flexion or extension.   11/22/2024  UDS: Normal  Hemoglobin A1c 5.6%  Rheumatoid factor less than 10, negative  Sedimentation rate: 21 mm/h  C-reactive protein 0.96 mg/dL slightly elevated  CBC with differential: White blood cell count 8.5, H&H 12.7/37.1%, platelets 283,000, differential slight lymphopenia and monocytosis  CMP: Within normal limits    Review of Previous Diagnostic Studies:    Lumbar spine X-rays, thoracic spine X-rays on 02/01/2024   12 rib-bearing thoracic-type vertebral bodies. 5 nonrib-bearing lumbar-type vertebral bodies.  Spinal stimulator device noted grossly intact appearance of the catheters. Both leads terminate at the level of T7. Posterior spinal fusion and discectomy changes spanning L4-S1 without new hardware fracture. Stable left S1 pedicle screw fracture. Degree of overall mild to moderate multilevel degenerative disc disease and facet arthropathy without significant change. Minimal grade 1 retrolisthesis L3 on L4 similar to prior. Degenerative related changes throughout the lumbar spine. No visualized displaced fracture or significant height loss. Mild degenerative osteoarthritis of the hip joints and SI joints.   Flexion and extension Xrays of the lumbar spine 8/3/2023: No instability  X-rays of the lumbar spine 6/2/2023: PLIF L4-S1.  The left S1 pedicle screw is fractured as was present previously.  There is disc space narrowing at L3-4 above the fused  level.  There is facet joint disease throughout the lumbar spine.    MRI of the thoracic spine without contrast 10/5/2022: multilevel spondylosis without significant canal or foraminal stenosis.  EMG/NCV of the bilateral lower extremities 12/28/2018: Chronic left S1 radiculopathy  CT LUMBAR SPINE WITH CONTRAST-01/21/2019: Excellent opacification of the thecal sac. Imaging covers from T12 through the S1 level caudally. The conus terminates at the L1/L2 level.  The distal cord and conus are normal in caliber. The nerve roots of the cauda equina are normal in caliber and distribution. L4-S1 fusion hardware is in place; the left S1 pedicle screw is fractured and there is lucency around the right S1 pedicle screw. Left L4 laminotomy. Vertebral body heights and alignment are normal. Vertebral body heights are normal. Degenerative changes are as follows:  T12-L1: Facet arthropathy without significant foraminal or spinal canal stenosis.  L1-L2: Facet arthropathy without significant foraminal or spinal canal stenosis.  L2-L3: Right greater than left facet arthropathy. Mild narrowing of the right subarticular zone. No significant central spinal canal or foraminal stenosis.  L3-L4: Disc bulge and facet arthropathy with ligamentum flavum thickening. There is severe spinal canal stenosis and at least moderate bilateral foraminal stenosis.   L4-L5: Improved right subarticular zone narrowing. No significant osseous spinal canal or foraminal stenosis.  L5-S1: Bilateral facet arthropathy. Shallow disc osteophyte. Unchanged mild osseous foraminal stenosis. No significant osseous spinal canal stenosis.  EKG April 19, 2018 revealed sinus bradycardia with first-degree AV block.  Left bundle branch block.    X-Ray lumbar spine 08/04/2017: Posterior lumbar fusion of L4, L5 and S1. Degenerative changes at L1-L2 with anterior osteophyte formation. Normal lumbar alignment in the lateral projection with a stable alignment in the flexed and  extended positions.      Duplex Venous Lower, 03/25/2017: Normal right lower extremity venous duplex scan. No evidence of DVT.  MRI Lumbar Spine, 10/28/2016: Since the previous MRI study, there has been L4-5-S1 posterior lumbar and interbody fusion. No new abnormality of alignment is seen. There is, however, significant worsening of posterior element hypertrophy at L3-4, above the fusion level, due to persistent L3-4 disc bulge, and what appears to be new spinal stenosis at this level, generally as a result of posterior element hypertrophic change. Canal appears stable elsewhere. No lumbar compression deformity or new abnormality of alignment is seen. No vertebral marrow edema is appreciated. Sagittal images suggest a small (18 mm) seroma or synovial cyst on the right at L5-S1. Tip of conus medullaris is again noted at L1.  Axial images   L1-L2: No significant canal or foraminal stenosis   L2-L3: Canal appears relatively narrowed, due to posterior element hypertrophy but still greater than 1 cm in diameter. Foramina appear  lower limits of normal diameter.   L3-L4: Moderate canal stenosis with lateral narrowing, AP diameter 6.5 mm, transverse diameter approximately 9 mm with crowding of the nerve roots. Foramina appear mildly to moderately narrowed bilaterally due to facet DJD.   L4-L5: Fused level, canal appears normal. Mild foraminal stenosis due to facet DJD  L5-S1: Canal appears normal. Left-sided neural foramen appears normal. Right-sided neural foramen appears mildly narrowed due to facet DJD. Postcontrast images show expected postoperative enhancement of the dorsal soft tissues and no pathologic postcontrast enhancement elsewhere.     The following portions of the patient's history were reviewed and updated as appropriate: problem list, past medical history, past surgery history, social history, family history, medication reconciliation, and allergies    Review of Systems   Gastrointestinal:  Positive for  diarrhea.   Musculoskeletal:  Positive for arthralgias, back pain and joint swelling.   All other systems reviewed and are negative.        Patient Active Problem List   Diagnosis    Chronic lumbar radiculopathy    Lumbar postlaminectomy syndrome    Status post L4-S1 lumbar spinal fusion    Physical deconditioning    Adhesive arachnoiditis    Bilateral stenosis of lateral recess of lumbar spine    Osteoarthritis    Chronic intractable pain    Encounter for therapeutic drug monitoring    Chronic pain syndrome    Intractable neuropathic pain of lower extremity, left    Gait disturbance    Polyneuropathy    Lumbar stenosis with neurogenic claudication    Ligamentum flavum hypertrophy    Non-insulin dependent type 2 diabetes mellitus    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    Neuralgia and neuritis    Diabetic polyneuropathy associated with type 2 diabetes mellitus       Past Medical History:   Diagnosis Date    Arthritis     Carcinoma of prostate     surgery     Chronic pain disorder     Chronic pain syndrome 08/02/2023    Eczema     Elevated cholesterol     Gout     History of chronic kidney disease     Hyperlipidemia     Hypertension     Kidney stone 1990    passed     Left bundle branch block     Low back pain     Lumbar stenosis with neurogenic claudication 08/03/2023    Lumbosacral disc disease     Neuropathic pain of flank, left     Non-insulin dependent type 2 diabetes mellitus 08/10/2023    Osteoarthritis     Polyneuropathy 08/03/2023    Thoracic disc disorder     Wears hearing aid in both ears          Past Surgical History:   Procedure Laterality Date    BACK SURGERY  2014    Lumbar Fusion, july 2014    BACK SURGERY  2014    Lumbar Laminectomy, march 2014    COLONOSCOPY  2016    EPIDURAL BLOCK  2016    LAMINECTOMY  2013    LUMBAR DECOMPRESSION N/A 08/14/2023    Procedure: LUMBAR DECOMPRESSION MINIMALLY INVASIVE;  Surgeon: Efrain Batista MD;  Location: [facility] OR;   Service: Pain Management;  Laterality: N/A;    OTHER SURGICAL HISTORY      Enteroscopic Polypectomy    PAIN PUMP INSERTION/REVISION N/A 10/12/2020    Procedure: PAIN PUMP TRIAL;  Surgeon: Efrain Batista MD;  Location:  [facility] OR;  Service: Pain Management;  Laterality: N/A;    MA MYELOGRAPHY VIA LUMBAR INJECT RS&I LUMBOSACRAL N/A 01/21/2019    Procedure: IR myelogram, lumbar;  Surgeon: Francisco Javier Clay MD;  Location:  [facility];  Service: Interventional Radiology    PROSTATE SURGERY      SPINAL CORD STIMULATOR IMPLANT N/A 11/21/2019    Procedure: SPINAL CORD STIMULATOR INSERTION;  Surgeon: Fausto Renee MD;  Location:  [facility] OR;  Service: Neurosurgery    SPINAL CORD STIMULATOR IMPLANT N/A 03/01/2021    Procedure: Removal of entire system, SPINAL CORD STIMULAR REMOVAL;  Surgeon: Fausto Renee MD;  Location:  [facility] OR;  Service: Neurosurgery;  Laterality: N/A;    TESTICLE UNDESCENDED REPAIR  1980    VOCAL CORD BIOPSY      polyps removed          Family History   Problem Relation Age of Onset    Congenital heart disease Mother     Hearing loss Mother     Multiple myeloma Father     Breast cancer Neg Hx     Ovarian cancer Neg Hx          Social History     Socioeconomic History    Marital status:    Tobacco Use    Smoking status: Never     Passive exposure: Never    Smokeless tobacco: Never   Vaping Use    Vaping status: Never Used   Substance and Sexual Activity    Alcohol use: No    Drug use: No    Sexual activity: Defer           Current Outpatient Medications:     acetaminophen (TYLENOL) 500 MG tablet, Take 1 tablet by mouth As Needed., Disp: , Rfl:     amLODIPine (NORVASC) 5 MG tablet, Take 1 tablet by mouth Daily., Disp: , Rfl:     ciprofloxacin (CIPRO) 500 MG tablet, Take 1 tablet by mouth 2 (Two) Times a Day., Disp: , Rfl:     diclofenac (VOLTAREN) 75 MG EC tablet, Take 1 tablet by mouth., Disp: , Rfl:     gabapentin (NEURONTIN) 800 MG tablet, TAKE 1 TABLET BY MOUTH EVERY  "MORNING, THEN TAKE ONE TABLET BY MOUTH EVERY AFTERNOON, THEN TAKE TWO TABLETS BY MOUTH EVERY EVENING, Disp: 120 tablet, Rfl: 3    lisinopril (PRINIVIL,ZESTRIL) 10 MG tablet, , Disp: , Rfl:     losartan (COZAAR) 100 MG tablet, Take 1 tablet by mouth Daily., Disp: , Rfl:     metroNIDAZOLE (FLAGYL) 500 MG tablet, Take 1 tablet by mouth 2 (Two) Times a Day., Disp: , Rfl:     nystatin (MYCOSTATIN) 079354 UNIT/GM ointment, Apply 1 Application topically to the appropriate area as directed As Needed., Disp: , Rfl:     pravastatin (PRAVACHOL) 10 MG tablet, Take 1 tablet by mouth Daily., Disp: , Rfl:     Naltrexone powder, Take 1 mg by mouth Daily. (Patient not taking: Reported on 12/19/2024), Disp: 30 g, Rfl: 1      Allergies   Allergen Reactions    Adhesive Tape Rash     Rash with blisters  Only after wearing excessive tape for SCS phase 1 for several days.    Doxycycline Rash         Pulse 80   Ht 175.3 cm (69\")   Wt 84.6 kg (186 lb 8 oz)   SpO2 98%   BMI 27.54 kg/m²       Physical Exam:  Constitutional: Patient appears well-developed, well-nourished, well-hydrated, appears younger than stated age  HEENT: Head: Normocephalic and atraumatic  Eyes: Conjunctivae and lids are normal  Pupils: Equal, round, reactive to light  Peripheral vascular exam: Femoral: right 2+, left 2+. Posterior tibialis: right 2+ and left 2+. Dorsalis pedis: right 2+ and left 2+. No edema.   Musculoskeletal   Gait and station: Gait evaluation demonstrated a normal gait. Able to walk on heels, toes, tandem walking   Lumbar Spine: Passive and active range of motion are almost full and without pain. Forward flexion increased his left-sided lower back/gluteal pain. Lumbar facet joint loading maneuvers are negative.   Sacroiliac Joints Provocative Maneuvers: Negative   Piriformis maneuvers: Negative   Right Hip Joint: The range of motion of the hip joint is almost full and without pain   Left Hip Joint: The range of motion of the hip joint is almost " full and without pain   Palpation of the bilateral ischial tuberosities: Unrevealing   Palpation of the bilateral psoas tendons and iliopsoas bursas: Unrevealing   Palpation of the bilateral greater trochanters: Unrevealing   Examination of the Iliotibial band: Unrevealing   Neurological:   Patient is alert and oriented to person, place, and time.   Speech: Normal.   Cortical function: Normal mental status.   Reflex Scores:  Right patellar: 0+  Left patellar: 0+  Right Achilles: 0+  Left Achilles: 0+  Motor strength: 5/5  Motor Tone: Normal  Involuntary movements: None.   Superficial/Primitive Reflexes: Primitive reflexes were absent.   Right Lamas: Absent  Left Lamas: Absent  Right ankle clonus: Absent  Left ankle clonus: Absent   Babinsky: Absent  Long tract signs: Negative. Straight leg raising test: Questionably positive on the left at 30-40 degrees with positive Lasegue. Femoral stretch sign: Negative on the right, positive on the left.   Tinel's sign: + Left superior iliac wing  Sensory exam: Intact to light touch, intact pain and temperature sensation, intact vibration sensation and normal proprioception  Coordination: Finger to nose: Normal. Balance: Normal Romberg's sign: Negative   Skin and subcutaneous tissue: Skin is warm and intact. No rash noted. No cyanosis.   Psychiatric: Judgment and insight: Normal. Recent and remote memory: Intact. Mood and affect: Normal.     Prashant Avila   1945    ASSESSMENT:   1. 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 Neuralgia and neuritis    3. Intractable neuropathic pain of lower extremity, left    4. Chronic intractable pain    5. Adhesive arachnoiditis    6. Status post L4-S1 lumbar spinal fusion    7. Lumbar postlaminectomy syndrome    8. Ligamentum flavum hypertrophy    9. Lumbar stenosis with neurogenic claudication    10. Diabetic polyneuropathy associated with type 2 diabetes mellitus    11. Non-insulin  dependent type 2 diabetes mellitus    12. Physical deconditioning        PLAN/MEDICAL DECISION MAKING:  Mr. Prashant Avila, 79 y.o. male originally referred by Dr. Fausto Renee in consultation for chronic intractable left gluteal pain and left lower extremity pain. Mr Avila presents with a very complex chronic pain condition, as referenced on previous notes. Prashant Avila underwent L4-L5 hemilaminectomy and foraminotomy at [facility] with Dr. Mackay on 03/03/2014. As he continued to struggle with pain, he underwent L4-S1 lumbar decompression and fusion by Dr. Renee on 07/24/2014. Prashant Avila experienced significant improvement after his last surgery with Dr. Renee,with complete resolution of his previously severe lower back pain. Unfortunately, he has continued experiencing intractable left gluteal and left lower extremity pain that was present prior to his last surgery with Dr Renee. He underwent neurosurgical consultation with Dr. Cox at the [facility], who recommended decompression of the nerve roots from L3-S1. He also underwent follow-up neurosurgical consultation with Dr. Fausto Renee on 11/03/2017. Dr. Renee discussed the possibility of decompression at L3-L4: Left L3-L4 laminotomy and foraminotomy and discussed the potential risk of developing instability that would necessitate extending his lumbar fusion to the L3 level. A CT myelogram was significant for stenosis above the level of his lumbar fusion, at L3-L4 without clinical correlation as he failed to obtain pain relief from diagnostic and therapeutic left L3-L4 transforaminal epidural steroid injection on April 3, 2018. In addition, he was not experiencing neurogenic claudication during that period (pain was worse sitting or lying down, and better by standing or walking). Consequently, he declined surgery. Prashant Avila has presented with symptoms consistent with chronic left S1 radiculopathy and superimposed symptoms from  "adhesive lumbar arachnoiditis. EMG/NCV, confirmed chronic left S1 radiculopathy. He underwent neurology consultation with Dr. Kodak Alonso who concurred with patient's proposed treatment protocol. Prashant Avila failed to obtain pain relief for more than 10 years with conservative measures (oral and topical analgesics; opioids, naltrexone, most medications available for treatment of neuropathic pain except for those contraindicated due to underlying conditions, failed analgesic trials due to side effects), physical therapy, physical therapist directed home exercise program HEP, to name a few; he also failed surgical measures (03/03/2014: L4-L5 hemilaminectomy and foraminotomy at [facility] with Dr. Mackay; 07/24/2014: L4-S1 lumbar decompression and fusion by Dr. Renee); He failed several minimally invasive interventional pain management measures including diagnostic selective nerve root injections, transforaminal epidural steroid injections, piriformis injections, spinal cord stimulator trial, spinal cord stimulator implant followed by explant, intrathecal analgesic trial, regenerative therapies, MILD. His last SCS trial was complicated by lead migration as revealed on X-rays. Prashant Avila underwent follow-up neurosurgical consultation with Dr. Fausto Renee on 06/14/2023 and was found not to be a surgical candidate. Per Dr. Renee;\" While the patient has a fracture of one of the screws, his symptoms are exactly the same as they had been since prior to his first surgery.\" He underwent follow-up psychological evaluation with Dr. Praveen Navarro in July 2023: \"From a psychological perspective, patient is an appropriate candidate for an intrathecal delivery system, a spinal cord stimulator device, a peripheral nerve stimulator device, a DRG device, and need any other procedures deemed appropriate for his condition.\"  A comprehensive evaluation including history and physical exam along with pertinent physiologic and " functional assessment was performed. Patient presents with intractable pain due to the diagnoses listed above. New diagnostic studies revealed similar findings with slight progression of stenosis at L3-L4 but without clinical correlation at this time (intolerance to sitting and lying down/better standing or walking). MRI revealed arachnoiditis, and EMG/NCV chronic S1 radiculopathy, both conditions correlated with clinical findings. Prashant Avila failed to respond to conservative modalities, minimally invasive interventional pain management measures, previous surgical interventions, as referenced under HPI. I have documented the impact of patient's moderate-to-severe pain contributing to significant impairment in daily activities, ADLs, and a negative impact on the patient's quality of life, as reflected on Global Pain Scale 60/100; The [place] Back Pain Disability Scale 44/100; [place] Claudication Score 24/50; The [place] [employer] Osteoarthritis Index (WOMAC) to assess pain, stiffness, and physical function in patients with hip osteoarthritis 0/96; Tinetti Gait & Balance Assessment Tool (low risk for falls). I have reviewed pertinent supporting diagnostic studies of patient's chronic pain condition as well as all available pertinent medical records to patient's chronic pain condition including previous therapies, as referenced above. PHQ-2 Depression Screening 0; Pain Self-Efficacy Questionnaire (PSEQ) 24/60. Again, we had a lengthy conversation with Mr. Avila regarding his intractable chronic pain condition and potential therapeutic options including risks, benefits, alternative therapies, to name a few. We discussed continuation of a stepwise approach which began with the least intense level of care as determined by the extent required to treat the patient's condition. The proposed treatments are consistent with the patient's medical condition and known to be safe and effective by  current guidelines and the standard of care. The duration and frequency proposed are considered appropriate for the service in accordance with accepted standards of medical practice for the diagnosis and treatment of the patient's condition and intended to improve the patient's level of function. These services will be furnished in a setting appropriate to the patient's medical needs and condition. Therefore, I have proposed the following plan:     1. Interventional pain management measures: Patient does not take blood thinners. I will start new pharmacological trials with alternative medications (failed all conventional ones except for gabapentin) and he will talk to his wife about additional interventional pain management options.   A. Chronic intractable Neuropathic Pain/Left gluteal pain/LLE pain: We have discussed peripheral nerve stimulation based on his positive response to diagnostic and therapeutic left superior cluneal nerve block and left medial cluneal nerve block. We discussed the following options:  Implantation of a Sprint Extensa Dual Lead System to the left superior cluneal nerve and left medial cluneal nerve   PNS trial with Nalu to the left superior cluneal nerve and left medial cluneal nerve. IF successful, PNS implant with Nalu.   DRG trial left S1 and S2   Spinal cord stimulator trial with Medtronic Inceptiv.   B. Lumbar spinal stenosis with neurogenic claudication: There is correlation with findings on MRI of adjacent level stenosis at L3-L4. His left lower extremity pain is way more significant than any symptoms related to stenosis at this time. We could consider spacers (Minuteman L3-L4, Vertiflex - depending on anatomy/intact L4-, repeat MILD using the new bone decorticator to facilitate access), Vs IT trials with bupivacaine or alternative drugs    2. Diagnostic studies: None indicated at this time.     3. Pharmacological measures: Reviewed and discussed; The patient has failed numerous  pharmacological trials in the past.  A. Patient takes acetaminophen, gabapentin (no side effects/appropriate compliance).   B. Patient will resume naltrexone 1 mg once a day, #30, 1 refill ([facility])  C. Trial with ropinirole 0.25 mg 1 hour before bedtime, may increase to 2 tablets QHS, #60, 1 refill  D. Trial with magnesium oxide 400 mg QHS (leg cramps), #30      Screening and compliance with controlled substances:  Patient has completed a SOAPP and ORT questionnaires (revealing low risk). Jon report has been reviewed and appropriate. Random urine drug screenings have been obtained and appropriate. Patient has signed a consent for treatment with controlled substances after reviewing the document and verbalizing full understanding of the risks, benefits, alternatives, and consequences of compliance and adherence with treatment and comprehensive plan of care. Patient received in hand a copy of this document.    4. Long-term rehabilitation efforts:  A. The patient does not have a history of falls. I performed a risk assessment for falls using the Tinetti gait & balance assessment tool (scored low risk for falls).   B. The patient will start a comprehensive physical [facility], gait and balance training, neurodynamics, core strengthening, gluteal and abductor strengthening, ultrasound, ASTYM, E-STIM, myofascial release, cupping, dry needling, home exercise program, 2-3 x per week for 8 weeks   C. Contrast therapy: Apply ice-packs for 15-20 minutes, followed by heating pads for 15-20 minutes to affected area   D. Start a low impact exercise program such as water therapy, swimming, chair yoga, Pilates  EFlor BustosCerda Lee Margarita  reports that he has never smoked. He has never been exposed to tobacco smoke. He has never used smokeless tobacco.     5. The patient has been instructed to contact my office with any questions or difficulties. The patient understands  the plan and agrees to proceed accordingly.    The patient has a documented plan of care to address chronic pain. Prashant Avila reports a pain score of 9/10.  Given his pain assessment as noted, treatment options were discussed and the following options were decided upon as a follow-up plan to address the patient's pain: continuation of current treatment plan for pain, educational materials on pain management, home exercises and therapy, prescription for non-opiod analgesics, referral to Physical Therapy, referral to specialist for assistance in pain treatment guidance, steroid injections, use of non-medical modalities (ice, heat, stretching and/or behavior modifications), and interventional pain management measures .               Pain Management Panel  More data exists         Latest Ref Rng & Units 11/22/2024 8/8/2023   Pain Management Panel   Amphetamine, Urine Qual Negative Negative  Negative    Barbiturates Screen, Urine Negative Negative  Negative    Benzodiazepine Screen, Urine Negative Negative  Negative    Buprenorphine, Screen, Urine Negative Negative  Negative    Cocaine Screen, Urine Negative Negative  Negative    Fentanyl, Urine Negative Negative  Negative    Methadone Screen , Urine Negative Negative  Negative    Methamphetamine, Ur Negative Negative  Negative       Details                    JOCELIN query complete. JOCELIN reviewed by Efrain Batista MD.     Pain Medications               acetaminophen (TYLENOL) 500 MG tablet Take 1 tablet by mouth As Needed.    diclofenac (VOLTAREN) 75 MG EC tablet Take 1 tablet by mouth.    gabapentin (NEURONTIN) 800 MG tablet TAKE 1 TABLET BY MOUTH EVERY MORNING, THEN TAKE ONE TABLET BY MOUTH EVERY AFTERNOON, THEN TAKE TWO TABLETS BY MOUTH EVERY EVENING             No orders of the defined types were placed in this encounter.     Total Time Spent: 101 minutes    Please note that portions of this note were completed with a voice recognition program.   Any  copied data in any portion of my note from previous notes included in the HPI, PE, MDM and/or assessment and plan has been reviewed by myself and accurate as of this date.   The 21st Century Cures Act makes medical notes like this available to patients in the interest of transparency. This is a medical document intended as peer to peer communication. It is written in medical language and may contain abbreviations or verbiage that are unfamiliar. It may appear blunt or direct. Medical documents are intended to carry relevant information, facts as evident, and the clinical opinion of the practitioner.     Efrain Batista MD    Patient Care Team:  Montse Chun MD as PCP - General (Internal Medicine)  Fausto Renee MD as Consulting Physician (Neurosurgery)  Jean Warren MD (Inactive) as Consulting Physician (Neurosurgery)  Marcelino Condon MD as Consulting Physician (Urology)  Brian Tatum III, MD as Consulting Physician (Cardiology)  SHERON Chun MD as Consulting Physician (Family Medicine)  Francisco Javier Clay MD as Consulting Physician (Neurosurgery)  Royer Roldan MD as Consulting Physician (General Practice)  Emigdio Watt MD as Consulting Physician (Gastroenterology)  Shola Vila MD as Consulting Physician (Otolaryngology)  Hank Frankel MD as Consulting Physician (Cardiology)  Inez Regan APRN as Nurse Practitioner (Pain Medicine)  Efrain Batista MD as Consulting Physician (Pain Medicine)     No orders of the defined types were placed in this encounter.        No future appointments.

## 2025-02-18 RX ORDER — ROPINIROLE 0.25 MG/1
TABLET, FILM COATED ORAL
Qty: 60 TABLET | Refills: 1 | Status: SHIPPED | OUTPATIENT
Start: 2025-02-18

## 2025-03-13 ENCOUNTER — TELEPHONE (OUTPATIENT)
Dept: PAIN MEDICINE | Facility: CLINIC | Age: 80
End: 2025-03-13
Payer: MEDICARE

## 2025-03-17 NOTE — PROGRESS NOTES
"Chief Complaint: \"I'm here to talk about options for treatment of my pain. What can we do next for my pain?\"       History of Present Illness: Mr. Prashant Avila, 80 y.o. male originally referred by Dr. Fausto Renee in consultation for chronic intractable left gluteal pain and left lower extremity pain with intolerance to sitting and initially without neurogenic claudication. Prashant Avila was last seen on 12/19/2024, for follow up of therapeutic left superior cluneal nerve block and therapeutic left medial cluneal nerve block by hydrodissection technique under ultrasound guidance, C arm fluoroscopic guidance, and PNS guidance. His pain level decreased from 9/10 to 0/10 lasting for about 24-48 hours with progressive recurrence. Prior to that, he had undergone on 11/25/2024, diagnostic left superior cluneal nerve block and diagnostic left medial cluneal nerve block by hydrodissection technique under ultrasound guidance, C arm fluoroscopic guidance, and PNS guidance. Pain decreased from 8/10 to 0/10 lasting for 6-10 hours. We have previously discussed that if benefit with these blocks, then, we could proceed with implantation of a Sprint Extensa Dual Lead System Vs PNS trial with Nalu Vs DRG Vs a spinal cord stimulator trial with Medtronic Inceptiv. In addition, he has progressively been developing neurogenic claudication. I suspect that some symptoms are emanating from the L3-L4 level, adjacent level disease and consequently, we briefly discuss the possibility of spacers ([place] L3-L4 depending on anatomy at L4) Vs a new IT trial with bupivacaine or other alternative drugs (he failed trials with Prialt and also with opioids).   He is back to the clinic to resume conversations on the treatment of these issues. He denies significant changes in his medical condition or new symptoms. He failed a trial with ropinirole.  We have discussed during his last visit the findings of his most recent diagnostic " studies, as follows:  MRI of the lumbar spine w/wo contrast on 12/14/2024 postoperative changes from PLIF L4-S1. Mild retrolisthesis of L3 on L4. Clumping of the cauda equina nerve roots. No abnormal enhancement. Multilevel spondylosis. At L3-L4: Prominent ligamentum flavum hypertrophy, disc bulge, facet arthropathy contributing to severe spinal canal stenosis. Severe right and moderate left neuroforaminal stenosis. L4-L5, L5-S1: Operative levels/well decompressed.  MRI of the thoracic spine w/o contrast on 12/14/2024. Postoperative changes from posterior decompressive laminectomy at T10-T11. The thoracic spinal canal is patent throughout. No significant spinal canal or neuroforaminal stenosis.    MRI of the pelvis wo contrast on 12/14/2024. The bone marrow signal intensity is normal. The hip joints appear congruent without evidence of high-grade cartilage loss. Unremarkable appearance of the pubic symphysis and sacroiliac joints.  Unremarkable appearance of the sacral plexus. Unremarkable and symmetric appearance of the muscles. The imaged portions of the sciatic nerves appear normal and symmetric.  Lumbar spine X-rays with flexion and extension on 11/22/2024. PLIF L4-S1 fusion with bilateral pedicle screws. There is a fracture of the left S1 pedicle screw that was noted before. Hardware otherwise appears intact. No change on flexion or extension.   LABS11/22/2024  UDS: Normal  Hemoglobin A1c 5.6%  Rheumatoid factor less than 10, negative  Sedimentation rate: 21 mm/h  C-reactive protein 0.96 mg/dL slightly elevated  CBC with differential: White blood cell count 8.5, H&H 12.7/37.1%, platelets 283,000, differential slight lymphopenia and monocytosis  CMP: Within normal limits     Chronic Pain History: Prashant Avila was originally referred by Dr. Fausto Renee in consultation for chronic intractable left gluteal and left lower extremity pain. As referenced on previous visits, Mr Avila presents with a very  complex chronic pain condition. Prashant Avila underwent L4-L5 hemilaminectomy and foraminotomy at [facility] with Dr. Mackay on 03/03/2014. As he continued to struggle with pain, he underwent L4-S1 lumbar decompression and fusion by Dr. Renee on 07/24/2014. Prashant Avila experienced significant improvement after his last surgery with Dr. Renee,with complete resolution of his previously severe lower back pain. Unfortunately, he continued experiencing intractable left gluteal and left lower extremity pain that was present prior to his surgery with Dr Renee. He underwent surgical consultation with Dr. Cox at the [facility], who recommended decompression of the nerve roots from L3-S1. He also underwent follow-up neurosurgical consultation with Dr. Fausto Renee on 11/03/2017. Dr. Renee discussed the possibility of lumbar decompression at L3-L4: Left L3-L4 laminotomy and foraminotomy. There is potential risk of developing instability that would necessitate extending his lumbar fusion to the L3 level. A CT myelogram was significant for stenosis above the level of his lumbar fusion, at L3-L4 without clinical correlation. He also failed to obtain relief from diagnostic and therapeutic left L3-L4 transforaminal epidural steroid injection on April 3, 2018. During those years, he was not experiencing neurogenic claudication (pain was worse sitting or lying down, and better by standing or walking without restrictions in the distance of his walking). Consequently, he declined surgery.   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 EMG/NCV, confirmed chronic left S1 radiculopathy that resembled the dermatomic distribution of his symptoms.   On 11/21/2019, he underwent a successful SCS trial followed by implantation of a spinal cord stimulator device by Dr. Fausto Renee (Saint Harsha Penta paddle lead. IPG: Saint  Harsha Proclaim 5 XR). Unfortunately, he never seemed completely satisfied with the use of his SCS device despite numerous attempts at SCS reprogramming. He perceived paresthesia overlapping the areas of his pain but did not like the way tonic stimulation felt. He reported no relief from Burst DR.   On 03/01/2021, he underwent explantation of the entire SCS system with Dr. Fausto Renee.   As he continued to struggle, he underwent intrathecal analgesic trials with fentanyl and Prialt without relief. On 09/08/2021, he requested to proceed with regenerative therapies, and as such he had platelet rich leukocyte poor plasma therapy and A2M therapy delivered into the lumbar epidural space via a caudal epidural catheter placement. He denied long lasting relief although the plan was to repeat this procedure at least every 3 weeks for at least 3 treatments as recommended by protocols.   Later, he regained interest in pursuing an intrathecal trial with a higher dose of opioids or Prialt or with alternative medications such as bupivacaine or clonidine. As he experienced psychological side effects with the lowest effective dose of Prialt during his original trial, I discouraged him with a trial with a higher dose due to a potential higher risk of worse side effects.   He also underwent neurology consultation with Dr. Kodak Alonso who concurred with patient's treatment measures. He underwent additional diagnostic studies.   MRI of the thoracic spine without contrast on 10/05/2022 revealed multilevel spondylosis without significant canal or foraminal stenosis.   X-rays of the lumbar spine on 06/02/2023 revealed PLIF L4-S1.  The left S1 pedicle screw is fractured as was present on previous imaging studies. There is disc space narrowing at L3-L4 above the fused level. Facet joint arthropathy throughout the lumbar spine.    Prashant Avila underwent follow-up neurosurgical consultation with Dr. Fausto Renee on 06/14/2023 and was  "found not to be a surgical candidate. Per Dr. Renee;\" While the patient has a fracture of one of the screws, his symptoms are exactly the same as they had been since prior to his first surgery.\"   He underwent follow-up psychological evaluation with Dr. Navarro in July 2023: \"From a psychological perspective, patient is an appropriate candidate for an intrathecal delivery system, a spinal cord stimulator device, a peripheral nerve stimulator device, a DRG device, and need any other procedures deemed appropriate for his condition.\"    Prashant Avila has failed to obtain pain relief for more than 10 years including conservative measures oral analgesics (opioids, naltrexone, almost every medication available for treatment of neuropathic pain except for those contraindicated due to his underlying conditions), topical analgesics, etc. He failed most of the analgesic trials due to side effects with most medications. Prashant Avila failed to obtain relief with non-pharmacological measures (ice, heat, physical therapy, physical therapist directed home exercise program HEP, to name a few). He failed two surgical interventions (03/03/2014: L4-L5 hemilaminectomy and foraminotomy at [facility] with Dr. Mackay, on 07/24/2014: L4-S1 lumbar decompression and fusion by Dr. Renee). He failed several efforts of minimally invasive interventional pain management measures, as referenced on previous notes, including diagnostic selective nerve root injections, transforaminal epidural steroid injections, piriformis injections, spinal cord stimulator trial, spinal cord stimulator implant followed by explant, intrathecal analgesic trials, regenerative therapies, MILD.  Pain has progressed in intensity over the past years.  Prashant Avila continues on gabapentin without side effects.   He was on oral naltrexone 1 mg daily with discreet analgesic benefit. He ran out of this medication. He thinks that it helped. We discussed again potential " treatments such as pulsed radiofrequency of the dorsal root ganglion of S1 (procedure is not covered by insurance), DRG, PNS, etc    Pain Description: Constant left gluteal with intermittent exacerbation, described as burning, pins and needles, and electrical sensation.   Radiation of Pain: The pain radiates intermittently from the left gluteal region down into the left lower extremity into the his left foot. He reports low grade lower back pain.   Pain intensity today: 9/10   Average pain intensity last week: 8/10  Pain intensity ranges from: 4/10 to 10/10  Aggravating factors: Pain increases with sitting for more than 20 minutes, lying down/during the night, standing 20', walking more than 100 yards. Patient describes some neurogenic claudication. Patient does not use a cane or walker. His major issue is pain in sitting position.   Alleviating factors: Pain decreases with changing positions: Standing, short walks  Associated Symptoms:   Patient reports pain, numbness, tingling, and weakness in the left lower extremity. Patient denies symptoms in the opposite limb  Patient denies any new bladder or bowel problems.   Patient reports difficulties with his balance but denies recent falls.   Pain interferes with general activities and affects patient's quality of life  Pain interferes with sleep causing sleep fragmentation (decreased since taking gabapentin)    Review of previous therapies and additional medical records:  Prashant Avila has already failed the following measures, including:   Conservative Measures: Oral analgesics (including opioids, naltrexone, most medications available for treatment of neuropathic pain except for those contraindicated due to underlying conditions, topical analgesics: failed analgesic trials due to side effects with most medications), ice, heat, physical therapy, physical therapist directed home exercise program HEP (ongoing), to name a few  Interventional Measures:   11/27/2024:  Therapeutic left superior cluneal nerve block and Therapeutic left medial cluneal nerve block by hydrodissection technique under ultrasound guidance, C arm fluoroscopic guidance, and PNS guidance. Pain 9/10 to 0/10 for 24-48 hours with progressive recurrence  11/25/2024: Diagnostic left superior cluneal nerve block and diagnostic left medial cluneal nerve block by hydrodissection technique under ultrasound guidance, C arm fluoroscopic guidance, and PNS guidance: Pain 8/10 to 0/10 for 6-10 hours   02/28/2024: Dx Tx left L5-S1 and left S1 TFESI  02/01/2024: SCS trial complicated by lead migration (from top T7 to top T8). Patient opted to switch over from [facility] to [facility] and extend the SCS trial. He was hesitant about outcome.   09/08/2021: Platelet rich leukocyte poor plasma therapy and A2 M therapy, via lumbar epidural administration with caudal epidural catheter placement approach  02/06/2019: Left S1 transforaminal epidural steroid injection/pain relief that was short-lived   02/20/2017: Diagnostic and therapeutic left sciatic nerve block with local anesthetics, steroids, hyaluronidase under ultrasound, PNS and fluoroscopic guidance- Patient did not receive sustained relief from this procedure.   02/06/2017: Diagnostic left S1 selective nerve root injection, relief  01/30/2017: Nevro SCS Trial; no relief  04/25/2016: Trial of lumbar epidural steroid injections with steroids and hyaluronidase via caudal catheter with 100% pain relief lasting 48 hours  12/16/2015: Diagnostic and therapeutic left S1 transforaminal epidural steroid injection with hyaluronidase with complete resolution of pain for several weeks  11/30/2015: Spinal cord stimulator trial Saint Jude, which provided him with more than 50% pain relief and functional improvement. However, patient decided to delay implantation of a permanent spinal cord stimulator device until the arrival of new technology. Patient did not like the perception of  "tonic stimulation, even though stimulation overlapped successfully the areas of his chronic pain, he did not find it pleasant.   02/09/2015: Diagnostic left piriformis muscle injection, no relief  Surgical Measures:   08/14/2023: MILD: Bilateral L3-L4 by Dr. Batista  03/01/2021: SCS explant by Dr. Fausto Renee.   11/21/2019: SCS implant: Saint Harsha Penta paddle lead with the top electrodes projecting at the level of the superior endplate of the T8 vertebral level. IPG: Saint Harsha Proclaim 5 XR IPG Non-Rechargeable by Dr. Fausto Renee   07/24/2014: L4-S1 decompression and fusion by Dr. Renee  03/03/2014: Left L4-L5 hemilaminectomy and foraminotomy at [facility] with Dr. Mackay   Prashant Avila underwent neurosurgical consultation with Dr. Fausto Renee on 06/24/2023, and was found not to be a surgical candidate.  Prashant Avila underwent psychological consultation at [facility] with Dr. Navarro on 08/30/2021, per note: \"It is recommended the patient be seen for psychological treatment of his pain disorder including biofeedback.  From a psychological perspective, patient is an appropriate candidate for interventional pain procedures, such as a pain pump with opioids or Prialt.\" Follow-up psychological evaluation with Dr. Praveen Navarro in July 2023: \"From a psychological perspective, patient is an appropriate candidate for an intrathecal delivery system, a spinal cord stimulator device, a peripheral nerve stimulator device, a DRG device, and need any other procedures deemed appropriate for his condition.\"   Prashant Avila presents with significant comorbidities including a history of prostate cancer, eczema, gout, hyperlipidemia, hypertension, history of kidney stones, left bundle branch block, osteoarthritis, rheumatoid arthritis, hearing loss, osteoporosis  In terms of current analgesics, Prashant Avila takes: acetaminophen, gabapentin 800 mg QID, Naltrexone powder, Take 1 mg by mouth " Daily  I have reviewed Jon Report consistent with medication reconciliation.  SOAPP/ORT: Low Risk     PHQ-2 Depression Screening  Little interest or pleasure in doing things? Not at all   Feeling down, depressed, or hopeless? Not at all   PHQ-2 Total Score 0      Pain Self-Efficacy Questionnaire (PSEQ)  ITEM 08-03 2023 08-29 2023 12-19 2024          I can enjoy things despite the pain. 4 3 3          I can do most of the household chores (tidying up, washing dishes, etc), despite the pain. 1 3 3          I can socialize with my friends or family members as often as I used to do, despite the pain. 1 3 3          I can cope with my pain in most situations. 4 3 3          I can do some form of work, despite the pain (includes housework, paid, and unpaid work). 1 4 3          I can still do many of the things I enjoy doing, such as hobbies or leisure activity despite pain. 0 0 0          I can cope with my pain without medications. 2 3 2          I can accomplish most of my goals in life despite the pain. 0 6 5          I can live in a normal lifestyle, despite the pain. 0 3 2          I can gradually become more active, despite the pain. 0 0 0          TOTAL SCORE 13/60 28/60 24/60             Global Pain Scale 08-03 2023 08-29 2023 12-19 2024              Pain 19 19 21              Feelings 8 9 11              Clinical outcomes 12 13 14              Activities 20 12 14              GPS Total: 59 53 60                 The [place] Back Pain Disability Scale  DATE 08-30 2022 08-03 2023 08-29 2023 12-19 2024           Sleep through the night 3 5 4 5           Turn over in bed 0 0 0  1           Get out of bed 0 0 0  1           Make your bed 0 0 0  1           Put on socks (pantyhose) 0 0 0  0           Ride in a car 5 5 4  4           Sit in a chair for several hours 5 5 4  5           Stand up for 20-30 minutes 0 0 3  1           Climb one flight of stairs 2 0 1  1           Walk a few blocks (200-300 yards)   3 5 3  2           Walk several miles 5 5 5  5           Run one block (about 50 yards) 5 5 5  5           Take food out of the refrigerator 0 0 0  0           Reach up to high shelves 0 4 1  1           Move a chair 0 2 1  1           Pull or push heavy doors 0 2 1  3           Bend over to clean the bathtub 0 4 3  3           Throw a ball 0 5 0  1           Carry two bags of groceries 2 5 3  1           Lift and carry a heavy suitcase 3 5 4  3                                                                                                                                                                                                                                                                                      Total score 33 57 42  44             LANSS pain scale  LANSS item 08-30 2022 08-29 2023 12-19 2024   1. Does the pain produce unpleasant sensations such as tingling, pricking or pins and needles? 5 3 5   2. Is there a different skin aspect in the painful areas, i.e. skin redder than usual or appearing mottled? 0 0 0   3. Does stroking the skin in the painful area or wearing tight clothing items produce unpleasant sensations? 0 0 0   4. Do you experience any sensations like electric shocks, bursting or jumping corresponding to painful episodes, i.e. unexplained bursts of pain? 2 2 2   5. Do you experience burning sensations in the painful areas or a sudden temperature change? 1 0 1   6. Result to stroking the non painful area and the described painful area with cotton wool: 0 0 0   7. Result to touching (pinprick) both areas with a 23 gauge needle: 3 0 0   Total Score: 08/30/2022: 13 (likely neuropathic pain)  Total Score: 08/29/2023: 5   Total Score: 12/19/2024: 8      [place] Claudication Score  All questions are in reference to an average for the past month 12-19 2024                 PAIN FREQUENCY:   How often have you experienced pain in your back or buttock or pain that goes down your back,  buttock, and/or legs?  Not at all  Less than once a week  At least once a week  Every day for at least a few minutes  Every day for most of the day  Every minute of the day  4                 PAIN SEVERITY:   How would you describe the worst pain you have had in your back, buttock, and/or legs?  0. None  1. Mild  2. Moderate  3. Severe  4. Very severe  5. Intolerable  5                 BACK PAIN SEVERITY:   How would you describe the pain or discomfort in your back and/or buttocks?  0. None  1. Mild  2. Moderate  3. Severe  4. Very severe  5. Intolerable  1                 LEG PAIN SEVERITY:   How would you describe the pain or discomfort in your legs or feet?  None  Mild  Moderate  Severe  Very severe  Intolerable  5                 NERVE SYMPTOM SEVERITY:   How would you describe the numbness or tingling in your legs or feet?  None  Mild  Moderate  Severe  Very severe  Intolerable  4                 LEG WEAKNESS:   How would you describe the strength in your legs, ankles, or feet?  None  Mild  Moderate  Severe  Very severe  Intolerable  1                 BALANCE:   Which statement best describes your steadiness when standing or walking?  0. I have had no problems with my balance.  1. I sometimes feel off-balance but I am able to walk without any aid.  2. I often feel off-balance but I am able to walk with an aid.  3. I am unable to walk without an aid.  4. I have difficulty walking despite using an aid.  5. I cannot stand up.  0                 WALKING DISTANCE:   When you went for a walk, how far were you able to walk before your back or leg started giving you trouble?  0. No limits or more than 2 miles   1. More than 1/4 mile but less than 2 miles  2. More than 100 yards but less than 1/4 mile  3. More than 50 feet but less than 100 yards  4. Less than 50 feet  5. Not at all  2                 WALKING ABILITY:   Which statement best describes your walking ability?  0. There is no limit to my walking ability.  1.  I can walk far enough to do everything I want to do.  2. I am able to walk comfortably from my home to the shops or my transport.  3. I am able to walk comfortably around the house.  4. I am able to walk only from the bedroom to the bathroom or kitchen.  5. I am not able to walk at all.  1                 WALKING SPEED:   Which statement best describes your walking?  0. I am able to walk at a normal speed.  1. I walk slowly but standing upright.  2. I walk slowly and bent forward.  3. I have to stop and stand still when I walk.  4. I have to stop and sit down when I walk.  5. I cannot walk at all.  1                 Total Score: Total Score _____% = (___) x 100                                                              50   24                    Review of New Diagnostic Studies:  I have independently reviewed and interpreted the images with the patient and used the images and a tridimensional spine model to explain findings. I have also reviewed the reports.  MRI of the lumbar spine w/wo contrast on 12/14/2024 revealed expected postoperative changes from PLIF L4-S1. Mild retrolisthesis of L3 on L4. There is some clumping of the cauda equina nerve roots. No abnormal enhancement. Multilevel spondylosis. At L3-L4: Prominent ligamentum flavum hypertrophy, disc bulge, facet arthropathy contributing to severe spinal canal stenosis. Severe right and moderate left neuroforaminal stenosis.  L4-L5, L5-S1: Operative levels/well decompressed.  MRI of the thoracic spine w/o contrast on 12/14/2024. Postoperative changes from posterior decompressive laminectomy at T10-T11. The thoracic spinal canal is patent throughout. No significant spinal canal or neuroforaminal stenosis.    MRI of the pelvis wo contrast on 12/14/2024. The bone marrow signal intensity is normal. The hip joints appear congruent without evidence of high-grade cartilage loss. Unremarkable appearance of the pubic symphysis and sacroiliac joints.  Unremarkable  appearance of the sacral plexus. Unremarkable and symmetric appearance of the muscles. The imaged portions of the sciatic nerves appear normal and symmetric.  Lumbar spine X-rays with flexion and extension on 11/22/2024. PLIF L4-S1 fusion with bilateral pedicle screws. There is a fracture of the left S1 pedicle screw that was noted before. Hardware otherwise appears intact. No change on flexion or extension.   11/22/2024  UDS: Normal  Hemoglobin A1c 5.6%  Rheumatoid factor less than 10, negative  Sedimentation rate: 21 mm/h  C-reactive protein 0.96 mg/dL slightly elevated  CBC with differential: White blood cell count 8.5, H&H 12.7/37.1%, platelets 283,000, differential slight lymphopenia and monocytosis  CMP: Within normal limits    Review of Previous Diagnostic Studies:    Lumbar spine X-rays, thoracic spine X-rays on 02/01/2024   12 rib-bearing thoracic-type vertebral bodies. 5 nonrib-bearing lumbar-type vertebral bodies.  Spinal stimulator device noted grossly intact appearance of the catheters. Both leads terminate at the level of T7. Posterior spinal fusion and discectomy changes spanning L4-S1 without new hardware fracture. Stable left S1 pedicle screw fracture. Degree of overall mild to moderate multilevel degenerative disc disease and facet arthropathy without significant change. Minimal grade 1 retrolisthesis L3 on L4 similar to prior. Degenerative related changes throughout the lumbar spine. No visualized displaced fracture or significant height loss. Mild degenerative osteoarthritis of the hip joints and SI joints.   Flexion and extension Xrays of the lumbar spine 8/3/2023: No instability  X-rays of the lumbar spine 6/2/2023: PLIF L4-S1.  The left S1 pedicle screw is fractured as was present previously.  There is disc space narrowing at L3-4 above the fused level.  There is facet joint disease throughout the lumbar spine.    MRI of the thoracic spine without contrast 10/5/2022: multilevel spondylosis  without significant canal or foraminal stenosis.  EMG/NCV of the bilateral lower extremities 12/28/2018: Chronic left S1 radiculopathy  CT LUMBAR SPINE WITH CONTRAST-01/21/2019: Excellent opacification of the thecal sac. Imaging covers from T12 through the S1 level caudally. The conus terminates at the L1/L2 level.  The distal cord and conus are normal in caliber. The nerve roots of the cauda equina are normal in caliber and distribution. L4-S1 fusion hardware is in place; the left S1 pedicle screw is fractured and there is lucency around the right S1 pedicle screw. Left L4 laminotomy. Vertebral body heights and alignment are normal. Vertebral body heights are normal. Degenerative changes are as follows:  T12-L1: Facet arthropathy without significant foraminal or spinal canal stenosis.  L1-L2: Facet arthropathy without significant foraminal or spinal canal stenosis.  L2-L3: Right greater than left facet arthropathy. Mild narrowing of the right subarticular zone. No significant central spinal canal or foraminal stenosis.  L3-L4: Disc bulge and facet arthropathy with ligamentum flavum thickening. There is severe spinal canal stenosis and at least moderate bilateral foraminal stenosis.   L4-L5: Improved right subarticular zone narrowing. No significant osseous spinal canal or foraminal stenosis.  L5-S1: Bilateral facet arthropathy. Shallow disc osteophyte. Unchanged mild osseous foraminal stenosis. No significant osseous spinal canal stenosis.  EKG April 19, 2018 revealed sinus bradycardia with first-degree AV block.  Left bundle branch block.    X-Ray lumbar spine 08/04/2017: Posterior lumbar fusion of L4, L5 and S1. Degenerative changes at L1-L2 with anterior osteophyte formation. Normal lumbar alignment in the lateral projection with a stable alignment in the flexed and extended positions.      Duplex Venous Lower, 03/25/2017: Normal right lower extremity venous duplex scan. No evidence of DVT.  MRI Lumbar Spine,  10/28/2016: Since the previous MRI study, there has been L4-5-S1 posterior lumbar and interbody fusion. No new abnormality of alignment is seen. There is, however, significant worsening of posterior element hypertrophy at L3-4, above the fusion level, due to persistent L3-4 disc bulge, and what appears to be new spinal stenosis at this level, generally as a result of posterior element hypertrophic change. Canal appears stable elsewhere. No lumbar compression deformity or new abnormality of alignment is seen. No vertebral marrow edema is appreciated. Sagittal images suggest a small (18 mm) seroma or synovial cyst on the right at L5-S1. Tip of conus medullaris is again noted at L1.  Axial images   L1-L2: No significant canal or foraminal stenosis   L2-L3: Canal appears relatively narrowed, due to posterior element hypertrophy but still greater than 1 cm in diameter. Foramina appear  lower limits of normal diameter.   L3-L4: Moderate canal stenosis with lateral narrowing, AP diameter 6.5 mm, transverse diameter approximately 9 mm with crowding of the nerve roots. Foramina appear mildly to moderately narrowed bilaterally due to facet DJD.   L4-L5: Fused level, canal appears normal. Mild foraminal stenosis due to facet DJD  L5-S1: Canal appears normal. Left-sided neural foramen appears normal. Right-sided neural foramen appears mildly narrowed due to facet DJD. Postcontrast images show expected postoperative enhancement of the dorsal soft tissues and no pathologic postcontrast enhancement elsewhere.     The following portions of the patient's history were reviewed and updated as appropriate: problem list, past medical history, past surgery history, social history, family history, medication reconciliation, and allergies    Review of Systems   Gastrointestinal:  Positive for diarrhea.   Musculoskeletal:  Positive for arthralgias, back pain and joint swelling.   All other systems reviewed and are negative.        Patient  Active Problem List   Diagnosis    Chronic lumbar radiculopathy    Lumbar postlaminectomy syndrome    Status post L4-S1 lumbar spinal fusion    Physical deconditioning    Adhesive arachnoiditis    Bilateral stenosis of lateral recess of lumbar spine    Osteoarthritis    Chronic intractable pain    Encounter for therapeutic drug monitoring    Chronic pain syndrome    Intractable neuropathic pain of lower extremity, left    Gait disturbance    Polyneuropathy    Lumbar stenosis with neurogenic claudication    Ligamentum flavum hypertrophy    Non-insulin dependent type 2 diabetes mellitus    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    Neuralgia and neuritis    Diabetic polyneuropathy associated with type 2 diabetes mellitus       Past Medical History:   Diagnosis Date    Arthritis     Carcinoma of prostate     surgery     Chronic pain disorder     Chronic pain syndrome 08/02/2023    Eczema     Elevated cholesterol     Gout     History of chronic kidney disease     Hyperlipidemia     Hypertension     Kidney stone 1990    passed     Left bundle branch block     Low back pain     Lumbar stenosis with neurogenic claudication 08/03/2023    Lumbosacral disc disease     Neuropathic pain of flank, left     Non-insulin dependent type 2 diabetes mellitus 08/10/2023    Osteoarthritis     Polyneuropathy 08/03/2023    Thoracic disc disorder     Wears hearing aid in both ears          Past Surgical History:   Procedure Laterality Date    BACK SURGERY  2014    Lumbar Fusion, july 2014    BACK SURGERY  2014    Lumbar Laminectomy, march 2014    COLONOSCOPY  2016    EPIDURAL BLOCK  2016    LAMINECTOMY  2013    LUMBAR DECOMPRESSION N/A 08/14/2023    Procedure: LUMBAR DECOMPRESSION MINIMALLY INVASIVE;  Surgeon: Efrain Batista MD;  Location: [facility];  Service: Pain Management;  Laterality: N/A;    OTHER SURGICAL HISTORY      Enteroscopic Polypectomy    PAIN PUMP INSERTION/REVISION N/A 10/12/2020     Procedure: PAIN PUMP TRIAL;  Surgeon: Efrain Batista MD;  Location:  [facility];  Service: Pain Management;  Laterality: N/A;    DE MYELOGRAPHY VIA LUMBAR INJECT RS&I LUMBOSACRAL N/A 01/21/2019    Procedure: IR myelogram, lumbar;  Surgeon: Francisco Javier Clay MD;  Location:  [facility];  Service: Interventional Radiology    PROSTATE SURGERY      SPINAL CORD STIMULATOR IMPLANT N/A 11/21/2019    Procedure: SPINAL CORD STIMULATOR INSERTION;  Surgeon: Fausto Renee MD;  Location:  [facility];  Service: Neurosurgery    SPINAL CORD STIMULATOR IMPLANT N/A 03/01/2021    Procedure: Removal of entire system, SPINAL CORD STIMULAR REMOVAL;  Surgeon: Fausto Renee MD;  Location:  [facility];  Service: Neurosurgery;  Laterality: N/A;    TESTICLE UNDESCENDED REPAIR  1980    VOCAL CORD BIOPSY      polyps removed          Family History   Problem Relation Age of Onset    Congenital heart disease Mother     Hearing loss Mother     Multiple myeloma Father     Breast cancer Neg Hx     Ovarian cancer Neg Hx          Social History     Socioeconomic History    Marital status:    Tobacco Use    Smoking status: Never     Passive exposure: Never    Smokeless tobacco: Never   Vaping Use    Vaping status: Never Used   Substance and Sexual Activity    Alcohol use: No    Drug use: No    Sexual activity: Defer           Current Outpatient Medications:     gabapentin (NEURONTIN) 800 MG tablet, TAKE 1 TABLET BY MOUTH EVERY MORNING, THEN TAKE ONE TABLET BY MOUTH EVERY AFTERNOON, THEN TAKE TWO TABLETS BY MOUTH EVERY EVENING, Disp: 120 tablet, Rfl: 3    lisinopril (PRINIVIL,ZESTRIL) 10 MG tablet, , Disp: , Rfl:     losartan (COZAAR) 100 MG tablet, Take 1 tablet by mouth Daily., Disp: , Rfl:     magnesium oxide (MAG-OX) 400 MG tablet, 1 tablet QHS, Disp: 30 tablet, Rfl: 1    metroNIDAZOLE (FLAGYL) 500 MG tablet, Take 1 tablet by mouth 2 (Two) Times a Day., Disp: , Rfl:     Naltrexone powder, Take 1 mg by mouth Daily.,  "Disp: 30 g, Rfl: 1    nystatin (MYCOSTATIN) 876659 UNIT/GM ointment, Apply 1 Application topically to the appropriate area as directed As Needed., Disp: , Rfl:     pravastatin (PRAVACHOL) 10 MG tablet, Take 1 tablet by mouth Daily., Disp: , Rfl:     tiZANidine (ZANAFLEX) 4 MG tablet, , Disp: , Rfl:       Allergies   Allergen Reactions    Adhesive Tape Rash     Rash with blisters  Only after wearing excessive tape for SCS phase 1 for several days.    Doxycycline Rash         Ht 175.3 cm (69\")   Wt 82.5 kg (181 lb 14.4 oz)   BMI 26.86 kg/m²       Physical Exam:  Constitutional: Patient appears well-developed, well-nourished, well-hydrated, appears younger than stated age  HEENT: Head: Normocephalic and atraumatic  Eyes: Conjunctivae and lids are normal  Pupils: Equal, round, reactive to light  Peripheral vascular exam: Femoral: right 2+, left 2+. Posterior tibialis: right 2+ and left 2+. Dorsalis pedis: right 2+ and left 2+. No edema.   Musculoskeletal   Gait and station: Gait evaluation demonstrated a normal gait. Able to walk on heels, toes, tandem walking   Lumbar Spine: Passive and active range of motion are almost full and without pain. Forward flexion increased his left-sided lower back/gluteal pain. Lumbar facet joint loading maneuvers are negative.   Sacroiliac Joints Provocative Maneuvers: Negative   Piriformis maneuvers: Negative   Right Hip Joint: The range of motion of the hip joint is almost full and without pain   Left Hip Joint: The range of motion of the hip joint is almost full and without pain   Palpation of the bilateral ischial tuberosities: Unrevealing   Palpation of the bilateral psoas tendons and iliopsoas bursas: Unrevealing   Palpation of the bilateral greater trochanters: Unrevealing   Examination of the Iliotibial band: Unrevealing   Neurological:   Patient is alert and oriented to person, place, and time.   Speech: Normal.   Cortical function: Normal mental status.   Reflex Scores:  Right " patellar: 0+  Left patellar: 0+  Right Achilles: 0+  Left Achilles: 0+  Motor strength: 5/5  Motor Tone: Normal  Involuntary movements: None.   Superficial/Primitive Reflexes: Primitive reflexes were absent.   Right Lamas: Absent  Left Lamas: Absent  Right ankle clonus: Absent  Left ankle clonus: Absent   Babinsky: Absent  Long tract signs: Negative. Straight leg raising test: Questionably positive on the left at 30-40 degrees with positive Lasegue. Femoral stretch sign: Negative on the right, positive on the left.   Tinel's sign: + Left superior iliac wing  Sensory exam: Intact to light touch, intact pain and temperature sensation, intact vibration sensation and normal proprioception  Coordination: Finger to nose: Normal. Balance: Normal Romberg's sign: Negative   Skin and subcutaneous tissue: Skin is warm and intact. No rash noted. No cyanosis.   Psychiatric: Judgment and insight: Normal. Recent and remote memory: Intact. Mood and affect: Normal.     ASSESSMENT:   1. 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 Neuralgia and neuritis    3. Intractable neuropathic pain of lower extremity, left    4. Chronic intractable pain    5. Adhesive arachnoiditis    6. Status post L4-S1 lumbar spinal fusion    7. Lumbar postlaminectomy syndrome    8. Ligamentum flavum hypertrophy    9. Lumbar stenosis with neurogenic claudication    10. Diabetic polyneuropathy associated with type 2 diabetes mellitus    11. Non-insulin dependent type 2 diabetes mellitus    12. Physical deconditioning        PLAN/MEDICAL DECISION MAKING:  Mr. Prashant Avila, 80 y.o. male originally referred by Dr. Fausto Renee in consultation for chronic intractable left gluteal pain and left lower extremity pain. Mr Avila presents with a very complex chronic pain condition, as referenced on previous notes. Prashant Avila underwent L4-L5 hemilaminectomy and [facility]aminotomy at [facility] with Dr. Mackay on  03/03/2014. As he continued to struggle with pain, he underwent L4-S1 lumbar decompression and fusion by Dr. Renee on 07/24/2014. Prashant Avila experienced significant improvement after his last surgery with Dr. Renee,with complete resolution of his previously severe lower back pain. Unfortunately, he has continued experiencing intractable left gluteal and left lower extremity pain that was present prior to his last surgery with Dr Renee. He underwent neurosurgical consultation with Dr. Cox at the [facility], who recommended decompression of the nerve roots from L3-S1. He also underwent follow-up neurosurgical consultation with Dr. Fausto Renee on 11/03/2017. Dr. Renee discussed the possibility of decompression at L3-L4: Left L3-L4 laminotomy and foraminotomy and discussed the potential risk of developing instability that would necessitate extending his lumbar fusion to the L3 level. A CT myelogram was significant for stenosis above the level of his lumbar fusion, at L3-L4 without clinical correlation as he failed to obtain pain relief from diagnostic and therapeutic left L3-L4 transforaminal epidural steroid injection on April 3, 2018. In addition, he was not experiencing neurogenic claudication during that period (pain was worse sitting or lying down, and better by standing or walking). Consequently, he declined surgery. Prashant Avila has presented with symptoms consistent with chronic left S1 radiculopathy and superimposed symptoms from adhesive lumbar arachnoiditis. EMG/NCV, confirmed chronic left S1 radiculopathy. He underwent neurology consultation with Dr. Kodak Alonso who concurred with patient's proposed treatment protocol. Prashant Avila failed to obtain pain relief for more than 10 years with conservative measures (oral and topical analgesics; opioids, naltrexone, most medications available for treatment of neuropathic pain except for those contraindicated due to underlying  "conditions, failed analgesic trials due to side effects), physical therapy, physical therapist directed home exercise program HEP, to name a few; he also failed surgical measures (03/03/2014: L4-L5 hemilaminectomy and foraminotomy at [facility] with Dr. Mackay; 07/24/2014: L4-S1 lumbar decompression and fusion by Dr. Renee); He failed several minimally invasive interventional pain management measures including diagnostic selective nerve root injections, transforaminal epidural steroid injections, piriformis injections, spinal cord stimulator trial, spinal cord stimulator implant followed by explant, intrathecal analgesic trial, regenerative therapies, MILD. His last SCS trial was complicated by lead migration as revealed on X-rays. Prashant Avila underwent follow-up neurosurgical consultation with Dr. Fausto Renee on 06/14/2023 and was found not to be a surgical candidate. Per Dr. Renee;\" While the patient has a fracture of one of the screws, his symptoms are exactly the same as they had been since prior to his first surgery.\" He underwent follow-up psychological evaluation with Dr. Praveen Navarro in July 2023: \"From a psychological perspective, patient is an appropriate candidate for an intrathecal delivery system, a spinal cord stimulator device, a peripheral nerve stimulator device, a DRG device, and need any other procedures deemed appropriate for his condition.\"  A comprehensive evaluation including history and physical exam along with pertinent physiologic and functional assessment was performed. Patient presents with intractable pain due to the diagnoses listed above. New diagnostic studies revealed similar findings with slight progression of stenosis at L3-L4 but without clinical correlation at this time (intolerance to sitting and lying down/better standing or walking). MRI revealed arachnoiditis, and EMG/NCV chronic S1 radiculopathy, both conditions correlated with clinical findings. Prashant Avila " failed to respond to conservative modalities, minimally invasive interventional pain management measures, previous surgical interventions, as referenced under HPI. I have documented the impact of patient's moderate-to-severe pain contributing to significant impairment in daily activities, ADLs, and a negative impact on the patient's quality of life, as reflected on Global Pain Scale 60/100; The [place] Back Pain Disability Scale 44/100; [place] Claudication Score 24/50; The [employer] Osteoarthritis Index (WOMAC) to assess pain, stiffness, and physical function in patients with hip osteoarthritis 0/96; Tinetti Gait & Balance Assessment Tool (low risk for falls). I have reviewed pertinent supporting diagnostic studies of patient's chronic pain condition as well as all available pertinent medical records to patient's chronic pain condition including previous therapies, as referenced above. PHQ-2 Depression Screening 0; Pain Self-Efficacy Questionnaire (PSEQ) 24/60. Again, we had a lengthy conversation with Mr. Avila regarding his intractable chronic pain condition and potential therapeutic options including risks, benefits, alternative therapies, to name a few. We discussed continuation of a stepwise approach which began with the least intense level of care as determined by the extent required to treat the patient's condition. The proposed treatments are consistent with the patient's medical condition and known to be safe and effective by current guidelines and the standard of care. The duration and frequency proposed are considered appropriate for the service in accordance with accepted standards of medical practice for the diagnosis and treatment of the patient's condition and intended to improve the patient's level of function. These services will be furnished in a setting appropriate to the patient's medical needs and condition. Therefore, I have proposed the following plan:     1.  Interventional pain management measures: Patient does not take blood thinners. I will start new pharmacological trials with alternative medications (failed all conventional ones except for gabapentin) and he will talk to his wife about additional interventional pain management options.     A. Chronic intractable Neuropathic Pain/Left gluteal pain/LLE pain: We have discussed peripheral nerve stimulation based on his positive response to diagnostic and therapeutic left superior cluneal nerve block and left medial cluneal nerve block. We discussed the following options:  Implantation of a Sprint Extensa Dual Lead System to the left superior cluneal nerve and left medial cluneal nerve   PNS trial with Nalu to the left superior cluneal nerve and left medial cluneal nerve. IF successful, PNS implant with Nalu.   DRG trial left S1 and S2   Spinal cord stimulator trial with [employer]onic Inceptiv.   Patient has opted for PNS trial. Prashant Avila will be scheduled for a PNS trial with Nalu to the left superior cluneal nerve and left medial cluneal nerve. IF successful, PNS implant with Nalu.     B. Lumbar spinal stenosis with neurogenic claudication: There is correlation with findings on MRI of adjacent level stenosis at L3-L4. His left lower extremity pain is way more significant than any symptoms related to stenosis at this time. We could consider spacers (Minuteman L3-L4, Vertiflex - depending on anatomy/intact L4-, repeat MILD using the new bone decorticator to facilitate access), Vs IT trials with bupivacaine or alternative drugs    2. Diagnostic studies: None indicated at this time.     3. Pharmacological measures: Reviewed and discussed; The patient has failed numerous pharmacological trials in the past.  A. Patient takes acetaminophen PRN  B.  He has responded to gabapentin.  He has been maxed out on immediate release gabapentin and is asking if the dose could be increased.  I suspect that he may not be absorbing as  much gabapentin due to the limited area of his absorption in the GI tract.  To RN, I have recommended trying a slow release formulation a nighttime and to continue the immediate release formulation during the daytime.  We will increase the dose of gabapentin at bedtime using a SR formulation to avoid side effects and enhance control of his nocturnal left lower extremity pain. He will start Gralise 600 mg 3 tablets at bedtime (after a meal), and will continue gabapentin 800 mg QAM and at Noon. (no side effects/appropriate compliance).   C. Please send Rx for naltrexone 1 mg once a day, #30, 1 refill ([facility] compound[facility])  D. Continue magnesium oxide 400 mg QHS (leg cramps), #30  E. Trial with prilocaine 2%, lidocaine 10%, baclofen 5%, capsaicin 0.001%, and mannitol 18%  cream, apply 1 to 2 grams of cream to the affected areas every 4 to 6 hours as needed    Screening and compliance with controlled substances:  Patient has completed a SOAPP and ORT questionnaires (revealing low risk). Jon report has been reviewed and appropriate. Random urine drug screenings have been obtained and appropriate. Patient has signed a consent for treatment with controlled substances after reviewing the document and verbalizing full understanding of the risks, benefits, alternatives, and consequences of compliance and adherence with treatment and comprehensive plan of care. Patient received in hand a copy of this document.    4. Long-term rehabilitation efforts:  A. The patient does not have a history of falls. I performed a risk assessment for falls using the Tinetti gait & balance assessment tool (scored low risk for falls).   B. The patient will start a comprehensive physical therapy program at [facility] for Alter-G, gait and balance training, neurodynamics, core strengthening, gluteal and abductor strengthening, ultrasound, ASTYM, E-STIM, myofascial release, cupping, dry needling, home exercise program, 2-3 x  per week for 8 weeks   C. Contrast therapy: Apply ice-packs for 15-20 minutes, followed by heating pads for 15-20 minutes to affected area   D. Start a low impact exercise program such as water therapy, swimming, chair yoga, Pilates  E. Prashant Avila  reports that he has never smoked. He has never been exposed to tobacco smoke. He has never used smokeless tobacco.     5. The patient has been instructed to contact my office with any questions or difficulties. The patient understands the plan and agrees to proceed accordingly.    The patient has a documented plan of care to address chronic pain. Prashant Avila reports a pain score of 9/10.  Given his pain assessment as noted, treatment options were discussed and the following options were decided upon as a follow-up plan to address the patient's pain: continuation of current treatment plan for pain, educational materials on pain management, home exercises and therapy, prescription for non-opiod analgesics, referral to Physical Therapy, referral to specialist for assistance in pain treatment guidance, steroid injections, use of non-medical modalities (ice, heat, stretching and/or behavior modifications), and interventional pain management measures .               Pain Management Panel  More data exists         Latest Ref Rng & Units 11/22/2024 8/8/2023   Pain Management Panel   Amphetamine, Urine Qual Negative Negative  Negative    Barbiturates Screen, Urine Negative Negative  Negative    Benzodiazepine Screen, Urine Negative Negative  Negative    Buprenorphine, Screen, Urine Negative Negative  Negative    Cocaine Screen, Urine Negative Negative  Negative    Fentanyl, Urine Negative Negative  Negative    Methadone Screen , Urine Negative Negative  Negative    Methamphetamine, Ur Negative Negative  Negative         JOCELIN query complete. JOCELIN reviewed by Efrain Batista MD.     Pain Medications              gabapentin (NEURONTIN) 800 MG tablet TAKE 1 TABLET  BY MOUTH EVERY MORNING, THEN TAKE ONE TABLET BY MOUTH EVERY AFTERNOON, THEN TAKE TWO TABLETS BY MOUTH EVERY EVENING    tiZANidine (ZANAFLEX) 4 MG tablet              No orders of the defined types were placed in this encounter.     Total Time Spent: 46 minutes    Please note that portions of this note were completed with a voice recognition program.   Any copied data in any portion of my note from previous notes included in the HPI, PE, MDM and/or assessment and plan has been reviewed by myself and accurate as of this date.   The 21st Century Cures Act makes medical notes like this available to patients in the interest of transparency. This is a medical document intended as peer to peer communication. It is written in medical language and may contain abbreviations or verbiage that are unfamiliar. It may appear blunt or direct. Medical documents are intended to carry relevant information, facts as evident, and the clinical opinion of the practitioner.     Efrain Batista MD    Patient Care Team:  Montse Chun MD as PCP - General (Internal Medicine)  Fausto Renee MD as Consulting Physician (Neurosurgery)  Jean Warren MD (Inactive) as Consulting Physician (Neurosurgery)  Marcelino Condon MD as Consulting Physician (Urology)  Brian Tatum III, MD as Consulting Physician (Cardiology)  SHERON Chun MD (Inactive) as Consulting Physician (Family Medicine)  Francisco Javier Clay MD as Consulting Physician (Neurosurgery)  Royer Roldan MD as Consulting Physician (General Practice)  Emigdio Watt MD as Consulting Physician (Gastroenterology)  Shola Vila MD as Consulting Physician (Otolaryngology)  Hank Frankel MD as Consulting Physician (Cardiology)  Inez Regan APRN as Nurse Practitioner (Pain Medicine)  Efrain Batista MD as Consulting Physician (Pain Medicine)     No orders of the defined types were placed in this encounter.        No future appointments.

## 2025-03-18 ENCOUNTER — OFFICE VISIT (OUTPATIENT)
Dept: PAIN MEDICINE | Facility: CLINIC | Age: 80
End: 2025-03-18
Payer: MEDICARE

## 2025-03-18 VITALS — BODY MASS INDEX: 26.94 KG/M2 | HEIGHT: 69 IN | WEIGHT: 181.9 LBS

## 2025-03-18 DIAGNOSIS — G58.9 ENTRAPMENT NEUROPATHY: ICD-10-CM

## 2025-03-18 DIAGNOSIS — R53.81 PHYSICAL DECONDITIONING: ICD-10-CM

## 2025-03-18 DIAGNOSIS — G89.29 CHRONIC INTRACTABLE PAIN: ICD-10-CM

## 2025-03-18 DIAGNOSIS — Z98.1 STATUS POST LUMBAR SPINAL FUSION: ICD-10-CM

## 2025-03-18 DIAGNOSIS — G03.9 ADHESIVE ARACHNOIDITIS: ICD-10-CM

## 2025-03-18 DIAGNOSIS — M96.1 LUMBAR POSTLAMINECTOMY SYNDROME: ICD-10-CM

## 2025-03-18 DIAGNOSIS — M48.062 LUMBAR STENOSIS WITH NEUROGENIC CLAUDICATION: ICD-10-CM

## 2025-03-18 DIAGNOSIS — M79.2 INTRACTABLE NEUROPATHIC PAIN OF LOWER EXTREMITY, LEFT: ICD-10-CM

## 2025-03-18 DIAGNOSIS — M79.2 NEURALGIA AND NEURITIS: ICD-10-CM

## 2025-03-18 DIAGNOSIS — E11.9 NON-INSULIN DEPENDENT TYPE 2 DIABETES MELLITUS: ICD-10-CM

## 2025-03-18 DIAGNOSIS — M24.28 LIGAMENTUM FLAVUM HYPERTROPHY: ICD-10-CM

## 2025-03-18 DIAGNOSIS — E11.42 DIABETIC POLYNEUROPATHY ASSOCIATED WITH TYPE 2 DIABETES MELLITUS: ICD-10-CM

## 2025-03-18 PROCEDURE — 1159F MED LIST DOCD IN RCRD: CPT | Performed by: ANESTHESIOLOGY

## 2025-03-18 PROCEDURE — 1160F RVW MEDS BY RX/DR IN RCRD: CPT | Performed by: ANESTHESIOLOGY

## 2025-03-18 PROCEDURE — 1125F AMNT PAIN NOTED PAIN PRSNT: CPT | Performed by: ANESTHESIOLOGY

## 2025-03-18 PROCEDURE — 99215 OFFICE O/P EST HI 40 MIN: CPT | Performed by: ANESTHESIOLOGY

## 2025-03-18 RX ORDER — GABAPENTIN 600 MG/1
TABLET, FILM COATED ORAL
Qty: 90 TABLET | Refills: 1 | Status: SHIPPED | OUTPATIENT
Start: 2025-03-18

## 2025-03-18 RX ORDER — GABAPENTIN 800 MG/1
TABLET ORAL
Qty: 60 TABLET | Refills: 1 | Status: SHIPPED | OUTPATIENT
Start: 2025-03-18

## 2025-03-25 ENCOUNTER — TELEPHONE (OUTPATIENT)
Dept: PAIN MEDICINE | Facility: CLINIC | Age: 80
End: 2025-03-25
Payer: MEDICARE

## 2025-03-25 NOTE — TELEPHONE ENCOUNTER
"Provider: SHANE    Caller: Prashant Avila \"Timur\"    Relationship to Patient: Self    Pharmacy:     Phone Number: [phone]    Reason for Call: PT CALLED TO ASK TO SPEAK TO DR LYNN ABOUT A DATE TO SCHEDULE THE PAIN STIMULATOR THAT HE IS TO GET, PLEASE CALL BACK  "

## 2025-03-25 NOTE — TELEPHONE ENCOUNTER
SPOKE W/ PT. HE EXPRESSED HE NO LONGER COULD WAIT FOR PROCEDURE DR. LYNN RECOMMENDED AT PT'S LAST APPT.. PT INITIALLY WANTED TO WAIT FOR PROCEDURE. SENT STAFF MESSAGE TO DR. LYNN & GIAN OTTO

## 2025-03-28 ENCOUNTER — OUTSIDE FACILITY SERVICE (OUTPATIENT)
Dept: PAIN MEDICINE | Facility: CLINIC | Age: 80
End: 2025-03-28
Payer: MEDICARE

## 2025-03-28 DIAGNOSIS — G58.9 ENTRAPMENT NEUROPATHY: Primary | ICD-10-CM

## 2025-04-10 ENCOUNTER — TELEPHONE (OUTPATIENT)
Dept: PAIN MEDICINE | Facility: CLINIC | Age: 80
End: 2025-04-10
Payer: MEDICARE

## 2025-04-10 DIAGNOSIS — G58.9 ENTRAPMENT NEUROPATHY: Primary | ICD-10-CM

## 2025-04-10 DIAGNOSIS — M79.2 NEURALGIA AND NEURITIS: ICD-10-CM

## 2025-04-10 DIAGNOSIS — G89.29 CHRONIC INTRACTABLE PAIN: ICD-10-CM

## 2025-04-10 NOTE — TELEPHONE ENCOUNTER
Spoke with patient who thought that he was on the schedule for Monday for a PNS.   Per Dr. Batista, send H&P and demos and orders to [facility]. Sent.   Per Dr. Batista, we need to confirm with the [facility] and Dr. Batista will call the patient tomorrow.   Patient verbalized understanding. Advised that he and Dr. Batista should discuss exactly what steps to take next.

## 2025-04-14 ENCOUNTER — DOCUMENTATION (OUTPATIENT)
Dept: PAIN MEDICINE | Facility: CLINIC | Age: 80
End: 2025-04-14
Payer: MEDICARE

## 2025-04-14 NOTE — PROGRESS NOTES
I spoke with Timur regarding the current status of his peripheral nerve stimulator trial, which has been submitted and is presently under insurance review.  The patient’s most recent psychological evaluation was completed in July 2023, with a prior assessment documented in 2021. There is a possibility that the insurance provider may request an updated psychological evaluation prior to approval.  The patient is currently taking Gralise (gabapentin extended-release) 600 mg, two tablets at bedtime, as he had previously experienced next-day drowsiness with the standard dosing. In light of his recent initiation of diazepam (Valium) over the past 2-3 weeks, I recommended that he reduce the dose to 2.5 mg (½ tablet) nightly, given the medication’s long half-life and the patient's age. Following this adjustment, he may cautiously trial an increase to three Gralise tablets at bedtime, provided he tolerates the change.    I encouraged the patient to contact me with any questions or concerns during this process. He was reassured that our office will notify him promptly once we receive an update from his insurance carrier.    The patient verbalized understanding and expressed sincere appreciation for the communication.    Efrain Batista MD

## 2025-04-17 ENCOUNTER — DOCUMENTATION (OUTPATIENT)
Dept: PAIN MEDICINE | Facility: CLINIC | Age: 80
End: 2025-04-17
Payer: MEDICARE

## 2025-04-17 ENCOUNTER — TELEPHONE (OUTPATIENT)
Dept: PAIN MEDICINE | Facility: CLINIC | Age: 80
End: 2025-04-17
Payer: MEDICARE

## 2025-04-17 NOTE — TELEPHONE ENCOUNTER
I spoke with Timur about the approval for his PNS trial. He is aware that auth is valid 4/21 through 5/2. He has my number and he will let me know when he is available. Also, I'm still awaiting on approval at [facility] for [employer] or [employer]. Otherwise, we may proceed with [employer] (or [employer])  Efrain Batista MD

## 2025-04-30 ENCOUNTER — OUTSIDE FACILITY SERVICE (OUTPATIENT)
Dept: PAIN MEDICINE | Facility: CLINIC | Age: 80
End: 2025-04-30
Payer: MEDICARE

## 2025-04-30 PROCEDURE — 64555 IMPLANT NEUROELECTRODES: CPT | Performed by: ANESTHESIOLOGY

## 2025-05-08 ENCOUNTER — OFFICE VISIT (OUTPATIENT)
Dept: PAIN MEDICINE | Facility: CLINIC | Age: 80
End: 2025-05-08
Payer: MEDICARE

## 2025-05-08 VITALS — WEIGHT: 180 LBS | BODY MASS INDEX: 26.66 KG/M2 | HEIGHT: 69 IN

## 2025-05-08 DIAGNOSIS — M79.2 INTRACTABLE NEUROPATHIC PAIN OF LOWER EXTREMITY, LEFT: ICD-10-CM

## 2025-05-08 DIAGNOSIS — M48.062 LUMBAR STENOSIS WITH NEUROGENIC CLAUDICATION: ICD-10-CM

## 2025-05-08 DIAGNOSIS — M24.28 LIGAMENTUM FLAVUM HYPERTROPHY: ICD-10-CM

## 2025-05-08 DIAGNOSIS — M79.2 NEURALGIA AND NEURITIS: ICD-10-CM

## 2025-05-08 DIAGNOSIS — G58.9 ENTRAPMENT NEUROPATHY: ICD-10-CM

## 2025-05-08 DIAGNOSIS — R26.9 GAIT DISTURBANCE: ICD-10-CM

## 2025-05-08 DIAGNOSIS — Z98.1 STATUS POST LUMBAR SPINAL FUSION: ICD-10-CM

## 2025-05-08 DIAGNOSIS — R53.81 PHYSICAL DECONDITIONING: ICD-10-CM

## 2025-05-08 DIAGNOSIS — E11.42 DIABETIC POLYNEUROPATHY ASSOCIATED WITH TYPE 2 DIABETES MELLITUS: ICD-10-CM

## 2025-05-08 DIAGNOSIS — Z96.82 PRESENCE OF NEUROSTIMULATOR: ICD-10-CM

## 2025-05-08 DIAGNOSIS — E11.9 NON-INSULIN DEPENDENT TYPE 2 DIABETES MELLITUS: ICD-10-CM

## 2025-05-08 DIAGNOSIS — M96.1 LUMBAR POSTLAMINECTOMY SYNDROME: ICD-10-CM

## 2025-05-08 DIAGNOSIS — G89.29 CHRONIC INTRACTABLE PAIN: ICD-10-CM

## 2025-05-08 DIAGNOSIS — G03.9 ADHESIVE ARACHNOIDITIS: ICD-10-CM

## 2025-05-08 RX ORDER — AMLODIPINE BESYLATE 5 MG/1
5 TABLET ORAL DAILY
COMMUNITY
Start: 2025-05-06

## 2025-05-08 NOTE — PROGRESS NOTES
"Chief Complaint: \"Peripheral nerve stimulator trial day #7.  I can sleep better but still have some pain.\"      History of Present Illness: Mr. Prashant Avila, 80 y.o. male originally referred by Dr. Fausto Renee in consultation for chronic intractable left gluteal pain and left lower extremity pain with intolerance to sitting and initially without neurogenic claudication. Prashant Avila was last seen on 04/30/2025 when he started a peripheral nerve stimulator trial with leads placed for stimulation of the left superior and left medial cluneal nerves using the Curonix system.  Prashant Avila reports that overall, he has been sleeping a lot better since initiating his trial, which was a significant problem for him for which he was taking Valium and gabapentin.  He also reports mild relief of his left gluteal and left lower extremity pain but not sufficient, and therefore, we will extend his trial. He discontinued Valium, and despite that, instead of rebound insomnia he has been sleeping better since starting his PNS trial.  Prior to this trial, on 12/19/2024, he was seen for follow up of therapeutic left superior cluneal nerve block and therapeutic left middle cluneal nerve block by hydrodissection technique under ultrasound guidance, C arm fluoroscopic guidance, and PNS guidance. His pain level decreased from 9/10 to 0/10 lasting for about 24-48 hours with progressive recurrence. Prior to that, he had undergone on 11/25/2024, diagnostic left superior cluneal nerve block and diagnostic left medial cluneal nerve block by hydrodissection technique under ultrasound guidance, C arm fluoroscopic guidance, and PNS guidance. Pain decreased from 8/10 to 0/10 lasting for 6-10 hours. We discussed that if these blocks were beneficial, then, we could proceed with implantation of a PNS System Vs DRG trial Vs a spinal cord stimulator trial with [employer]. In addition, he has been developing progressive " neurogenic claudication in addition to his chronic radicular pain/arachnoiditis. I suspect that his claudication symptoms are emanating from the L3-L4 level, adjacent level disease and his radicular symptoms/arachnoiditis from the operative sites.  Consequently, we have discussed in the past the possibility of spacers (Minuteman L3-L4 depending on anatomy at L4) Vs a new IT trial with bupivacaine or other alternative drugs (he failed trials with Prialt and also with opioids). He was found not to be a surgical candidate  He is back to the clinic to resume conversations on the treatment of these issues. He denies significant changes in his medical condition or new symptoms. He failed a trial with ropinirole.  We have discussed during his last visit the findings of his most recent diagnostic studies, as follows:  MRI of the lumbar spine w/wo contrast on 12/14/2024 postoperative changes from PLIF L4-S1. Mild retrolisthesis of L3 on L4. Clumping of the cauda equina nerve roots. No abnormal enhancement. Multilevel spondylosis. At L3-L4: Prominent ligamentum flavum hypertrophy, disc bulge, facet arthropathy contributing to severe spinal canal stenosis. Severe right and moderate left neuroforaminal stenosis. L4-L5, L5-S1: Operative levels/well decompressed.  MRI of the thoracic spine w/o contrast on 12/14/2024. Postoperative changes from posterior decompressive laminectomy at T10-T11. The thoracic spinal canal is patent throughout. No significant spinal canal or neuroforaminal stenosis.    MRI of the pelvis wo contrast on 12/14/2024. The bone marrow signal intensity is normal. The hip joints appear congruent without evidence of high-grade cartilage loss. Unremarkable appearance of the pubic symphysis and sacroiliac joints.  Unremarkable appearance of the sacral plexus. Unremarkable and symmetric appearance of the muscles. The imaged portions of the sciatic nerves appear normal and symmetric.  Lumbar spine X-rays with  flexion and extension on 11/22/2024. PLIF L4-S1 fusion with bilateral pedicle screws. There is a fracture of the left S1 pedicle screw that was noted before. Hardware otherwise appears intact. No change on flexion or extension.   LABS11/22/2024  UDS: Normal  Hemoglobin A1c 5.6%  Rheumatoid factor less than 10, negative  Sedimentation rate: 21 mm/h  C-reactive protein 0.96 mg/dL slightly elevated  CBC with differential: White blood cell count 8.5, H&H 12.7/37.1%, platelets 283,000, differential slight lymphopenia and monocytosis  CMP: Within normal limits     Chronic Pain History: Prashant Avila was originally referred by Dr. Fausto Renee in consultation for chronic intractable left gluteal and left lower extremity pain. As referenced on previous visits, Mr Avila presents with a very complex chronic pain condition. Prashant Avila underwent L4-L5 hemilaminectomy and foraminotomy at [facility] with Dr. Mackay on 03/03/2014. As he continued to struggle with pain, he underwent L4-S1 lumbar decompression and fusion by Dr. Renee on 07/24/2014. Prashant Avila experienced significant improvement after his last surgery with Dr. Renee,with complete resolution of his previously severe lower back pain. Unfortunately, he continued experiencing intractable left gluteal and left lower extremity pain that was present prior to his surgery with Dr Renee. He underwent surgical consultation with Dr. Cox at [facility], who recommended decompression of the nerve roots from L3-S1. He also underwent follow-up neurosurgical consultation with Dr. Fausto Renee on 11/03/2017. Dr. Renee discussed the possibility of lumbar decompression at L3-L4: Left L3-L4 laminotomy and foraminotomy. There is potential risk of developing instability that would necessitate extending his lumbar fusion to the L3 level. A CT myelogram was significant for stenosis above the level of his lumbar fusion, at L3-L4 without clinical correlation.  He also failed to obtain relief from diagnostic and therapeutic left L3-L4 transforaminal epidural steroid injection on April 3, 2018. During those years, he was not experiencing neurogenic claudication (pain was worse sitting or lying down, and better by standing or walking without restrictions in the distance of his walking). Consequently, he declined surgery.   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 EMG/NCV, confirmed chronic left S1 radiculopathy that resembled the dermatomic distribution of his symptoms.   On 11/21/2019, he underwent a successful SCS trial followed by implantation of a spinal cord stimulator device by Dr. Fausto Renee ([employer] Penta paddle lead. IPG: [employer] Proclaim 5 XR). Unfortunately, he never seemed completely satisfied with the use of his SCS device despite numerous attempts at SCS reprogramming. He perceived paresthesia overlapping the areas of his pain but did not like the way tonic stimulation felt. He reported no relief from Burst DR.   On 03/01/2021, he underwent explantation of the entire SCS system with Dr. Fausto Renee.   As he continued to struggle, he underwent intrathecal analgesic trials with fentanyl and Prialt without relief. On 09/08/2021, he requested to proceed with regenerative therapies, and as such he had platelet rich leukocyte poor plasma therapy and A2M therapy delivered into the lumbar epidural space via a caudal epidural catheter placement. He denied long lasting relief although the plan was to repeat this procedure at least every 3 weeks for at least 3 treatments as recommended by protocols.   Later, he regained interest in pursuing an intrathecal trial with a higher dose of opioids or Prialt or with alternative medications such as bupivacaine or clonidine. As he experienced psychological side effects with the lowest effective dose of Prialt  "during his original trial, I discouraged him with a trial with a higher dose due to a potential higher risk of worse side effects.   He also underwent neurology consultation with Dr. Kodak Alonso who concurred with patient's treatment measures. He underwent additional diagnostic studies.   MRI of the thoracic spine without contrast on 10/05/2022 revealed multilevel spondylosis without significant canal or foraminal stenosis.   X-rays of the lumbar spine on 06/02/2023 revealed PLIF L4-S1.  The left S1 pedicle screw is fractured as was present on previous imaging studies. There is disc space narrowing at L3-L4 above the fused level. Facet joint arthropathy throughout the lumbar spine.    Prashant Avila underwent follow-up neurosurgical consultation with Dr. Fausto Renee on 06/14/2023 and was found not to be a surgical candidate. Per Dr. Renee;\" While the patient has a fracture of one of the screws, his symptoms are exactly the same as they had been since prior to his first surgery.\"   He underwent follow-up psychological evaluation with Dr. Navarro in July 2023: \"From a psychological perspective, patient is an appropriate candidate for an intrathecal delivery system, a spinal cord stimulator device, a peripheral nerve stimulator device, a DRG device, and need any other procedures deemed appropriate for his condition.\"    Prashant Avila has failed to obtain pain relief for more than 10 years including conservative measures oral analgesics (opioids, naltrexone, almost every medication available for treatment of neuropathic pain except for those contraindicated due to his underlying conditions), topical analgesics, etc. He failed most of the analgesic trials due to side effects with most medications. Prashant Avial failed to obtain relief with non-pharmacological measures (ice, heat, physical therapy, physical therapist directed home exercise program HEP, to name a few). He failed two surgical interventions " (03/03/2014: L4-L5 hemilaminectomy and foraminotomy at [facility] with Dr. Mackay, on 07/24/2014: L4-S1 lumbar decompression and fusion by Dr. Renee). He failed several efforts of minimally invasive interventional pain management measures, as referenced on previous notes, including diagnostic selective nerve root injections, transforaminal epidural steroid injections, piriformis injections, spinal cord stimulator trial, spinal cord stimulator implant followed by explant, intrathecal analgesic trials, regenerative therapies, MILD.  Pain has progressed in intensity over the past years.  Prashant Avila continues on gabapentin without side effects.   He was on oral naltrexone 1 mg daily with discreet analgesic benefit. He ran out of this medication. He thinks that it helped. We discussed again potential treatments such as pulsed radiofrequency of the dorsal root ganglion of S1 (procedure is not covered by insurance), DRG, PNS, etc    Pain Description: Constant left gluteal with intermittent exacerbation, described as burning, pins and needles, and electrical sensation.   Radiation of Pain: The pain radiates intermittently from the left gluteal region down into the left lower extremity into the his left foot. He reports low grade lower back pain.   Pain intensity today: 9/10   Average pain intensity last week: 8/10  Pain intensity ranges from: 4/10 to 10/10  Aggravating factors: Pain increases with sitting for more than 20 minutes, lying down/during the night, standing 20', walking more than 100 yards. Patient describes some neurogenic claudication. Patient does not use a cane or walker. His major issue is pain in sitting position.   Alleviating factors: Pain decreases with changing positions: Standing, short walks  Associated Symptoms:   Patient reports pain, numbness, tingling, and weakness in the left lower extremity. Patient denies symptoms in the opposite limb  Patient denies any new bladder or bowel problems.    Patient reports difficulties with his balance but denies recent falls.   Pain interferes with general activities and affects patient's quality of life  Pain interferes with sleep causing sleep fragmentation (decreased since taking gabapentin)    Review of previous therapies and additional medical records:  Prashant Avila has already failed the following measures, including:   Conservative Measures: Oral analgesics (including opioids, naltrexone, most medications available for treatment of neuropathic pain except for those contraindicated due to underlying conditions, topical analgesics: failed analgesic trials due to side effects with most medications), ice, heat, physical therapy, physical therapist directed home exercise program HEP (ongoing), to name a few  Interventional Measures:   11/27/2024: Therapeutic left superior cluneal nerve block and Therapeutic left medial cluneal nerve block by hydrodissection technique under ultrasound guidance, C arm fluoroscopic guidance, and PNS guidance. Pain 9/10 to 0/10 for 24-48 hours with progressive recurrence  11/25/2024: Diagnostic left superior cluneal nerve block and diagnostic left medial cluneal nerve block by hydrodissection technique under ultrasound guidance, C arm fluoroscopic guidance, and PNS guidance: Pain 8/10 to 0/10 for 6-10 hours   02/28/2024: Dx Tx left L5-S1 and left S1 TFESI  02/01/2024: SCS trial complicated by lead migration (from top T7 to top T8). Patient opted to switch over from [employer] to [employer] and extend the SCS trial. He was hesitant about outcome.   09/08/2021: Platelet rich leukocyte poor plasma therapy and A2 M therapy, via lumbar epidural administration with caudal epidural catheter placement approach  02/06/2019: Left S1 transforaminal epidural steroid injection/pain relief that was short-lived   02/20/2017: Diagnostic and therapeutic left sciatic nerve block with local anesthetics, steroids, hyaluronidase under ultrasound,  "PNS and fluoroscopic guidance- Patient did not receive sustained relief from this procedure.   02/06/2017: Diagnostic left S1 selective nerve root injection, relief  01/30/2017: Nevro SCS Trial; no relief  04/25/2016: Trial of lumbar epidural steroid injections with steroids and hyaluronidase via caudal catheter with 100% pain relief lasting 48 hours  12/16/2015: Diagnostic and therapeutic left S1 transforaminal epidural steroid injection with hyaluronidase with complete resolution of pain for several weeks  11/30/2015: Spinal cord stimulator trial [employer], which provided him with more than 50% pain relief and functional improvement. However, patient decided to delay implantation of a permanent spinal cord stimulator device until the arrival of new technology. Patient did not like the perception of tonic stimulation, even though stimulation overlapped successfully the areas of his chronic pain, he did not find it pleasant.   02/09/2015: Diagnostic left piriformis muscle injection, no relief  Surgical Measures:   08/14/2023: MILD: Bilateral L3-L4 by Dr. Batista  03/01/2021: SCS explant by Dr. Fausto Renee.   11/21/2019: SCS implant: [employer] Penta paddle lead with the top electrodes projecting at the level of the superior endplate of the T8 vertebral level. IPG: [employer] Proclaim 5 XR IPG Non-Rechargeable by Dr. Fausto Renee   07/24/2014: L4-S1 decompression and fusion by Dr. Renee  03/03/2014: Left L4-L5 hemilaminectomy and foraminotomy at [facility] with Dr. Codie Avlia underwent neurosurgical consultation with Dr. Fausto Renee on 06/24/2023, and was found not to be a surgical candidate.  Prashant Avila underwent psychological consultation at [facility] with Dr. Navarro on 08/30/2021, per note: \"It is recommended the patient be seen for psychological treatment of his pain disorder including biofeedback.  From a psychological perspective, patient is an appropriate candidate " "for interventional pain procedures, such as a pain pump with opioids or Prialt.\" Follow-up psychological evaluation with Dr. Praveen Navarro in July 2023: \"From a psychological perspective, patient is an appropriate candidate for an intrathecal delivery system, a spinal cord stimulator device, a peripheral nerve stimulator device, a DRG device, and need any other procedures deemed appropriate for his condition.\"   Prashant Avila presents with significant comorbidities including a history of prostate cancer, eczema, gout, hyperlipidemia, hypertension, history of kidney stones, left bundle branch block, osteoarthritis, rheumatoid arthritis, hearing loss, osteoporosis  In terms of current analgesics, Prashant Avila takes: acetaminophen, gabapentin 800 mg QID, Naltrexone powder, Take 1 mg by mouth Daily  I have reviewed Jon Report consistent with medication reconciliation.  SOAPP/ORT: Low Risk     PHQ-2 Depression Screening  Little interest or pleasure in doing things? Not at all   Feeling down, depressed, or hopeless? Not at all   PHQ-2 Total Score 0      Pain Self-Efficacy Questionnaire (PSEQ)  ITEM 08-03 2023 08-29 2023 12-19 2024          I can enjoy things despite the pain. 4 3 3          I can do most of the household chores (tidying up, washing dishes, etc), despite the pain. 1 3 3          I can socialize with my friends or family members as often as I used to do, despite the pain. 1 3 3          I can cope with my pain in most situations. 4 3 3          I can do some form of work, despite the pain (includes housework, paid, and unpaid work). 1 4 3          I can still do many of the things I enjoy doing, such as hobbies or leisure activity despite pain. 0 0 0          I can cope with my pain without medications. 2 3 2          I can accomplish most of my goals in life despite the pain. 0 6 5          I can live in a normal lifestyle, despite the pain. 0 3 2          I can gradually become more " active, despite the pain. 0 0 0          TOTAL SCORE 13/60 28/60 24/60             Global Pain Scale 08-03 2023 08-29 2023 12-19 2024              Pain 19 19 21              Feelings 8 9 11              Clinical outcomes 12 13 14              Activities 20 12 14              GPS Total: 59 53 60                 The [place] Back Pain Disability Scale  DATE 08-30 2022 08-03 2023 08-29 2023 12-19 2024           Sleep through the night 3 5 4 5           Turn over in bed 0 0 0  1           Get out of bed 0 0 0  1           Make your bed 0 0 0  1           Put on socks (pantyhose) 0 0 0  0           Ride in a car 5 5 4  4           Sit in a chair for several hours 5 5 4  5           Stand up for 20-30 minutes 0 0 3  1           Climb one flight of stairs 2 0 1  1           Walk a few blocks (200-300 yards)  3 5 3  2           Walk several miles 5 5 5  5           Run one block (about 50 yards) 5 5 5  5           Take food out of the refrigerator 0 0 0  0           Reach up to high shelves 0 4 1  1           Move a chair 0 2 1  1           Pull or push heavy doors 0 2 1  3           Bend over to clean the bathtub 0 4 3  3           Throw a ball 0 5 0  1           Carry two bags of groceries 2 5 3  1           Lift and carry a heavy suitcase 3 5 4  3                                                                                                                                                                                                                                                                                      Total score 33 57 42  44             LANSS pain scale  LANSS item 08-30 2022 08-29 2023 12-19 2024   1. Does the pain produce unpleasant sensations such as tingling, pricking or pins and needles? 5 3 5   2. Is there a different skin aspect in the painful areas, i.e. skin redder than usual or appearing mottled? 0 0 0   3. Does stroking the skin in the painful area or wearing tight clothing items  produce unpleasant sensations? 0 0 0   4. Do you experience any sensations like electric shocks, bursting or jumping corresponding to painful episodes, i.e. unexplained bursts of pain? 2 2 2   5. Do you experience burning sensations in the painful areas or a sudden temperature change? 1 0 1   6. Result to stroking the non painful area and the described painful area with cotton wool: 0 0 0   7. Result to touching (pinprick) both areas with a 23 gauge needle: 3 0 0   Total Score: 08/30/2022: 13 (likely neuropathic pain)  Total Score: 08/29/2023: 5   Total Score: 12/19/2024: 8      Iron Claudication Score  All questions are in reference to an average for the past month 12-19 2024                 PAIN FREQUENCY:   How often have you experienced pain in your back or buttock or pain that goes down your back, buttock, and/or legs?  Not at all  Less than once a week  At least once a week  Every day for at least a few minutes  Every day for most of the day  Every minute of the day  4                 PAIN SEVERITY:   How would you describe the worst pain you have had in your back, buttock, and/or legs?  0. None  1. Mild  2. Moderate  3. Severe  4. Very severe  5. Intolerable  5                 BACK PAIN SEVERITY:   How would you describe the pain or discomfort in your back and/or buttocks?  0. None  1. Mild  2. Moderate  3. Severe  4. Very severe  5. Intolerable  1                 LEG PAIN SEVERITY:   How would you describe the pain or discomfort in your legs or feet?  None  Mild  Moderate  Severe  Very severe  Intolerable  5                 NERVE SYMPTOM SEVERITY:   How would you describe the numbness or tingling in your legs or feet?  None  Mild  Moderate  Severe  Very severe  Intolerable  4                 LEG WEAKNESS:   How would you describe the strength in your legs, ankles, or feet?  None  Mild  Moderate  Severe  Very severe  Intolerable  1                 BALANCE:   Which statement best describes your steadiness  when standing or walking?  0. I have had no problems with my balance.  1. I sometimes feel off-balance but I am able to walk without any aid.  2. I often feel off-balance but I am able to walk with an aid.  3. I am unable to walk without an aid.  4. I have difficulty walking despite using an aid.  5. I cannot stand up.  0                 WALKING DISTANCE:   When you went for a walk, how far were you able to walk before your back or leg started giving you trouble?  0. No limits or more than 2 miles   1. More than 1/4 mile but less than 2 miles  2. More than 100 yards but less than 1/4 mile  3. More than 50 feet but less than 100 yards  4. Less than 50 feet  5. Not at all  2                 WALKING ABILITY:   Which statement best describes your walking ability?  0. There is no limit to my walking ability.  1. I can walk far enough to do everything I want to do.  2. I am able to walk comfortably from my home to the shops or my transport.  3. I am able to walk comfortably around the house.  4. I am able to walk only from the bedroom to the bathroom or kitchen.  5. I am not able to walk at all.  1                 WALKING SPEED:   Which statement best describes your walking?  0. I am able to walk at a normal speed.  1. I walk slowly but standing upright.  2. I walk slowly and bent forward.  3. I have to stop and stand still when I walk.  4. I have to stop and sit down when I walk.  5. I cannot walk at all.  1                 Total Score: Total Score _____% = (___) x 100                                                              50   24                    Review of New Diagnostic Studies:  I have independently reviewed and interpreted the images with the patient and used the images and a tridimensional spine model to explain findings. I have also reviewed the reports.  MRI of the lumbar spine w/wo contrast on 12/14/2024 revealed expected postoperative changes from PLIF L4-S1. Mild retrolisthesis of L3 on L4. There is  some clumping of the cauda equina nerve roots. No abnormal enhancement. Multilevel spondylosis. At L3-L4: Prominent ligamentum flavum hypertrophy, disc bulge, facet arthropathy contributing to severe spinal canal stenosis. Severe right and moderate left neuroforaminal stenosis.  L4-L5, L5-S1: Operative levels/well decompressed.  MRI of the thoracic spine w/o contrast on 12/14/2024. Postoperative changes from posterior decompressive laminectomy at T10-T11. The thoracic spinal canal is patent throughout. No significant spinal canal or neuroforaminal stenosis.    MRI of the pelvis wo contrast on 12/14/2024. The bone marrow signal intensity is normal. The hip joints appear congruent without evidence of high-grade cartilage loss. Unremarkable appearance of the pubic symphysis and sacroiliac joints.  Unremarkable appearance of the sacral plexus. Unremarkable and symmetric appearance of the muscles. The imaged portions of the sciatic nerves appear normal and symmetric.  Lumbar spine X-rays with flexion and extension on 11/22/2024. PLIF L4-S1 fusion with bilateral pedicle screws. There is a fracture of the left S1 pedicle screw that was noted before. Hardware otherwise appears intact. No change on flexion or extension.   11/22/2024  UDS: Normal  Hemoglobin A1c 5.6%  Rheumatoid factor less than 10, negative  Sedimentation rate: 21 mm/h  C-reactive protein 0.96 mg/dL slightly elevated  CBC with differential: White blood cell count 8.5, H&H 12.7/37.1%, platelets 283,000, differential slight lymphopenia and monocytosis  CMP: Within normal limits    Review of Previous Diagnostic Studies:    Lumbar spine X-rays, thoracic spine X-rays on 02/01/2024   12 rib-bearing thoracic-type vertebral bodies. 5 nonrib-bearing lumbar-type vertebral bodies.  Spinal stimulator device noted grossly intact appearance of the catheters. Both leads terminate at the level of T7. Posterior spinal fusion and discectomy changes spanning L4-S1 without new  hardware fracture. Stable left S1 pedicle screw fracture. Degree of overall mild to moderate multilevel degenerative disc disease and facet arthropathy without significant change. Minimal grade 1 retrolisthesis L3 on L4 similar to prior. Degenerative related changes throughout the lumbar spine. No visualized displaced fracture or significant height loss. Mild degenerative osteoarthritis of the hip joints and SI joints.   Flexion and extension Xrays of the lumbar spine 8/3/2023: No instability  X-rays of the lumbar spine 6/2/2023: PLIF L4-S1.  The left S1 pedicle screw is fractured as was present previously.  There is disc space narrowing at L3-4 above the fused level.  There is facet joint disease throughout the lumbar spine.    MRI of the thoracic spine without contrast 10/5/2022: multilevel spondylosis without significant canal or foraminal stenosis.  EMG/NCV of the bilateral lower extremities 12/28/2018: Chronic left S1 radiculopathy  CT LUMBAR SPINE WITH CONTRAST-01/21/2019: Excellent opacification of the thecal sac. Imaging covers from T12 through the S1 level caudally. The conus terminates at the L1/L2 level.  The distal cord and conus are normal in caliber. The nerve roots of the cauda equina are normal in caliber and distribution. L4-S1 fusion hardware is in place; the left S1 pedicle screw is fractured and there is lucency around the right S1 pedicle screw. Left L4 laminotomy. Vertebral body heights and alignment are normal. Vertebral body heights are normal. Degenerative changes are as follows:  T12-L1: Facet arthropathy without significant foraminal or spinal canal stenosis.  L1-L2: Facet arthropathy without significant foraminal or spinal canal stenosis.  L2-L3: Right greater than left facet arthropathy. Mild narrowing of the right subarticular zone. No significant central spinal canal or foraminal stenosis.  L3-L4: Disc bulge and facet arthropathy with ligamentum flavum thickening. There is severe  spinal canal stenosis and at least moderate bilateral foraminal stenosis.   L4-L5: Improved right subarticular zone narrowing. No significant osseous spinal canal or foraminal stenosis.  L5-S1: Bilateral facet arthropathy. Shallow disc osteophyte. Unchanged mild osseous foraminal stenosis. No significant osseous spinal canal stenosis.  EKG April 19, 2018 revealed sinus bradycardia with first-degree AV block.  Left bundle branch block.    X-Ray lumbar spine 08/04/2017: Posterior lumbar fusion of L4, L5 and S1. Degenerative changes at L1-L2 with anterior osteophyte formation. Normal lumbar alignment in the lateral projection with a stable alignment in the flexed and extended positions.      Duplex Venous Lower, 03/25/2017: Normal right lower extremity venous duplex scan. No evidence of DVT.  MRI Lumbar Spine, 10/28/2016: Since the previous MRI study, there has been L4-5-S1 posterior lumbar and interbody fusion. No new abnormality of alignment is seen. There is, however, significant worsening of posterior element hypertrophy at L3-4, above the fusion level, due to persistent L3-4 disc bulge, and what appears to be new spinal stenosis at this level, generally as a result of posterior element hypertrophic change. Canal appears stable elsewhere. No lumbar compression deformity or new abnormality of alignment is seen. No vertebral marrow edema is appreciated. Sagittal images suggest a small (18 mm) seroma or synovial cyst on the right at L5-S1. Tip of conus medullaris is again noted at L1.  Axial images   L1-L2: No significant canal or foraminal stenosis   L2-L3: Canal appears relatively narrowed, due to posterior element hypertrophy but still greater than 1 cm in diameter. Foramina appear  lower limits of normal diameter.   L3-L4: Moderate canal stenosis with lateral narrowing, AP diameter 6.5 mm, transverse diameter approximately 9 mm with crowding of the nerve roots. Foramina appear mildly to moderately narrowed  bilaterally due to facet DJD.   L4-L5: Fused level, canal appears normal. Mild foraminal stenosis due to facet DJD  L5-S1: Canal appears normal. Left-sided neural foramen appears normal. Right-sided neural foramen appears mildly narrowed due to facet DJD. Postcontrast images show expected postoperative enhancement of the dorsal soft tissues and no pathologic postcontrast enhancement elsewhere.     The following portions of the patient's history were reviewed and updated as appropriate: problem list, past medical history, past surgery history, social history, family history, medication reconciliation, and allergies    Review of Systems   Gastrointestinal:  Positive for diarrhea.   Musculoskeletal:  Positive for arthralgias, back pain and joint swelling.   All other systems reviewed and are negative.        Patient Active Problem List   Diagnosis    Chronic lumbar radiculopathy    Lumbar postlaminectomy syndrome    Status post L4-S1 lumbar spinal fusion    Physical deconditioning    Adhesive arachnoiditis    Bilateral stenosis of lateral recess of lumbar spine    Presence of neurostimulator    Osteoarthritis    Chronic intractable pain    Encounter for therapeutic drug monitoring    Chronic pain syndrome    Intractable neuropathic pain of lower extremity, left    Gait disturbance    Polyneuropathy    Lumbar stenosis with neurogenic claudication    Ligamentum flavum hypertrophy    Non-insulin dependent type 2 diabetes mellitus    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    Neuralgia and neuritis    Diabetic polyneuropathy associated with type 2 diabetes mellitus       Past Medical History:   Diagnosis Date    Arthritis     Carcinoma of prostate     surgery     Chronic pain disorder     Chronic pain syndrome 08/02/2023    Eczema     Elevated cholesterol     Gout     History of chronic kidney disease     Hyperlipidemia     Hypertension     Joint pain Hands    Kidney stone 1990     passed     Left bundle branch block     Low back pain     Lumbar stenosis with neurogenic claudication 08/03/2023    Lumbosacral disc disease     Neuropathic pain of flank, left     Non-insulin dependent type 2 diabetes mellitus 08/10/2023    Osteoarthritis     Polyneuropathy 08/03/2023    Rheumatoid arthritis     Thoracic disc disorder     Wears hearing aid in both ears          Past Surgical History:   Procedure Laterality Date    BACK SURGERY  2014    Lumbar Fusion, july 2014    BACK SURGERY  2014    Lumbar Laminectomy, march 2014    COLONOSCOPY  2016    EPIDURAL BLOCK  2016    LAMINECTOMY  2013    LUMBAR DECOMPRESSION N/A 08/14/2023    Procedure: LUMBAR DECOMPRESSION MINIMALLY INVASIVE;  Surgeon: Efrain Batista MD;  Location:  [facility];  Service: Pain Management;  Laterality: N/A;    OTHER SURGICAL HISTORY      Enteroscopic Polypectomy    PAIN PUMP INSERTION/REVISION N/A 10/12/2020    Procedure: PAIN PUMP TRIAL;  Surgeon: Efrain Batista MD;  Location:  [facility];  Service: Pain Management;  Laterality: N/A;    OR MYELOGRAPHY VIA LUMBAR INJECT RS&I LUMBOSACRAL N/A 01/21/2019    Procedure: IR myelogram, lumbar;  Surgeon: Francisco Javier Clay MD;  Location:  [facility];  Service: Interventional Radiology    PROSTATE SURGERY      SPINAL CORD STIMULATOR IMPLANT N/A 11/21/2019    Procedure: SPINAL CORD STIMULATOR INSERTION;  Surgeon: Fausto Renee MD;  Location:  [facility];  Service: Neurosurgery    SPINAL CORD STIMULATOR IMPLANT N/A 03/01/2021    Procedure: Removal of entire system, SPINAL CORD STIMULAR REMOVAL;  Surgeon: Fausto Renee MD;  Location:  [facility];  Service: Neurosurgery;  Laterality: N/A;    TESTICLE UNDESCENDED REPAIR  1980    VOCAL CORD BIOPSY      polyps removed          Family History   Problem Relation Age of Onset    Congenital heart disease Mother     Hearing loss Mother     Hypertension Mother     Multiple myeloma Father     Breast cancer Neg Hx     Ovarian cancer Neg  "Hx          Social History     Socioeconomic History    Marital status:    Tobacco Use    Smoking status: Never     Passive exposure: Never    Smokeless tobacco: Never   Vaping Use    Vaping status: Never Used   Substance and Sexual Activity    Alcohol use: No    Drug use: No    Sexual activity: Defer           Current Outpatient Medications:     amLODIPine (NORVASC) 5 MG tablet, Take 1 tablet by mouth Daily., Disp: , Rfl:     gabapentin (NEURONTIN) 800 MG tablet, TAKE 1 TABLET EVERY MORNING, and EVERY AFTERNOON, Disp: 60 tablet, Rfl: 1    gabapentin, once-daily, (Gralise) 600 MG tablet tablet, Take 3 tablets after dinner or a snack before bedtime, Disp: 90 tablet, Rfl: 1    Gel Base gel, Use 2 g 4 (Four) Times a Day. prilocaine 2%, lidocaine 10%, imipramine 3%, capsaicin 0.001% and mannitol 20%, Disp: 240 g, Rfl: 5    losartan (COZAAR) 100 MG tablet, Take 1 tablet by mouth Daily., Disp: , Rfl:     magnesium oxide (MAG-OX) 400 MG tablet, 1 tablet QHS, Disp: 30 tablet, Rfl: 1    pravastatin (PRAVACHOL) 10 MG tablet, Take 1 tablet by mouth Daily., Disp: , Rfl:       Allergies   Allergen Reactions    Adhesive Tape Rash     Rash with blisters  Only after wearing excessive tape for SCS phase 1 for several days.    Doxycycline Rash         Ht 175.3 cm (69.02\")   Wt 81.6 kg (180 lb)   BMI 26.57 kg/m²       Physical Exam:  Constitutional: Patient appears well-developed, well-nourished, well-hydrated, appears younger than stated age  HEENT: Head: Normocephalic and atraumatic  Eyes: Conjunctivae and lids are normal  Pupils: Equal, round, reactive to light  Peripheral vascular exam: Femoral: right 2+, left 2+. Posterior tibialis: right 2+ and left 2+. Dorsalis pedis: right 2+ and left 2+. No edema.   Musculoskeletal   Gait and station: Gait evaluation demonstrated a normal gait. Able to walk on heels, toes, tandem walking   Lumbar Spine: Passive and active range of motion are almost full and without pain. Forward " flexion increased his left-sided lower back/gluteal pain. Lumbar facet joint loading maneuvers are negative.   Sacroiliac Joints Provocative Maneuvers: Negative   Piriformis maneuvers: Negative   Right Hip Joint: The range of motion of the hip joint is almost full and without pain   Left Hip Joint: The range of motion of the hip joint is almost full and without pain   Palpation of the bilateral ischial tuberosities: Unrevealing   Palpation of the bilateral psoas tendons and iliopsoas bursas: Unrevealing   Palpation of the bilateral greater trochanters: Unrevealing   Examination of the Iliotibial band: Unrevealing   Neurological:   Patient is alert and oriented to person, place, and time.   Speech: Normal.   Cortical function: Normal mental status.   Reflex Scores:  Right patellar: 0+  Left patellar: 0+  Right Achilles: 0+  Left Achilles: 0+  Motor strength: 5/5  Motor Tone: Normal  Involuntary movements: None.   Superficial/Primitive Reflexes: Primitive reflexes were absent.   Right Lamas: Absent  Left Lamas: Absent  Right ankle clonus: Absent  Left ankle clonus: Absent   Babinsky: Absent  Long tract signs: Negative. Straight leg raising test: Questionably positive on the left at 30-40 degrees with positive Lasegue. Femoral stretch sign: Negative on the right, positive on the left.   Tinel's sign: + Left superior iliac wing  Sensory exam: Intact to light touch, intact pain and temperature sensation, intact vibration sensation and normal proprioception  Coordination: Finger to nose: Normal. Balance: Normal Romberg's sign: Negative   Skin and subcutaneous tissue: Skin is warm and intact. No rash noted. No cyanosis.   Psychiatric: Judgment and insight: Normal. Recent and remote memory: Intact. Mood and affect: Normal.     PROCEDURE:  Peripheral nerve stimulator analysis and reprogramming.    The patient has been using the stimulator 100% of the time.    Three new programs were created by adjusting electrode  polarities, pulse width, pulse rate, and cycling.    ASSESSMENT:   1. Entrapment neuropathy    2. Neuralgia and neuritis    3. Chronic intractable pain    4. Adhesive arachnoiditis    5. Intractable neuropathic pain of lower extremity, left    6. Status post L4-S1 lumbar spinal fusion    7. Lumbar postlaminectomy syndrome    8. Ligamentum flavum hypertrophy    9. Lumbar stenosis with neurogenic claudication    10. Diabetic polyneuropathy associated with type 2 diabetes mellitus    11. Non-insulin dependent type 2 diabetes mellitus    12. Physical deconditioning    13. Gait disturbance    14. Presence of neurostimulator          PLAN/MEDICAL DECISION MAKING:  Mr. Prashant Avila, 80 y.o. male who is undergoing a peripheral nerve stimulator trial from which he has been experiencing significant benefits such as resolution of his nocturnal pain.  He is still experiencing discomfort in his left gluteal region and left lower extremity.  Therefore, we have decided on extending the trial.  Prashant Avila failed to respond to conservative modalities, minimally invasive interventional pain management measures, previous surgical interventions, as referenced under HPI. I have documented the impact of patient's moderate-to-severe pain contributing to significant impairment in daily activities, ADLs, and a negative impact on the patient's quality of life, as reflected on Global Pain Scale 60/100; The [place] Back Pain Disability Scale 44/100; [place] Claudication Score 24/50; The [employer] [place] [employer] Osteoarthritis Index (WOMAC) to assess pain, stiffness, and physical function in patients with hip osteoarthritis 0/96; Tinetti Gait & Balance Assessment Tool (low risk for falls). I have reviewed pertinent supporting diagnostic studies of patient's chronic pain condition as well as all available pertinent medical records to patient's chronic pain condition including previous therapies, as referenced above.  PHQ-2 Depression Screening 0; Pain Self-Efficacy Questionnaire (PSEQ) 24/60. Again, we had a lengthy conversation with Mr. Avila regarding his intractable chronic pain condition and potential therapeutic options including risks, benefits, alternative therapies, to name a few. We discussed continuation of a stepwise approach which began with the least intense level of care as determined by the extent required to treat the patient's condition. The proposed treatments are consistent with the patient's medical condition and known to be safe and effective by current guidelines and the standard of care. The duration and frequency proposed are considered appropriate for the service in accordance with accepted standards of medical practice for the diagnosis and treatment of the patient's condition and intended to improve the patient's level of function. These services will be furnished in a setting appropriate to the patient's medical needs and condition. Therefore, I have proposed the following plan:     1. Interventional pain management measures: Patient does not take blood thinners.     A. Chronic intractable Neuropathic Pain/Left gluteal pain/LLE pain: We had decided to extend his peripheral nerve stimulator trial until next week.  Then, we will make a decision as to whether proceed with implantation or move to other therapies.    Implantation of Curonic System to the left middle cluneal nerve and gluteal nerve  DRG trial left S1 and S2   Spinal cord stimulator trial with Med[employer]onic Inceptiv.     B. Lumbar spinal stenosis with neurogenic claudication: There is correlation with findings on MRI of adjacent level stenosis at L3-L4. His left lower extremity pain is way more significant than any symptoms related to stenosis at this time. We could consider spacers (Minuteman L3-L4, Vertiflex - depending on anatomy/intact L4-, repeat MILD using the new bone decorticator to facilitate access), Vs IT trials with bupivacaine or  alternative drugs    2. Diagnostic studies: None indicated at this time.     3. Pharmacological measures: Reviewed and discussed; The patient has failed numerous pharmacological trials in the past.  A. Patient takes acetaminophen PRN  B. Continue Gralise 600 mg 3 tablets at bedtime (I have advised him again to take this medication after a meal or a snack), and will continue gabapentin 800 mg QAM and at Noon. (no side effects/appropriate compliance).  I have recommended using a slow release formulation a nighttime and using immediate release formulation during the daytime.   C. Please send Rx for naltrexone 1 mg once a day, #30[facility]  D. Continue magnesium oxide 400 mg QHS (leg cramps), #30  E. Continue prilocaine 2%, lidocaine 10%, baclofen 5%, capsaicin 0.001%, and mannitol 18%  cream, apply 1 to 2 grams of cream to the affected areas every 4 to 6 hours as needed    Screening and compliance with controlled substances:  Patient has completed a SOAPP and ORT questionnaires (revealing low risk). Jon report has been reviewed and appropriate. Random urine drug screenings have been obtained and appropriate. Patient has signed a consent for treatment with controlled substances after reviewing the document and verbalizing full understanding of the risks, benefits, alternatives, and consequences of compliance and adherence with treatment and comprehensive plan of care. Patient received in hand a copy of this document.    4. Long-term rehabilitation efforts:  A. The patient does not have a history of falls. I performed a risk assessment for falls using the Tinetti gait & balance assessment tool (scored low risk for falls).   B. The patient will start a comprehensive physical therapy program at [facility] for Alter-G, gait and balance training, neurodynamics, core strengthening, gluteal and abductor strengthening, ultrasound, ASTYM, E-STIM, myofascial release, cupping, dry  needling, home exercise program, 2-3 x per week for 8 weeks   C. Contrast therapy: Apply ice-packs for 15-20 minutes, followed by heating pads for 15-20 minutes to affected area   D. Start a low impact exercise program such as water therapy, swimming, chair yoga, Pilates  E. Prashant Avila  reports that he has never smoked. He has never been exposed to tobacco smoke. He has never used smokeless tobacco.     5. The patient has been instructed to contact my office with any questions or difficulties. The patient understands the plan and agrees to proceed accordingly.    The patient has a documented plan of care to address chronic pain. Prashant Avila reports a pain score of 7/10.  Given his pain assessment as noted, treatment options were discussed and the following options were decided upon as a follow-up plan to address the patient's pain: continuation of current treatment plan for pain, educational materials on pain management, home exercises and therapy, prescription for non-opiod analgesics, referral to Physical Therapy, referral to specialist for assistance in pain treatment guidance, steroid injections, use of non-medical modalities (ice, heat, stretching and/or behavior modifications), and interventional pain management measures .       A controlled substance agreement is in chart. The patient agreed with the terms of this agreement. The patient has received a copy of the agreement.   JOCELIN query complete. JOCELIN reviewed by Efrain Batista MD.           Pain Management Panel  More data exists         Latest Ref Rng & Units 11/22/2024 8/8/2023   Pain Management Panel   Amphetamine, Urine Qual Negative Negative  Negative    Barbiturates Screen, Urine Negative Negative  Negative    Benzodiazepine Screen, Urine Negative Negative  Negative    Buprenorphine, Screen, Urine Negative Negative  Negative    Cocaine Screen, Urine Negative Negative  Negative    Fentanyl, Urine Negative Negative  Negative     Methadone Screen , Urine Negative Negative  Negative    Methamphetamine, Ur Negative Negative  Negative           Pain Medications              gabapentin (NEURONTIN) 800 MG tablet TAKE 1 TABLET EVERY MORNING, and EVERY AFTERNOON             No orders of the defined types were placed in this encounter.     Total Time Spent: 55 minutes    Please note that portions of this note were completed with a voice recognition program.   Any copied data in any portion of my note from previous notes included in the HPI, PE, MDM and/or assessment and plan has been reviewed by myself and accurate as of this date.   The 21st Century Cures Act makes medical notes like this available to patients in the interest of transparency. This is a medical document intended as peer to peer communication. It is written in medical language and may contain abbreviations or verbiage that are unfamiliar. It may appear blunt or direct. Medical documents are intended to carry relevant information, facts as evident, and the clinical opinion of the practitioner.     Efrain Batista MD    Patient Care Team:  Montse Chun MD as PCP - General (Internal Medicine)  Fausto Renee MD as Consulting Physician (Neurosurgery)  Jean Warern MD (Inactive) as Consulting Physician (Neurosurgery)  Marcelino Condon MD as Consulting Physician (Urology)  Brian Tatum III, MD as Consulting Physician (Cardiology)  SHERON Chun MD (Inactive) as Consulting Physician (Family Medicine)  Francisco Javier Clay MD as Consulting Physician (Neurosurgery)  Royer Roldan MD as Consulting Physician (General Practice)  Emigdio Watt MD as Consulting Physician (Gastroenterology)  Shola Vila MD as Consulting Physician (Otolaryngology)  Hank Frankel MD as Consulting Physician (Cardiology)  Inez Regan APRN as Nurse Practitioner (Pain Medicine)  Efrain Batista MD as Consulting Physician (Pain Medicine)     No orders of the defined types  were placed in this encounter.        No future appointments.

## 2025-05-14 ENCOUNTER — OFFICE VISIT (OUTPATIENT)
Dept: PAIN MEDICINE | Facility: CLINIC | Age: 80
End: 2025-05-14
Payer: MEDICARE

## 2025-05-14 VITALS — WEIGHT: 181 LBS | BODY MASS INDEX: 26.81 KG/M2 | HEIGHT: 69 IN

## 2025-05-14 DIAGNOSIS — M79.2 NEURALGIA AND NEURITIS: ICD-10-CM

## 2025-05-14 DIAGNOSIS — M96.1 LUMBAR POSTLAMINECTOMY SYNDROME: ICD-10-CM

## 2025-05-14 DIAGNOSIS — G03.9 ADHESIVE ARACHNOIDITIS: ICD-10-CM

## 2025-05-14 DIAGNOSIS — G58.9 ENTRAPMENT NEUROPATHY: Primary | ICD-10-CM

## 2025-05-14 DIAGNOSIS — G89.29 CHRONIC INTRACTABLE PAIN: ICD-10-CM

## 2025-05-14 DIAGNOSIS — Z98.1 STATUS POST LUMBAR SPINAL FUSION: ICD-10-CM

## 2025-05-14 DIAGNOSIS — M79.2 INTRACTABLE NEUROPATHIC PAIN OF LOWER EXTREMITY, LEFT: ICD-10-CM

## 2025-05-14 NOTE — PROGRESS NOTES
"Chief Complaint: Curonix lead pull\"        Subjective   Prashant Avila is a 80 y.o. male who presents with a longstanding history of chronic intractable left gluteal pain and left lower extremity pain with intolerance to sitting, as well as standing and walking.  On on 12/19/2024, for follow up of therapeutic left superior cluneal nerve block and therapeutic left medial cluneal nerve block by hydrodissection technique under ultrasound guidance, C arm fluoroscopic guidance, and PNS guidance. His pain level decreased from 9/10 to 0/10 lasting for about 24-48 hours with progressive recurrence.  He was last seen on 04/30/2025 when he started a peripheral nerve stimulator trial with leads placed for stimulation of the left superior and left medial cluneal nerves using the Curonix system.  He reported on his last follow-up visit with Dr. Batista on 5/8/2025 he has been sleeping a lot better since initiating his trial, which was a significant problem for him for which he was taking Valium and gabapentin.  He also reports mild relief of his left gluteal and left lower extremity pain but not sufficient, and therefore, we will extend his trial. He discontinued Valium, and despite that, instead of rebound insomnia he has been sleeping better since starting his PNS trial.  He did present for additional programming of the Curonix PNS system.  Despite these attempts, he is not receiving exceptional benefit.  He returns today for lead pull.    History of Present Illness  Mr. Prashant Avila, 80 y.o. male who presents with a longstanding history of chronic intractable left gluteal pain and left lower extremity pain with intolerance to sitting, as well as standing and walking.  On on 12/19/2024, for follow up of therapeutic left superior cluneal nerve block and therapeutic left medial cluneal nerve block by hydrodissection technique under ultrasound guidance, C arm fluoroscopic guidance, and PNS guidance. His pain level " decreased from 9/10 to 0/10 lasting for about 24-48 hours with progressive recurrence.  He was last seen on 04/30/2025 when he started a peripheral nerve stimulator trial with leads placed for stimulation of the left superior and left medial cluneal nerves using the Curonix system.  He reported on his last follow-up visit with Dr. Batista on 5/8/2025 he has been sleeping a lot better since initiating his trial, which was a significant problem for him for which he was taking Valium and gabapentin.  He also reports mild relief of his left gluteal and left lower extremity pain but not sufficient, and therefore, we will extend his trial. He discontinued Valium, and despite that, instead of rebound insomnia he has been sleeping better since starting his PNS trial.  He did present for additional programming of the Curonix PNS system.          The following portions of the patient's history were reviewed and updated as appropriate: allergies, current medications, past family history, past medical history, past social history, past surgical history, and problem list.    Review of Systems   Musculoskeletal:  Positive for back pain.   All other systems reviewed and are negative.      Objective   Physical Exam  Vitals reviewed.   Constitutional:       Appearance: Normal appearance.   HENT:      Mouth/Throat:      Mouth: Mucous membranes are moist.   Pulmonary:      Effort: Pulmonary effort is normal.   Musculoskeletal:         General: Normal range of motion.   Skin:     General: Skin is warm and dry.   Neurological:      General: No focal deficit present.      Mental Status: He is alert and oriented to person, place, and time.     Procedure #2: Removal of peripheral nerve stimulator trial leads.   Two leads were removed with tips intact. There is no erythema, drainage, or fluid accumulation at the site. The area was cleansed with chlorhexidine.  A small Covaderm was applied.     Assessment & Plan   Diagnoses and all orders  for this visit:    1. Entrapment neuropathy (Primary)    2. Neuralgia and neuritis    3. Chronic intractable pain    4. Adhesive arachnoiditis    5. Intractable neuropathic pain of lower extremity, left    6. Status post L4-S1 lumbar spinal fusion    7. Lumbar postlaminectomy syndrome       Any copied data in any portion of my note from previous notes included in the HPI, PE, MDM and/or assessment and plan has been reviewed by myself and accurate as of this date.   The 21st Century Cures Act makes medical notes like this available to patients in the interest of transparency. This is a medical document intended as peer to peer communication. It is written in medical language and may contain abbreviations or verbiage that are unfamiliar. It may appear blunt or direct. Medical documents are intended to carry relevant information, facts as evident, and the clinical opinion of the practitioner.    Plan : Unfortunately patient did not receive benefit with peripheral nerve stimulator trial.  I will let Dr. Batista know moving forward.    Inez Regan, APRN

## 2025-05-28 DIAGNOSIS — G03.9 ADHESIVE ARACHNOIDITIS: ICD-10-CM

## 2025-05-28 DIAGNOSIS — G58.9 ENTRAPMENT NEUROPATHY: ICD-10-CM

## 2025-05-30 RX ORDER — GABAPENTIN 600 MG/1
TABLET, FILM COATED ORAL
Qty: 90 TABLET | Refills: 3 | Status: SHIPPED | OUTPATIENT
Start: 2025-05-30

## 2025-06-02 DIAGNOSIS — G03.9 ADHESIVE ARACHNOIDITIS: ICD-10-CM

## 2025-06-02 DIAGNOSIS — G58.9 ENTRAPMENT NEUROPATHY: ICD-10-CM

## 2025-06-03 RX ORDER — GABAPENTIN 800 MG/1
TABLET ORAL
Qty: 60 TABLET | Refills: 3 | Status: SHIPPED | OUTPATIENT
Start: 2025-06-03

## 2025-06-05 ENCOUNTER — PATIENT MESSAGE (OUTPATIENT)
Dept: PAIN MEDICINE | Facility: CLINIC | Age: 80
End: 2025-06-05
Payer: MEDICARE

## 2025-06-06 ENCOUNTER — DOCUMENTATION (OUTPATIENT)
Dept: PAIN MEDICINE | Facility: CLINIC | Age: 80
End: 2025-06-06
Payer: MEDICARE

## 2025-06-06 DIAGNOSIS — M79.2 NEURALGIA AND NEURITIS: ICD-10-CM

## 2025-06-06 DIAGNOSIS — G89.29 CHRONIC INTRACTABLE PAIN: ICD-10-CM

## 2025-06-06 DIAGNOSIS — Z01.818 PREOPERATIVE TESTING: Primary | ICD-10-CM

## 2025-06-06 DIAGNOSIS — G58.9 ENTRAPMENT NEUROPATHY: ICD-10-CM

## 2025-06-06 RX ORDER — CHLORHEXIDINE GLUCONATE 40 MG/ML
1 SOLUTION TOPICAL DAILY
Qty: 236 ML | Refills: 0 | Status: SHIPPED | OUTPATIENT
Start: 2025-06-06

## 2025-06-06 NOTE — PROGRESS NOTES
"Prashant Avila underwent a PNS trial with Curonix. During the trial, he noticed a remarkable improvement of his nocturnal pain. He slept through the nights and stopped taking Valium. During the daytime, he noticed improvement but during the first days of the trials his relief was limited due to some procedural pain. For that reason, we extended his trial beyond the initially agreed period of 4-7 days. Upon completion of his trial, we discussed giving some time before deciding on moving forward with PNS implant.   Timur contacted me 2 weeks ago, and again on 06/05. Timur reported the following:  Nocturnal pain: My sleep was 75% improved. Able to get 7 hrs sleep. He did not take Valium during his PNS trial  Daytime pain:   Worst position; Sitting: \"Sitting was improved some  40%.\"    Reclining to rest 30%  Riding in car 30%  On telephone conversation, he confirmed that overall improvement was at about 70-75%. He is very satisfied with the outcome and is hoping to move forward with PNS implant ASAP (\"Hurting and thank YOU for helping. Timur.\")  Will initiate process    Efrain Batista MD      "

## 2025-06-12 ENCOUNTER — TELEPHONE (OUTPATIENT)
Dept: PAIN MEDICINE | Facility: CLINIC | Age: 80
End: 2025-06-12
Payer: MEDICARE

## 2025-06-12 ENCOUNTER — LAB (OUTPATIENT)
Dept: LAB | Facility: HOSPITAL | Age: 80
End: 2025-06-12
Payer: MEDICARE

## 2025-06-12 DIAGNOSIS — Z98.1 STATUS POST LUMBAR SPINAL FUSION: Primary | ICD-10-CM

## 2025-06-12 DIAGNOSIS — M96.1 LUMBAR POSTLAMINECTOMY SYNDROME: ICD-10-CM

## 2025-06-12 DIAGNOSIS — Z01.818 PREOPERATIVE TESTING: ICD-10-CM

## 2025-06-12 LAB
APTT PPP: 33.5 SECONDS (ref 22–39)
BACTERIA UR QL AUTO: NORMAL /HPF
BILIRUB UR QL STRIP: NEGATIVE
CLARITY UR: CLEAR
COLOR UR: YELLOW
DEPRECATED RDW RBC AUTO: 43.9 FL (ref 37–54)
ERYTHROCYTE [DISTWIDTH] IN BLOOD BY AUTOMATED COUNT: 12.7 % (ref 12.3–15.4)
GLUCOSE UR STRIP-MCNC: NEGATIVE MG/DL
HCT VFR BLD AUTO: 37.7 % (ref 37.5–51)
HGB BLD-MCNC: 13.1 G/DL (ref 13–17.7)
HGB UR QL STRIP.AUTO: NEGATIVE
HYALINE CASTS UR QL AUTO: NORMAL /LPF
INR PPP: 1.03 (ref 0.89–1.12)
KETONES UR QL STRIP: ABNORMAL
LEUKOCYTE ESTERASE UR QL STRIP.AUTO: NEGATIVE
MCH RBC QN AUTO: 32.8 PG (ref 26.6–33)
MCHC RBC AUTO-ENTMCNC: 34.7 G/DL (ref 31.5–35.7)
MCV RBC AUTO: 94.5 FL (ref 79–97)
NITRITE UR QL STRIP: NEGATIVE
PH UR STRIP.AUTO: 5.5 [PH] (ref 5–8)
PLATELET # BLD AUTO: 267 10*3/MM3 (ref 140–450)
PMV BLD AUTO: 10.2 FL (ref 6–12)
PROT UR QL STRIP: NEGATIVE
PROTHROMBIN TIME: 14.1 SECONDS (ref 12.2–15.3)
RBC # BLD AUTO: 3.99 10*6/MM3 (ref 4.14–5.8)
RBC # UR STRIP: NORMAL /HPF
REF LAB TEST METHOD: NORMAL
SP GR UR STRIP: 1.02 (ref 1–1.03)
SQUAMOUS #/AREA URNS HPF: NORMAL /HPF
UROBILINOGEN UR QL STRIP: ABNORMAL
WBC # UR STRIP: NORMAL /HPF
WBC NRBC COR # BLD AUTO: 7.38 10*3/MM3 (ref 3.4–10.8)

## 2025-06-12 PROCEDURE — 87086 URINE CULTURE/COLONY COUNT: CPT

## 2025-06-12 PROCEDURE — 36415 COLL VENOUS BLD VENIPUNCTURE: CPT

## 2025-06-12 PROCEDURE — 85730 THROMBOPLASTIN TIME PARTIAL: CPT

## 2025-06-12 PROCEDURE — 85610 PROTHROMBIN TIME: CPT

## 2025-06-12 PROCEDURE — 85027 COMPLETE CBC AUTOMATED: CPT

## 2025-06-12 PROCEDURE — 83036 HEMOGLOBIN GLYCOSYLATED A1C: CPT

## 2025-06-12 PROCEDURE — 81001 URINALYSIS AUTO W/SCOPE: CPT

## 2025-06-12 NOTE — TELEPHONE ENCOUNTER
Patient was in office and discharge instructions given.   Spoke with patient and gave surgery date, reminded to use bactroban in nose as pharmacy confirmed patient picked it up 5 days ago.   Patient does not have a back brace and said that he will message Dr. Batista regarding that.

## 2025-06-12 NOTE — TELEPHONE ENCOUNTER
The lab called to let us know that they do not do MRSA swabs and neither does the main lab.She said it is a type of POC testing that will need to be done in an office setting.     How do you wish to proceed?

## 2025-06-13 LAB — HBA1C MFR BLD: 5.4 % (ref 4.8–5.6)

## 2025-06-14 LAB — BACTERIA SPEC AEROBE CULT: NO GROWTH

## 2025-06-18 ENCOUNTER — OUTSIDE FACILITY SERVICE (OUTPATIENT)
Dept: PAIN MEDICINE | Facility: CLINIC | Age: 80
End: 2025-06-18
Payer: MEDICARE

## 2025-06-18 ENCOUNTER — DOCUMENTATION (OUTPATIENT)
Dept: PAIN MEDICINE | Facility: CLINIC | Age: 80
End: 2025-06-18

## 2025-06-18 DIAGNOSIS — G89.18 POSTOPERATIVE PAIN: Primary | ICD-10-CM

## 2025-06-18 PROCEDURE — 64555 IMPLANT NEUROELECTRODES: CPT | Performed by: ANESTHESIOLOGY

## 2025-06-18 PROCEDURE — 64590 INS/RPL PRPH SAC/GSTR NPG/R: CPT | Performed by: ANESTHESIOLOGY

## 2025-06-18 RX ORDER — HYDROCODONE BITARTRATE AND ACETAMINOPHEN 5; 325 MG/1; MG/1
.5-1 TABLET ORAL 4 TIMES DAILY PRN
Qty: 12 TABLET | Refills: 0 | Status: SHIPPED | OUTPATIENT
Start: 2025-06-18

## 2025-06-18 NOTE — PROGRESS NOTES
PROCEDURE DATE: 06/18/2025      PREOPERATIVE DIAGNOSES:  1. 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 Neuralgia and neuritis   3. Intractable neuropathic pain of lower extremity, left   4. Chronic intractable pain   5. Adhesive arachnoiditis   6. Lumbar postlaminectomy syndrome   7. Diabetic polyneuropathy associated with type 2 diabetes mellitus   8. Physical deconditioning     POSTOPERATIVE DIAGNOSES:  1. 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 Neuralgia and neuritis   3. Intractable neuropathic pain of lower extremity, left   4. Chronic intractable pain   5. Adhesive arachnoiditis   6. Lumbar postlaminectomy syndrome   7. Diabetic polyneuropathy associated with type 2 diabetes mellitus   8. Physical deconditioning   9. Peripheral nerve neurostimulator device in situ   10. Encounter for fitting and adjustment of neuropacemaker of peripheral nerve     PROCEDURE PERFORMED: Peripheral Nerve Stimulator (PNS) Trial to the Left Superior and Left Middle Cluneal Nerves using the [employer] System 64555 x2    PROCEDURE: Percutaneous implantation of Curonix Freedom PNS electrode array and  Left Superior Cluneal Nerve and left Middle Cluneal Nerve  CPT Codes: 06433 - Percutaneous implantation of neurostimulator electrode array peripheral nerve X2 REF [id number]  [id number] Exp. 03/01/2027 and SN [id number] Exp. 02/01/2027   88611 - Insertion or replacement of peripheral neurostimulator pulse generator or , direct or inductive coupling    SURGEON: Efrain Batista MD    ASSISTANT: NABOR Briggs (Suction, irrigation, tissue retraction, assistance with suturing and wound closure, application of dressing, and skilled APRN assistance to facilitate care of patient including application of an abdominal binder and a back brace).    ANESTHESIA: Local Anesthesia + GAET     COMPLICATIONS: None    EBL: 0    INDICATIONS: Chronic left lower back and gluteal  "pain consistent with superior and middle cluneal neuralgia. Pain has been refractory to conservative therapy. Diagnostic nerve blocks and a PNS trial demonstrated significant symptom relief. The decision was made to proceed with Curonix Elim PNS implantation for both the left superior and middle cluneal nerves. Prashant Avila underwent a PNS trial with Curonix. During the trial, he noticed a remarkable improvement in his nocturnal pain. He slept through the nights and stopped taking Valium during his trial. During the daytime, he noticed improvement but during the first days of the trial the relief of his chronic pain was clouded due to some procedural pain. For that reason, we extended his PNS trial beyond the initially agreed period of 4-7 days. Upon completion of his trial, we discussed giving him some time to recognize his relief before deciding on moving forward with PNS implant.  Timur called me a week after his trial, and again on 06/05/2025. Timur reported the following: Nocturnal pain; My sleep was 75% improved. I was able to get 7 hours of uninterrupted sleep. I did not take Valium during my PNS trial. Daytime pain: Worst position; Sitting: \"Sitting was improved some  40%.\" Reclining to rest 30%; Riding in car 30%. He confirmed an overall improvement of about 70-75%. He was very satisfied with the outcome of his PNS trial and was hoping to move forward with PNS implant. Timur reports that he has completed the recommendations for infection control prior to surgery; Hibiclens showers daily 5 days before surgery; Chlorhexidine towelettes over the surgical regions the night before and the day of surgery; intranasal Bactroban 2% three times daily for 5 days before surgery. He also completed CBC, PT, PTT, CMP, UA, MRSA screening.      PROCEDURE SUMMARY: After explaining all the risks and benefits of the procedure, an informed consent was obtained. History and physical examination were updated, and the " patient's surgical site confirmed with the patient and marked in the holding room accordingly. The /EPG site was marked with a skin marker with the patient in sitting and standing position. The patient received antibiotic prophylaxis as per the hospital's current protocol prior to skin incision. The patient was transferred to the operating room and placed on the operating room table in the prone position. All pressure sensitive areas were carefully padded. Time out was completed. The thoracic, lumbar and gluteal regions were prepped with ChloraPrep followed by DuraPrep, and draped with sterile towels, Ioban and universal drapes. The left superior cluneal nerve and left middle cluneal nerve territories were identified using surface anatomical landmarks, C-arm fluoroscopic guidance, and ultrasound guidance. The skin and subcutaneous tissues overlying the targets were anesthetized with five ml of 1% lidocaine followed by 20 ml of 0.25% bupivacaine with epinephrine 1:200,000. Under sterile conditions and fluoroscopic guidance, the first Freedom PNS electrode array (Channel A) was placed over the course of the left superior cluneal nerve. A marker needle was used to guide positioning in the A/P view. The electrode array was extended inferiorly and secured. A stab incision was created using an 11-blade, and the introducer needle was advanced toward the target using fluoroscopy to confirm proper positioning over the superior cluneal nerve pathway. The electrode was advanced through the introducer cannula, final placement was confirmed in both lateral and A/P views, and the introducer stylet was removed. The array was then connected to the  and guided into a subcutaneous  pocket created approximately 10 cm to the initial entry site. The  was secured with sutures, excess lead was coiled, and the assembly was anchored to the fascia. The second Freedom PNS device (Channel B) was implanted  similarly for the left middle cluneal nerve lateral to the sacral foramina and medial to the SI joint covering the S1, S2, and S3 neural foramen covering the middle cluneal nerve pathways. Electrode placement was performed parallel to the first implant, over the expected location of the middle cluneal branches. A second  pocket was created, and the electrode array was tunneled into the pocket and connected to the Channel B . Position was verified and stimulation was tested intraoperatively with appropriate coverage achieved. Both incisions were closed in two layers with absorbable and non-absorbable sutures and sterile dressings were applied. Appropriate electrode positioning and successful intraoperative stimulation testing for both superior and middle cluneal nerves. No complications encountered. Fluoroscopy was utilized using low-dose of radiation applying collimation, pulsed mode, and shielding following ALARA recommendations. Fluoroscopy time: 92 seconds. The patient tolerated the procedure well and without incident.  Upon completion of the procedure, the patient was transferred to the recovery room in stable condition. The patient was [facility] from [facility] neurologically intact and with appropriate discharge instructions. Complex peripheral nerve stimulator programming was completed in the recovery room. Three programs were created to deliver low-amplitude - paresthesia free stimulation (0.2-0.5 V). Paresthesia was confirmed to overlap the patient's painful region.   Patient confirmed significant pain relief with coverage overlapping the patient's painful regions.     PLAN:  Follow-up with NABOR Briggs on Monday for possible PNS adjustment and wound check.  The patient was discharged from the hospital neurologically intact at baseline and with appropriate discharge instructions:  Wear an abdominal binder around the IPG site over a cotton T-shirt at all times  Wear a back  brace when out of bed  Patient received a prescription for Lortab 5/325, 1/2 to 1 tablet every 4-6 hours for postoperative pain #10 with no refills.  The patient was instructed to call our office 24/7 with questions or concerns (there is an after clinic hours answering service); come immediately to the ER or call 911 if sudden onset of severe back pain, weakness, loss of bladder or bowel control, and/or any other major event.  The patient has been instructed to contact my office with any other questions or difficulties. The patient and family understand the plan and agree to proceed accordingly.      EMR Dragon/Transcription disclaimer: Much of this encounter note is an electronic transcription of spoken language to printed text.       Efrain Batista MD      Signatures  Electronically signed by: Efrain Batista M.D.; JUNE 18, 2025, 16:42 EST (Author)

## 2025-07-03 ENCOUNTER — OFFICE VISIT (OUTPATIENT)
Dept: PAIN MEDICINE | Facility: CLINIC | Age: 80
End: 2025-07-03
Payer: MEDICARE

## 2025-07-03 ENCOUNTER — LAB (OUTPATIENT)
Dept: LAB | Facility: HOSPITAL | Age: 80
End: 2025-07-03
Payer: MEDICARE

## 2025-07-03 VITALS — BODY MASS INDEX: 27.85 KG/M2 | HEIGHT: 69 IN | WEIGHT: 188 LBS

## 2025-07-03 DIAGNOSIS — M79.2 INTRACTABLE NEUROPATHIC PAIN OF LOWER EXTREMITY, LEFT: ICD-10-CM

## 2025-07-03 DIAGNOSIS — T14.8XXA WOUND DISCHARGE: ICD-10-CM

## 2025-07-03 DIAGNOSIS — Z46.2 ENCOUNTER FOR FITTING AND ADJUSTMENT OF NEUROPACEMAKER OF PERIPHERAL NERVE: ICD-10-CM

## 2025-07-03 DIAGNOSIS — M79.2 NEURALGIA AND NEURITIS: ICD-10-CM

## 2025-07-03 DIAGNOSIS — M54.16 CHRONIC LUMBAR RADICULOPATHY: ICD-10-CM

## 2025-07-03 DIAGNOSIS — Z96.82 PRESENCE OF NEUROSTIMULATOR: ICD-10-CM

## 2025-07-03 DIAGNOSIS — G58.9 ENTRAPMENT NEUROPATHY: ICD-10-CM

## 2025-07-03 DIAGNOSIS — M96.1 LUMBAR POSTLAMINECTOMY SYNDROME: ICD-10-CM

## 2025-07-03 LAB
ALBUMIN SERPL-MCNC: 4.2 G/DL (ref 3.5–5.2)
ALBUMIN/GLOB SERPL: 1.6 G/DL
ALP SERPL-CCNC: 91 U/L (ref 39–117)
ALT SERPL W P-5'-P-CCNC: 17 U/L (ref 1–41)
ANION GAP SERPL CALCULATED.3IONS-SCNC: 10 MMOL/L (ref 5–15)
AST SERPL-CCNC: 24 U/L (ref 1–40)
BASOPHILS # BLD AUTO: 0.09 10*3/MM3 (ref 0–0.2)
BASOPHILS NFR BLD AUTO: 1 % (ref 0–1.5)
BILIRUB SERPL-MCNC: 0.6 MG/DL (ref 0–1.2)
BUN SERPL-MCNC: 12 MG/DL (ref 8–23)
BUN/CREAT SERPL: 11.7 (ref 7–25)
CALCIUM SPEC-SCNC: 9.4 MG/DL (ref 8.6–10.5)
CHLORIDE SERPL-SCNC: 101 MMOL/L (ref 98–107)
CO2 SERPL-SCNC: 22 MMOL/L (ref 22–29)
CREAT SERPL-MCNC: 1.03 MG/DL (ref 0.76–1.27)
CRP SERPL-MCNC: <0.3 MG/DL (ref 0–0.5)
DEPRECATED RDW RBC AUTO: 44 FL (ref 37–54)
EGFRCR SERPLBLD CKD-EPI 2021: 72.5 ML/MIN/1.73
EOSINOPHIL # BLD AUTO: 0.35 10*3/MM3 (ref 0–0.4)
EOSINOPHIL NFR BLD AUTO: 3.8 % (ref 0.3–6.2)
ERYTHROCYTE [DISTWIDTH] IN BLOOD BY AUTOMATED COUNT: 12.2 % (ref 12.3–15.4)
ERYTHROCYTE [SEDIMENTATION RATE] IN BLOOD: 11 MM/HR (ref 0–20)
GLOBULIN UR ELPH-MCNC: 2.7 GM/DL
GLUCOSE SERPL-MCNC: 91 MG/DL (ref 65–99)
HCT VFR BLD AUTO: 40.3 % (ref 37.5–51)
HGB BLD-MCNC: 13.3 G/DL (ref 13–17.7)
IMM GRANULOCYTES # BLD AUTO: 0.05 10*3/MM3 (ref 0–0.05)
IMM GRANULOCYTES NFR BLD AUTO: 0.5 % (ref 0–0.5)
LYMPHOCYTES # BLD AUTO: 1.48 10*3/MM3 (ref 0.7–3.1)
LYMPHOCYTES NFR BLD AUTO: 15.9 % (ref 19.6–45.3)
MCH RBC QN AUTO: 32.4 PG (ref 26.6–33)
MCHC RBC AUTO-ENTMCNC: 33 G/DL (ref 31.5–35.7)
MCV RBC AUTO: 98.1 FL (ref 79–97)
MONOCYTES # BLD AUTO: 1 10*3/MM3 (ref 0.1–0.9)
MONOCYTES NFR BLD AUTO: 10.7 % (ref 5–12)
NEUTROPHILS NFR BLD AUTO: 6.36 10*3/MM3 (ref 1.7–7)
NEUTROPHILS NFR BLD AUTO: 68.1 % (ref 42.7–76)
NRBC BLD AUTO-RTO: 0 /100 WBC (ref 0–0.2)
PLATELET # BLD AUTO: 292 10*3/MM3 (ref 140–450)
PMV BLD AUTO: 10.4 FL (ref 6–12)
POTASSIUM SERPL-SCNC: 4.7 MMOL/L (ref 3.5–5.2)
PROT SERPL-MCNC: 6.9 G/DL (ref 6–8.5)
RBC # BLD AUTO: 4.11 10*6/MM3 (ref 4.14–5.8)
SODIUM SERPL-SCNC: 133 MMOL/L (ref 136–145)
WBC NRBC COR # BLD AUTO: 9.33 10*3/MM3 (ref 3.4–10.8)

## 2025-07-03 PROCEDURE — 80053 COMPREHEN METABOLIC PANEL: CPT

## 2025-07-03 PROCEDURE — 36415 COLL VENOUS BLD VENIPUNCTURE: CPT | Performed by: NURSE PRACTITIONER

## 2025-07-03 PROCEDURE — 85652 RBC SED RATE AUTOMATED: CPT

## 2025-07-03 PROCEDURE — 86140 C-REACTIVE PROTEIN: CPT

## 2025-07-03 PROCEDURE — 85025 COMPLETE CBC W/AUTO DIFF WBC: CPT | Performed by: NURSE PRACTITIONER

## 2025-07-03 NOTE — PROGRESS NOTES
"Chief Complaint: \"I am doing well with my stimulator device.\"          Brief History: Mr. Prashant Avila is a 80 y.o. male, who underwent on 06/18/2025 percutaneous implantation of a [employer]ab[employer] Freedom peripheral nerve stimulator electrode array and  to the left superior cluneal nerve and the left middle cluneal nerve. Device is full body MRI compatible. The device was primarily implanted for treatment of chronic intractable left lower back and gluteal pain. Prashant Avila returns to the clinic for his first postoperative visit for evaluation of his surgical wounds, removal of stitches, continued assessment of his chronic pain, and possible stimulator reprogramming. Prashant Avila reports that he has remained afebrile since his surgery and that his surgical wounds are well healed without erythema, drainage, or fluid accumulation.  was mild.  Mr. Prashant Avila reports 50% pain relief along with remarkable functional improvement with the use of his stimulator device.  Prashant Avila reports significant relief of his previous inability to sit or lie down.  Prashant Avila also reports significant improvement of his nocturnal pain. Prashant Avila would like to obtain more coverage with his stimulator device of his left gluteal pain.   Pain level is rated as 7/10 with the use of the spinal cord stimulator device.    Patient level ranges from 5/10 to 8/10 with the use of the spinal cord stimulator device.    Patient denies pain, numbness, and weakness in the lower extremities.    Please see previous OV notes for a comprehensive history     Review of previous therapies and additional medical records:  Prashant Avila has already failed the following measures, including:   Conservative Measures: Oral analgesics (including opioids, naltrexone, most medications available for treatment of neuropathic pain except for those contraindicated due to underlying conditions, topical analgesics: " failed analgesic trials due to side effects with most medications), ice, heat, physical therapy, physical therapist directed home exercise program HEP (ongoing), to name a few  Interventional Measures:   06/18/2025: Implantation of a Curonix PNS  11/27/2024: Therapeutic left superior cluneal nerve block and Therapeutic left medial cluneal nerve block by hydrodissection technique under ultrasound guidance, C arm fluoroscopic guidance, and PNS guidance. Pain 9/10 to 0/10 for 24-48 hours with progressive recurrence  11/25/2024: Diagnostic left superior cluneal nerve block and diagnostic left medial cluneal nerve block by hydrodissection technique under ultrasound guidance, C arm fluoroscopic guidance, and PNS guidance: Pain 8/10 to 0/10 for 6-10 hours   02/28/2024: Dx Tx left L5-S1 and left S1 TFESI  02/01/2024: SCS trial complicated by lead migration (from top T7 to top T8). Patient opted to switch over from [employer] to [email or web address] and extend the SCS trial. He was hesitant about outcome.   09/08/2021: Platelet rich leukocyte poor plasma therapy and A2 M therapy, via lumbar epidural administration with caudal epidural catheter placement approach  02/06/2019: Left S1 transforaminal epidural steroid injection/pain relief that was short-lived   02/20/2017: Diagnostic and therapeutic left sciatic nerve block with local anesthetics, steroids, hyaluronidase under ultrasound, PNS and fluoroscopic guidance- Patient did not receive sustained relief from this procedure.   02/06/2017: Diagnostic left S1 selective nerve root injection, relief  01/30/2017: Nevro SCS Trial; no relief  04/25/2016: Trial of lumbar epidural steroid injections with steroids and hyaluronidase via caudal catheter with 100% pain relief lasting 48 hours  12/16/2015: Diagnostic and therapeutic left S1 transforaminal epidural steroid injection with hyaluronidase with complete resolution of pain for several weeks  11/30/2015: Spinal cord stimulator trial  "[employer], which provided him with more than 50% pain relief and functional improvement. However, patient decided to delay implantation of a permanent spinal cord stimulator device until the arrival of new technology. Patient did not like the perception of tonic stimulation, even though stimulation overlapped successfully the areas of his chronic pain, he did not find it pleasant.   02/09/2015: Diagnostic left piriformis muscle injection, no relief  Surgical Measures:   08/14/2023: MILD: Bilateral L3-L4 by Dr. Batista  03/01/2021: SCS explant by Dr. Fausto Renee.   11/21/2019: SCS implant: [employer] Penta paddle lead with the top electrodes projecting at the level of the superior endplate of the T8 vertebral level. IPG: [employer] Proclaim 5 XR IPG Non-Rechargeable by Dr. Fausto Renee   07/24/2014: L4-S1 decompression and fusion by Dr. Renee  03/03/2014: Left L4-L5 hemilaminectomy and [facility]aminotomy at [facility] with Dr. Mackay   Prashant Avila underwent neurosurgical consultation with Dr. Fausto Renee on 06/24/2023, and was found not to be a surgical candidate.  Prashant Avila underwent psychological consultation at [facility] psychology with Dr. Navarro on 08/30/2021, per note: \"It is recommended the patient be seen for psychological treatment of his pain disorder including biofeedback.  From a psychological perspective, patient is an appropriate candidate for interventional pain procedures, such as a pain pump with opioids or Prialt.\" Follow-up psychological evaluation with Dr. Praveen Navarro in July 2023: \"From a psychological perspective, patient is an appropriate candidate for an intrathecal delivery system, a spinal cord stimulator device, a peripheral nerve stimulator device, a DRG device, and need any other procedures deemed appropriate for his condition.\"   Prashant Avila presents with significant comorbidities including a history of prostate cancer, eczema, gout, hyperlipidemia, " hypertension, history of kidney stones, left bundle branch block, osteoarthritis, rheumatoid arthritis, hearing loss, osteoporosis  In terms of current analgesics, Prashant Avila takes: acetaminophen, gabapentin 800 mg QID, Naltrexone powder, Take 1 mg by mouth Daily  I have reviewed Jon Report consistent with medication reconciliation.  SOAPP/ORT: Low Risk        Global Pain Scale 08-03 2023 08-29 2023 12-19 2024              Pain 19 19 21              Feelings 8 9 11              Clinical outcomes 12 13 14              Activities 20 12 14              GPS Total: 59 53 60                  Review of New Diagnostic Studies: There are no new diagnostic studies for review    The following portions of the patient's history were reviewed and updated as appropriate: problem list, past medical history, past surgery history, social history, family history, medications, and allergies    Review of Systems      Patient Active Problem List   Diagnosis    Chronic lumbar radiculopathy    Lumbar postlaminectomy syndrome    Status post L4-S1 lumbar spinal fusion    Physical deconditioning    Adhesive arachnoiditis    Bilateral stenosis of lateral recess of lumbar spine    Presence of neurostimulator    Osteoarthritis    Chronic intractable pain    Encounter for therapeutic drug monitoring    Chronic pain syndrome    Intractable neuropathic pain of lower extremity, left    Gait disturbance    Polyneuropathy    Lumbar stenosis with neurogenic claudication    Ligamentum flavum hypertrophy    Non-insulin dependent type 2 diabetes mellitus    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    Neuralgia and neuritis    Diabetic polyneuropathy associated with type 2 diabetes mellitus       Past Medical History:   Diagnosis Date    Arthritis     Carcinoma of prostate     surgery     Chronic pain disorder     Chronic pain syndrome 08/02/2023    Eczema     Elevated cholesterol     Gout     History  of chronic kidney disease     Hyperlipidemia     Hypertension     Joint pain Hands    Kidney stone 1990    passed     Left bundle branch block     Low back pain     Lumbar stenosis with neurogenic claudication 08/03/2023    Lumbosacral disc disease     Neuropathic pain of flank, left     Non-insulin dependent type 2 diabetes mellitus 08/10/2023    Osteoarthritis     Polyneuropathy 08/03/2023    Rheumatoid arthritis     Thoracic disc disorder     Wears hearing aid in both ears          Past Surgical History:   Procedure Laterality Date    BACK SURGERY  2014    Lumbar Fusion, july 2014    BACK SURGERY  2014    Lumbar Laminectomy, march 2014    COLONOSCOPY  2016    EPIDURAL BLOCK  2016    LAMINECTOMY  2013    LUMBAR DECOMPRESSION N/A 08/14/2023    Procedure: LUMBAR DECOMPRESSION MINIMALLY INVASIVE;  Surgeon: Efrain Batista MD;  Location:  [facility];  Service: Pain Management;  Laterality: N/A;    OTHER SURGICAL HISTORY      Enteroscopic Polypectomy    PAIN PUMP INSERTION/REVISION N/A 10/12/2020    Procedure: PAIN PUMP TRIAL;  Surgeon: Efrain Batista MD;  Location:  [facility];  Service: Pain Management;  Laterality: N/A;    NM MYELOGRAPHY VIA LUMBAR INJECT RS&I LUMBOSACRAL N/A 01/21/2019    Procedure: IR myelogram, lumbar;  Surgeon: Francisco Javier Clay MD;  Location:  [facility];  Service: Interventional Radiology    PROSTATE SURGERY      SPINAL CORD STIMULATOR IMPLANT N/A 11/21/2019    Procedure: SPINAL CORD STIMULATOR INSERTION;  Surgeon: Fausto Renee MD;  Location:  [facility];  Service: Neurosurgery    SPINAL CORD STIMULATOR IMPLANT N/A 03/01/2021    Procedure: Removal of entire system, SPINAL CORD STIMULAR REMOVAL;  Surgeon: Fausto Renee MD;  Location:  [facility];  Service: Neurosurgery;  Laterality: N/A;    TESTICLE UNDESCENDED REPAIR  1980    VOCAL CORD BIOPSY      polyps removed          Family History   Problem Relation Age of Onset    Congenital heart disease Mother     Hearing loss  Mother     Hypertension Mother     Multiple myeloma Father     Breast cancer Neg Hx     Ovarian cancer Neg Hx          Social History     Socioeconomic History    Marital status:    Tobacco Use    Smoking status: Never     Passive exposure: Never    Smokeless tobacco: Never   Vaping Use    Vaping status: Never Used   Substance and Sexual Activity    Alcohol use: No    Drug use: No    Sexual activity: Defer           Current Outpatient Medications:     amLODIPine (NORVASC) 5 MG tablet, Take 1 tablet by mouth Daily., Disp: , Rfl:     gabapentin (NEURONTIN) 800 MG tablet, TAKE 1 TABLET BY MOUTH EVERY MORNING AND TAKE 1 TABLET BY MOUTH EVERY AFTERNOON, Disp: 60 tablet, Rfl: 3    gabapentin, once-daily, (GRALISE) 600 MG tablet tablet, TAKE 3 TABLETS BY MOUTH AFTER DINNER OR A SNACK BEFORE BEDTIME, Disp: 90 tablet, Rfl: 3    losartan (COZAAR) 100 MG tablet, Take 1 tablet by mouth Daily., Disp: , Rfl:     pravastatin (PRAVACHOL) 10 MG tablet, Take 1 tablet by mouth Daily., Disp: , Rfl:     Chlorhexidine Gluconate 4 % solution, Apply 1 Application topically to the appropriate area as directed Daily. Apply to low back, abdomen, buttocks for 5 days prior to procedure and after procedure as directed by surgeon (Patient not taking: Reported on 7/3/2025), Disp: 236 mL, Rfl: 0    Gel Base gel, Use 2 g 4 (Four) Times a Day. prilocaine 2%, lidocaine 10%, imipramine 3%, capsaicin 0.001% and mannitol 20%, Disp: 240 g, Rfl: 5    HYDROcodone-acetaminophen (NORCO) 5-325 MG per tablet, Take 0.5 (one-half) -1 (one) tablet by mouth 4 (Four) Times a Day As Needed for SEVERE PAIN. (Patient not taking: Reported on 7/3/2025), Disp: 12 tablet, Rfl: 0    magnesium oxide (MAG-OX) 400 MG tablet, 1 tablet QHS (Patient not taking: Reported on 7/3/2025), Disp: 30 tablet, Rfl: 1    mupirocin (BACTROBAN) 2 % nasal ointment, Administer 1 Application into the nostril(s) as directed by provider 2 (Two) Times a Day. (Patient not taking: Reported on  "7/3/2025), Disp: 15 g, Rfl: 0      Allergies   Allergen Reactions    Adhesive Tape Rash     Rash with blisters  Only after wearing excessive tape for SCS phase 1 for several days.    Doxycycline Rash         Ht 175.3 cm (69\")   Wt 85.3 kg (188 lb)   BMI 27.76 kg/m²       Physical Exam   Constitutional: Patient appears well-developed, well-nourished, well-hydrated  HEENT: Head: Normocephalic and atraumatic  Eyes: Conjunctivae and lids are normal  Pupils: Equal, round, reactive to light  Musculoskeletal   Gait and station: Gait evaluation demonstrated a normal gait   Neurological:   Patient is alert and oriented to person, place, and time.   Speech: Normal.   Cortical function: Normal mental status.   Motor strength: 5/5  Motor Tone: Normal .   Involuntary movements: None.   Skin and subcutaneous tissue: Skin is warm and intact. No rash noted. No cyanosis. The surgical wounds are well-healed with appropriate epithelization and without erythema, drainage or fluid accumulation  Psychiatric: Judgment and insight: Normal. Recent and remote memory: Intact. Mood and affect: Normal.     PROCEDURE:  Peripheral nerve stimulator analysis and reprogramming.    The patient has been using the stimulator 100% of the time.    The programs were adjusted to deliver low-amplitude - paresthesia free stimulation (0.2-0.5 V). Paresthesia was confirmed to overlap the patient's painful region.         Time spent reprogramming: 15 minutes  A copy of the telemetry report will be scanned in the patient's chart.    PROCEDURE:  Removal of stitches: The surgical wounds were cleansed with chlorhexidine. Stitches were removed. There is complete epithelialization of the surgical wounds.  There is some dermatitis around the incisional sites due to the adhesive on the dressings.  With compression of the site, there is minimal serous drainage.  Covaderm were applied.       ASSESSMENT:   1. Entrapment neuropathy    2. Neuralgia and neuritis    3. " Wound discharge    4. Lumbar postlaminectomy syndrome    5. Intractable neuropathic pain of lower extremity, left    6. Chronic lumbar radiculopathy    7. Encounter for fitting and adjustment of neuropacemaker of peripheral nerve    8. Presence of peripheral nerve stimulator        PLAN/MEDICAL DECISION MAKING: Patient continues dealing with chronic pain condition and residual symptoms.  I have instructed him to shower daily, and cleanse the upper incisional site with Hibiclens and cover with a sterile dressing.  I will see him back next week for wound check.  I have also ordered blood work which he will get due to his previous history of infections.  Continue current management and any additional workup, referrals, and treatments as outlined in the following plan:  1. Follow up on in 1 week for evaluation of patient's chronic pain, check, and possible PNS reprogramming.   2. Continue back brace when out of bed.   3. Do not submerge in water (baths, pools, etc) for 2 weeks.   4. The patient has been instructed to contact my office with any questions or difficulties. The patient understands the plan and agrees to proceed accordingly.       Pain Management Panel  More data exists         Latest Ref Rng & Units 11/22/2024 8/8/2023   Pain Management Panel   Amphetamine, Urine Qual Negative Negative  Negative    Barbiturates Screen, Urine Negative Negative  Negative    Benzodiazepine Screen, Urine Negative Negative  Negative    Buprenorphine, Screen, Urine Negative Negative  Negative    Cocaine Screen, Urine Negative Negative  Negative    Fentanyl, Urine Negative Negative  Negative    Methadone Screen , Urine Negative Negative  Negative    Methamphetamine, Ur Negative Negative  Negative             Pain Medications              gabapentin (NEURONTIN) 800 MG tablet TAKE 1 TABLET BY MOUTH EVERY MORNING AND TAKE 1 TABLET BY MOUTH EVERY AFTERNOON    HYDROcodone-acetaminophen (NORCO) 5-325 MG per tablet Take 0.5 (one-half) -1  (one) tablet by mouth 4 (Four) Times a Day As Needed for SEVERE PAIN.             Orders Placed This Encounter   Procedures    Comprehensive Metabolic Panel     Standing Status:   Future     Expected Date:   7/8/2025     Expiration Date:   7/3/2026     Release to patient:   Routine Release [[id number]]    C-reactive Protein     Standing Status:   Future     Expected Date:   7/8/2025     Expiration Date:   7/3/2026     Release to patient:   Routine Release [[id number]]    Sedimentation Rate     Standing Status:   Future     Expected Date:   7/8/2025     Expiration Date:   10/3/2026     Release to patient:   Routine Release [[id number]]    CBC & Differential     Manual Differential:   No     Release to patient:   Routine Release [[id number]]        Please note that portions of this note were completed with a voice recognition program.     Any copied data in any portion of my note from previous notes included in the HPI, PE, MDM and/or assessment and plan has been reviewed by myself and accurate as of this date.     The 21st Century Cures Act makes medical notes like this available to patients in the interest of transparency. This is a medical document intended as peer to peer communication. It is written in medical language and may contain abbreviations or verbiage that are unfamiliar. It may appear blunt or direct. Medical documents are intended to carry relevant information, facts as evident, and the clinical opinion of the practitioner.     NABOR Solomon    Patient Care Team:  Montse Chun MD as PCP - General (Internal Medicine)  Fausto Renee MD as Consulting Physician (Neurosurgery)  Jean Warren MD (Inactive) as Consulting Physician (Neurosurgery)  Marcelino Condon MD as Consulting Physician (Urology)  Brian Tatum III, MD as Consulting Physician (Cardiology)  SHERON Chun MD (Inactive) as Consulting Physician (Family Medicine)  Francisco Javier Clay MD as Consulting Physician  (Neurosurgery)  Royer Roldan MD as Consulting Physician (General Practice)  Emigdio Watt MD as Consulting Physician (Gastroenterology)  Shola Vila MD as Consulting Physician (Otolaryngology)  Hank Frankel MD as Consulting Physician (Cardiology)  Inez Regan APRN as Nurse Practitioner (Pain Medicine)  Efrain Batista MD as Consulting Physician (Pain Medicine)     No orders of the defined types were placed in this encounter.        Future Appointments   Date Time Provider Department Center   7/10/2025  9:30 AM Inez Regan APRN [facility]

## 2025-07-09 NOTE — PROGRESS NOTES
"Chief Complaint: \"Wound check.\"          Brief History: Mr. Prashant Avila is a 80 y.o. male, who underwent on 06/18/2025 percutaneous implantation of a Curonix Freedom peripheral nerve stimulator electrode array and  to the left superior cluneal nerve and the left middle cluneal nerve. Device is full body MRI compatible. The device was primarily implanted for treatment of chronic intractable left lower back and gluteal pain. Prashant Avila returns to the clinic for evaluation of his surgical wounds, and continued assessment of his chronic pain, and possible stimulator reprogramming. Prashant Avila reports that he has remained afebrile since his surgery.  When I last evaluated him in office last week, he had a small amount of serous drainage from his upper midline incision.  This was thought to be due to him wearing the device belt at night and lying on the site.  He was instructed to cleanse the site daily with chlorhexidine, and apply a sterile dressing.  I did obtain labs, CMP, CBC, CRP and sed rate, to ensure no signs or symptoms of infection which were all within normal range.  He returns today for wound check.    Mr. Prashant Avila reports 50% pain relief along with remarkable functional improvement with the use of his stimulator device.  Prashant Avila reports significant relief of his previous inability to sit or lie down.  Prashant Avila also reports significant improvement of his nocturnal pain. Prashant Avila would like to obtain more coverage with his stimulator device of his left gluteal pain.   Pain level is rated as 5 /10 with the use of the spinal cord stimulator device.    Patient level ranges from 5/10 to 8/10 with the use of the spinal cord stimulator device.    Patient denies pain, numbness, and weakness in the lower extremities.    Please see previous OV notes for a comprehensive history     Review of previous therapies and additional medical records:  Prashant" Adan Avila has already failed the following measures, including:   Conservative Measures: Oral analgesics (including opioids, naltrexone, most medications available for treatment of neuropathic pain except for those contraindicated due to underlying conditions, topical analgesics: failed analgesic trials due to side effects with most medications), ice, heat, physical therapy, physical therapist directed home exercise program HEP (ongoing), to name a few  Interventional Measures:   06/18/2025: Implantation of a Curonix PNS  11/27/2024: Therapeutic left superior cluneal nerve block and Therapeutic left medial cluneal nerve block by hydrodissection technique under ultrasound guidance, C arm fluoroscopic guidance, and PNS guidance. Pain 9/10 to 0/10 for 24-48 hours with progressive recurrence  11/25/2024: Diagnostic left superior cluneal nerve block and diagnostic left medial cluneal nerve block by hydrodissection technique under ultrasound guidance, C arm fluoroscopic guidance, and PNS guidance: Pain 8/10 to 0/10 for 6-10 hours   02/28/2024: Dx Tx left L5-S1 and left S1 TFESI  02/01/2024: SCS trial complicated by lead migration (from top T7 to top T8). Patient opted to switch over from [employer] to [employer] and extend the SCS trial. He was hesitant about outcome.   09/08/2021: Platelet rich leukocyte poor plasma therapy and A2 M therapy, via lumbar epidural administration with caudal epidural catheter placement approach  02/06/2019: Left S1 transforaminal epidural steroid injection/pain relief that was short-lived   02/20/2017: Diagnostic and therapeutic left sciatic nerve block with local anesthetics, steroids, hyaluronidase under ultrasound, PNS and fluoroscopic guidance- Patient did not receive sustained relief from this procedure.   02/06/2017: Diagnostic left S1 selective nerve root injection, relief  01/30/2017: Nevro SCS Trial; no relief  04/25/2016: Trial of lumbar epidural steroid injections with steroids  "and hyaluronidase via caudal catheter with 100% pain relief lasting 48 hours  12/16/2015: Diagnostic and therapeutic left S1 transforaminal epidural steroid injection with hyaluronidase with complete resolution of pain for several weeks  11/30/2015: Spinal cord stimulator trial [employer], which provided him with more than 50% pain relief and functional improvement. However, patient decided to delay implantation of a permanent spinal cord stimulator device until the arrival of new technology. Patient did not like the perception of tonic stimulation, even though stimulation overlapped successfully the areas of his chronic pain, he did not find it pleasant.   02/09/2015: Diagnostic left piriformis muscle injection, no relief  Surgical Measures:   08/14/2023: MILD: Bilateral L3-L4 by Dr. Batista  03/01/2021: SCS explant by Dr. Fausto Renee.   11/21/2019: SCS implant: [employer] Penta paddle lead with the top electrodes projecting at the level of the superior endplate of the T8 vertebral level. IPG: [employer] Proclaim 5 XR IPG Non-Rechargeable by Dr. Fausto Renee   07/24/2014: L4-S1 decompression and fusion by Dr. Renee  03/03/2014: Left L4-L5 hemilaminectomy and foraminotomy at [facility] with Dr. Mackay   Prashant Avila underwent neurosurgical consultation with Dr. Fausto Renee on 06/24/2023, and was found not to be a surgical candidate.  Prashant Avila underwent psychological consultation at [facility] with Dr. Navarro on 08/30/2021, per note: \"It is recommended the patient be seen for psychological treatment of his pain disorder including biofeedback.  From a psychological perspective, patient is an appropriate candidate for interventional pain procedures, such as a pain pump with opioids or Prialt.\" Follow-up psychological evaluation with Dr. Praveen Navarro in July 2023: \"From a psychological perspective, patient is an appropriate candidate for an intrathecal delivery system, a spinal cord " "stimulator device, a peripheral nerve stimulator device, a DRG device, and need any other procedures deemed appropriate for his condition.\"   Prashant Avila presents with significant comorbidities including a history of prostate cancer, eczema, gout, hyperlipidemia, hypertension, history of kidney stones, left bundle branch block, osteoarthritis, rheumatoid arthritis, hearing loss, osteoporosis  In terms of current analgesics, Prashant Avila takes: acetaminophen, gabapentin 800 mg QID, Naltrexone powder, Take 1 mg by mouth Daily  I have reviewed Jon Report consistent with medication reconciliation.  SOAPP/ORT: Low Risk        Global Pain Scale 08-03 2023 08-29 2023 12-19 2024              Pain 19 19 21              Feelings 8 9 11              Clinical outcomes 12 13 14              Activities 20 12 14              GPS Total: 59 53 60                  Review of New Diagnostic Studies:   CRP, CMP CBC sed rate, within normal limits    The following portions of the patient's history were reviewed and updated as appropriate: problem list, past medical history, past surgery history, social history, family history, medications, and allergies    Review of Systems   All other systems reviewed and are negative.        Patient Active Problem List   Diagnosis    Chronic lumbar radiculopathy    Lumbar postlaminectomy syndrome    Status post L4-S1 lumbar spinal fusion    Physical deconditioning    Adhesive arachnoiditis    Bilateral stenosis of lateral recess of lumbar spine    Presence of neurostimulator    Osteoarthritis    Chronic intractable pain    Encounter for therapeutic drug monitoring    Chronic pain syndrome    Intractable neuropathic pain of lower extremity, left    Gait disturbance    Polyneuropathy    Lumbar stenosis with neurogenic claudication    Ligamentum flavum hypertrophy    Non-insulin dependent type 2 diabetes mellitus    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    Neuralgia and neuritis    Diabetic polyneuropathy associated with type 2 diabetes mellitus       Past Medical History:   Diagnosis Date    Arthritis     Carcinoma of prostate     surgery     Chronic pain disorder     Chronic pain syndrome 08/02/2023    Eczema     Elevated cholesterol     Extremity pain 6/24    Left buttock and leg    Gout     History of chronic kidney disease     Hyperlipidemia     Hypertension     Joint pain Hands    Kidney stone 1990    passed     Left bundle branch block     Low back pain     Lumbar stenosis with neurogenic claudication 08/03/2023    Lumbosacral disc disease     Neuropathic pain of flank, left     Non-insulin dependent type 2 diabetes mellitus 08/10/2023    Osteoarthritis     Polyneuropathy 08/03/2023    Rheumatoid arthritis     Thoracic disc disorder     Wears hearing aid in both ears          Past Surgical History:   Procedure Laterality Date    BACK SURGERY  2014    Lumbar Fusion, july 2014    BACK SURGERY  2014    Lumbar Laminectomy, march 2014    COLONOSCOPY  2016    EPIDURAL BLOCK  2016    LAMINECTOMY  2013    LUMBAR DECOMPRESSION N/A 08/14/2023    Procedure: LUMBAR DECOMPRESSION MINIMALLY INVASIVE;  Surgeon: Efrain Batista MD;  Location:  [facility];  Service: Pain Management;  Laterality: N/A;    OTHER SURGICAL HISTORY      Enteroscopic Polypectomy    PAIN PUMP INSERTION/REVISION N/A 10/12/2020    Procedure: PAIN PUMP TRIAL;  Surgeon: Efrain Batista MD;  Location:  [facility];  Service: Pain Management;  Laterality: N/A;    FL MYELOGRAPHY VIA LUMBAR INJECT RS&I LUMBOSACRAL N/A 01/21/2019    Procedure: IR myelogram, lumbar;  Surgeon: Francisco Javier Clay MD;  Location:  [facility];  Service: Interventional Radiology    PROSTATE SURGERY      SPINAL CORD STIMULATOR IMPLANT N/A 11/21/2019    Procedure: SPINAL CORD STIMULATOR INSERTION;  Surgeon: Fausto Renee MD;  Location:  [facility];  Service: Neurosurgery    SPINAL CORD STIMULATOR  IMPLANT N/A 03/01/2021    Procedure: Removal of entire system, SPINAL CORD STIMULAR REMOVAL;  Surgeon: Fuasto Renee MD;  Location: [facility];  Service: Neurosurgery;  Laterality: N/A;    TESTICLE UNDESCENDED REPAIR  1980    VOCAL CORD BIOPSY      polyps removed          Family History   Problem Relation Age of Onset    Congenital heart disease Mother     Hearing loss Mother     Hypertension Mother     Multiple myeloma Father     Breast cancer Neg Hx     Ovarian cancer Neg Hx          Social History     Socioeconomic History    Marital status:    Tobacco Use    Smoking status: Never     Passive exposure: Never    Smokeless tobacco: Never   Vaping Use    Vaping status: Never Used   Substance and Sexual Activity    Alcohol use: No    Drug use: No    Sexual activity: Not Currently     Partners: Female           Current Outpatient Medications:     amLODIPine (NORVASC) 5 MG tablet, Take 1 tablet by mouth Daily., Disp: , Rfl:     Chlorhexidine Gluconate 4 % solution, Apply 1 Application topically to the appropriate area as directed Daily. Apply to low back, abdomen, buttocks for 5 days prior to procedure and after procedure as directed by surgeon, Disp: 236 mL, Rfl: 0    gabapentin (NEURONTIN) 800 MG tablet, TAKE 1 TABLET BY MOUTH EVERY MORNING AND TAKE 1 TABLET BY MOUTH EVERY AFTERNOON, Disp: 60 tablet, Rfl: 3    gabapentin, once-daily, (GRALISE) 600 MG tablet tablet, TAKE 3 TABLETS BY MOUTH AFTER DINNER OR A SNACK BEFORE BEDTIME, Disp: 90 tablet, Rfl: 3    HYDROcodone-acetaminophen (NORCO) 5-325 MG per tablet, Take 0.5 (one-half) -1 (one) tablet by mouth 4 (Four) Times a Day As Needed for SEVERE PAIN., Disp: 12 tablet, Rfl: 0    losartan (COZAAR) 100 MG tablet, Take 1 tablet by mouth Daily., Disp: , Rfl:     magnesium oxide (MAG-OX) 400 MG tablet, 1 tablet QHS, Disp: 30 tablet, Rfl: 1    mupirocin (BACTROBAN) 2 % nasal ointment, Administer 1 Application into the nostril(s) as directed by provider 2 (Two)  "Times a Day., Disp: 15 g, Rfl: 0    pravastatin (PRAVACHOL) 10 MG tablet, Take 1 tablet by mouth Daily., Disp: , Rfl:     Gel Base gel, Use 2 g 4 (Four) Times a Day. prilocaine 2%, lidocaine 10%, imipramine 3%, capsaicin 0.001% and mannitol 20%, Disp: 240 g, Rfl: 5      Allergies   Allergen Reactions    Adhesive Tape Rash     Rash with blisters  Only after wearing excessive tape for SCS phase 1 for several days.    Doxycycline Rash         Ht 175.3 cm (69.02\")   Wt 84.8 kg (187 lb)   BMI 27.60 kg/m²       Physical Exam   Constitutional: Patient appears well-developed, well-nourished, well-hydrated  HEENT: Head: Normocephalic and atraumatic  Eyes: Conjunctivae and lids are normal  Pupils: Equal, round, reactive to light  Musculoskeletal   Gait and station: Gait evaluation demonstrated a normal gait   Neurological:   Patient is alert and oriented to person, place, and time.   Speech: Normal.   Cortical function: Normal mental status.   Motor strength: 5/5  Motor Tone: Normal .   Involuntary movements: None.   Skin and subcutaneous tissue: Skin is warm and intact. No rash noted. No cyanosis. The surgical wounds are well-healed with appropriate epithelization and without erythema, drainage or fluid accumulation  Psychiatric: Judgment and insight: Normal. Recent and remote memory: Intact. Mood and affect: Normal.     PROCEDURE:  Peripheral nerve stimulator analysis and reprogramming.    The patient has been using the stimulator 100% of the time.    The programs were adjusted to deliver low-amplitude - paresthesia free stimulation (0.2-0.5 V). Paresthesia was confirmed to overlap the patient's painful region.           A copy of the telemetry report will be scanned in the patient's chart.    PROCEDURE:  Wound Check: The surgical wounds were cleansed with chlorhexidine. There is complete epithelialization of the surgical wounds.  There is some dermatitis around the incisional sites due to the adhesive on the dressings.  " With compression of the site, there is no drainage.         ASSESSMENT:   1. Entrapment neuropathy    2. Neuralgia and neuritis    3. Lumbar postlaminectomy syndrome    4. Chronic lumbar radiculopathy    5. Presence of peripheral nerve stimulator          PLAN/MEDICAL DECISION MAKING: Patient continues dealing with chronic pain condition and residual symptoms.  I have instructed him to shower daily, and cleanse the upper incisional site with Hibiclens and cover with a sterile dressing.  I will see him back next week for wound check. Continue current management and any additional workup, referrals, and treatments as outlined in the following plan:  1. Follow up on in 6 weeks for evaluation of patient's chronic pain, check, and possible PNS reprogramming.   2. Continue back brace when out of bed.   3. Do not submerge in water (baths, pools, etc) for 1 week.   4. The patient has been instructed to contact my office with any questions or difficulties. The patient understands the plan and agrees to proceed accordingly.         Pain Medications              gabapentin (NEURONTIN) 800 MG tablet TAKE 1 TABLET BY MOUTH EVERY MORNING AND TAKE 1 TABLET BY MOUTH EVERY AFTERNOON    HYDROcodone-acetaminophen (NORCO) 5-325 MG per tablet Take 0.5 (one-half) -1 (one) tablet by mouth 4 (Four) Times a Day As Needed for SEVERE PAIN.             No orders of the defined types were placed in this encounter.       Please note that portions of this note were completed with a voice recognition program.     Any copied data in any portion of my note from previous notes included in the HPI, PE, MDM and/or assessment and plan has been reviewed by myself and accurate as of this date.     The 21st Century Cures Act makes medical notes like this available to patients in the interest of transparency. This is a medical document intended as peer to peer communication. It is written in medical language and may contain abbreviations or verbiage that  are unfamiliar. It may appear blunt or direct. Medical documents are intended to carry relevant information, facts as evident, and the clinical opinion of the practitioner.     NABOR Solomon    Patient Care Team:  Montse Chun MD as PCP - General (Internal Medicine)  Fausto Renee MD as Consulting Physician (Neurosurgery)  Jean Warren MD (Inactive) as Consulting Physician (Neurosurgery)  Marcelino Condon MD as Consulting Physician (Urology)  Brian Tatum III, MD as Consulting Physician (Cardiology)  SHERON Chun MD (Inactive) as Consulting Physician (Family Medicine)  Francisco Javier lCay MD as Consulting Physician (Neurosurgery)  Royer Roldan MD as Consulting Physician (General Practice)  Emigdio Watt MD as Consulting Physician (Gastroenterology)  Shola Vila MD as Consulting Physician (Otolaryngology)  Hank Frankel MD as Consulting Physician (Cardiology)  Inez Regan APRN as Nurse Practitioner (Pain Medicine)  Efrain Batista MD as Consulting Physician (Pain Medicine)     No orders of the defined types were placed in this encounter.        No future appointments.

## 2025-07-10 ENCOUNTER — OFFICE VISIT (OUTPATIENT)
Dept: PAIN MEDICINE | Facility: CLINIC | Age: 80
End: 2025-07-10
Payer: MEDICARE

## 2025-07-10 VITALS — HEIGHT: 69 IN | BODY MASS INDEX: 27.7 KG/M2 | WEIGHT: 187 LBS

## 2025-07-10 DIAGNOSIS — M96.1 LUMBAR POSTLAMINECTOMY SYNDROME: ICD-10-CM

## 2025-07-10 DIAGNOSIS — G58.9 ENTRAPMENT NEUROPATHY: ICD-10-CM

## 2025-07-10 DIAGNOSIS — M79.2 NEURALGIA AND NEURITIS: ICD-10-CM

## 2025-07-10 DIAGNOSIS — M54.16 CHRONIC LUMBAR RADICULOPATHY: ICD-10-CM

## 2025-07-10 DIAGNOSIS — Z96.82 PRESENCE OF NEUROSTIMULATOR: ICD-10-CM

## 2025-08-28 ENCOUNTER — PATIENT ROUNDING (BHMG ONLY) (OUTPATIENT)
Dept: PAIN MEDICINE | Facility: CLINIC | Age: 80
End: 2025-08-28

## 2025-08-28 ENCOUNTER — LAB (OUTPATIENT)
Dept: LAB | Facility: HOSPITAL | Age: 80
End: 2025-08-28
Payer: MEDICARE

## 2025-08-28 ENCOUNTER — OFFICE VISIT (OUTPATIENT)
Dept: PAIN MEDICINE | Facility: CLINIC | Age: 80
End: 2025-08-28
Payer: MEDICARE

## 2025-08-28 VITALS — BODY MASS INDEX: 27.99 KG/M2 | HEIGHT: 69 IN | WEIGHT: 189 LBS

## 2025-08-28 DIAGNOSIS — G03.9 ADHESIVE ARACHNOIDITIS: ICD-10-CM

## 2025-08-28 DIAGNOSIS — M96.1 LUMBAR POSTLAMINECTOMY SYNDROME: ICD-10-CM

## 2025-08-28 DIAGNOSIS — Z46.2 ENCOUNTER FOR FITTING AND ADJUSTMENT OF NEUROPACEMAKER OF PERIPHERAL NERVE: ICD-10-CM

## 2025-08-28 DIAGNOSIS — R53.81 PHYSICAL DECONDITIONING: ICD-10-CM

## 2025-08-28 DIAGNOSIS — Z51.81 THERAPEUTIC DRUG MONITORING: ICD-10-CM

## 2025-08-28 DIAGNOSIS — E11.9 NON-INSULIN DEPENDENT TYPE 2 DIABETES MELLITUS: ICD-10-CM

## 2025-08-28 DIAGNOSIS — Z96.82 PRESENCE OF NEUROSTIMULATOR: ICD-10-CM

## 2025-08-28 DIAGNOSIS — G58.9 ENTRAPMENT NEUROPATHY: ICD-10-CM

## 2025-08-28 DIAGNOSIS — R26.9 GAIT DISTURBANCE: ICD-10-CM

## 2025-08-28 DIAGNOSIS — E11.42 DIABETIC POLYNEUROPATHY ASSOCIATED WITH TYPE 2 DIABETES MELLITUS: ICD-10-CM

## 2025-08-28 DIAGNOSIS — M79.2 NEURALGIA AND NEURITIS: ICD-10-CM

## 2025-08-28 DIAGNOSIS — M54.16 CHRONIC LUMBAR RADICULOPATHY: ICD-10-CM

## 2025-08-28 LAB
AMPHET+METHAMPHET UR QL: NEGATIVE
AMPHETAMINES UR QL: NEGATIVE
BARBITURATES UR QL SCN: NEGATIVE
BENZODIAZ UR QL SCN: NEGATIVE
BUPRENORPHINE SERPL-MCNC: NEGATIVE NG/ML
CANNABINOIDS SERPL QL: NEGATIVE
COCAINE UR QL: NEGATIVE
FENTANYL UR-MCNC: NEGATIVE NG/ML
METHADONE UR QL SCN: NEGATIVE
OPIATES UR QL: NEGATIVE
OXYCODONE UR QL SCN: NEGATIVE
PCP UR QL SCN: NEGATIVE
TRICYCLICS UR QL SCN: NEGATIVE

## 2025-08-28 PROCEDURE — G0480 DRUG TEST DEF 1-7 CLASSES: HCPCS

## 2025-08-28 PROCEDURE — 80307 DRUG TEST PRSMV CHEM ANLYZR: CPT | Performed by: ANESTHESIOLOGY

## 2025-08-30 LAB — GABAPENTIN UR-MCNC: >800 UG/ML

## (undated) DEVICE — PK NEURO DISC 10

## (undated) DEVICE — TP SILK DURAPORE 3IN

## (undated) DEVICE — ELECTRD BLD EZ CLN MOD 4IN

## (undated) DEVICE — HDRST INTUB GENTLETOUCH SLOT 7IN RT

## (undated) DEVICE — Device

## (undated) DEVICE — 3M™ STERI-DRAPE™ INSTRUMENT POUCH 1018: Brand: STERI-DRAPE™

## (undated) DEVICE — ADHS SKIN PREMIERPRO EXOFIN TOPICAL HI/VISC .5ML

## (undated) DEVICE — GLV SURG SIGNATURE TOUCH PF LTX 7.5 STRL BX/50

## (undated) DEVICE — NEEDLE, QUINCKE 22GX3.5": Brand: MEDLINE INDUSTRIES, INC.

## (undated) DEVICE — SUT VIC 3/0 PS2 27IN J427H

## (undated) DEVICE — 3M™ IOBAN™ 2 ANTIMICROBIAL INCISE DRAPE 6650EZ: Brand: IOBAN™ 2

## (undated) DEVICE — DRAPE,TOP,102X53,STERILE: Brand: MEDLINE

## (undated) DEVICE — CODMAN® DISPOSABLE CATHETER PASSER: Brand: CODMAN®

## (undated) DEVICE — STRAP POSTN KN/BDY FM 5X72IN DISP

## (undated) DEVICE — 3M™ STERI-STRIP™ REINFORCED ADHESIVE SKIN CLOSURES, R1547, 1/2 IN X 4 IN (12 MM X 100 MM), 6 STRIPS/ENVELOPE: Brand: 3M™ STERI-STRIP™

## (undated) DEVICE — GLV SURG PREMIERPRO MIC LTX PF SZ7.5 BRN

## (undated) DEVICE — GLV SURG PREMIERPRO MIC LTX PF SZ6.5 BRN

## (undated) DEVICE — SUT NUROLON 0 MO7 CR8 18IN C541D

## (undated) DEVICE — DBD-DRAPE,LAP,CHOLE,W/TROUGHS,STERILE: Brand: MEDLINE

## (undated) DEVICE — DRSNG WND BORDR/ADHS NONADHR/GZ LF 4X4IN STRL

## (undated) DEVICE — PK LAP LASR CHOLE 10

## (undated) DEVICE — ADHS LIQ MASTISOL 2/3ML

## (undated) DEVICE — GLV SURG PREMIERPRO MIC LTX PF SZ8 BRN

## (undated) DEVICE — TOOL 14MH30D LEGEND 14CM 3MM MH DIAM: Brand: MIDAS REX ™

## (undated) DEVICE — NDL SPINE 18G 31/2IN PNK

## (undated) DEVICE — SKIN AFFIX SURG ADHESIVE 72/CS 0.55ML: Brand: MEDLINE

## (undated) DEVICE — KT CATH PERC INTRASPINAL

## (undated) DEVICE — APPL CHLORAPREP W/TINT 26ML BLU

## (undated) DEVICE — CONTAINER,SPECIMEN,OR STERILE,4OZ: Brand: MEDLINE

## (undated) DEVICE — PK MINOR SPLT 10

## (undated) DEVICE — TRY L/P SFTY A/20G

## (undated) DEVICE — SNAP KOVER: Brand: UNBRANDED

## (undated) DEVICE — SUT VIC PLS CTD ANTIB BR 3/0 8/18IN 45CM

## (undated) DEVICE — SUT SILK 2/0 SH CR8 18IN CR8 C012D

## (undated) DEVICE — GLV SURG PREMIERPRO MIC LTX PF SZ7 BRN

## (undated) DEVICE — 3M™ MEDIPORE™ H SOFT CLOTH SURGICAL TAPE, 2863, 3 IN X 10 YD, 12/CASE: Brand: 3M™ MEDIPORE™

## (undated) DEVICE — APPL DURAPREP IODOPHOR APL 26ML

## (undated) DEVICE — SHEET, T, LAPAROTOMY, STERILE: Brand: MEDLINE

## (undated) DEVICE — SUT ETHLN 3/0 FS1 30IN 669H

## (undated) DEVICE — SUT NUROLON 0 CT1 30IN 5424H

## (undated) DEVICE — IRRIGATOR BULB ASEPTO 60CC STRL

## (undated) DEVICE — DRSNG WND BORDR/ADHS NONADHR/GZ LF 2X2IN STRL

## (undated) DEVICE — GLV SURG SIGNATURE TOUCH PF LTX 7.5 STRL

## (undated) DEVICE — CVR HNDL LT SURG ACCSSRY BLU STRL

## (undated) DEVICE — GAUZE,SPONGE,4"X4",16PLY,XRAY,STRL,LF: Brand: MEDLINE

## (undated) DEVICE — NDL EPID PERIFIX TUOHY 17G 4.5IN

## (undated) DEVICE — ELECTRD BLD EZ CLN STD 2.5IN

## (undated) DEVICE — ANTIBACTERIAL UNDYED BRAIDED (POLYGLACTIN 910), SYNTHETIC ABSORBABLE SUTURE: Brand: COATED VICRYL

## (undated) DEVICE — PATIENT RETURN ELECTRODE, SINGLE-USE, CONTACT QUALITY MONITORING, ADULT, WITH 9FT CORD, FOR PATIENTS WEIGING OVER 33LBS. (15KG): Brand: MEGADYNE

## (undated) DEVICE — SYR LL TP 10ML STRL

## (undated) DEVICE — SYR CONTRL LUERLOK 10CC

## (undated) DEVICE — HOLDER: Brand: DEROYAL

## (undated) DEVICE — DRSNG WND GZ PAD BORDERED 4X8IN STRL

## (undated) DEVICE — POSTN ARM CRDL LAMIN PK/2

## (undated) DEVICE — BRAIN SPATULA, 7 7/8", (200 MM), DOUBLE ENDED, MALLEABLE, WIDTH: 10 MM, SILICONE, STERILE, DISPOSABLE, PACKAGE OF 10 PIECES: Brand: AESCULAP

## (undated) DEVICE — GLV SURG BIOGEL LTX PF 8

## (undated) DEVICE — SYR EPILOR LOR LL 7ML LF

## (undated) DEVICE — SYR LUERLOK 20CC BX/50

## (undated) DEVICE — ACCY PA700 LUBRICANT DIFFUSER MR7 4 PACK: Brand: MIDAS REX

## (undated) DEVICE — APPL CHLORAPREP TINTED 26ML TEAL

## (undated) DEVICE — Device: Brand: MILD DEVICE KIT

## (undated) DEVICE — SYRINGE, LUER LOCK, 5ML: Brand: MEDLINE

## (undated) DEVICE — ST EXT IV SMRTSTE 2VLV FIX M LL 6ML 41

## (undated) DEVICE — ELECTRD BLD EXT EDGE/INSUL 1P 4IN

## (undated) DEVICE — PENCL ROCKRSWCH MEGADYNE W/HOLSTR 10FT SS

## (undated) DEVICE — BOWL UTIL STRL 32OZ